# Patient Record
Sex: MALE | Race: WHITE | NOT HISPANIC OR LATINO | Employment: OTHER
[De-identification: names, ages, dates, MRNs, and addresses within clinical notes are randomized per-mention and may not be internally consistent; named-entity substitution may affect disease eponyms.]

---

## 2017-01-18 ENCOUNTER — MYAURORA ACCOUNT LINK (OUTPATIENT)
Dept: OTHER | Age: 67
End: 2017-01-18

## 2017-01-18 ENCOUNTER — PRIOR ORIGINAL RECORDS (OUTPATIENT)
Dept: OTHER | Age: 67
End: 2017-01-18

## 2017-03-05 DIAGNOSIS — K21.9 GASTROESOPHAGEAL REFLUX DISEASE, ESOPHAGITIS PRESENCE NOT SPECIFIED: Primary | ICD-10-CM

## 2017-03-07 RX ORDER — PANTOPRAZOLE SODIUM 40 MG/1
TABLET, DELAYED RELEASE ORAL
Qty: 90 TABLET | Refills: 1 | Status: SHIPPED | OUTPATIENT
Start: 2017-03-07 | End: 2017-06-29

## 2017-03-07 NOTE — TELEPHONE ENCOUNTER
Medication refilled per protocol.     LOV 12/9/2016    Future Appointments  Date Time Provider Cindi Calhoun   6/9/2017 8:30 AM Chris Duvall MD [facility] 3 [facility]         Signed Prescriptions Disp Refills    PANTOPRAZOLE SODIUM 40 MG Oral Tab EC 90

## 2017-04-03 ENCOUNTER — MYAURORA ACCOUNT LINK (OUTPATIENT)
Dept: OTHER | Age: 67
End: 2017-04-03

## 2017-04-03 ENCOUNTER — PRIOR ORIGINAL RECORDS (OUTPATIENT)
Dept: OTHER | Age: 67
End: 2017-04-03

## 2017-04-04 ENCOUNTER — MED REC SCAN ONLY (OUTPATIENT)
Dept: FAMILY MEDICINE CLINIC | Facility: CLINIC | Age: 67
End: 2017-04-04

## 2017-04-04 ENCOUNTER — PRIOR ORIGINAL RECORDS (OUTPATIENT)
Dept: OTHER | Age: 67
End: 2017-04-04

## 2017-04-05 ENCOUNTER — PRIOR ORIGINAL RECORDS (OUTPATIENT)
Dept: OTHER | Age: 67
End: 2017-04-05

## 2017-04-05 ENCOUNTER — HOSPITAL (OUTPATIENT)
Dept: OTHER | Age: 67
End: 2017-04-05
Attending: INTERNAL MEDICINE

## 2017-04-05 ENCOUNTER — DIAGNOSTIC TRANS (OUTPATIENT)
Dept: OTHER | Age: 67
End: 2017-04-05

## 2017-04-05 LAB
ANALYZER ANC (IANC): NORMAL
ANION GAP SERPL CALC-SCNC: 13 MMOL/L (ref 10–20)
BUN SERPL-MCNC: 17 MG/DL (ref 6–20)
BUN/CREAT SERPL: 19 (ref 7–25)
CALCIUM SERPL-MCNC: 9.1 MG/DL (ref 8.4–10.2)
CHLORIDE: 102 MMOL/L (ref 98–107)
CO2 SERPL-SCNC: 29 MMOL/L (ref 21–32)
CREAT SERPL-MCNC: 0.9 MG/DL (ref 0.67–1.17)
ERYTHROCYTE [DISTWIDTH] IN BLOOD: 12.9 % (ref 11–15)
GLUCOSE SERPL-MCNC: 139 MG/DL (ref 65–99)
HEMATOCRIT: 46.3 % (ref 39–51)
HGB BLD-MCNC: 16.4 GM/DL (ref 13–17)
MCH RBC QN AUTO: 31.8 PG (ref 26–34)
MCHC RBC AUTO-ENTMCNC: 35.4 GM/DL (ref 32–36.5)
MCV RBC AUTO: 89.7 FL (ref 78–100)
PLATELET # BLD: 160 THOUSAND/MCL (ref 140–450)
POTASSIUM SERPL-SCNC: 3.6 MMOL/L (ref 3.4–5.1)
RBC # BLD: 5.16 MILLION/MCL (ref 4.5–5.9)
SODIUM SERPL-SCNC: 140 MMOL/L (ref 135–145)
WBC # BLD: 8.6 THOUSAND/MCL (ref 4.2–11)

## 2017-04-06 LAB
BUN: 17 MG/DL
CALCIUM: 9.1 MG/DL
CHLORIDE: 102 MEQ/L
CREATININE, SERUM: 0.9 MG/DL
GLUCOSE: 139 MG/DL
HEMATOCRIT: 46.3 %
HEMOGLOBIN: 16.4 G/DL
PLATELETS: 160 K/UL
POTASSIUM, SERUM: 3.6 MEQ/L
RED BLOOD COUNT: 5.16 X 10-6/U
SODIUM: 140 MEQ/L
WHITE BLOOD COUNT: 8.6 X 10-3/U

## 2017-04-07 ENCOUNTER — PRIOR ORIGINAL RECORDS (OUTPATIENT)
Dept: OTHER | Age: 67
End: 2017-04-07

## 2017-04-10 ENCOUNTER — PRIOR ORIGINAL RECORDS (OUTPATIENT)
Dept: OTHER | Age: 67
End: 2017-04-10

## 2017-04-19 ENCOUNTER — PRIOR ORIGINAL RECORDS (OUTPATIENT)
Dept: OTHER | Age: 67
End: 2017-04-19

## 2017-04-19 ENCOUNTER — OFFICE VISIT (OUTPATIENT)
Dept: FAMILY MEDICINE CLINIC | Facility: CLINIC | Age: 67
End: 2017-04-19

## 2017-04-19 ENCOUNTER — LAB ENCOUNTER (OUTPATIENT)
Dept: LAB | Age: 67
End: 2017-04-19
Payer: COMMERCIAL

## 2017-04-19 VITALS
BODY MASS INDEX: 33.22 KG/M2 | DIASTOLIC BLOOD PRESSURE: 64 MMHG | RESPIRATION RATE: 12 BRPM | HEIGHT: 75.4 IN | HEART RATE: 68 BPM | WEIGHT: 270 LBS | SYSTOLIC BLOOD PRESSURE: 132 MMHG

## 2017-04-19 DIAGNOSIS — R91.1 LUNG NODULE SEEN ON IMAGING STUDY: ICD-10-CM

## 2017-04-19 DIAGNOSIS — J01.00 ACUTE NON-RECURRENT MAXILLARY SINUSITIS: ICD-10-CM

## 2017-04-19 DIAGNOSIS — I10 ESSENTIAL HYPERTENSION, MALIGNANT: ICD-10-CM

## 2017-04-19 DIAGNOSIS — R94.39 ABNORMAL STRESS TEST: Primary | ICD-10-CM

## 2017-04-19 DIAGNOSIS — E55.9 AVITAMINOSIS D: ICD-10-CM

## 2017-04-19 DIAGNOSIS — I25.10 CORONARY ATHEROSCLEROSIS OF NATIVE CORONARY ARTERY: ICD-10-CM

## 2017-04-19 DIAGNOSIS — E78.2 MIXED HYPERLIPIDEMIA: Primary | ICD-10-CM

## 2017-04-19 DIAGNOSIS — N40.0 BENIGN NON-NODULAR PROSTATIC HYPERPLASIA WITHOUT LOWER URINARY TRACT SYMPTOMS: ICD-10-CM

## 2017-04-19 PROCEDURE — 80053 COMPREHEN METABOLIC PANEL: CPT

## 2017-04-19 PROCEDURE — 82306 VITAMIN D 25 HYDROXY: CPT

## 2017-04-19 PROCEDURE — 36415 COLL VENOUS BLD VENIPUNCTURE: CPT

## 2017-04-19 PROCEDURE — 84153 ASSAY OF PSA TOTAL: CPT

## 2017-04-19 PROCEDURE — 80061 LIPID PANEL: CPT

## 2017-04-19 PROCEDURE — 99214 OFFICE O/P EST MOD 30 MIN: CPT | Performed by: FAMILY MEDICINE

## 2017-04-19 RX ORDER — AZITHROMYCIN 250 MG/1
TABLET, FILM COATED ORAL
Qty: 6 TABLET | Refills: 0 | Status: SHIPPED | OUTPATIENT
Start: 2017-04-19 | End: 2017-10-20 | Stop reason: ALTCHOICE

## 2017-04-19 RX ORDER — AZELASTINE 1 MG/ML
2 SPRAY, METERED NASAL 2 TIMES DAILY
Qty: 1 BOTTLE | Refills: 3 | Status: SHIPPED | OUTPATIENT
Start: 2017-04-19 | End: 2018-05-09

## 2017-04-19 NOTE — PROGRESS NOTES
Patient presents with:  Test Results     HPI:   Genia Up is a 77year old male who presents to the office to discuss recent testing results . Heart testing, and an abnl CXR. Cardiologist is Doreen. Heart history: 2010 he has quad bypass. including Test Results. 1. Abnormal stress test  And cath  Need to follow up with cards to determine next step  Avoid heavy exertion in mean time.      2. Lung nodule seen on imaging study  Subtle nodule in LLL  Will order CT for better definition.  - CT

## 2017-04-24 LAB
ALBUMIN: 3.9 G/DL
ALT (SGPT): 71 U/L
AST (SGOT): 59 U/L
BILIRUBIN TOTAL: 0.7 MG/DL
BUN: 14 MG/DL
CALCIUM: 9.8 MG/DL
CHLORIDE: 102 MEQ/L
CHOLESTEROL, TOTAL: 186 MG/DL
CREATININE, SERUM: 1.09 MG/DL
GLUCOSE: 115 MG/DL
GLUCOSE: 115 MG/DL
HDL CHOLESTEROL: 48 MG/DL
LDL CHOLESTEROL: 89 MG/DL
POTASSIUM, SERUM: 3.7 MEQ/L
PROTEIN, TOTAL: 7.7 G/DL
SGOT (AST): 59 IU/L
SGPT (ALT): 71 IU/L
SODIUM: 140 MEQ/L
TRIGLYCERIDES: 247 MG/DL
VITAMIN D 25-OH: 37.2 NG/ML

## 2017-04-26 ENCOUNTER — PRIOR ORIGINAL RECORDS (OUTPATIENT)
Dept: OTHER | Age: 67
End: 2017-04-26

## 2017-05-01 ENCOUNTER — HOSPITAL ENCOUNTER (OUTPATIENT)
Dept: CT IMAGING | Facility: HOSPITAL | Age: 67
Discharge: HOME | End: 2017-05-01
Attending: FAMILY MEDICINE
Payer: COMMERCIAL

## 2017-05-01 DIAGNOSIS — R91.1 LUNG NODULE SEEN ON IMAGING STUDY: ICD-10-CM

## 2017-05-01 PROCEDURE — 71260 CT THORAX DX C+: CPT

## 2017-06-29 DIAGNOSIS — K21.9 GASTROESOPHAGEAL REFLUX DISEASE, ESOPHAGITIS PRESENCE NOT SPECIFIED: ICD-10-CM

## 2017-06-29 NOTE — TELEPHONE ENCOUNTER
New script for Pantoprazole Sodium 40 mg # 80 from [facility] (new pharmacy -updated in patients chart). Rx pended. Routed to Clinical Staff.

## 2017-06-30 RX ORDER — PANTOPRAZOLE SODIUM 40 MG/1
40 TABLET, DELAYED RELEASE ORAL
Qty: 90 TABLET | Refills: 1 | Status: SHIPPED | OUTPATIENT
Start: 2017-06-30 | End: 2017-12-31

## 2017-10-09 ENCOUNTER — TELEPHONE (OUTPATIENT)
Dept: FAMILY MEDICINE CLINIC | Facility: CLINIC | Age: 67
End: 2017-10-09

## 2017-10-09 DIAGNOSIS — R73.9 ELEVATED BLOOD SUGAR: ICD-10-CM

## 2017-10-09 DIAGNOSIS — E78.00 PURE HYPERCHOLESTEROLEMIA: Primary | ICD-10-CM

## 2017-10-09 DIAGNOSIS — M10.9 GOUTY ARTHROPATHY: ICD-10-CM

## 2017-10-17 ENCOUNTER — LAB ENCOUNTER (OUTPATIENT)
Dept: LAB | Age: 67
End: 2017-10-17
Attending: FAMILY MEDICINE
Payer: COMMERCIAL

## 2017-10-17 ENCOUNTER — PRIOR ORIGINAL RECORDS (OUTPATIENT)
Dept: OTHER | Age: 67
End: 2017-10-17

## 2017-10-17 DIAGNOSIS — R73.9 ELEVATED BLOOD SUGAR: ICD-10-CM

## 2017-10-17 DIAGNOSIS — E55.9 AVITAMINOSIS D: Primary | ICD-10-CM

## 2017-10-17 DIAGNOSIS — E78.00 PURE HYPERCHOLESTEROLEMIA: ICD-10-CM

## 2017-10-17 DIAGNOSIS — M10.9 GOUTY ARTHROPATHY: ICD-10-CM

## 2017-10-17 PROCEDURE — 83036 HEMOGLOBIN GLYCOSYLATED A1C: CPT

## 2017-10-17 PROCEDURE — 85025 COMPLETE CBC W/AUTO DIFF WBC: CPT

## 2017-10-17 PROCEDURE — 82306 VITAMIN D 25 HYDROXY: CPT

## 2017-10-17 PROCEDURE — 80061 LIPID PANEL: CPT

## 2017-10-17 PROCEDURE — 84550 ASSAY OF BLOOD/URIC ACID: CPT

## 2017-10-17 PROCEDURE — 36415 COLL VENOUS BLD VENIPUNCTURE: CPT

## 2017-10-17 PROCEDURE — 80053 COMPREHEN METABOLIC PANEL: CPT

## 2017-10-18 LAB
ALBUMIN: 3.9 G/DL
ALKALINE PHOSPHATATE(ALK PHOS): 62 IU/L
ALT (SGPT): 74 U/L
AST (SGOT): 58 U/L
BILIRUBIN TOTAL: 0.7 MG/DL
BUN: 13 MG/DL
CALCIUM: 9.6 MG/DL
CHLORIDE: 102 MEQ/L
CHOLESTEROL, TOTAL: 183 MG/DL
CREATININE, SERUM: 1.11 MG/DL
GLUCOSE: 136 MG/DL
GLUCOSE: 136 MG/DL
HDL CHOLESTEROL: 47 MG/DL
HEMOGLOBIN A1C: 6.6 %
LDL CHOLESTEROL: 76 MG/DL
POTASSIUM, SERUM: 4 MEQ/L
PROTEIN, TOTAL: 7.8 G/DL
SGOT (AST): 58 IU/L
SGPT (ALT): 74 IU/L
SODIUM: 141 MEQ/L
TRIGLYCERIDES: 298 MG/DL
VITAMIN D 25-OH: 35.8 NG/ML

## 2017-10-19 ENCOUNTER — PRIOR ORIGINAL RECORDS (OUTPATIENT)
Dept: OTHER | Age: 67
End: 2017-10-19

## 2017-10-20 ENCOUNTER — OFFICE VISIT (OUTPATIENT)
Dept: FAMILY MEDICINE CLINIC | Facility: CLINIC | Age: 67
End: 2017-10-20

## 2017-10-20 VITALS
TEMPERATURE: 99 F | HEART RATE: 72 BPM | WEIGHT: 268 LBS | SYSTOLIC BLOOD PRESSURE: 136 MMHG | DIASTOLIC BLOOD PRESSURE: 84 MMHG | RESPIRATION RATE: 12 BRPM | BODY MASS INDEX: 31.64 KG/M2 | HEIGHT: 77 IN

## 2017-10-20 DIAGNOSIS — R73.9 ELEVATED BLOOD SUGAR: ICD-10-CM

## 2017-10-20 DIAGNOSIS — E78.00 PURE HYPERCHOLESTEROLEMIA: ICD-10-CM

## 2017-10-20 DIAGNOSIS — Z00.00 ANNUAL PHYSICAL EXAM: Primary | ICD-10-CM

## 2017-10-20 DIAGNOSIS — I25.812 CORONARY ARTERY DISEASE INVOLVING BYPASS GRAFT OF TRANSPLANTED HEART WITHOUT ANGINA PECTORIS: ICD-10-CM

## 2017-10-20 DIAGNOSIS — I10 ESSENTIAL HYPERTENSION: ICD-10-CM

## 2017-10-20 PROCEDURE — 90653 IIV ADJUVANT VACCINE IM: CPT | Performed by: FAMILY MEDICINE

## 2017-10-20 PROCEDURE — 90471 IMMUNIZATION ADMIN: CPT | Performed by: FAMILY MEDICINE

## 2017-10-20 PROCEDURE — 99397 PER PM REEVAL EST PAT 65+ YR: CPT | Performed by: FAMILY MEDICINE

## 2017-10-20 RX ORDER — TELMISARTAN 80 MG/1
TABLET ORAL
Refills: 1 | COMMUNITY
Start: 2017-09-13 | End: 2019-09-09

## 2017-10-20 RX ORDER — HYDROCHLOROTHIAZIDE 25 MG/1
25 TABLET ORAL DAILY
COMMUNITY
End: 2017-10-20

## 2017-10-20 RX ORDER — CHLORTHALIDONE 25 MG/1
25 TABLET ORAL DAILY
Qty: 90 TABLET | Refills: 3 | Status: SHIPPED | OUTPATIENT
Start: 2017-10-20 | End: 2018-10-18

## 2017-10-20 NOTE — PROGRESS NOTES
Patient presents with:  Physical     HPI:   Eboni Rand is a 79year old male who presents for a complete physical exam.    Last colonoscopy:  2013 - repeat 10 yrs  Last PSA:  Urology,  Normal PSA  Immunizations: PNA 23 in 2015. Zoster 2012.       CA needed for Erectile Dysfunction. Disp:  Rfl:    aspirin 325 MG Oral Tab Take 325 mg by mouth daily. Disp:  Rfl:    Rosuvastatin Calcium (CRESTOR) 10 MG Oral Tab Take 10 mg by mouth nightly.  Disp:  Rfl:    Triamcinolone Acetonide (NASACORT) 55 MCG/ACT Nasal reviewed, negative other than noted above. EXAM:   /84   Pulse 72   Temp 99 °F (37.2 °C) (Oral)   Resp 12   Ht 77\"   Wt 268 lb   BMI 31.78 kg/m²   Body mass index is 31.78 kg/m². General appearance: alert, appears stated age and cooperative. habits are not healthy, placing him at higher risk for cardiac events. Need to eat healthy, exercise, regularly.

## 2017-11-29 ENCOUNTER — PRIOR ORIGINAL RECORDS (OUTPATIENT)
Dept: OTHER | Age: 67
End: 2017-11-29

## 2017-12-18 PROBLEM — M47.812 SPONDYLOSIS OF CERVICAL REGION WITHOUT MYELOPATHY OR RADICULOPATHY: Status: ACTIVE | Noted: 2017-12-18

## 2017-12-22 PROBLEM — M54.2 NECK PAIN: Status: ACTIVE | Noted: 2017-12-22

## 2017-12-31 DIAGNOSIS — K21.9 GASTROESOPHAGEAL REFLUX DISEASE, ESOPHAGITIS PRESENCE NOT SPECIFIED: ICD-10-CM

## 2018-01-02 RX ORDER — PANTOPRAZOLE SODIUM 40 MG/1
TABLET, DELAYED RELEASE ORAL
Qty: 90 TABLET | Refills: 1 | Status: SHIPPED | OUTPATIENT
Start: 2018-01-02 | End: 2018-07-10

## 2018-01-12 DIAGNOSIS — Z87.442 HISTORY OF RENAL CALCULI: ICD-10-CM

## 2018-01-12 RX ORDER — ALLOPURINOL 300 MG/1
TABLET ORAL
Qty: 90 TABLET | Refills: 2 | Status: SHIPPED | OUTPATIENT
Start: 2018-01-12 | End: 2018-05-09

## 2018-01-12 NOTE — TELEPHONE ENCOUNTER
Patient is calling for     Allopurinol (Tab) ZYLOPRIM 300 MG     Refilled to [facility], [place] -

## 2018-01-19 ENCOUNTER — OFFICE VISIT (OUTPATIENT)
Dept: FAMILY MEDICINE CLINIC | Facility: CLINIC | Age: 68
End: 2018-01-19

## 2018-01-19 VITALS
BODY MASS INDEX: 32.26 KG/M2 | SYSTOLIC BLOOD PRESSURE: 122 MMHG | TEMPERATURE: 98 F | WEIGHT: 262.19 LBS | RESPIRATION RATE: 16 BRPM | HEIGHT: 75.5 IN | DIASTOLIC BLOOD PRESSURE: 70 MMHG | HEART RATE: 60 BPM

## 2018-01-19 DIAGNOSIS — R73.9 ELEVATED BLOOD SUGAR: Primary | ICD-10-CM

## 2018-01-19 LAB
CARTRIDGE LOT#: ABNORMAL NUMERIC
HEMOGLOBIN A1C: 6.1 % (ref 4.3–5.6)

## 2018-01-19 PROCEDURE — 83036 HEMOGLOBIN GLYCOSYLATED A1C: CPT | Performed by: FAMILY MEDICINE

## 2018-01-19 PROCEDURE — 99214 OFFICE O/P EST MOD 30 MIN: CPT | Performed by: FAMILY MEDICINE

## 2018-01-19 RX ORDER — AMOXICILLIN 500 MG
1 CAPSULE ORAL DAILY
COMMUNITY
End: 2020-12-16

## 2018-01-19 RX ORDER — METOPROLOL SUCCINATE 25 MG/1
1 TABLET, EXTENDED RELEASE ORAL DAILY
Refills: 2 | COMMUNITY
Start: 2017-11-07 | End: 2020-06-24

## 2018-01-19 NOTE — PROGRESS NOTES
Patient presents with:  Blood Sugar: 3 month f/u     HPI:   Abdulaziz Arriola is a 79year old male who presents to the office for follow up of elevated blood sugars. A1C testing eclipsed the 6.5 threshold in October to 6.6.   Previously he was at a [facility]

## 2018-02-15 DIAGNOSIS — Z87.442 HISTORY OF RENAL CALCULI: ICD-10-CM

## 2018-02-15 RX ORDER — POTASSIUM CITRATE 10 MEQ/1
TABLET, EXTENDED RELEASE ORAL
Qty: 300 TABLET | Refills: 0 | Status: SHIPPED | OUTPATIENT
Start: 2018-02-15 | End: 2018-05-21

## 2018-02-15 NOTE — TELEPHONE ENCOUNTER
LOV 1/19/2018. Last potassium was 4.0 on 10/17/17.     Future Appointments  Date Time Provider Cindi Unique   7/20/2018 9:00 AM Violetta Hemphill MD [facility] 3 [facility]        Refill request for:      Pending Prescriptions Disp Refills    POTASSIUM CITRA

## 2018-03-03 ENCOUNTER — CHARTING TRANS (OUTPATIENT)
Dept: OTHER | Age: 68
End: 2018-03-03

## 2018-05-09 ENCOUNTER — OFFICE VISIT (OUTPATIENT)
Dept: FAMILY MEDICINE CLINIC | Facility: CLINIC | Age: 68
End: 2018-05-09

## 2018-05-09 VITALS
RESPIRATION RATE: 20 BRPM | BODY MASS INDEX: 34 KG/M2 | WEIGHT: 272.63 LBS | DIASTOLIC BLOOD PRESSURE: 64 MMHG | TEMPERATURE: 98 F | HEART RATE: 56 BPM | SYSTOLIC BLOOD PRESSURE: 116 MMHG

## 2018-05-09 DIAGNOSIS — M10.9 GOUTY ARTHROPATHY: Primary | ICD-10-CM

## 2018-05-09 DIAGNOSIS — R10.32 LLQ ABDOMINAL PAIN: ICD-10-CM

## 2018-05-09 DIAGNOSIS — J30.2 SEASONAL ALLERGIC RHINITIS, UNSPECIFIED TRIGGER: ICD-10-CM

## 2018-05-09 PROBLEM — N52.9 ERECTILE DYSFUNCTION: Status: ACTIVE | Noted: 2018-05-09

## 2018-05-09 PROCEDURE — 99214 OFFICE O/P EST MOD 30 MIN: CPT | Performed by: PHYSICIAN ASSISTANT

## 2018-05-09 RX ORDER — AZELASTINE 1 MG/ML
2 SPRAY, METERED NASAL 2 TIMES DAILY
Qty: 3 BOTTLE | Refills: 3 | Status: SHIPPED | OUTPATIENT
Start: 2018-05-09 | End: 2020-01-14

## 2018-05-09 RX ORDER — SILDENAFIL 100 MG/1
100 TABLET, FILM COATED ORAL
Refills: 0 | Status: CANCELLED | OUTPATIENT
Start: 2018-05-09

## 2018-05-09 RX ORDER — ALLOPURINOL 300 MG/1
TABLET ORAL
Qty: 90 TABLET | Refills: 3 | Status: SHIPPED | OUTPATIENT
Start: 2018-05-09 | End: 2019-04-29

## 2018-05-09 NOTE — PROGRESS NOTES
CC:  Patient presents with:  Stomach Pain: for last 2 weeks has had abdominal ache lower epigastric area starts after eating and lasts all day ate lunch today and had gas and upset stomach, has started a probiotic       HPI: Jose Bustamante presents with complaints PANTOPRAZOLE SODIUM 40 MG Oral Tab EC TAKE 1 TABLET BY MOUTH DAILY Disp: 90 tablet Rfl: 1   hydrochlorothiazide 25 MG Oral Tab Take 25 mg by mouth daily.  Disp:  Rfl:    Telmisartan 80 MG Oral Tab TAKE 1 T BY MOUTH DAILY Disp:  Rfl: 1   Sildenafil Citrate normal; no edema   Cardiovascular: RRR; no murmurs, rubs, or extra sounds  Pulmonary/Chest: Clear to auscultation; no wheezing or labored breathing  Abdomen: No distention.  Normal BS; no HSM; McBurney's negative; no rebound tenderness or guarding; no herni defined types were placed in this encounter.       Signed Prescriptions Disp Refills    allopurinol 300 MG Oral Tab 90 tablet 3      Sig: TAKE 1 TABLET BY MOUTH ONCE A DAY      Azelastine HCl 0.1 % Nasal Solution 3 Bottle 3      Si sprays by Nasal route

## 2018-05-21 DIAGNOSIS — Z87.442 HISTORY OF RENAL CALCULI: ICD-10-CM

## 2018-05-22 ENCOUNTER — HOSPITAL ENCOUNTER (OUTPATIENT)
Dept: CT IMAGING | Facility: HOSPITAL | Age: 68
Discharge: HOME | End: 2018-05-22
Attending: PHYSICIAN ASSISTANT
Payer: COMMERCIAL

## 2018-05-22 DIAGNOSIS — R10.32 LLQ ABDOMINAL PAIN: ICD-10-CM

## 2018-05-22 PROCEDURE — 74176 CT ABD & PELVIS W/O CONTRAST: CPT | Performed by: PHYSICIAN ASSISTANT

## 2018-05-22 RX ORDER — POTASSIUM CITRATE 10 MEQ/1
TABLET, EXTENDED RELEASE ORAL
Qty: 300 TABLET | Refills: 0 | Status: SHIPPED | OUTPATIENT
Start: 2018-05-22 | End: 2018-09-05

## 2018-05-22 NOTE — TELEPHONE ENCOUNTER
Refill  request sent from pharmacy for Potassium. LOV 05/09/18 and CMP 10/17/17. Will you approve ? Routed to Dr Low Limon.

## 2018-05-24 ENCOUNTER — TELEPHONE (OUTPATIENT)
Dept: FAMILY MEDICINE CLINIC | Facility: CLINIC | Age: 68
End: 2018-05-24

## 2018-05-24 NOTE — TELEPHONE ENCOUNTER
Patient reports no change in abdominal discomfort. He will continue to monitor and will call back if anything changes.     FYI Routed to Omar Bustamante PA-C

## 2018-06-01 ENCOUNTER — PRIOR ORIGINAL RECORDS (OUTPATIENT)
Dept: OTHER | Age: 68
End: 2018-06-01

## 2018-06-05 ENCOUNTER — TELEPHONE (OUTPATIENT)
Dept: FAMILY MEDICINE CLINIC | Facility: CLINIC | Age: 68
End: 2018-06-05

## 2018-06-05 NOTE — TELEPHONE ENCOUNTER
Received medical records request from [facility] requesting patient's medical records from the past 5 years, 05/31/13 to present. All records located in [address] [place] [address] in which request was sent to [employer]. [address] [place] [address], [facility], [phone]

## 2018-06-06 ENCOUNTER — PRIOR ORIGINAL RECORDS (OUTPATIENT)
Dept: OTHER | Age: 68
End: 2018-06-06

## 2018-06-06 LAB
ALBUMIN: 4.3 G/DL
ALKALINE PHOSPHATATE(ALK PHOS): 50 IU/L
ALT (SGPT): 63 U/L
AST (SGOT): 61 U/L
BILIRUBIN TOTAL: 0.8 MG/DL
BUN: 15 MG/DL
CALCIUM: 9.9 MG/DL
CHLORIDE: 98 MEQ/L
CHOLESTEROL, TOTAL: 193 MG/DL
CREATININE, SERUM: 1.05 MG/DL
GLOBULIN: 2.8 G/DL
GLUCOSE: 131 MG/DL
GLUCOSE: 131 MG/DL
HDL CHOLESTEROL: 40 MG/DL
HEMATOCRIT: 42.8 %
HEMOGLOBIN A1C: 6.5 %
HEMOGLOBIN: 15 G/DL
LDL CHOLESTEROL: 116 MG/DL
PLATELETS: 155 K/UL
POTASSIUM, SERUM: 3.8 MEQ/L
PROTEIN, TOTAL: 7.1 G/DL
RED BLOOD COUNT: 4.73 X 10-6/U
SGOT (AST): 61 IU/L
SGPT (ALT): 63 IU/L
SODIUM: 140 MEQ/L
TRIGLYCERIDES: 259 MG/DL
VITAMIN D 25-OH: 49 NG/ML
WHITE BLOOD COUNT: 7 X 10-3/U

## 2018-07-03 PROBLEM — G57.03 PIRIFORMIS SYNDROME OF BOTH SIDES: Status: ACTIVE | Noted: 2018-07-03

## 2018-07-03 PROBLEM — M16.0 OSTEOARTHRITIS OF BOTH HIPS, UNSPECIFIED OSTEOARTHRITIS TYPE: Status: ACTIVE | Noted: 2018-07-03

## 2018-07-10 DIAGNOSIS — K21.9 GASTROESOPHAGEAL REFLUX DISEASE, ESOPHAGITIS PRESENCE NOT SPECIFIED: ICD-10-CM

## 2018-07-10 RX ORDER — PANTOPRAZOLE SODIUM 40 MG/1
TABLET, DELAYED RELEASE ORAL
Qty: 90 TABLET | Refills: 0 | Status: SHIPPED | OUTPATIENT
Start: 2018-07-10 | End: 2018-10-09

## 2018-07-20 ENCOUNTER — OFFICE VISIT (OUTPATIENT)
Dept: FAMILY MEDICINE CLINIC | Facility: CLINIC | Age: 68
End: 2018-07-20
Payer: COMMERCIAL

## 2018-07-20 VITALS
DIASTOLIC BLOOD PRESSURE: 66 MMHG | BODY MASS INDEX: 33.25 KG/M2 | HEIGHT: 75.6 IN | HEART RATE: 72 BPM | WEIGHT: 270.19 LBS | RESPIRATION RATE: 16 BRPM | TEMPERATURE: 98 F | SYSTOLIC BLOOD PRESSURE: 122 MMHG

## 2018-07-20 DIAGNOSIS — R10.32 LLQ ABDOMINAL PAIN: ICD-10-CM

## 2018-07-20 DIAGNOSIS — E78.00 PURE HYPERCHOLESTEROLEMIA: ICD-10-CM

## 2018-07-20 DIAGNOSIS — R73.9 ELEVATED BLOOD SUGAR: Primary | ICD-10-CM

## 2018-07-20 DIAGNOSIS — I10 ESSENTIAL HYPERTENSION: ICD-10-CM

## 2018-07-20 PROCEDURE — 99215 OFFICE O/P EST HI 40 MIN: CPT | Performed by: FAMILY MEDICINE

## 2018-07-20 NOTE — PROGRESS NOTES
Patient presents with:  HTN  Cholesterol     HPI:   Kosta Mercado is a 76year old male who presents to the office for lab check up. On testing with summer with another provider (cardiology), A1C was 6.5.   In Winter, the A1C was 6.2 and previously oz  07/03/18 : 260 lb  05/09/18 : 272 lb 9.6 oz  01/19/18 : 262 lb 3.2 oz  12/18/17 : 268 lb  10/26/17 : 265 lb      General appearance - alert, well appearing, and in no distress  Neck - supple, no significant adenopathy  Chest - clear to auscultation, no

## 2018-09-05 DIAGNOSIS — Z87.442 HISTORY OF RENAL CALCULI: ICD-10-CM

## 2018-09-10 RX ORDER — POTASSIUM CITRATE 10 MEQ/1
TABLET, EXTENDED RELEASE ORAL
Qty: 300 TABLET | Refills: 0 | Status: SHIPPED | OUTPATIENT
Start: 2018-09-10 | End: 2019-04-08

## 2018-09-12 ENCOUNTER — LAB ENCOUNTER (OUTPATIENT)
Dept: LAB | Age: 68
End: 2018-09-12
Attending: INTERNAL MEDICINE
Payer: COMMERCIAL

## 2018-09-12 ENCOUNTER — PRIOR ORIGINAL RECORDS (OUTPATIENT)
Dept: OTHER | Age: 68
End: 2018-09-12

## 2018-09-12 DIAGNOSIS — E87.6 HYPOPOTASSEMIA: Primary | ICD-10-CM

## 2018-09-12 DIAGNOSIS — I10 HTN (HYPERTENSION): ICD-10-CM

## 2018-09-12 DIAGNOSIS — I25.10 CAD (CORONARY ARTERY DISEASE): ICD-10-CM

## 2018-09-12 LAB
ALT SERPL-CCNC: 58 U/L (ref 17–63)
AST SERPL-CCNC: 46 U/L (ref 15–41)
CHOLEST SMN-MCNC: 172 MG/DL (ref ?–200)
HDLC SERPL-MCNC: 43 MG/DL (ref 40–59)
LDLC SERPL CALC-MCNC: 83 MG/DL (ref ?–100)
NONHDLC SERPL-MCNC: 129 MG/DL (ref ?–130)
TRIGL SERPL-MCNC: 230 MG/DL (ref 30–149)
VLDLC SERPL CALC-MCNC: 46 MG/DL (ref 0–30)

## 2018-09-12 PROCEDURE — 84450 TRANSFERASE (AST) (SGOT): CPT

## 2018-09-12 PROCEDURE — 36415 COLL VENOUS BLD VENIPUNCTURE: CPT

## 2018-09-12 PROCEDURE — 84460 ALANINE AMINO (ALT) (SGPT): CPT

## 2018-09-12 PROCEDURE — 80061 LIPID PANEL: CPT

## 2018-09-13 LAB
ALT (SGPT): 58 U/L
AST (SGOT): 46 U/L
CHOLESTEROL, TOTAL: 172 MG/DL
HDL CHOLESTEROL: 43 MG/DL
LDL CHOLESTEROL: 83 MG/DL
NON-HDL CHOLESTEROL: 129 MG/DL
TRIGLYCERIDES: 230 MG/DL

## 2018-09-14 ENCOUNTER — PRIOR ORIGINAL RECORDS (OUTPATIENT)
Dept: OTHER | Age: 68
End: 2018-09-14

## 2018-10-09 DIAGNOSIS — K21.9 GASTROESOPHAGEAL REFLUX DISEASE, ESOPHAGITIS PRESENCE NOT SPECIFIED: ICD-10-CM

## 2018-10-10 RX ORDER — PANTOPRAZOLE SODIUM 40 MG/1
TABLET, DELAYED RELEASE ORAL
Qty: 90 TABLET | Refills: 3 | Status: SHIPPED | OUTPATIENT
Start: 2018-10-10 | End: 2019-10-04

## 2018-10-18 RX ORDER — CHLORTHALIDONE 25 MG/1
TABLET ORAL
Qty: 90 TABLET | Refills: 0 | Status: SHIPPED | OUTPATIENT
Start: 2018-10-18 | End: 2019-01-09

## 2018-10-23 ENCOUNTER — HOSPITAL ENCOUNTER (OUTPATIENT)
Dept: CT IMAGING | Facility: HOSPITAL | Age: 68
Discharge: HOME | End: 2018-10-23
Attending: PHYSICIAN ASSISTANT
Payer: COMMERCIAL

## 2018-10-23 DIAGNOSIS — R10.9 RIGHT FLANK PAIN: ICD-10-CM

## 2018-10-23 PROCEDURE — 74176 CT ABD & PELVIS W/O CONTRAST: CPT | Performed by: PHYSICIAN ASSISTANT

## 2018-10-23 NOTE — PROGRESS NOTES
Patient notified via [address] Result has been released, patient account is currently active. No evidence of stones. No additional narcotic refills.

## 2018-11-01 VITALS
RESPIRATION RATE: 16 BRPM | SYSTOLIC BLOOD PRESSURE: 122 MMHG | BODY MASS INDEX: 31.66 KG/M2 | OXYGEN SATURATION: 97 % | DIASTOLIC BLOOD PRESSURE: 84 MMHG | HEIGHT: 76 IN | HEART RATE: 51 BPM | WEIGHT: 260 LBS | TEMPERATURE: 96.3 F

## 2018-11-06 ENCOUNTER — OFFICE VISIT (OUTPATIENT)
Dept: FAMILY MEDICINE CLINIC | Facility: CLINIC | Age: 68
End: 2018-11-06
Payer: COMMERCIAL

## 2018-11-06 VITALS
WEIGHT: 263 LBS | DIASTOLIC BLOOD PRESSURE: 60 MMHG | SYSTOLIC BLOOD PRESSURE: 120 MMHG | RESPIRATION RATE: 14 BRPM | TEMPERATURE: 98 F | HEIGHT: 75.6 IN | HEART RATE: 60 BPM | BODY MASS INDEX: 32.36 KG/M2

## 2018-11-06 DIAGNOSIS — I10 ESSENTIAL HYPERTENSION: ICD-10-CM

## 2018-11-06 DIAGNOSIS — Z00.00 ANNUAL PHYSICAL EXAM: Primary | ICD-10-CM

## 2018-11-06 DIAGNOSIS — Z23 NEED FOR VACCINATION FOR PNEUMOCOCCUS: ICD-10-CM

## 2018-11-06 DIAGNOSIS — Z23 NEEDS FLU SHOT: ICD-10-CM

## 2018-11-06 DIAGNOSIS — I25.812 CORONARY ARTERY DISEASE INVOLVING BYPASS GRAFT OF TRANSPLANTED HEART WITHOUT ANGINA PECTORIS: ICD-10-CM

## 2018-11-06 DIAGNOSIS — I77.9 CAROTID ARTERY DISEASE, UNSPECIFIED LATERALITY (HCC): ICD-10-CM

## 2018-11-06 DIAGNOSIS — E78.00 PURE HYPERCHOLESTEROLEMIA: ICD-10-CM

## 2018-11-06 DIAGNOSIS — R73.9 ELEVATED BLOOD SUGAR: ICD-10-CM

## 2018-11-06 PROCEDURE — 99397 PER PM REEVAL EST PAT 65+ YR: CPT | Performed by: FAMILY MEDICINE

## 2018-11-06 PROCEDURE — 90472 IMMUNIZATION ADMIN EACH ADD: CPT | Performed by: FAMILY MEDICINE

## 2018-11-06 PROCEDURE — 83036 HEMOGLOBIN GLYCOSYLATED A1C: CPT | Performed by: FAMILY MEDICINE

## 2018-11-06 PROCEDURE — 90653 IIV ADJUVANT VACCINE IM: CPT | Performed by: FAMILY MEDICINE

## 2018-11-06 PROCEDURE — 90670 PCV13 VACCINE IM: CPT | Performed by: FAMILY MEDICINE

## 2018-11-06 PROCEDURE — 90471 IMMUNIZATION ADMIN: CPT | Performed by: FAMILY MEDICINE

## 2018-11-06 NOTE — PROGRESS NOTES
Patient presents with:  Physical  Imm/Inj: flu shot     HPI:   Martínez Corrigan is a 76year old male with PMH CAD, HTN who presents for a complete physical exam.     Last colonoscopy:  2017 - repeat 2 yrs - lots of polyps  Last PSA:  managed by urology, Date Value   04/19/2017 1.160           Current Outpatient Medications:  CHLORTHALIDONE 25 MG Oral Tab TAKE 1 TABLET BY MOUTH DAILY Disp: 90 tablet Rfl: 0   PANTOPRAZOLE SODIUM 40 MG Oral Tab EC TAKE 1 TABLET BY MOUTH DAILY Disp: 90 tablet Rfl: 3   JAQUELINE 1968    \"Closed treatment of orbital fracture (non-'blowout')\"   • OTHER SURGICAL HISTORY N/A 12/21/2015    Procedure: EXCISION OF LESION/LIPOMA;  Surgeon: Meaghan Ko MD;  Location: [facility] MAIN OR   • SINUS SURGERY    2003   • SPINE SURGERY PROCEDURE U midline and thyroid not enlarged, symmetric, no tenderness/mass/nodules  Lungs: clear to auscultation bilaterally  Heart: S1, S2 normal, no murmur, click, rub or gallop, regular rate and rhythm  Abdomen: soft, non-tender; bowel sounds normal; no masses,  n

## 2018-11-07 PROBLEM — R10.32 LEFT LOWER QUADRANT PAIN: Status: ACTIVE | Noted: 2018-11-07

## 2018-11-07 PROBLEM — R74.01 NONSPECIFIC ELEVATION OF LEVELS OF TRANSAMINASE OR LACTIC ACID DEHYDROGENASE (LDH): Status: ACTIVE | Noted: 2018-11-07

## 2018-11-07 PROBLEM — R74.02 NONSPECIFIC ELEVATION OF LEVELS OF TRANSAMINASE OR LACTIC ACID DEHYDROGENASE (LDH): Status: ACTIVE | Noted: 2018-11-07

## 2018-11-14 ENCOUNTER — LAB ENCOUNTER (OUTPATIENT)
Dept: LAB | Facility: HOSPITAL | Age: 68
End: 2018-11-14
Attending: PHYSICIAN ASSISTANT
Payer: COMMERCIAL

## 2018-11-14 DIAGNOSIS — Z12.5 SCREENING FOR PROSTATE CANCER: ICD-10-CM

## 2018-11-14 DIAGNOSIS — R82.90 ABNORMAL URINALYSIS: ICD-10-CM

## 2018-11-14 DIAGNOSIS — R74.02 NONSPECIFIC ELEVATION OF LEVELS OF TRANSAMINASE OR LACTIC ACID DEHYDROGENASE (LDH): ICD-10-CM

## 2018-11-14 DIAGNOSIS — R74.01 NONSPECIFIC ELEVATION OF LEVELS OF TRANSAMINASE OR LACTIC ACID DEHYDROGENASE (LDH): ICD-10-CM

## 2018-11-14 PROCEDURE — 83540 ASSAY OF IRON: CPT

## 2018-11-14 PROCEDURE — 86706 HEP B SURFACE ANTIBODY: CPT

## 2018-11-14 PROCEDURE — 36415 COLL VENOUS BLD VENIPUNCTURE: CPT

## 2018-11-14 PROCEDURE — 87340 HEPATITIS B SURFACE AG IA: CPT

## 2018-11-14 PROCEDURE — 83550 IRON BINDING TEST: CPT

## 2018-11-14 PROCEDURE — 84153 ASSAY OF PSA TOTAL: CPT

## 2018-11-14 PROCEDURE — 86803 HEPATITIS C AB TEST: CPT

## 2018-11-14 PROCEDURE — 81015 MICROSCOPIC EXAM OF URINE: CPT

## 2018-11-18 NOTE — PROGRESS NOTES
Date: 2018    To: Hortencia Galindo  : 1950    I hope this letter finds you doing well. I am writing to inform you of the following:          The results of your recent lab tests were normal.       Please call the office at [phone] if

## 2018-12-13 ENCOUNTER — PRIOR ORIGINAL RECORDS (OUTPATIENT)
Dept: OTHER | Age: 68
End: 2018-12-13

## 2018-12-17 ENCOUNTER — MYAURORA ACCOUNT LINK (OUTPATIENT)
Dept: OTHER | Age: 68
End: 2018-12-17

## 2018-12-18 ENCOUNTER — PRIOR ORIGINAL RECORDS (OUTPATIENT)
Dept: OTHER | Age: 68
End: 2018-12-18

## 2019-01-02 ENCOUNTER — WALK IN (OUTPATIENT)
Dept: URGENT CARE | Age: 69
End: 2019-01-02

## 2019-01-02 VITALS
WEIGHT: 265 LBS | TEMPERATURE: 97.9 F | SYSTOLIC BLOOD PRESSURE: 114 MMHG | BODY MASS INDEX: 32.27 KG/M2 | RESPIRATION RATE: 18 BRPM | HEART RATE: 56 BPM | DIASTOLIC BLOOD PRESSURE: 78 MMHG | HEIGHT: 76 IN

## 2019-01-02 DIAGNOSIS — J01.90 ACUTE BACTERIAL SINUSITIS: Primary | ICD-10-CM

## 2019-01-02 DIAGNOSIS — B96.89 ACUTE BACTERIAL SINUSITIS: Primary | ICD-10-CM

## 2019-01-02 PROCEDURE — 99214 OFFICE O/P EST MOD 30 MIN: CPT | Performed by: NURSE PRACTITIONER

## 2019-01-02 RX ORDER — AZELASTINE 1 MG/ML
1 SPRAY, METERED NASAL 2 TIMES DAILY
COMMUNITY
End: 2022-10-18

## 2019-01-02 RX ORDER — TRIAMCINOLONE ACETONIDE 55 UG/1
1 SPRAY, METERED NASAL PRN
COMMUNITY

## 2019-01-02 RX ORDER — PANTOPRAZOLE SODIUM 40 MG/10ML
INJECTION, POWDER, LYOPHILIZED, FOR SOLUTION INTRAVENOUS
COMMUNITY
End: 2020-06-26

## 2019-01-02 RX ORDER — TELMISARTAN 40 MG/1
TABLET ORAL
COMMUNITY
End: 2019-06-19

## 2019-01-02 RX ORDER — ROSUVASTATIN CALCIUM 40 MG/1
40 TABLET, COATED ORAL
COMMUNITY
End: 2019-06-19

## 2019-01-02 RX ORDER — ALLOPURINOL 300 MG/1
TABLET ORAL DAILY
COMMUNITY

## 2019-01-02 RX ORDER — CHLORTHALIDONE 25 MG/1
TABLET ORAL
COMMUNITY
End: 2020-06-26

## 2019-01-02 RX ORDER — AMOXICILLIN AND CLAVULANATE POTASSIUM 875; 125 MG/1; MG/1
1 TABLET, FILM COATED ORAL EVERY 12 HOURS
Qty: 20 TABLET | Refills: 0 | Status: SHIPPED | OUTPATIENT
Start: 2019-01-02 | End: 2019-01-12

## 2019-01-02 ASSESSMENT — ENCOUNTER SYMPTOMS
SINUS PRESSURE: 1
EYES NEGATIVE: 1
SORE THROAT: 0
GASTROINTESTINAL NEGATIVE: 1
FEVER: 0
FATIGUE: 0
RESPIRATORY NEGATIVE: 1
SINUS PAIN: 1
VOICE CHANGE: 0

## 2019-01-09 RX ORDER — CHLORTHALIDONE 25 MG/1
TABLET ORAL
Qty: 90 TABLET | Refills: 0 | Status: SHIPPED | OUTPATIENT
Start: 2019-01-09 | End: 2019-04-29

## 2019-01-09 NOTE — TELEPHONE ENCOUNTER
Requested Prescriptions     Pending Prescriptions Disp Refills   • CHLORTHALIDONE 25 MG Oral Tab [Pharmacy Med Name: CHLORTHALIDONE 25MG TABLETS] 90 tablet 0     Sig: TAKE 1 TABLET BY MOUTH DAILY       LOV 11/6/2018     Appointment scheduled: 2/6/2019

## 2019-02-26 RX ORDER — AMOXICILLIN AND CLAVULANATE POTASSIUM 875; 125 MG/1; MG/1
TABLET, FILM COATED ORAL
COMMUNITY
End: 2019-12-12

## 2019-02-28 VITALS
BODY MASS INDEX: 33.5 KG/M2 | RESPIRATION RATE: 16 BRPM | DIASTOLIC BLOOD PRESSURE: 60 MMHG | WEIGHT: 261 LBS | HEART RATE: 72 BPM | SYSTOLIC BLOOD PRESSURE: 122 MMHG | HEIGHT: 74 IN

## 2019-02-28 VITALS
SYSTOLIC BLOOD PRESSURE: 122 MMHG | RESPIRATION RATE: 16 BRPM | DIASTOLIC BLOOD PRESSURE: 60 MMHG | HEART RATE: 64 BPM | BODY MASS INDEX: 34.65 KG/M2 | WEIGHT: 270 LBS | HEIGHT: 74 IN

## 2019-02-28 VITALS
SYSTOLIC BLOOD PRESSURE: 112 MMHG | BODY MASS INDEX: 34.27 KG/M2 | RESPIRATION RATE: 16 BRPM | WEIGHT: 267 LBS | HEIGHT: 74 IN | HEART RATE: 62 BPM | DIASTOLIC BLOOD PRESSURE: 60 MMHG

## 2019-03-01 VITALS
DIASTOLIC BLOOD PRESSURE: 85 MMHG | HEART RATE: 60 BPM | SYSTOLIC BLOOD PRESSURE: 135 MMHG | BODY MASS INDEX: 34.39 KG/M2 | RESPIRATION RATE: 16 BRPM | HEIGHT: 74 IN | WEIGHT: 268 LBS

## 2019-03-01 VITALS — DIASTOLIC BLOOD PRESSURE: 60 MMHG | HEART RATE: 60 BPM | WEIGHT: 271 LBS | SYSTOLIC BLOOD PRESSURE: 108 MMHG

## 2019-04-03 RX ORDER — POTASSIUM CHLORIDE 20 MEQ/1
TABLET, EXTENDED RELEASE ORAL
COMMUNITY
Start: 2014-05-20 | End: 2020-06-26

## 2019-04-03 RX ORDER — METOPROLOL SUCCINATE 25 MG/1
TABLET, EXTENDED RELEASE ORAL
COMMUNITY
End: 2019-08-27 | Stop reason: SDUPTHER

## 2019-04-03 RX ORDER — ASPIRIN 325 MG
1 TABLET ORAL DAILY
COMMUNITY
Start: 2010-07-29

## 2019-04-03 RX ORDER — AMOXICILLIN 500 MG
CAPSULE ORAL
COMMUNITY
Start: 2017-04-26 | End: 2020-06-26

## 2019-04-08 DIAGNOSIS — Z87.442 HISTORY OF RENAL CALCULI: ICD-10-CM

## 2019-04-08 RX ORDER — POTASSIUM CITRATE 10 MEQ/1
TABLET, EXTENDED RELEASE ORAL
Qty: 300 TABLET | Refills: 0 | Status: SHIPPED | OUTPATIENT
Start: 2019-04-08 | End: 2019-07-06

## 2019-04-08 NOTE — TELEPHONE ENCOUNTER
Refill request Potassium    LOV 11/6/18    Potassium 10 MeQ , 3 tabs once a day, 9/10/18 #300    Refill processed

## 2019-04-23 ENCOUNTER — TELEPHONE (OUTPATIENT)
Dept: FAMILY MEDICINE CLINIC | Facility: CLINIC | Age: 69
End: 2019-04-23

## 2019-04-23 DIAGNOSIS — M10.9 GOUTY ARTHROPATHY: ICD-10-CM

## 2019-04-24 RX ORDER — CHLORTHALIDONE 25 MG/1
TABLET ORAL
Qty: 90 TABLET | Refills: 0 | OUTPATIENT
Start: 2019-04-24

## 2019-04-24 NOTE — TELEPHONE ENCOUNTER
Requested Prescriptions     Pending Prescriptions Disp Refills   • CHLORTHALIDONE 25 MG Oral Tab [Pharmacy Med Name: CHLORTHALIDONE 25MG TABLETS] 90 tablet 0     Sig: TAKE 1 TABLET BY MOUTH DAILY       LOV 11/6/2018 .      Cancelled BP f/u appointment 2/6/1

## 2019-04-24 NOTE — TELEPHONE ENCOUNTER
Due for an appt to discuss sugars - very concerned about diabetes and he cancelled out scheduled Feb appt.     If he can schedule, ok to refill the chlorthalidone

## 2019-04-29 RX ORDER — CHLORTHALIDONE 25 MG/1
25 TABLET ORAL
Qty: 90 TABLET | Refills: 0 | Status: SHIPPED | OUTPATIENT
Start: 2019-04-29 | End: 2019-07-06

## 2019-04-29 RX ORDER — ALLOPURINOL 300 MG/1
TABLET ORAL
Qty: 90 TABLET | Refills: 1 | Status: SHIPPED | OUTPATIENT
Start: 2019-04-29 | End: 2019-10-04

## 2019-04-29 NOTE — TELEPHONE ENCOUNTER
Pt has scheduled his med visit w/Dr Gene Blackwood on 5/8/19. He is requesting meds to be sent to his [employer] as he will be out before appt.  Also requesting Alloprurinol

## 2019-05-08 ENCOUNTER — OFFICE VISIT (OUTPATIENT)
Dept: FAMILY MEDICINE CLINIC | Facility: CLINIC | Age: 69
End: 2019-05-08
Payer: COMMERCIAL

## 2019-05-08 VITALS
DIASTOLIC BLOOD PRESSURE: 60 MMHG | SYSTOLIC BLOOD PRESSURE: 112 MMHG | BODY MASS INDEX: 33.12 KG/M2 | HEIGHT: 76 IN | OXYGEN SATURATION: 98 % | TEMPERATURE: 98 F | WEIGHT: 272 LBS | HEART RATE: 76 BPM | RESPIRATION RATE: 16 BRPM

## 2019-05-08 DIAGNOSIS — R73.9 ELEVATED BLOOD SUGAR: ICD-10-CM

## 2019-05-08 DIAGNOSIS — K57.90 DIVERTICULAR DISEASE: ICD-10-CM

## 2019-05-08 DIAGNOSIS — E11.9 TYPE 2 DIABETES MELLITUS WITHOUT COMPLICATION, WITHOUT LONG-TERM CURRENT USE OF INSULIN (HCC): Primary | ICD-10-CM

## 2019-05-08 DIAGNOSIS — R10.32 LEFT LOWER QUADRANT PAIN: ICD-10-CM

## 2019-05-08 DIAGNOSIS — I10 ESSENTIAL HYPERTENSION: ICD-10-CM

## 2019-05-08 DIAGNOSIS — E78.00 PURE HYPERCHOLESTEROLEMIA: ICD-10-CM

## 2019-05-08 PROCEDURE — 99214 OFFICE O/P EST MOD 30 MIN: CPT | Performed by: FAMILY MEDICINE

## 2019-05-08 PROCEDURE — 83036 HEMOGLOBIN GLYCOSYLATED A1C: CPT | Performed by: FAMILY MEDICINE

## 2019-05-08 RX ORDER — METFORMIN HYDROCHLORIDE 500 MG/1
500 TABLET, EXTENDED RELEASE ORAL 2 TIMES DAILY WITH MEALS
Qty: 60 TABLET | Refills: 5 | Status: SHIPPED | OUTPATIENT
Start: 2019-05-08 | End: 2020-02-11

## 2019-05-08 RX ORDER — CIPROFLOXACIN 500 MG/1
500 TABLET, FILM COATED ORAL 2 TIMES DAILY
Qty: 14 TABLET | Refills: 0 | Status: SHIPPED | OUTPATIENT
Start: 2019-05-08 | End: 2019-05-15

## 2019-05-08 RX ORDER — METRONIDAZOLE 500 MG/1
500 TABLET ORAL 3 TIMES DAILY
Qty: 21 TABLET | Refills: 0 | Status: SHIPPED | OUTPATIENT
Start: 2019-05-08 | End: 2019-05-15

## 2019-05-08 NOTE — PROGRESS NOTES
Patient presents with:  Blood Pressure: Medication Refills  Sinus Problem: x 2-3 weeks  Ear Problem: Popping on Right Ear   Abdominal Pain: LLQ pain      HPI:   Liss Prado is a 76year old male who presents to the office for 6 mo med check up.     Pr visualized feet and the appearance is normal.  Bilateral monofilament/sensation of both feet is normal.  Pulsation pedal pulse exam of both lower legs/feet is normal as well.   Pulses: 2+ and symmetric  Neurologic: Alert and oriented X 3, normal strength an

## 2019-06-11 LAB
25(OH)D3 SERPL-MCNC: 50 NG/ML (ref 30–100)
ALBUMIN SERPL-MCNC: 4.2 G/DL (ref 3.6–5.1)
ALBUMIN/GLOB SERPL: 1.7 (CALC) (ref 1–2.5)
ALP SERPL-CCNC: 45 U/L (ref 40–115)
ALT SERPL-CCNC: 40 U/L (ref 9–46)
AST SERPL-CCNC: 29 U/L (ref 10–35)
BASOPHILS # BLD AUTO: 80 CELLS/UL (ref 0–200)
BASOPHILS NFR BLD AUTO: 1.2 %
BILIRUB SERPL-MCNC: 0.8 MG/DL (ref 0.2–1.2)
BUN SERPL-MCNC: 17 MG/DL (ref 7–25)
BUN/CREAT SERPL: ABNORMAL (CALC) (ref 6–22)
CALCIUM SERPL-MCNC: 9.8 MG/DL (ref 8.6–10.3)
CHLORIDE SERPL-SCNC: 100 MMOL/L (ref 98–110)
CHOLEST SERPL-MCNC: 175 MG/DL
CHOLEST/HDLC SERPL: 4.3 (CALC)
CO2 SERPL-SCNC: 32 MMOL/L (ref 20–32)
CREAT SERPL-MCNC: 0.95 MG/DL (ref 0.7–1.25)
EOSINOPHIL # BLD AUTO: 228 CELLS/UL (ref 15–500)
EOSINOPHIL NFR BLD AUTO: 3.4 %
ERYTHROCYTE [DISTWIDTH] IN BLOOD BY AUTOMATED COUNT: 13 % (ref 11–15)
GFRSERPLBLD MDRD-ARVRAT: 81 ML/MIN/1.73M2
GLOBULIN SER CALC-MCNC: 2.5 G/DL (CALC) (ref 1.9–3.7)
GLUCOSE SERPL-MCNC: 148 MG/DL (ref 65–99)
HBA1C MFR BLD: 6.9 % OF TOTAL HGB
HCT VFR BLD AUTO: 46.7 % (ref 38.5–50)
HDLC SERPL-MCNC: 41 MG/DL
HGB BLD-MCNC: 16.4 G/DL (ref 13.2–17.1)
LDLC SERPL CALC-MCNC: 97 MG/DL (CALC)
LYMPHOCYTES # BLD AUTO: 2017 CELLS/UL (ref 850–3900)
LYMPHOCYTES NFR BLD AUTO: 30.1 %
MCH RBC QN AUTO: 32 PG (ref 27–33)
MCHC RBC AUTO-ENTMCNC: 35.1 G/DL (ref 32–36)
MCV RBC AUTO: 91.2 FL (ref 80–100)
MONOCYTES # BLD AUTO: 509 CELLS/UL (ref 200–950)
MONOCYTES NFR BLD AUTO: 7.6 %
NEUTROPHILS # BLD AUTO: 3866 CELLS/UL (ref 1500–7800)
NEUTROPHILS NFR BLD AUTO: 57.7 %
NONHDLC SERPL-MCNC: 134 MG/DL (CALC)
PLATELET # BLD AUTO: 152 THOUSAND/UL (ref 140–400)
PMV BLD REES-ECKER: 13.4 FL (ref 7.5–12.5)
POTASSIUM SERPL-SCNC: 3.8 MMOL/L (ref 3.5–5.3)
PROT SERPL-MCNC: 6.7 G/DL (ref 6.1–8.1)
RBC # BLD AUTO: 5.12 MILLION/UL (ref 4.2–5.8)
SODIUM SERPL-SCNC: 142 MMOL/L (ref 135–146)
TRIGL SERPL-MCNC: 256 MG/DL
WBC # BLD AUTO: 6.7 THOUSAND/UL (ref 3.8–10.8)

## 2019-06-19 ENCOUNTER — OFFICE VISIT (OUTPATIENT)
Dept: CARDIOLOGY | Age: 69
End: 2019-06-19

## 2019-06-19 ENCOUNTER — TELEPHONE (OUTPATIENT)
Dept: CARDIOLOGY | Age: 69
End: 2019-06-19

## 2019-06-19 VITALS
DIASTOLIC BLOOD PRESSURE: 50 MMHG | SYSTOLIC BLOOD PRESSURE: 110 MMHG | RESPIRATION RATE: 16 BRPM | HEART RATE: 64 BPM | WEIGHT: 268 LBS | BODY MASS INDEX: 32.63 KG/M2 | HEIGHT: 76 IN

## 2019-06-19 DIAGNOSIS — E55.9 VITAMIN D DEFICIENCY: ICD-10-CM

## 2019-06-19 DIAGNOSIS — I10 HYPERTENSION, BENIGN: ICD-10-CM

## 2019-06-19 DIAGNOSIS — E11.9 TYPE 2 DIABETES MELLITUS WITHOUT COMPLICATION, WITHOUT LONG-TERM CURRENT USE OF INSULIN (CMD): ICD-10-CM

## 2019-06-19 DIAGNOSIS — I65.23 BILATERAL CAROTID ARTERY STENOSIS: ICD-10-CM

## 2019-06-19 DIAGNOSIS — E78.2 HYPERLIPIDEMIA, MIXED: ICD-10-CM

## 2019-06-19 DIAGNOSIS — I12.9 BENIGN HYPERTENSION WITH CKD (CHRONIC KIDNEY DISEASE) STAGE III (CMD): Primary | ICD-10-CM

## 2019-06-19 DIAGNOSIS — I25.10 CORONARY ARTERY DISEASE INVOLVING NATIVE CORONARY ARTERY OF NATIVE HEART WITHOUT ANGINA PECTORIS: ICD-10-CM

## 2019-06-19 DIAGNOSIS — Z95.1 HX OF CABG: ICD-10-CM

## 2019-06-19 DIAGNOSIS — N18.30 BENIGN HYPERTENSION WITH CKD (CHRONIC KIDNEY DISEASE) STAGE III (CMD): Primary | ICD-10-CM

## 2019-06-19 PROCEDURE — 3074F SYST BP LT 130 MM HG: CPT | Performed by: INTERNAL MEDICINE

## 2019-06-19 PROCEDURE — 99215 OFFICE O/P EST HI 40 MIN: CPT | Performed by: INTERNAL MEDICINE

## 2019-06-19 PROCEDURE — 3078F DIAST BP <80 MM HG: CPT | Performed by: INTERNAL MEDICINE

## 2019-06-19 RX ORDER — ROSUVASTATIN CALCIUM 10 MG/1
10 TABLET, COATED ORAL DAILY
COMMUNITY
End: 2019-07-08 | Stop reason: SDUPTHER

## 2019-06-19 RX ORDER — TELMISARTAN 80 MG/1
80 TABLET ORAL DAILY
COMMUNITY
End: 2019-11-05 | Stop reason: SDUPTHER

## 2019-06-19 ASSESSMENT — ENCOUNTER SYMPTOMS
CHILLS: 0
DIZZINESS: 0
SUSPICIOUS LESIONS: 0
COUGH: 0
NIGHT SWEATS: 1
PSYCHIATRIC NEGATIVE: 1
FEVER: 0
ABDOMINAL PAIN: 0
BRUISES/BLEEDS EASILY: 0
HEMATOCHEZIA: 0
ENDOCRINE NEGATIVE: 1
WEIGHT LOSS: 0
SYNCOPE: 0
SHORTNESS OF BREATH: 1
WEIGHT GAIN: 0
HEMOPTYSIS: 0
LIGHT-HEADEDNESS: 0

## 2019-07-06 DIAGNOSIS — Z87.442 HISTORY OF RENAL CALCULI: ICD-10-CM

## 2019-07-09 RX ORDER — POTASSIUM CITRATE 10 MEQ/1
TABLET, EXTENDED RELEASE ORAL
Qty: 300 TABLET | Refills: 0 | Status: SHIPPED | OUTPATIENT
Start: 2019-07-09 | End: 2019-10-04

## 2019-07-09 RX ORDER — CHLORTHALIDONE 25 MG/1
TABLET ORAL
Qty: 90 TABLET | Refills: 1 | Status: SHIPPED | OUTPATIENT
Start: 2019-07-09 | End: 2020-03-11

## 2019-07-09 NOTE — TELEPHONE ENCOUNTER
Deyanira Montes De Oca from [employer] called stated once approved please call the pharmacy at [phone] to speed up the process

## 2019-07-10 RX ORDER — ROSUVASTATIN CALCIUM 40 MG/1
40 TABLET, COATED ORAL DAILY
Qty: 90 TABLET | Refills: 3 | Status: SHIPPED | OUTPATIENT
Start: 2019-07-10 | End: 2019-07-15 | Stop reason: SDUPTHER

## 2019-07-15 ENCOUNTER — TELEPHONE (OUTPATIENT)
Dept: CARDIOLOGY | Age: 69
End: 2019-07-15

## 2019-07-15 RX ORDER — ROSUVASTATIN CALCIUM 40 MG/1
40 TABLET, COATED ORAL DAILY
Qty: 90 TABLET | Refills: 1 | Status: SHIPPED | OUTPATIENT
Start: 2019-07-15 | End: 2019-07-18 | Stop reason: DRUGHIGH

## 2019-07-17 ENCOUNTER — ANCILLARY PROCEDURE (OUTPATIENT)
Dept: CARDIOLOGY | Age: 69
End: 2019-07-17
Attending: INTERNAL MEDICINE

## 2019-07-17 VITALS — WEIGHT: 270 LBS | BODY MASS INDEX: 32.88 KG/M2 | HEIGHT: 76 IN

## 2019-07-17 DIAGNOSIS — Z95.1 HX OF CABG: ICD-10-CM

## 2019-07-17 DIAGNOSIS — I65.23 BILATERAL CAROTID ARTERY STENOSIS: ICD-10-CM

## 2019-07-17 DIAGNOSIS — I25.10 CORONARY ARTERY DISEASE INVOLVING NATIVE CORONARY ARTERY OF NATIVE HEART WITHOUT ANGINA PECTORIS: ICD-10-CM

## 2019-07-17 DIAGNOSIS — I10 HYPERTENSION, BENIGN: ICD-10-CM

## 2019-07-17 DIAGNOSIS — I12.9 BENIGN HYPERTENSION WITH CKD (CHRONIC KIDNEY DISEASE) STAGE III (CMD): ICD-10-CM

## 2019-07-17 DIAGNOSIS — E11.9 TYPE 2 DIABETES MELLITUS WITHOUT COMPLICATION, WITHOUT LONG-TERM CURRENT USE OF INSULIN (CMD): ICD-10-CM

## 2019-07-17 DIAGNOSIS — E78.2 HYPERLIPIDEMIA, MIXED: ICD-10-CM

## 2019-07-17 DIAGNOSIS — E55.9 VITAMIN D DEFICIENCY: ICD-10-CM

## 2019-07-17 DIAGNOSIS — N18.30 BENIGN HYPERTENSION WITH CKD (CHRONIC KIDNEY DISEASE) STAGE III (CMD): ICD-10-CM

## 2019-07-17 LAB
HEART RATE RESERVE PREDICTED: 23.18 BPM
LV EF: 48 %
PEAK HR ACHIEVED: 116 BPM
RESTING HR ACHIEVED: 57 BPM
STRESS BASELINE BP: NORMAL MMHG
STRESS O2 SAT REST: 96 %
STRESS PERCENT HR: 77 %
STRESS POST ESTIMATED WORKLOAD: 5.5 METS
STRESS POST EXERCISE DUR MIN: 6 MIN
STRESS POST O2 SAT PEAK: 94 %
STRESS POST PEAK BP: NORMAL MMHG
STRESS TARGET HR: 151 BPM

## 2019-07-17 PROCEDURE — 94621 CARDIOPULM EXERCISE TESTING: CPT | Performed by: INTERNAL MEDICINE

## 2019-07-17 PROCEDURE — A9502 TC99M TETROFOSMIN: HCPCS | Performed by: INTERNAL MEDICINE

## 2019-07-17 PROCEDURE — X1094 CARDIOPULMONARY STRESS TEST: HCPCS | Performed by: INTERNAL MEDICINE

## 2019-07-17 PROCEDURE — 78452 HT MUSCLE IMAGE SPECT MULT: CPT | Performed by: INTERNAL MEDICINE

## 2019-07-17 RX ORDER — REGADENOSON 0.08 MG/ML
0.4 INJECTION, SOLUTION INTRAVENOUS ONCE
Status: COMPLETED | OUTPATIENT
Start: 2019-07-17 | End: 2019-07-17

## 2019-07-17 RX ADMIN — REGADENOSON 0.4 MG: 0.08 INJECTION, SOLUTION INTRAVENOUS at 10:00

## 2019-07-17 ASSESSMENT — EXERCISE STRESS TEST
MPH: 2.5
PEAK_HR: 88
STAGE_CATEGORIES: RECOVERY 3
PEAK_BP: 120/80
PEAK_BP: 140/80
PEAK_BP: 130/80
PEAK_RPP: 12032
PEAK_O2_SAT: 94
STAGE_CATEGORIES: 1
PEAK_O2_SAT: 97
PEAK_RPP: 14400
STAGE_CATEGORIES: RECOVERY 0
PEAK_HR: 96
PEAK_O2_SAT: 96
PEAK_HR: 94
GRADE: 8
PEAK_BP: 130/70
STAGE_CATEGORIES: RECOVERY 2
PEAK_RPP: 18560
PEAK_HR: 77
COMMENTS: REGADENOSON CONVERTED
PEAK_HR: 116
STAGE_CATEGORIES: RECOVERY 4
PEAK_HR: 100
PEAK_HR: 90
PEAK_O2_SAT: 96
COMMENTS: RPE:11
PEAK_HR: 116
PEAK_HR: 57
PEAK_RPP: 6840
PEAK_RPP: 10010
PEAK_BP: 144/86
STAGE_CATEGORIES: RECOVERY 1
PEAK_RPP: 12600
STAGE_CATEGORIES: 3
STAGE_CATEGORIES: RESTING
STAGE_CATEGORIES: 2
PEAK_RPP: 11440
PEAK_BP: 160/80
MPH: 3.3
PEAK_RPP: 13824
PEAK_BP: 144/86
PEAK_BP: 160/80
GRADE: 9
COMMENTS: RPE:15
PEAK_BP: 128/76
MPH: 3.9
PEAK_O2_SAT: 98
GRADE: 7
PEAK_RPP: 18560

## 2019-07-18 ENCOUNTER — TELEPHONE (OUTPATIENT)
Dept: CARDIOLOGY | Age: 69
End: 2019-07-18

## 2019-07-18 RX ORDER — ROSUVASTATIN CALCIUM 20 MG/1
20 TABLET, COATED ORAL DAILY
COMMUNITY
End: 2019-07-18 | Stop reason: SDUPTHER

## 2019-07-18 RX ORDER — ROSUVASTATIN CALCIUM 20 MG/1
20 TABLET, COATED ORAL DAILY
Qty: 90 TABLET | Refills: 1 | Status: SHIPPED | OUTPATIENT
Start: 2019-07-18 | End: 2020-02-17 | Stop reason: SDUPTHER

## 2019-07-29 LAB
BODY MASS INDEX: 33
BREATHING RESERVE (CALCULATED) PREDICTED: 35.69 %
BREATHING RESERVE (MEASURED) ACHIEVED: 41 %
CHANGE VO2/ CHANGE WR ACHIEVED: 9.5 ML/MIN/WATT
CHRONOTROPIC INDEX PREDICTED: 0.75
HEART RATE RESERVE PREDICTED: 23.18 BPM
HEIGHT: 193 CM
IDEAL BODY WEIGHT: 92 KG
METS ACHIEVED: 5.5
O2 SATURATION ACHIEVED: 94 %
OUES ACHIEVED: 2499.6
PEAK HR ACHIEVED: 116 BPM
PEAK HR PREDICTED: 151 BPM
PEAK O2 PULSE (%) PREDICTED: 111.85 %
PEAK O2 PULSE (ML/BEAT) ACHIEVED: 20.42 ML/BEAT
PEAK O2 PULSE (ML/BEAT) PREDICTED: 18.26 ML/BEAT
PEAK RESPIRATORY RATE ACHIEVED: 30 BRS/MIN
PEAK VE ACHIEVED: 83.6 L/MIN
PECO2 ACHIEVED: 29.3 MMHG
PECO2/PETCO2 AT PREDICTED: 79.19 %
PETCO2 ACHIEVED: 37 MMHG
PREDICTED VO2 % AT AT: 60.89 %
PREDICTED VO2 %: 85.78 %
RER MAX ACHIEVED: 1.06
RESTING HR ACHIEVED: 57 BPM
RESTING MVV: 130 L/MIN
STRESS PERCENT HR: 77 %
STRESS TARGET HR: 151 BPM
VD/VT ACHIEVED: 0.24
VE/VCO2 AT ACHIEVED: 29
VE/VO2 AT ACHIEVED: 23
VO2 AT ACHIEVED: 13.7 ML/KG/MIN
VO2 AT AT (ML/MIN) ACHIEVED: 1672 ML/MIN
VO2 AT, IBW ACHIEVED: 18.17 ML/KG/MIN
VO2 PEAK (ML/KG/MIN) ACHIEVED: 19.3 ML/KG/MIN
VO2 PEAK (ML/KG/MIN) PREDICTED: 22.5 ML/KG/MIN
VO2 PEAK (ML/MIN) ACHIEVED: 2369 ML/MIN
VO2 PEAK (ML/MIN) PREDICTED: 2757 ML/MIN
VO2 PEAK IBW ACHIEVED: 25.75 ML/KG/MIN
VT/IC PREDICTED: 61 %
WEIGHT MEASUREMENT: 123 KG

## 2019-07-30 ENCOUNTER — TELEPHONE (OUTPATIENT)
Dept: CARDIOLOGY | Age: 69
End: 2019-07-30

## 2019-08-27 RX ORDER — METOPROLOL SUCCINATE 25 MG/1
TABLET, EXTENDED RELEASE ORAL
Qty: 90 TABLET | Refills: 2 | Status: SHIPPED | OUTPATIENT
Start: 2019-08-27 | End: 2020-06-26

## 2019-09-12 ENCOUNTER — TELEPHONE (OUTPATIENT)
Dept: CARDIOLOGY | Age: 69
End: 2019-09-12

## 2019-09-25 PROBLEM — Z86.0100 PERSONAL HISTORY OF COLONIC POLYPS: Status: ACTIVE | Noted: 2019-09-25

## 2019-09-25 PROBLEM — R13.10 DYSPHAGIA: Status: ACTIVE | Noted: 2019-09-25

## 2019-09-25 PROBLEM — Z86.010 PERSONAL HISTORY OF COLONIC POLYPS: Status: ACTIVE | Noted: 2019-09-25

## 2019-09-25 PROBLEM — D12.3 BENIGN NEOPLASM OF TRANSVERSE COLON: Status: ACTIVE | Noted: 2019-09-25

## 2019-09-25 PROBLEM — K63.5 POLYP, SIGMOID COLON: Status: ACTIVE | Noted: 2019-09-25

## 2019-10-04 DIAGNOSIS — Z87.442 HISTORY OF RENAL CALCULI: ICD-10-CM

## 2019-10-04 DIAGNOSIS — M10.9 GOUTY ARTHROPATHY: ICD-10-CM

## 2019-10-04 DIAGNOSIS — K21.9 GASTROESOPHAGEAL REFLUX DISEASE, ESOPHAGITIS PRESENCE NOT SPECIFIED: ICD-10-CM

## 2019-10-07 RX ORDER — POTASSIUM CITRATE 10 MEQ/1
30 TABLET, EXTENDED RELEASE ORAL DAILY
Qty: 270 TABLET | Refills: 3 | Status: SHIPPED | OUTPATIENT
Start: 2019-10-07 | End: 2020-06-24

## 2019-10-07 RX ORDER — ALLOPURINOL 300 MG/1
TABLET ORAL
Qty: 90 TABLET | Refills: 3 | Status: SHIPPED | OUTPATIENT
Start: 2019-10-07 | End: 2020-11-02

## 2019-10-07 RX ORDER — PANTOPRAZOLE SODIUM 40 MG/1
TABLET, DELAYED RELEASE ORAL
Qty: 90 TABLET | Refills: 3 | Status: SHIPPED | OUTPATIENT
Start: 2019-10-07 | End: 2020-11-02

## 2019-10-12 DIAGNOSIS — Z87.442 HISTORY OF RENAL CALCULI: ICD-10-CM

## 2019-10-14 RX ORDER — POTASSIUM CITRATE 10 MEQ/1
TABLET, EXTENDED RELEASE ORAL
Qty: 270 TABLET | Refills: 3 | OUTPATIENT
Start: 2019-10-14

## 2019-11-06 RX ORDER — TELMISARTAN 80 MG/1
80 TABLET ORAL DAILY
Qty: 90 TABLET | Refills: 2 | Status: SHIPPED | OUTPATIENT
Start: 2019-11-06 | End: 2020-06-26

## 2019-12-17 ENCOUNTER — WALK IN (OUTPATIENT)
Dept: URGENT CARE | Age: 69
End: 2019-12-17

## 2019-12-17 DIAGNOSIS — J20.9 ACUTE BRONCHITIS, UNSPECIFIED ORGANISM: Primary | ICD-10-CM

## 2019-12-17 PROCEDURE — 99214 OFFICE O/P EST MOD 30 MIN: CPT | Performed by: NURSE PRACTITIONER

## 2019-12-17 RX ORDER — DOXYCYCLINE HYCLATE 100 MG/1
100 CAPSULE ORAL 2 TIMES DAILY
Qty: 20 CAPSULE | Refills: 0 | Status: SHIPPED | OUTPATIENT
Start: 2019-12-17 | End: 2019-12-27

## 2019-12-17 ASSESSMENT — ENCOUNTER SYMPTOMS
WHEEZING: 0
ALLERGIC/IMMUNOLOGIC NEGATIVE: 1
HEMATOLOGIC/LYMPHATIC NEGATIVE: 1
EYES NEGATIVE: 1
PSYCHIATRIC NEGATIVE: 1
SHORTNESS OF BREATH: 0
NEUROLOGICAL NEGATIVE: 1
GASTROINTESTINAL NEGATIVE: 1
COUGH: 1
CONSTITUTIONAL NEGATIVE: 1

## 2019-12-23 ENCOUNTER — TELEPHONE (OUTPATIENT)
Dept: FAMILY MEDICINE CLINIC | Facility: CLINIC | Age: 69
End: 2019-12-23

## 2019-12-31 ENCOUNTER — OFFICE VISIT (OUTPATIENT)
Dept: FAMILY MEDICINE CLINIC | Facility: CLINIC | Age: 69
End: 2019-12-31
Payer: MEDICARE

## 2019-12-31 VITALS
SYSTOLIC BLOOD PRESSURE: 126 MMHG | WEIGHT: 267.38 LBS | TEMPERATURE: 97 F | HEIGHT: 76 IN | RESPIRATION RATE: 18 BRPM | BODY MASS INDEX: 32.56 KG/M2 | HEART RATE: 56 BPM | DIASTOLIC BLOOD PRESSURE: 60 MMHG

## 2019-12-31 DIAGNOSIS — I25.812 CORONARY ARTERY DISEASE INVOLVING BYPASS GRAFT OF TRANSPLANTED HEART WITHOUT ANGINA PECTORIS: ICD-10-CM

## 2019-12-31 DIAGNOSIS — E78.00 PURE HYPERCHOLESTEROLEMIA: ICD-10-CM

## 2019-12-31 DIAGNOSIS — E11.9 TYPE 2 DIABETES MELLITUS WITHOUT COMPLICATION, WITHOUT LONG-TERM CURRENT USE OF INSULIN (HCC): ICD-10-CM

## 2019-12-31 DIAGNOSIS — I10 ESSENTIAL HYPERTENSION: ICD-10-CM

## 2019-12-31 DIAGNOSIS — Z00.00 ENCOUNTER FOR ANNUAL HEALTH EXAMINATION: Primary | ICD-10-CM

## 2019-12-31 PROBLEM — K63.5 POLYP, SIGMOID COLON: Status: RESOLVED | Noted: 2019-09-25 | Resolved: 2019-12-31

## 2019-12-31 PROBLEM — R74.02 NONSPECIFIC ELEVATION OF LEVELS OF TRANSAMINASE OR LACTIC ACID DEHYDROGENASE (LDH): Status: RESOLVED | Noted: 2018-11-07 | Resolved: 2019-12-31

## 2019-12-31 PROBLEM — M54.2 NECK PAIN: Status: RESOLVED | Noted: 2017-12-22 | Resolved: 2019-12-31

## 2019-12-31 PROBLEM — R10.32 LEFT LOWER QUADRANT PAIN: Status: RESOLVED | Noted: 2018-11-07 | Resolved: 2019-12-31

## 2019-12-31 PROBLEM — D12.3 BENIGN NEOPLASM OF TRANSVERSE COLON: Status: RESOLVED | Noted: 2019-09-25 | Resolved: 2019-12-31

## 2019-12-31 PROBLEM — Z86.0100 PERSONAL HISTORY OF COLONIC POLYPS: Status: RESOLVED | Noted: 2019-09-25 | Resolved: 2019-12-31

## 2019-12-31 PROBLEM — Z86.010 PERSONAL HISTORY OF COLONIC POLYPS: Status: RESOLVED | Noted: 2019-09-25 | Resolved: 2019-12-31

## 2019-12-31 PROBLEM — R74.01 NONSPECIFIC ELEVATION OF LEVELS OF TRANSAMINASE OR LACTIC ACID DEHYDROGENASE (LDH): Status: RESOLVED | Noted: 2018-11-07 | Resolved: 2019-12-31

## 2019-12-31 PROCEDURE — G0402 INITIAL PREVENTIVE EXAM: HCPCS | Performed by: FAMILY MEDICINE

## 2019-12-31 NOTE — PROGRESS NOTES
HPI:   Vernell Marks is a 71year old male who presents for a Medicare Initial Preventative Physical Exam (Welcome to Medicare- < 12 months on Medicare). Preventative Screening  Colonoscopy - 9/25/2019. Repeat 3 yrs. 2022.    Prostate - 1.24 a year the last two weeks)?: Several days  PHQ-2 SCORE: 2     Advanced Directive:   He does have a Living Will but we do NOT have it on file in [address] [place] [address] Rd.    The patient has this document but we do not have it in [facility], and patient is instructed to get our office a copy (121.3 kg)  09/09/19 : 268 lb (121.6 kg)  05/08/19 : 272 lb (123.4 kg)     Last Cholesterol Labs:   Lab Results   Component Value Date    CHOLEST 172 09/12/2018    HDL 43 09/12/2018    LDL 83 09/12/2018    TRIG 230 (H) 09/12/2018          Last Chemistry La reflux, Fatigue, Gout, Hearing loss, High blood pressure, High cholesterol, Night sweats, Stress, Visual impairment, and Wears glasses.     He  has a past surgical history that includes orbit surgery proc unlisted (1968); colonoscopy (2/2012); femur/knee hernandez 20/30   Left Eye Visual Acuity: Corrected Left Eye Chart Acuity: 20/30   Both Eyes Visual Acuity: Corrected Both Eyes Chart Acuity: 20/30   Able To Tolerate Visual Acuity: Yes      General Appearance:  Alert, cooperative, no distress, appears stated age with diet, weight loss. Will help heart, DM.    c-scope UTD  PSA wnl a year ago  Immun UTD    2.  Coronary artery disease involving bypass graft of transplanted heart without angina pectoris  Follows with cardio  Meds traightened out - on Crestor 20 and te 11/30/2015 122        EKG - w/ Initial Preventative Physical Exam only, or if medically necessary Electrocardiogram date    Colorectal Cancer Screening      Colonoscopy Screen every 10 years Colonoscopy due on 09/25/2022 Update Health Maintenance if appl anticonvulsants.)    Potassium  Annually Potassium (mmol/L)   Date Value   10/17/2017 4.0     POTASSIUM (mmol/L)   Date Value   11/30/2015 3.5    No flowsheet data found.     Creatinine  Annually CREATININE (mg/dL)   Date Value   06/02/2018 1.05   11/30/201

## 2019-12-31 NOTE — PATIENT INSTRUCTIONS
[place] SCREENING SCHEDULE   Tests on this list are recommended by your physician but may not be covered, or covered at this frequency, by your insurer. Please check with your insurance carrier before scheduling to verify coverage.     PREVENT visit.  Limited to patients who meet one of the following criteria:   • Men who are 73-68 years old and have smoked more than 100 cigarettes in their lifetime   • Anyone with a family history    Colorectal Cancer Screening Covered up to Age 76     [place] Clients of institutions for the mentally retarded   Persons who live in the same house as a HepB virus carrier   Homosexual men   Illicit injectable drug abusers     Tetanus Toxoid- Only covered with a cut with metal- TD and TDaP Not covered by [facility]

## 2020-01-09 LAB
ABSOLUTE IMMATURE GRANULOCYTES (OFFPRE24): NORMAL
ALBUMIN SERPL-MCNC: 4.6 G/DL
ALBUMIN/GLOB SERPL: NORMAL {RATIO}
ALP SERPL-CCNC: 44 U/L
ALT SERPL-CCNC: 51 U/L
ANION GAP SERPL CALC-SCNC: NORMAL MMOL/L
AST SERPL-CCNC: 57 U/L
BASO+EOS+MONOS # BLD: NORMAL 10*3/UL
BASO+EOS+MONOS NFR BLD: NORMAL %
BASOPHILS # BLD: NORMAL 10*3/UL
BASOPHILS NFR BLD: NORMAL %
BILIRUB SERPL-MCNC: 0.7 MG/DL
BUN SERPL-MCNC: 17 MG/DL
BUN/CREAT SERPL: NORMAL
CALCIUM SERPL-MCNC: 9.9 MG/DL
CHLORIDE SERPL-SCNC: 98 MMOL/L
CHOLEST SERPL-MCNC: 183 MG/DL
CHOLEST/HDLC SERPL: NORMAL {RATIO}
CO2 SERPL-SCNC: NORMAL MMOL/L
CREAT SERPL-MCNC: 1.17 MG/DL
DIFFERENTIAL METHOD BLD: NORMAL
EOSINOPHIL # BLD: NORMAL 10*3/UL
EOSINOPHIL NFR BLD: NORMAL %
ERYTHROCYTE [DISTWIDTH] IN BLOOD: NORMAL %
EST. AVERAGE GLUCOSE BLD GHB EST-MCNC: NORMAL MG/DL
GLOBULIN SER-MCNC: 2.6 G/DL
GLUCOSE SERPL-MCNC: 164 MG/DL
HBA1C MFR BLD: 7.5 %
HBA1C MFR BLD: NORMAL % (ref 4.5–5.6)
HCT VFR BLD CALC: 47.4 %
HDLC SERPL-MCNC: 41 MG/DL
HGB BLD-MCNC: 16.2 G/DL
IMMATURE GRANULOCYTES (OFFPRE25): NORMAL
LDLC SERPL CALC-MCNC: NORMAL MG/DL
LENGTH OF FAST TIME PATIENT: NORMAL H
LENGTH OF FAST TIME PATIENT: NORMAL H
LYMPHOCYTES # BLD: NORMAL 10*3/UL
LYMPHOCYTES NFR BLD: NORMAL %
MCH RBC QN AUTO: NORMAL PG
MCHC RBC AUTO-ENTMCNC: NORMAL G/DL
MCV RBC AUTO: NORMAL FL
MONOCYTES # BLD: NORMAL 10*3/UL
MONOCYTES NFR BLD: NORMAL %
MPV (OFFPRE2): NORMAL
NEUTROPHILS # BLD: NORMAL 10*3/UL
NEUTROPHILS NFR BLD: NORMAL %
NONHDLC SERPL-MCNC: NORMAL MG/DL
NRBC BLD MANUAL-RTO: NORMAL %
PLAT MORPH BLD: NORMAL
PLATELET # BLD: 170 10*3/UL
POTASSIUM SERPL-SCNC: 3.7 MMOL/L
PROT SERPL-MCNC: 7.2 G/DL
RBC # BLD: 5.23 10*6/UL
RBC MORPH BLD: NORMAL
SODIUM SERPL-SCNC: 141 MMOL/L
TRIGL SERPL-MCNC: 405 MG/DL
VLDLC SERPL CALC-MCNC: NORMAL MG/DL
WBC # BLD: 6.4 10*3/UL
WBC MORPH BLD: NORMAL

## 2020-01-13 ENCOUNTER — TELEPHONE (OUTPATIENT)
Dept: CARDIOLOGY | Age: 70
End: 2020-01-13

## 2020-01-13 ENCOUNTER — CLINICAL ABSTRACT (OUTPATIENT)
Dept: CARDIOLOGY | Age: 70
End: 2020-01-13

## 2020-01-14 RX ORDER — AZELASTINE 1 MG/ML
SPRAY, METERED NASAL
Qty: 90 ML | Refills: 0 | Status: SHIPPED
Start: 2020-01-14 | End: 2020-01-18

## 2020-01-14 RX ORDER — AZELASTINE 1 MG/ML
SPRAY, METERED NASAL
Qty: 90 ML | Refills: 0 | Status: CANCELLED | OUTPATIENT
Start: 2020-01-14

## 2020-01-14 NOTE — TELEPHONE ENCOUNTER
The prescribing provider is no longer in office. Is this a medication that should be refilled?     Routed to Macy Dunne MD

## 2020-01-16 ENCOUNTER — OFFICE VISIT (OUTPATIENT)
Dept: CARDIOLOGY | Age: 70
End: 2020-01-16

## 2020-01-16 VITALS
DIASTOLIC BLOOD PRESSURE: 70 MMHG | BODY MASS INDEX: 32.21 KG/M2 | WEIGHT: 264.5 LBS | HEIGHT: 76 IN | SYSTOLIC BLOOD PRESSURE: 108 MMHG | RESPIRATION RATE: 16 BRPM | HEART RATE: 60 BPM

## 2020-01-16 DIAGNOSIS — E78.2 HYPERLIPIDEMIA, MIXED: ICD-10-CM

## 2020-01-16 DIAGNOSIS — I12.9 BENIGN HYPERTENSION WITH CKD (CHRONIC KIDNEY DISEASE) STAGE III (CMD): Primary | ICD-10-CM

## 2020-01-16 DIAGNOSIS — I10 HYPERTENSION, BENIGN: ICD-10-CM

## 2020-01-16 DIAGNOSIS — Z95.1 HX OF CABG: ICD-10-CM

## 2020-01-16 DIAGNOSIS — E55.9 VITAMIN D DEFICIENCY: ICD-10-CM

## 2020-01-16 DIAGNOSIS — E11.9 TYPE 2 DIABETES MELLITUS WITHOUT COMPLICATION, WITHOUT LONG-TERM CURRENT USE OF INSULIN (CMD): ICD-10-CM

## 2020-01-16 DIAGNOSIS — I25.10 CORONARY ARTERY DISEASE INVOLVING NATIVE CORONARY ARTERY OF NATIVE HEART WITHOUT ANGINA PECTORIS: ICD-10-CM

## 2020-01-16 DIAGNOSIS — I65.23 BILATERAL CAROTID ARTERY STENOSIS: ICD-10-CM

## 2020-01-16 DIAGNOSIS — N18.30 BENIGN HYPERTENSION WITH CKD (CHRONIC KIDNEY DISEASE) STAGE III (CMD): Primary | ICD-10-CM

## 2020-01-16 PROCEDURE — 99214 OFFICE O/P EST MOD 30 MIN: CPT | Performed by: INTERNAL MEDICINE

## 2020-01-16 RX ORDER — TELMISARTAN 40 MG/1
40 TABLET ORAL
COMMUNITY
End: 2020-06-26

## 2020-01-16 ASSESSMENT — ENCOUNTER SYMPTOMS
COUGH: 0
WEIGHT LOSS: 0
HEMATOCHEZIA: 0
HEMOPTYSIS: 0
SYNCOPE: 0
LIGHT-HEADEDNESS: 0
ENDOCRINE NEGATIVE: 1
ABDOMINAL PAIN: 0
PSYCHIATRIC NEGATIVE: 1
BRUISES/BLEEDS EASILY: 0
SUSPICIOUS LESIONS: 0
WEIGHT GAIN: 0
CHILLS: 0
DIZZINESS: 0
SHORTNESS OF BREATH: 0
FEVER: 0

## 2020-01-18 RX ORDER — AZELASTINE 1 MG/ML
SPRAY, METERED NASAL
Qty: 3 BOTTLE | Refills: 1 | Status: SHIPPED
Start: 2020-01-18 | End: 2020-01-18

## 2020-01-18 RX ORDER — AZELASTINE 1 MG/ML
SPRAY, METERED NASAL
Qty: 3 BOTTLE | Refills: 1 | Status: SHIPPED | OUTPATIENT
Start: 2020-01-18 | End: 2021-06-01

## 2020-02-09 DIAGNOSIS — E11.9 TYPE 2 DIABETES MELLITUS WITHOUT COMPLICATION, WITHOUT LONG-TERM CURRENT USE OF INSULIN (HCC): ICD-10-CM

## 2020-02-11 RX ORDER — METFORMIN HYDROCHLORIDE 500 MG/1
TABLET, EXTENDED RELEASE ORAL
Qty: 180 TABLET | Refills: 1 | Status: SHIPPED | OUTPATIENT
Start: 2020-02-11 | End: 2020-06-24

## 2020-02-17 RX ORDER — ROSUVASTATIN CALCIUM 20 MG/1
20 TABLET, COATED ORAL DAILY
Qty: 90 TABLET | Refills: 0 | Status: SHIPPED | OUTPATIENT
Start: 2020-02-17 | End: 2020-06-26

## 2020-03-11 ENCOUNTER — WALK IN (OUTPATIENT)
Dept: URGENT CARE | Age: 70
End: 2020-03-11

## 2020-03-11 VITALS
RESPIRATION RATE: 16 BRPM | SYSTOLIC BLOOD PRESSURE: 134 MMHG | DIASTOLIC BLOOD PRESSURE: 90 MMHG | HEIGHT: 76 IN | BODY MASS INDEX: 32.2 KG/M2 | TEMPERATURE: 98.1 F | OXYGEN SATURATION: 97 % | HEART RATE: 60 BPM

## 2020-03-11 DIAGNOSIS — J01.00 ACUTE MAXILLARY SINUSITIS, RECURRENCE NOT SPECIFIED: Primary | ICD-10-CM

## 2020-03-11 DIAGNOSIS — H65.93 FLUID LEVEL BEHIND TYMPANIC MEMBRANE OF BOTH EARS: ICD-10-CM

## 2020-03-11 DIAGNOSIS — J30.2 SEASONAL ALLERGIC RHINITIS, UNSPECIFIED TRIGGER: ICD-10-CM

## 2020-03-11 PROCEDURE — 99214 OFFICE O/P EST MOD 30 MIN: CPT | Performed by: NURSE PRACTITIONER

## 2020-03-11 RX ORDER — DOXYCYCLINE HYCLATE 100 MG/1
100 CAPSULE ORAL 2 TIMES DAILY
Qty: 14 CAPSULE | Refills: 0 | Status: SHIPPED | OUTPATIENT
Start: 2020-03-11 | End: 2020-03-18

## 2020-03-11 RX ORDER — CHLORTHALIDONE 25 MG/1
TABLET ORAL
Qty: 60 TABLET | Refills: 0 | Status: SHIPPED | OUTPATIENT
Start: 2020-03-11 | End: 2020-06-05

## 2020-03-11 ASSESSMENT — ENCOUNTER SYMPTOMS
TROUBLE SWALLOWING: 0
ABDOMINAL PAIN: 0
LIGHT-HEADEDNESS: 0
CHEST TIGHTNESS: 0
NAUSEA: 0
HEADACHES: 0
SINUS PRESSURE: 1
COUGH: 1
APPETITE CHANGE: 0
VOMITING: 0
SLEEP DISTURBANCE: 0
CHILLS: 0
FATIGUE: 0
RHINORRHEA: 1
SHORTNESS OF BREATH: 0
EYE REDNESS: 0
FEVER: 0
SORE THROAT: 0
DIZZINESS: 0
ACTIVITY CHANGE: 0
DIAPHORESIS: 0
SINUS PAIN: 0
WHEEZING: 0

## 2020-03-11 NOTE — TELEPHONE ENCOUNTER
Requested Prescriptions     Pending Prescriptions Disp Refills   • CHLORTHALIDONE 25 MG Oral Tab [Pharmacy Med Name: CHLORTHALIDONE 25MG TABLETS] 90 tablet 1     Sig: TAKE 1 TABLET BY MOUTH DAILY     LOV 12/31/2019 (physical)  Last CMP 10/17/17  Creatinine

## 2020-04-14 ENCOUNTER — TELEPHONE (OUTPATIENT)
Dept: FAMILY MEDICINE CLINIC | Facility: CLINIC | Age: 70
End: 2020-04-14

## 2020-04-14 NOTE — TELEPHONE ENCOUNTER
Appointment for 4 mo fup Diabetes on 4/30/20 has been cancelled.  Option of Virtual or video visit mentioned but not confirmed) as pt will call back to reschedule for sometime in July

## 2020-05-01 ENCOUNTER — HOSPITAL ENCOUNTER (INPATIENT)
Facility: HOSPITAL | Age: 70
LOS: 1 days | Discharge: HOME | DRG: 054 | End: 2020-05-02
Attending: EMERGENCY MEDICINE | Admitting: HOSPITALIST
Payer: MEDICARE

## 2020-05-01 ENCOUNTER — APPOINTMENT (OUTPATIENT)
Dept: MRI IMAGING | Facility: HOSPITAL | Age: 70
DRG: 054 | End: 2020-05-01
Attending: EMERGENCY MEDICINE
Payer: MEDICARE

## 2020-05-01 DIAGNOSIS — D49.6 NEOPLASM OF BRAIN CAUSING MASS EFFECT ON ADJACENT STRUCTURES (HCC): Primary | ICD-10-CM

## 2020-05-01 PROBLEM — G93.5 NEOPLASM OF BRAIN CAUSING MASS EFFECT ON ADJACENT STRUCTURES (HCC): Status: ACTIVE | Noted: 2020-05-01

## 2020-05-01 PROBLEM — R73.9 HYPERGLYCEMIA: Status: ACTIVE | Noted: 2020-05-01

## 2020-05-01 PROBLEM — E87.6 HYPOKALEMIA: Status: ACTIVE | Noted: 2020-05-01

## 2020-05-01 PROCEDURE — 70553 MRI BRAIN STEM W/O & W/DYE: CPT | Performed by: EMERGENCY MEDICINE

## 2020-05-01 PROCEDURE — 99291 CRITICAL CARE FIRST HOUR: CPT | Performed by: HOSPITALIST

## 2020-05-01 RX ORDER — POTASSIUM CHLORIDE 20 MEQ/1
40 TABLET, EXTENDED RELEASE ORAL ONCE
Status: COMPLETED | OUTPATIENT
Start: 2020-05-01 | End: 2020-05-01

## 2020-05-01 RX ORDER — FAMOTIDINE 10 MG/ML
20 INJECTION, SOLUTION INTRAVENOUS ONCE
Status: COMPLETED | OUTPATIENT
Start: 2020-05-01 | End: 2020-05-01

## 2020-05-01 RX ORDER — DEXAMETHASONE SODIUM PHOSPHATE 4 MG/ML
10 VIAL (ML) INJECTION ONCE
Status: COMPLETED | OUTPATIENT
Start: 2020-05-01 | End: 2020-05-01

## 2020-05-02 ENCOUNTER — TELEPHONE (OUTPATIENT)
Dept: SURGERY | Facility: CLINIC | Age: 70
End: 2020-05-02

## 2020-05-02 VITALS
WEIGHT: 270.94 LBS | RESPIRATION RATE: 19 BRPM | TEMPERATURE: 98 F | BODY MASS INDEX: 32.99 KG/M2 | HEART RATE: 82 BPM | HEIGHT: 76 IN | OXYGEN SATURATION: 92 % | DIASTOLIC BLOOD PRESSURE: 52 MMHG | SYSTOLIC BLOOD PRESSURE: 115 MMHG

## 2020-05-02 PROCEDURE — 99222 1ST HOSP IP/OBS MODERATE 55: CPT | Performed by: PHYSICIAN ASSISTANT

## 2020-05-02 PROCEDURE — 99239 HOSP IP/OBS DSCHRG MGMT >30: CPT | Performed by: INTERNAL MEDICINE

## 2020-05-02 PROCEDURE — 99221 1ST HOSP IP/OBS SF/LOW 40: CPT | Performed by: NURSE PRACTITIONER

## 2020-05-02 RX ORDER — FAMOTIDINE 10 MG/ML
20 INJECTION, SOLUTION INTRAVENOUS 2 TIMES DAILY
Status: DISCONTINUED | OUTPATIENT
Start: 2020-05-02 | End: 2020-05-02

## 2020-05-02 RX ORDER — METOCLOPRAMIDE 10 MG/1
10 TABLET ORAL EVERY 6 HOURS PRN
Status: DISCONTINUED | OUTPATIENT
Start: 2020-05-02 | End: 2020-05-02

## 2020-05-02 RX ORDER — ONDANSETRON 2 MG/ML
4 INJECTION INTRAMUSCULAR; INTRAVENOUS EVERY 6 HOURS PRN
Status: DISCONTINUED | OUTPATIENT
Start: 2020-05-02 | End: 2020-05-02

## 2020-05-02 RX ORDER — CHLORTHALIDONE 25 MG/1
25 TABLET ORAL DAILY
Status: DISCONTINUED | OUTPATIENT
Start: 2020-05-02 | End: 2020-05-02

## 2020-05-02 RX ORDER — LOSARTAN POTASSIUM 100 MG/1
100 TABLET ORAL DAILY
Status: DISCONTINUED | OUTPATIENT
Start: 2020-05-02 | End: 2020-05-02

## 2020-05-02 RX ORDER — ASPIRIN 325 MG
325 TABLET ORAL DAILY
Status: DISCONTINUED | OUTPATIENT
Start: 2020-05-02 | End: 2020-05-02

## 2020-05-02 RX ORDER — POTASSIUM CITRATE 5 MEQ/1
30 TABLET, EXTENDED RELEASE ORAL DAILY
Status: DISCONTINUED | OUTPATIENT
Start: 2020-05-02 | End: 2020-05-02

## 2020-05-02 RX ORDER — PANTOPRAZOLE SODIUM 40 MG/1
40 TABLET, DELAYED RELEASE ORAL DAILY
Status: DISCONTINUED | OUTPATIENT
Start: 2020-05-02 | End: 2020-05-02

## 2020-05-02 RX ORDER — HYDRALAZINE HYDROCHLORIDE 20 MG/ML
10 INJECTION INTRAMUSCULAR; INTRAVENOUS EVERY 4 HOURS PRN
Status: DISCONTINUED | OUTPATIENT
Start: 2020-05-02 | End: 2020-05-02

## 2020-05-02 RX ORDER — METOCLOPRAMIDE HYDROCHLORIDE 5 MG/ML
10 INJECTION INTRAMUSCULAR; INTRAVENOUS EVERY 6 HOURS PRN
Status: DISCONTINUED | OUTPATIENT
Start: 2020-05-02 | End: 2020-05-02

## 2020-05-02 RX ORDER — DEXAMETHASONE SODIUM PHOSPHATE 4 MG/ML
4 VIAL (ML) INJECTION EVERY 6 HOURS
Status: DISCONTINUED | OUTPATIENT
Start: 2020-05-02 | End: 2020-05-02

## 2020-05-02 RX ORDER — MORPHINE SULFATE 4 MG/ML
1 INJECTION, SOLUTION INTRAMUSCULAR; INTRAVENOUS
Status: DISCONTINUED | OUTPATIENT
Start: 2020-05-02 | End: 2020-05-02

## 2020-05-02 RX ORDER — ACETAMINOPHEN 325 MG/1
650 TABLET ORAL EVERY 6 HOURS PRN
Status: DISCONTINUED | OUTPATIENT
Start: 2020-05-02 | End: 2020-05-02

## 2020-05-02 RX ORDER — ROSUVASTATIN CALCIUM 20 MG/1
20 TABLET, COATED ORAL NIGHTLY
Status: DISCONTINUED | OUTPATIENT
Start: 2020-05-02 | End: 2020-05-02

## 2020-05-02 RX ORDER — ALLOPURINOL 300 MG/1
300 TABLET ORAL DAILY
Status: DISCONTINUED | OUTPATIENT
Start: 2020-05-02 | End: 2020-05-02

## 2020-05-02 RX ORDER — METOCLOPRAMIDE HYDROCHLORIDE 5 MG/ML
5 INJECTION INTRAMUSCULAR; INTRAVENOUS EVERY 8 HOURS PRN
Status: DISCONTINUED | OUTPATIENT
Start: 2020-05-02 | End: 2020-05-02

## 2020-05-02 RX ORDER — HYDROCODONE BITARTRATE AND ACETAMINOPHEN 5; 325 MG/1; MG/1
1 TABLET ORAL EVERY 6 HOURS PRN
Status: DISCONTINUED | OUTPATIENT
Start: 2020-05-02 | End: 2020-05-02

## 2020-05-02 RX ORDER — METOPROLOL SUCCINATE 25 MG/1
25 TABLET, EXTENDED RELEASE ORAL DAILY
Status: DISCONTINUED | OUTPATIENT
Start: 2020-05-02 | End: 2020-05-02

## 2020-05-02 RX ORDER — DEXTROSE MONOHYDRATE 25 G/50ML
50 INJECTION, SOLUTION INTRAVENOUS
Status: DISCONTINUED | OUTPATIENT
Start: 2020-05-02 | End: 2020-05-02

## 2020-05-02 RX ORDER — SODIUM CHLORIDE 9 MG/ML
INJECTION, SOLUTION INTRAVENOUS CONTINUOUS
Status: DISCONTINUED | OUTPATIENT
Start: 2020-05-02 | End: 2020-05-02

## 2020-05-02 RX ORDER — DEXAMETHASONE 4 MG/1
4 TABLET ORAL 3 TIMES DAILY
Qty: 90 TABLET | Refills: 0 | Status: SHIPPED | OUTPATIENT
Start: 2020-05-02 | End: 2020-06-24

## 2020-05-02 NOTE — PROGRESS NOTES
Full consult to follow  Patient with MRI showing meningioma (or some variant) on th right side. Decadron for the edema. H2 blocker as well. Surgery can be done in an elective manner. I will ask IR about potentially doing preoperative embolization.

## 2020-05-02 NOTE — PROGRESS NOTES
[facility]PITAL[facility]  Progress Note     Curtis Walhs Patient Status:  Inpatient    1950 MRN [id number]   [facility] 6NE-A Attending Quinten Oliveros MD   Hosp Day # 0 PCP Pipo Dang MD     Chief Complaint: brain mass    S: Pa No results for input(s): TROP, CK in the last 168 hours. Imaging: Imaging data reviewed in Epic.     Medications:   • dexamethasone Sodium Phosphate  4 mg Intravenous Q6H   • aspirin  325 mg Oral Daily   • Metoprolol Succinate ER  25 mg Oral D

## 2020-05-02 NOTE — PLAN OF CARE
Assumed care of pt this am, he is AOx3, DEAN, no neuro deficits noted. Pt discharged home with all discharge information/instructions, all questions answered. IV x2 d/c'd pressure held, no bleeding from sites. All personal belongings taken with pt.  He was e

## 2020-05-02 NOTE — CONSULTS
[id number] [facility] Neurosurgery Consultation Note    Yusuf Troncoso Patient Status:  Inpatient    1950 MRN [id number]   [facility] 6NE-A Attending Jeanna Amaya MD   Hosp Day # 0 PCP Anel Payne MD     CC:Patient presents wi 07/2010    QUADRUPLE   • COLONOSCOPY  2/2012   • COLONOSCOPY     • EXCISION OF LESION/LIPOMA N/A 12/21/2015    Performed by Gilberto Wilkerson MD at [facility] MAIN OR   • [address] [facility] [address]    \"Closed treatmen insight into the problem appear intact and appropriate. Cranial nerve Exam:  Visual fields are full. The pupils are equal, round, and reactive to light. The extraocular movements are intact. Facial sensation in intact.   Facial symmetry and movement o effect as described above with mild right to left shift with surrounding edema without herniation. Assessment:  1. Right frontal and temporal lobe meningioma. Plan:  1. Discussed treatment options. Pt prefers to have tumor resected.   This is n

## 2020-05-02 NOTE — ED NOTES
Report given to Black Pyle RN. Dr. Nisreen Arceo at patient bedside. Pt remains AAOX4 with no new complaints.

## 2020-05-02 NOTE — DISCHARGE SUMMARY
[facility] [place] HOSPITALIST  DISCHARGE SUMMARY     Andre Heck Patient Status:  Inpatient    1950 MRN [id number]   [facility] 6NE-A Attending Carlos Wilson MD   Hosp Day # 0 PCP Jeff Trevino MD     Date of Admission: 2020  Jadiel dexamethasone 4 MG tablet  Commonly known as:  DECADRON  Notes to patient:  Your last dose was this afternoon at 12:15pm      Take 1 tablet (4 mg total) by mouth 3 (three) times daily.    Quantity:  90 tablet  Refills:  0        CONTINUE taking these medi Prep Kit  Notes to patient:  You did not get this medication in the hospital       Take as directed by physician.    Quantity:  1 kit  Refills:  0     Pantoprazole Sodium 40 MG Tbec  Commonly known as:  PROTONIX  Notes to patient:  Your last dose was this m 30 Days 5/2/2020 - 6/1/2020    None          Vital signs:  Temp:  [97.7 °F (36.5 °C)-98 °F (36.7 °C)] 97.7 °F (36.5 °C)  Pulse:  [59-89] 82  Resp:  [13-23] 19  BP: (115-160)/(52-87) 115/52  -----  PATIENT DISCHARGE INSTRUCTIONS: See electronic chart    Aru

## 2020-05-02 NOTE — ED INITIAL ASSESSMENT (HPI)
Pt has been having sinus problems and has taken antibiotics without relief. Pt also c/o unsteady gait over the past year.

## 2020-05-02 NOTE — ED NOTES
Pt's monitor has alarmed asystole multiple times. Each time RN to the bedside, pt sitting up and talking to his wife. Radial pulse palpable and pt has no symptoms, will continue to monitor.

## 2020-05-02 NOTE — H&P
[facility]ITAL[facility]  History and Physical     Gregory Ruelas Patient Status:  Emergency    1950 MRN [id number]   Location [facility] Attending No att. providers found   Hosp Day # 0 PCP Kinza Quiros MD     Chief Com Father    • Cancer Father    • Lung Disorder Maternal Grandmother    • Cancer Paternal Grandfather    • Heart Disease Paternal Grandmother         CAD   • Breast Cancer Other    • Alcohol and Other Disorders Associated Other    • Heart Attack Other Rfl: 2  aspirin 325 MG Oral Tab, Take 325 mg by mouth daily. , Disp: , Rfl:   Triamcinolone Acetonide (NASACORT) 55 MCG/ACT Nasal Aerosol, 1 spray by Nasal route daily. , Disp: , Rfl:   Multiple Vitamins-Minerals (CENTRUM SILVER) Oral Tab, Take 1 Tab by mout 1. Cerebral Edema  1. decadron  2. Brain mass, likely meningioma  1. NS to eval  3. CAD s/p CABG  4. HTN  5. DM2  6. GERD  7. Hx of renal stones    Total Critical care time:  Greater than 40 minutes.       Quality:  · DVT Prophylaxis: SCD pending possib

## 2020-05-02 NOTE — TELEPHONE ENCOUNTER
FYI    Patient was seen over the weekend by neurosurgery for a large meningioma. He will need embolization and surgery. Once surgical date has been coordinated with Dr. Jennifer Gomez please have patient schedule for embolization 2 days prior.

## 2020-05-02 NOTE — CONSULTS
[id number] [facility] Interventional Neuro Radiology Consult    Silvia Cheadle Patient Status:  Inpatient    1950 MRN [id number]   [facility] 6NE-A Attending Drew Martínez MD   Hosp Day # 0 PCP Magali William MD     REASON FOR Date   • Abdominal pain    • Back problem    • Calculus of kidney    • Coronary atherosclerosis     S/P QUADRUPLE CABG 2010   • Esophageal reflux    • Fatigue    • Gout    • Hearing loss    • High blood pressure    • High cholesterol    • Night sweats    • TWICE DAILY WITH MEALS, Disp: 180 tablet, Rfl: 1  Azelastine HCl 0.1 % Nasal Solution, USE 2 SPRAYS IN EACH NOSTRIL TWICE DAILY, Disp: 3 Bottle, Rfl: 1  Tadalafil 20 MG Oral Tab, Take 1 tablet (20 mg total) by mouth daily as needed for Erectile Dysfunction Or  Metoclopramide HCl (REGLAN) injection 10 mg, 10 mg, Intravenous, Q6H PRN  aspirin tab 325 mg, 325 mg, Oral, Daily  Metoprolol Succinate ER (Toprol XL) 24 hr tab 25 mg, 25 mg, Oral, Daily  Rosuvastatin Calcium (CRESTOR) tab 20 mg, 20 mg, Oral, Nightly light touch is intact bilaterally. Motor: No Drift. No arm or leg weakness noted. 5/5 bilaterally. Finger-to-nose coordination is intact. Gait deferred.         DIAGNOSTIC DATA:   Lab Results   Component Value Date    WBC 8.4 05/02/2020    HGB 15.5 05/02/

## 2020-05-02 NOTE — PLAN OF CARE
Received pt. From ER at approx. 0100. Vitals stable. Hourly neuro exams, pt. Intact. Denies pain. IVF at 75cc/hr. Pt. NPO for now.

## 2020-05-02 NOTE — ED PROVIDER NOTES
Patient Seen in: [facility] Emergency Department      History   Patient presents with:  Abnormal Result    Stated Complaint: brain tumor found on imaging    HPI    Patient presents for evaluation of brain tumor.   The patient has been having sinus sy History    Tobacco Use      Smoking status: Light Tobacco Smoker        Packs/day: 2.00        Years: 17.00        Pack years: 29        Types: Cigars        Quit date: 1988        Years since quittin.3      Smokeless tobacco: Never Used      [facility] other components within normal limits   BASIC METABOLIC PANEL (8) - Abnormal; Notable for the following components:    Glucose 204 (*)     Sodium 135 (*)     BUN 20 (*)     All other components within normal limits   MAGNESIUM - Abnormal; Notable for the f these tests on the individual orders. HEMOGLOBIN A1C    Narrative:     Hemoglobin A1C to be confirmed at [address]    Narrative: The following orders were created for panel order TYPE AND SCREEN.   Procedure unremarkable. The bony nasal septum is bidirectional with very mild leftward deviation of the posterior half of the bony nasal septum and mild rightward deviation of the anterior half of the nasal septum based on axial image 65.  Olfactory groove Keros Clas images with FLAIR sequences and diffusion weighted images without and with infusion. PATIENT STATED HISTORY:(As transcribed by Technologist)  Abnormal findings of CT sinus. Evaluate brain tumor.    CONTRAST USED:  20 mL of Dotarem  FINDINGS:  INTRACRANIAL: 4.8 x 6.4 cm homogeneous lead enhancing extra-axial dural-based mass involving the right anterior temporal lobe and anterior inferior lateral right frontal lobe most consistent with a meningioma.   Prominent enhancement and adjacent feeding vessels are note effect on adjacent structures ([facility] 75.) D49.6 5/1/2020 Unknown    Hyperglycemia R73.9 5/1/2020 Yes    Hypokalemia E87.6 5/1/2020 Yes

## 2020-05-04 ENCOUNTER — PATIENT OUTREACH (OUTPATIENT)
Dept: CASE MANAGEMENT | Age: 70
End: 2020-05-04

## 2020-05-04 DIAGNOSIS — D49.6 NEOPLASM OF BRAIN CAUSING MASS EFFECT ON ADJACENT STRUCTURES (HCC): ICD-10-CM

## 2020-05-04 DIAGNOSIS — E11.9 TYPE 2 DIABETES MELLITUS WITHOUT COMPLICATION, WITHOUT LONG-TERM CURRENT USE OF INSULIN (HCC): ICD-10-CM

## 2020-05-04 DIAGNOSIS — Z02.9 ENCOUNTERS FOR UNSPECIFIED ADMINISTRATIVE PURPOSE: ICD-10-CM

## 2020-05-04 PROCEDURE — 1111F DSCHRG MED/CURRENT MED MERGE: CPT

## 2020-05-04 NOTE — TELEPHONE ENCOUNTER
Per Dr. Charly Cohn THEMA) on 5/1/20:    \"History of Present Illness: Fontenot Glass a 71year old male with a past medical history of CAD s/p CABG, HTN and GERD.  He now comes to the ED, sent by his ENT. [facility] has had recurrently sinus is

## 2020-05-04 NOTE — TELEPHONE ENCOUNTER
Dr. Christel Valdivia' office contacted [facility], requesting medical clearance request to hold ASA be faxed to office in writing at [phone].

## 2020-05-04 NOTE — PROGRESS NOTES
Initial Post Discharge Follow Up   Discharge Date: 5/2/20  Contact Date: 5/4/2020    Consent Verification:  Assessment Completed With: Patient  HIPAA Verified?   Yes    Discharge Dx:     Brain mass  Cerebral Edema  CAD  Ess HTN  DM2  GERD    Was TCC orde discharge instructions? No  • Before leaving the hospital was your diagnoses explained to you? Yes  • Do you have any questions about your diagnoses?  No  • Are you able to perform normal daily activities of living as you have prior to your hospital stay ( daily.     • Multiple Vitamins-Minerals (CENTRUM SILVER) Oral Tab Take 1 Tab by mouth daily.        • (NCM)  Were there any medication changes noted on AVS?  no  • (NCM) If a new medication was prescribed:    o Was the new medication’s purpose & side effect understanding of these. NCM instructed patient to call PCP with any questions or needs, he states he will.     CCM referral placed:  Yes    BOOK BY DATE: 5/16/2020    [x]  Discharge Summary, Discharge medications reviewed/discussed/and reconciled against ou

## 2020-05-05 ENCOUNTER — TELEPHONE (OUTPATIENT)
Dept: CARDIOLOGY | Age: 70
End: 2020-05-05

## 2020-05-06 ENCOUNTER — DOCUMENTATION (OUTPATIENT)
Dept: CARDIOLOGY | Age: 70
End: 2020-05-06

## 2020-05-07 NOTE — TELEPHONE ENCOUNTER
Letter received from Cardiologist giving clearance for procedure and clearance to hold ASA 10 days prior to procedure. Note sent to scan. Will verify follow up appointment plan with providers.

## 2020-05-08 ENCOUNTER — TELEMEDICINE (OUTPATIENT)
Dept: FAMILY MEDICINE CLINIC | Facility: CLINIC | Age: 70
End: 2020-05-08
Payer: MEDICARE

## 2020-05-08 DIAGNOSIS — E11.9 TYPE 2 DIABETES MELLITUS WITHOUT COMPLICATION, WITHOUT LONG-TERM CURRENT USE OF INSULIN (HCC): ICD-10-CM

## 2020-05-08 DIAGNOSIS — D32.9 MENINGIOMA (HCC): Primary | ICD-10-CM

## 2020-05-08 PROCEDURE — 99442 PHONE E/M BY PHYS 11-20 MIN: CPT | Performed by: FAMILY MEDICINE

## 2020-05-08 NOTE — PROGRESS NOTES
Subjective     HPI:   Jeannine Padilla is a 71year old male who presents for for hospital f/u - Transitional care medicine visit. This visit is conducted using Telemedicine with live, interactive video and audio.   The video aspect was not sufficien hours prior to surgery with neuro IRStuart PLATT from hospital on steroids. Planning to see Dr. Jayla Jones with [facility] to get the operation.     Estimating it will be about 30-40 days right now, then surgery, then recovery    No side effects at this point MG) Oral Tab CR, Take 3 tablets (30 mEq total) by mouth daily.  TAKE 3 TABLETS BY MOUTH ONCE A DAY, Disp: 270 tablet, Rfl: 3  ALLOPURINOL 300 MG Oral Tab, TAKE 1 TABLET BY MOUTH ONCE A DAY, Disp: 90 tablet, Rfl: 3  PANTOPRAZOLE SODIUM 40 MG Oral Tab EC, [place] Problem Relation Age of Onset   • Heart Disease Father    • Cancer Father    • Lung Disorder Maternal Grandmother    • Cancer Paternal Grandfather    • Heart Disease Paternal Grandmother         CAD   • Breast Cancer Other    • Alcohol and Other Disorder fat into the posterior ethmoid air cells without evidence for  herniation or entrapment of the medial rectus muscle. Please correlate clinically.   3. Incidentally, there appears to be a large right frontal lobe mass with associated vasogenic edema  and pos

## 2020-05-22 ENCOUNTER — TELEPHONE (OUTPATIENT)
Dept: CARDIOLOGY | Age: 70
End: 2020-05-22

## 2020-05-22 RX ORDER — ROSUVASTATIN CALCIUM 20 MG/1
20 TABLET, COATED ORAL DAILY
Qty: 90 TABLET | Refills: 2 | Status: SHIPPED | OUTPATIENT
Start: 2020-05-22 | End: 2021-07-09 | Stop reason: DRUGHIGH

## 2020-06-05 RX ORDER — CHLORTHALIDONE 25 MG/1
TABLET ORAL
Qty: 90 TABLET | Refills: 1 | Status: SHIPPED | OUTPATIENT
Start: 2020-06-05 | End: 2020-10-06

## 2020-06-05 NOTE — TELEPHONE ENCOUNTER
Patient had a video visit appointment with Dr Nelson Found on 05/08/20 for a post hospital follow up. Does the patient need another appointment scheduled?

## 2020-06-05 NOTE — TELEPHONE ENCOUNTER
LOV 12/31/2019 and at that time, pt was advised to follow up in 4-6 months. No future appointments. Please assist pt in scheduling follow up appt for DM,HTN. Chlorthalidone was last prescribed 3/11/2020 and at that time, pt was advised to follow up in 4-6 months. Please ask pt if he has enough medication to last until appt    Routed to front staff.

## 2020-06-17 ENCOUNTER — TELEPHONE (OUTPATIENT)
Dept: CARDIOLOGY | Age: 70
End: 2020-06-17

## 2020-06-24 ENCOUNTER — OFFICE VISIT (OUTPATIENT)
Dept: FAMILY MEDICINE CLINIC | Facility: CLINIC | Age: 70
End: 2020-06-24
Payer: MEDICARE

## 2020-06-24 VITALS
SYSTOLIC BLOOD PRESSURE: 128 MMHG | BODY MASS INDEX: 31.84 KG/M2 | WEIGHT: 258.81 LBS | TEMPERATURE: 99 F | RESPIRATION RATE: 16 BRPM | HEIGHT: 75.75 IN | HEART RATE: 60 BPM | DIASTOLIC BLOOD PRESSURE: 74 MMHG

## 2020-06-24 DIAGNOSIS — D42.0 ATYPICAL MENINGIOMA OF BRAIN (HCC): Primary | ICD-10-CM

## 2020-06-24 DIAGNOSIS — E78.00 PURE HYPERCHOLESTEROLEMIA: ICD-10-CM

## 2020-06-24 DIAGNOSIS — I25.812 CORONARY ARTERY DISEASE INVOLVING BYPASS GRAFT OF TRANSPLANTED HEART WITHOUT ANGINA PECTORIS: ICD-10-CM

## 2020-06-24 DIAGNOSIS — I10 ESSENTIAL HYPERTENSION: ICD-10-CM

## 2020-06-24 DIAGNOSIS — E11.9 TYPE 2 DIABETES MELLITUS WITHOUT COMPLICATION, WITHOUT LONG-TERM CURRENT USE OF INSULIN (HCC): ICD-10-CM

## 2020-06-24 DIAGNOSIS — D49.6 NEOPLASM OF BRAIN CAUSING MASS EFFECT ON ADJACENT STRUCTURES (HCC): ICD-10-CM

## 2020-06-24 PROCEDURE — 1111F DSCHRG MED/CURRENT MED MERGE: CPT | Performed by: FAMILY MEDICINE

## 2020-06-24 PROCEDURE — 99214 OFFICE O/P EST MOD 30 MIN: CPT | Performed by: FAMILY MEDICINE

## 2020-06-24 RX ORDER — GAUZE BANDAGE 2" X 2"
1 BANDAGE TOPICAL DAILY
COMMUNITY
Start: 2020-06-16 | End: 2020-07-13

## 2020-06-24 RX ORDER — ACETAMINOPHEN 325 MG/1
650 TABLET ORAL EVERY 6 HOURS PRN
COMMUNITY
Start: 2020-06-16

## 2020-06-24 RX ORDER — BUTALBITAL, ACETAMINOPHEN AND CAFFEINE 50; 325; 40 MG/1; MG/1; MG/1
1 TABLET ORAL AS NEEDED
COMMUNITY
Start: 2020-06-16 | End: 2020-12-16

## 2020-06-24 RX ORDER — GLIPIZIDE 10 MG/1
10 TABLET ORAL DAILY
COMMUNITY
Start: 2020-06-16 | End: 2020-07-13

## 2020-06-24 RX ORDER — TRAZODONE HYDROCHLORIDE 100 MG/1
100 TABLET ORAL NIGHTLY
COMMUNITY
Start: 2020-06-16 | End: 2020-12-16

## 2020-06-24 RX ORDER — PHENYTOIN 50 MG/1
150 TABLET, CHEWABLE ORAL 3 TIMES DAILY
COMMUNITY
Start: 2020-06-16 | End: 2020-12-16

## 2020-06-24 RX ORDER — LEVETIRACETAM 500 MG/1
1 TABLET ORAL 2 TIMES DAILY
COMMUNITY
Start: 2020-05-08 | End: 2020-12-16

## 2020-06-24 NOTE — PROGRESS NOTES
Patient presents with:  Hospital F/U: Discharged from [place] 6/17/2020.  Brain sugery 5/27/2020     HPI:   Solomon Arthur is a 79year old male with PMH CAD s/p CABG, HTN, HLD, DM2 who presents to the office for follow up after meningioma and surgery Neoplasm of brain causing mass effect on adjacent structures [facility])    Social History    Tobacco Use      Smoking status: Light Tobacco Smoker        Packs/day: 2.00        Years: 17.00        Pack years: 29        Types: Cigars        Quit date: 1/1/1988 MRI Brain: 1. There is a 5.1 x 4.8 x 6.4 cm homogeneous lead enhancing extra-axial dural-based mass involving the right anterior temporal lobe and anterior inferior lateral right frontal lobe most consistent with a meningioma.   Prominent enhancement an

## 2020-06-26 ENCOUNTER — OFFICE VISIT (OUTPATIENT)
Dept: CARDIOLOGY | Age: 70
End: 2020-06-26

## 2020-06-26 VITALS
HEART RATE: 68 BPM | HEIGHT: 76 IN | BODY MASS INDEX: 31.66 KG/M2 | WEIGHT: 260 LBS | SYSTOLIC BLOOD PRESSURE: 144 MMHG | DIASTOLIC BLOOD PRESSURE: 70 MMHG

## 2020-06-26 DIAGNOSIS — I10 HYPERTENSION, BENIGN: ICD-10-CM

## 2020-06-26 DIAGNOSIS — I95.1 ORTHOSTASIS: ICD-10-CM

## 2020-06-26 DIAGNOSIS — I65.23 BILATERAL CAROTID ARTERY STENOSIS: ICD-10-CM

## 2020-06-26 DIAGNOSIS — E11.9 TYPE 2 DIABETES MELLITUS WITHOUT COMPLICATION, WITHOUT LONG-TERM CURRENT USE OF INSULIN (CMD): ICD-10-CM

## 2020-06-26 DIAGNOSIS — I12.9 BENIGN HYPERTENSION WITH CKD (CHRONIC KIDNEY DISEASE) STAGE III (CMD): Primary | ICD-10-CM

## 2020-06-26 DIAGNOSIS — E78.2 HYPERLIPIDEMIA, MIXED: ICD-10-CM

## 2020-06-26 DIAGNOSIS — N18.30 BENIGN HYPERTENSION WITH CKD (CHRONIC KIDNEY DISEASE) STAGE III (CMD): Primary | ICD-10-CM

## 2020-06-26 DIAGNOSIS — I25.10 CORONARY ARTERY DISEASE INVOLVING NATIVE CORONARY ARTERY OF NATIVE HEART WITHOUT ANGINA PECTORIS: ICD-10-CM

## 2020-06-26 DIAGNOSIS — Z95.1 HX OF CABG: ICD-10-CM

## 2020-06-26 DIAGNOSIS — E55.9 VITAMIN D DEFICIENCY: ICD-10-CM

## 2020-06-26 PROCEDURE — 99215 OFFICE O/P EST HI 40 MIN: CPT | Performed by: INTERNAL MEDICINE

## 2020-06-26 SDOH — HEALTH STABILITY: PHYSICAL HEALTH: ON AVERAGE, HOW MANY MINUTES DO YOU ENGAGE IN EXERCISE AT THIS LEVEL?: 10 MIN

## 2020-06-26 SDOH — HEALTH STABILITY: PHYSICAL HEALTH: ON AVERAGE, HOW MANY DAYS PER WEEK DO YOU ENGAGE IN MODERATE TO STRENUOUS EXERCISE (LIKE A BRISK WALK)?: 7 DAYS

## 2020-06-26 ASSESSMENT — ENCOUNTER SYMPTOMS
DIFFICULTY WITH CONCENTRATION: 1
FEVER: 0
DIZZINESS: 1
HEMATOCHEZIA: 0
WEIGHT LOSS: 0
HEMOPTYSIS: 0
WEIGHT GAIN: 0
COUGH: 0
SUSPICIOUS LESIONS: 0
ALTERED MENTAL STATUS: 1
CHILLS: 0
BRUISES/BLEEDS EASILY: 0
ALLERGIC/IMMUNOLOGIC COMMENTS: NO NEW FOOD ALLERGIES

## 2020-06-26 ASSESSMENT — PATIENT HEALTH QUESTIONNAIRE - PHQ9
SUM OF ALL RESPONSES TO PHQ9 QUESTIONS 1 AND 2: 1
2. FEELING DOWN, DEPRESSED OR HOPELESS: SEVERAL DAYS
CLINICAL INTERPRETATION OF PHQ2 SCORE: NO FURTHER SCREENING NEEDED
1. LITTLE INTEREST OR PLEASURE IN DOING THINGS: NOT AT ALL
CLINICAL INTERPRETATION OF PHQ9 SCORE: NO FURTHER SCREENING NEEDED
SUM OF ALL RESPONSES TO PHQ9 QUESTIONS 1 AND 2: 1

## 2020-07-07 ENCOUNTER — TELEPHONE (OUTPATIENT)
Dept: CARDIOLOGY | Age: 70
End: 2020-07-07

## 2020-07-07 DIAGNOSIS — Z95.1 HX OF CABG: ICD-10-CM

## 2020-07-07 DIAGNOSIS — N18.30 BENIGN HYPERTENSION WITH CKD (CHRONIC KIDNEY DISEASE) STAGE III (CMD): Primary | ICD-10-CM

## 2020-07-07 DIAGNOSIS — I12.9 BENIGN HYPERTENSION WITH CKD (CHRONIC KIDNEY DISEASE) STAGE III (CMD): Primary | ICD-10-CM

## 2020-07-07 DIAGNOSIS — I10 HYPERTENSION, BENIGN: ICD-10-CM

## 2020-07-07 DIAGNOSIS — I25.10 CORONARY ARTERY DISEASE INVOLVING NATIVE CORONARY ARTERY OF NATIVE HEART WITHOUT ANGINA PECTORIS: ICD-10-CM

## 2020-07-07 DIAGNOSIS — E11.9 TYPE 2 DIABETES MELLITUS WITHOUT COMPLICATION, WITHOUT LONG-TERM CURRENT USE OF INSULIN (CMD): ICD-10-CM

## 2020-07-07 RX ORDER — POTASSIUM CHLORIDE 20 MEQ/1
20 TABLET, EXTENDED RELEASE ORAL DAILY
Qty: 30 TABLET | Refills: 0 | Status: SHIPPED | OUTPATIENT
Start: 2020-07-07 | End: 2021-04-01

## 2020-07-07 RX ORDER — FUROSEMIDE 20 MG/1
20 TABLET ORAL DAILY
Qty: 30 TABLET | Refills: 0 | Status: SHIPPED | OUTPATIENT
Start: 2020-07-07 | End: 2021-04-01

## 2020-07-09 RX ORDER — FUROSEMIDE 20 MG/1
20 TABLET ORAL DAILY
Qty: 90 TABLET | OUTPATIENT
Start: 2020-07-09

## 2020-07-09 RX ORDER — POTASSIUM CHLORIDE 20 MEQ/1
20 TABLET, EXTENDED RELEASE ORAL DAILY
Qty: 90 TABLET | OUTPATIENT
Start: 2020-07-09

## 2020-07-12 ENCOUNTER — PATIENT MESSAGE (OUTPATIENT)
Dept: FAMILY MEDICINE CLINIC | Facility: CLINIC | Age: 70
End: 2020-07-12

## 2020-07-12 DIAGNOSIS — E11.9 TYPE 2 DIABETES MELLITUS WITHOUT COMPLICATION, WITHOUT LONG-TERM CURRENT USE OF INSULIN (HCC): Primary | ICD-10-CM

## 2020-07-13 ENCOUNTER — PATIENT OUTREACH (OUTPATIENT)
Dept: CASE MANAGEMENT | Age: 70
End: 2020-07-13

## 2020-07-13 RX ORDER — GAUZE BANDAGE 2" X 2"
1 BANDAGE TOPICAL DAILY
Qty: 90 TABLET | Refills: 1 | Status: SHIPPED | OUTPATIENT
Start: 2020-07-13 | End: 2020-12-16

## 2020-07-13 RX ORDER — GLIPIZIDE 10 MG/1
10 TABLET ORAL DAILY
Qty: 90 TABLET | Refills: 1 | Status: SHIPPED | OUTPATIENT
Start: 2020-07-13 | End: 2021-01-04

## 2020-07-13 NOTE — TELEPHONE ENCOUNTER
From: Abdulaziz Prior  To: Christopher Billingsley MD  Sent: 7/12/2020 5:47 PM CDT  Subject: Prescription Question    I am running low on the following prescriptions:    Glipizide 10 mg tablets  Vitamin B1 thiamine 100 mg tablets  Trazodone 100 mg tablets - Is

## 2020-07-13 NOTE — TELEPHONE ENCOUNTER
Ok to stop the trazodone. I might recommend cutting to 50mg for a few days before going from 100mg to 0.

## 2020-07-14 ENCOUNTER — TELEPHONE (OUTPATIENT)
Dept: FAMILY MEDICINE CLINIC | Facility: CLINIC | Age: 70
End: 2020-07-14

## 2020-07-14 ENCOUNTER — APPOINTMENT (OUTPATIENT)
Dept: CARDIOLOGY | Age: 70
End: 2020-07-14

## 2020-07-14 NOTE — TELEPHONE ENCOUNTER
Received call from home health nurse to inform [facility] and speech health on 07/09.  Is requesting a new referral for outpatient speech therapy through [facility].

## 2020-07-15 NOTE — TELEPHONE ENCOUNTER
Spoke with patient's wife they have decided to hold off on speech therapy for now and see how he does.   They will let us know if he decides to move forward with additional speech therapy

## 2020-07-18 ENCOUNTER — LAB SERVICES (OUTPATIENT)
Dept: LAB | Age: 70
End: 2020-07-18

## 2020-07-18 DIAGNOSIS — I65.23 BILATERAL CAROTID ARTERY STENOSIS: ICD-10-CM

## 2020-07-18 DIAGNOSIS — E55.9 VITAMIN D DEFICIENCY: ICD-10-CM

## 2020-07-18 DIAGNOSIS — I10 HYPERTENSION, BENIGN: ICD-10-CM

## 2020-07-18 DIAGNOSIS — Z95.1 HX OF CABG: ICD-10-CM

## 2020-07-18 DIAGNOSIS — E78.2 HYPERLIPIDEMIA, MIXED: ICD-10-CM

## 2020-07-18 DIAGNOSIS — I12.9 BENIGN HYPERTENSION WITH CKD (CHRONIC KIDNEY DISEASE) STAGE III (CMD): ICD-10-CM

## 2020-07-18 DIAGNOSIS — N18.30 BENIGN HYPERTENSION WITH CKD (CHRONIC KIDNEY DISEASE) STAGE III (CMD): ICD-10-CM

## 2020-07-18 DIAGNOSIS — E11.9 TYPE 2 DIABETES MELLITUS WITHOUT COMPLICATION, WITHOUT LONG-TERM CURRENT USE OF INSULIN (CMD): ICD-10-CM

## 2020-07-18 DIAGNOSIS — I25.10 CORONARY ARTERY DISEASE INVOLVING NATIVE CORONARY ARTERY OF NATIVE HEART WITHOUT ANGINA PECTORIS: ICD-10-CM

## 2020-07-18 LAB
ALBUMIN SERPL-MCNC: 3.6 G/DL (ref 3.6–5.1)
ALBUMIN/GLOB SERPL: 1.2 {RATIO} (ref 1–2.4)
ALP SERPL-CCNC: 62 UNITS/L (ref 45–117)
ALT SERPL-CCNC: 29 UNITS/L
ANION GAP SERPL CALC-SCNC: 10 MMOL/L (ref 10–20)
AST SERPL-CCNC: 23 UNITS/L
BASOPHILS # BLD: 0.1 K/MCL (ref 0–0.3)
BASOPHILS NFR BLD: 1 %
BILIRUB SERPL-MCNC: 0.3 MG/DL (ref 0.2–1)
BUN SERPL-MCNC: 12 MG/DL (ref 6–20)
BUN/CREAT SERPL: 16 (ref 7–25)
CALCIUM SERPL-MCNC: 8.9 MG/DL (ref 8.4–10.2)
CHLORIDE SERPL-SCNC: 107 MMOL/L (ref 98–107)
CHOLEST SERPL-MCNC: 178 MG/DL
CHOLEST/HDLC SERPL: 4.3 {RATIO}
CO2 SERPL-SCNC: 27 MMOL/L (ref 21–32)
CREAT SERPL-MCNC: 0.73 MG/DL (ref 0.67–1.17)
DIFFERENTIAL METHOD BLD: ABNORMAL
EOSINOPHIL # BLD: 0.1 K/MCL (ref 0.1–0.5)
EOSINOPHIL NFR BLD: 2 %
ERYTHROCYTE [DISTWIDTH] IN BLOOD: 13.2 % (ref 11–15)
GLOBULIN SER-MCNC: 3.1 G/DL (ref 2–4)
GLUCOSE SERPL-MCNC: 135 MG/DL (ref 65–99)
HBA1C MFR BLD: 5.3 % (ref 4.5–5.6)
HCT VFR BLD CALC: 40.1 % (ref 39–51)
HDLC SERPL-MCNC: 41 MG/DL
HGB BLD-MCNC: 12.4 G/DL (ref 13–17)
IMM GRANULOCYTES # BLD AUTO: 0 K/MCL (ref 0–0.2)
IMM GRANULOCYTES NFR BLD: 0 %
LDLC SERPL CALC-MCNC: 94 MG/DL
LENGTH OF FAST TIME PATIENT: 12 HRS
LENGTH OF FAST TIME PATIENT: 12 HRS
LYMPHOCYTES # BLD: 1.5 K/MCL (ref 1–4)
LYMPHOCYTES NFR BLD: 22 %
MCH RBC QN AUTO: 26.7 PG (ref 26–34)
MCHC RBC AUTO-ENTMCNC: 30.9 G/DL (ref 32–36.5)
MCV RBC AUTO: 86.2 FL (ref 78–100)
MONOCYTES # BLD: 0.5 K/MCL (ref 0.3–0.9)
MONOCYTES NFR BLD: 8 %
NEUTROPHILS # BLD: 4.6 K/MCL (ref 1.8–7.7)
NEUTROPHILS NFR BLD: 67 %
NONHDLC SERPL-MCNC: 137 MG/DL
NRBC BLD MANUAL-RTO: 0 /100 WBC
PLATELET # BLD: 211 K/MCL (ref 140–450)
POTASSIUM SERPL-SCNC: 3.7 MMOL/L (ref 3.4–5.1)
PROT SERPL-MCNC: 6.7 G/DL (ref 6.4–8.2)
RBC # BLD: 4.65 MIL/MCL (ref 4.5–5.9)
SODIUM SERPL-SCNC: 140 MMOL/L (ref 135–145)
TRIGL SERPL-MCNC: 213 MG/DL
WBC # BLD: 6.9 K/MCL (ref 4.2–11)

## 2020-07-20 ENCOUNTER — TELEPHONE (OUTPATIENT)
Dept: CARDIOLOGY | Age: 70
End: 2020-07-20

## 2020-07-23 ENCOUNTER — APPOINTMENT (OUTPATIENT)
Dept: CARDIOLOGY | Age: 70
End: 2020-07-23

## 2020-08-17 ENCOUNTER — PATIENT OUTREACH (OUTPATIENT)
Dept: CASE MANAGEMENT | Age: 70
End: 2020-08-17

## 2020-08-17 NOTE — PROGRESS NOTES
Called and spoke to patient. Patient didn't remember speaking to [address] [place] [address] the program to patient. Patient states he will give the program a try. Patient just recently had brain surgery. 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  [facility] [employer]

## 2020-08-28 ENCOUNTER — HOSPITAL ENCOUNTER (OUTPATIENT)
Dept: MRI IMAGING | Facility: HOSPITAL | Age: 70
Discharge: HOME | End: 2020-08-28
Attending: CLINICAL NURSE SPECIALIST
Payer: MEDICARE

## 2020-08-28 DIAGNOSIS — D42.0 ATYPICAL MENINGIOMA OF BRAIN (HCC): ICD-10-CM

## 2020-08-28 PROCEDURE — A9575 INJ GADOTERATE MEGLUMI 0.1ML: HCPCS

## 2020-08-28 PROCEDURE — 70553 MRI BRAIN STEM W/O & W/DYE: CPT | Performed by: CLINICAL NURSE SPECIALIST

## 2020-10-01 ENCOUNTER — OFFICE VISIT (OUTPATIENT)
Dept: CARDIOLOGY | Age: 70
End: 2020-10-01

## 2020-10-01 VITALS
HEIGHT: 76 IN | BODY MASS INDEX: 31.45 KG/M2 | SYSTOLIC BLOOD PRESSURE: 116 MMHG | WEIGHT: 258.3 LBS | RESPIRATION RATE: 16 BRPM | DIASTOLIC BLOOD PRESSURE: 62 MMHG | HEART RATE: 60 BPM

## 2020-10-01 DIAGNOSIS — N18.30 BENIGN HYPERTENSION WITH CKD (CHRONIC KIDNEY DISEASE) STAGE III (CMD): ICD-10-CM

## 2020-10-01 DIAGNOSIS — I65.23 BILATERAL CAROTID ARTERY STENOSIS: ICD-10-CM

## 2020-10-01 DIAGNOSIS — E78.2 HYPERLIPIDEMIA, MIXED: ICD-10-CM

## 2020-10-01 DIAGNOSIS — Z95.1 HX OF CABG: ICD-10-CM

## 2020-10-01 DIAGNOSIS — I12.9 BENIGN HYPERTENSION WITH CKD (CHRONIC KIDNEY DISEASE) STAGE III (CMD): ICD-10-CM

## 2020-10-01 DIAGNOSIS — E55.9 VITAMIN D DEFICIENCY: ICD-10-CM

## 2020-10-01 DIAGNOSIS — I10 HYPERTENSION, BENIGN: ICD-10-CM

## 2020-10-01 DIAGNOSIS — I47.29 NSVT (NONSUSTAINED VENTRICULAR TACHYCARDIA) (CMD): Primary | ICD-10-CM

## 2020-10-01 DIAGNOSIS — E11.9 TYPE 2 DIABETES MELLITUS WITHOUT COMPLICATION, WITHOUT LONG-TERM CURRENT USE OF INSULIN (CMD): ICD-10-CM

## 2020-10-01 DIAGNOSIS — I25.10 CORONARY ARTERY DISEASE INVOLVING NATIVE CORONARY ARTERY OF NATIVE HEART WITHOUT ANGINA PECTORIS: ICD-10-CM

## 2020-10-01 DIAGNOSIS — I95.1 ORTHOSTASIS: ICD-10-CM

## 2020-10-01 PROBLEM — D32.9 MENINGIOMA (CMD): Status: ACTIVE | Noted: 2020-05-12

## 2020-10-01 PROCEDURE — 99214 OFFICE O/P EST MOD 30 MIN: CPT | Performed by: INTERNAL MEDICINE

## 2020-10-01 RX ORDER — THIAMINE MONONITRATE (VIT B1) 100 MG
TABLET ORAL
COMMUNITY
Start: 2020-07-13 | End: 2021-11-16

## 2020-10-01 RX ORDER — POTASSIUM CHLORIDE 750 MG/1
10 TABLET, EXTENDED RELEASE ORAL DAILY
COMMUNITY
End: 2021-04-01

## 2020-10-01 RX ORDER — CHLORTHALIDONE 25 MG/1
25 TABLET ORAL DAILY
COMMUNITY
End: 2022-04-04

## 2020-10-01 ASSESSMENT — ENCOUNTER SYMPTOMS
FEVER: 0
CHILLS: 0
ENDOCRINE NEGATIVE: 1
ABDOMINAL PAIN: 0
SYNCOPE: 0
BRUISES/BLEEDS EASILY: 0
SUSPICIOUS LESIONS: 0
COUGH: 0
PSYCHIATRIC NEGATIVE: 1
HEMATOCHEZIA: 0
SHORTNESS OF BREATH: 0
HEMOPTYSIS: 0
WEIGHT LOSS: 0
WEIGHT GAIN: 0
LIGHT-HEADEDNESS: 0
DIZZINESS: 0

## 2020-10-01 ASSESSMENT — PATIENT HEALTH QUESTIONNAIRE - PHQ9
2. FEELING DOWN, DEPRESSED OR HOPELESS: NOT AT ALL
CLINICAL INTERPRETATION OF PHQ9 SCORE: NO FURTHER SCREENING NEEDED
1. LITTLE INTEREST OR PLEASURE IN DOING THINGS: NOT AT ALL
CLINICAL INTERPRETATION OF PHQ2 SCORE: NO FURTHER SCREENING NEEDED
SUM OF ALL RESPONSES TO PHQ9 QUESTIONS 1 AND 2: 0
SUM OF ALL RESPONSES TO PHQ9 QUESTIONS 1 AND 2: 0

## 2020-10-06 RX ORDER — CHLORTHALIDONE 25 MG/1
TABLET ORAL
Qty: 60 TABLET | Refills: 0 | Status: SHIPPED | OUTPATIENT
Start: 2020-10-06 | End: 2020-12-08

## 2020-10-06 NOTE — TELEPHONE ENCOUNTER
Requested Prescriptions     Signed Prescriptions Disp Refills   • CHLORTHALIDONE 25 MG Oral Tab 60 tablet 0     Sig: TAKE 1 TABLET BY MOUTH EVERY DAY     Authorizing Provider: Dori Chavez     Ordering User: Varsha Cooley 6/24/2020

## 2020-10-31 DIAGNOSIS — K21.9 GASTROESOPHAGEAL REFLUX DISEASE: ICD-10-CM

## 2020-10-31 DIAGNOSIS — M10.9 GOUTY ARTHROPATHY: ICD-10-CM

## 2020-11-02 ENCOUNTER — TELEPHONE (OUTPATIENT)
Dept: FAMILY MEDICINE CLINIC | Facility: CLINIC | Age: 70
End: 2020-11-02

## 2020-11-02 RX ORDER — ALLOPURINOL 300 MG/1
TABLET ORAL
Qty: 90 TABLET | Refills: 3 | Status: SHIPPED | OUTPATIENT
Start: 2020-11-02 | End: 2021-01-04

## 2020-11-02 RX ORDER — PANTOPRAZOLE SODIUM 40 MG/1
TABLET, DELAYED RELEASE ORAL
Qty: 90 TABLET | Refills: 3 | Status: SHIPPED | OUTPATIENT
Start: 2020-11-02 | End: 2021-01-04

## 2020-11-02 NOTE — TELEPHONE ENCOUNTER
Lets hold off on labs in advance, and discuss at the visit if any are needed.   He has had a fair amount of blood work done this year already

## 2020-11-02 NOTE — TELEPHONE ENCOUNTER
Pt is completely out of medication, and is going to [facility] and needs it refilled asap.

## 2020-11-02 NOTE — TELEPHONE ENCOUNTER
Pt wants to know if he needs any labs prior to appt since he had them with Cardiologist.    Please enter lab orders for the patient's upcoming physical appointment. Physical scheduled:    Your appointments     Date & Time Appointment Department [facility])

## 2020-11-05 NOTE — TELEPHONE ENCOUNTER
Called patient and left message on machine letting him know no blood work was needed prior to appointment

## 2020-11-10 ENCOUNTER — PATIENT OUTREACH (OUTPATIENT)
Dept: CASE MANAGEMENT | Age: 70
End: 2020-11-10

## 2020-11-10 NOTE — PROGRESS NOTES
Contacting patient to follow up on CCM for the month.  LMCB       Time with pt -  1min  Chart review -    Total time -  1min  Total Monthly time-  1min

## 2020-11-10 NOTE — PROGRESS NOTES
Reached out to patient. Patient was on his way to work.  Scheduled call back for 11/10/2020 at 3pm.           Time with pt - 2 min  Chart review -  5 min  Total time -  7 min  Total Monthly time-  7 min

## 2020-11-11 LAB
CALCIUM SERPL-MCNC: 9.6 MG/DL
CHLORIDE SERPL-SCNC: 99 MMOL/L
CO2 SERPL-SCNC: 32 MMOL/L
GLUCOSE SERPL-MCNC: 135 MG/DL
HCT VFR BLD CALC: 46.3 %
HGB BLD-MCNC: 14.7 G/DL
PLATELET # BLD: 176 K/MCL
POTASSIUM SERPL-SCNC: 3.6 MMOL/L
RBC # BLD: 5.69 10*6/UL
SODIUM SERPL-SCNC: 141 MMOL/L
WBC # BLD: 6.2 K/MCL

## 2020-11-12 ENCOUNTER — TELEPHONE (OUTPATIENT)
Dept: FAMILY MEDICINE CLINIC | Facility: CLINIC | Age: 70
End: 2020-11-12

## 2020-11-12 ENCOUNTER — CLINICAL ABSTRACT (OUTPATIENT)
Dept: CARDIOLOGY | Age: 70
End: 2020-11-12

## 2020-11-12 NOTE — TELEPHONE ENCOUNTER
Received lab results from [facility] for pt.  Placed in Dr Aylin Ordoñez

## 2020-11-13 ENCOUNTER — IMMUNIZATION (OUTPATIENT)
Dept: FAMILY MEDICINE CLINIC | Facility: CLINIC | Age: 70
End: 2020-11-13
Payer: MEDICARE

## 2020-11-13 DIAGNOSIS — Z23 NEED FOR VACCINATION: ICD-10-CM

## 2020-11-13 PROCEDURE — 90662 IIV NO PRSV INCREASED AG IM: CPT | Performed by: FAMILY MEDICINE

## 2020-11-13 PROCEDURE — G0008 ADMIN INFLUENZA VIRUS VAC: HCPCS | Performed by: FAMILY MEDICINE

## 2020-12-08 RX ORDER — CHLORTHALIDONE 25 MG/1
25 TABLET ORAL DAILY
Qty: 60 TABLET | Refills: 0 | Status: SHIPPED | OUTPATIENT
Start: 2020-12-08 | End: 2021-01-04

## 2020-12-08 NOTE — TELEPHONE ENCOUNTER
Requested Prescriptions     Pending Prescriptions Disp Refills   • chlorthalidone 25 MG Oral Tab 60 tablet 0     Sig: Take 1 tablet (25 mg total) by mouth daily.      LOV 6/24/2020     Patient was asked to follow-up in: 6 months    Appointment scheduled: 12

## 2020-12-10 ENCOUNTER — TELEPHONE (OUTPATIENT)
Dept: FAMILY MEDICINE CLINIC | Facility: CLINIC | Age: 70
End: 2020-12-10

## 2020-12-16 ENCOUNTER — OFFICE VISIT (OUTPATIENT)
Dept: FAMILY MEDICINE CLINIC | Facility: CLINIC | Age: 70
End: 2020-12-16
Payer: MEDICARE

## 2020-12-16 VITALS
RESPIRATION RATE: 16 BRPM | DIASTOLIC BLOOD PRESSURE: 74 MMHG | HEIGHT: 75.25 IN | HEART RATE: 60 BPM | BODY MASS INDEX: 32.53 KG/M2 | WEIGHT: 261.63 LBS | SYSTOLIC BLOOD PRESSURE: 136 MMHG | TEMPERATURE: 98 F

## 2020-12-16 DIAGNOSIS — D42.0 ATYPICAL MENINGIOMA OF BRAIN (HCC): ICD-10-CM

## 2020-12-16 DIAGNOSIS — E11.9 TYPE 2 DIABETES MELLITUS WITHOUT COMPLICATION, WITHOUT LONG-TERM CURRENT USE OF INSULIN (HCC): ICD-10-CM

## 2020-12-16 DIAGNOSIS — Z00.00 ENCOUNTER FOR ANNUAL HEALTH EXAMINATION: Primary | ICD-10-CM

## 2020-12-16 PROCEDURE — G0438 PPPS, INITIAL VISIT: HCPCS | Performed by: FAMILY MEDICINE

## 2020-12-16 PROCEDURE — 83036 HEMOGLOBIN GLYCOSYLATED A1C: CPT | Performed by: FAMILY MEDICINE

## 2020-12-16 RX ORDER — POTASSIUM CITRATE 10 MEQ/1
3 TABLET, EXTENDED RELEASE ORAL DAILY
COMMUNITY
Start: 2020-10-07 | End: 2021-01-04

## 2020-12-16 RX ORDER — FUROSEMIDE 20 MG/1
20 TABLET ORAL DAILY
COMMUNITY
Start: 2020-07-07 | End: 2020-12-16

## 2020-12-16 NOTE — PROGRESS NOTES
HPI:   Aime Escalante is a 79year old male who presents for a Medicare Subsequent Annual Wellness visit (Pt already had Initial Annual Wellness). Preventative Screening  Colonoscopy - 2019. Repeat 3 yrs - 2022. Prostate - PSA ordered.   Last 2018 cigar/month     This is a tobacco user, and I will give tobacco cessation counseling today.  (update Vitals or Tob Hx section to heike Tobacco Cessation counseling given as YES and refresh this link)      Mr. Miriam Benito already takes aspirin and has it on his labs)   Lab Results   Component Value Date    WBC 6.2 11/11/2020    HGB 14.7 11/11/2020     11/11/2020        ALLERGIES:   He is allergic to atorvastatin; carvedilol; pravastatin; and seasonal.    CURRENT MEDICATIONS:     •  Multiple Vitamins-[place] grandmother; Lung Disorder in his maternal grandmother. SOCIAL HISTORY:   He  reports that he has been smoking cigars. He has a 34.00 pack-year smoking history.  He has never used smokeless tobacco. He reports current alcohol use of about 12.0 standard dr Skin: Skin color, texture, turgor normal, no rashes or lesions   Neurologic: Normal         Vaccination History     Immunization History   Administered Date(s) Administered   • FLU VAC High Dose 65 YRS & Older PRSV Free (80211) 09/24/2019, 11/13/2020   • Hemoglobin A1C to be confirmed at [facility]   05/01/2020 7.5 % (H)       No flowsheet data found.     Fasting Blood Sugar (FSB)Annually Glucose (mg/dL)   Date Value   11/11/2020 135     GLUCOSE (mg/dL)   Date Value   11/30/2015 130 (H)       Cardiovascular D covered with your prescription benefits, but Medicare does not cover unless Medically needed    Zoster   Not covered by Medicare Part B No vaccine history found This may be covered with your pharmacy  prescription benefits      SPECIFIC DISEASE MONITORING

## 2020-12-16 NOTE — PATIENT INSTRUCTIONS
[place] [place] SCREENING SCHEDULE   Tests on this list are recommended by your physician but may not be covered, or covered at this frequency, by your insurer. Please check with your insurance carrier before scheduling to verify coverage.     PREVENT visit.  Limited to patients who meet one of the following criteria:   • Men who are 73-68 years old and have smoked more than 100 cigarettes in their lifetime   • Anyone with a family history    Colorectal Cancer Screening Covered up to Age 76     Colonosco Factor VIII or IX concentrates   Clients of institutions for the mentally retarded   Persons who live in the same house as a HepB virus carrier   Homosexual men   Illicit injectable drug abusers     Tetanus Toxoid- Only covered with a cut with metal- TD an

## 2020-12-21 ENCOUNTER — HOSPITAL ENCOUNTER (OUTPATIENT)
Dept: MRI IMAGING | Facility: HOSPITAL | Age: 70
Discharge: HOME | End: 2020-12-21
Attending: CLINICAL NURSE SPECIALIST
Payer: MEDICARE

## 2020-12-21 DIAGNOSIS — D42.0 ATYPICAL MENINGIOMA OF BRAIN (HCC): ICD-10-CM

## 2020-12-21 PROCEDURE — A9575 INJ GADOTERATE MEGLUMI 0.1ML: HCPCS | Performed by: RADIOLOGY

## 2020-12-21 PROCEDURE — 70553 MRI BRAIN STEM W/O & W/DYE: CPT | Performed by: CLINICAL NURSE SPECIALIST

## 2020-12-22 ENCOUNTER — PATIENT OUTREACH (OUTPATIENT)
Dept: CASE MANAGEMENT | Age: 70
End: 2020-12-22

## 2020-12-22 DIAGNOSIS — M16.0 OSTEOARTHRITIS OF BOTH HIPS, UNSPECIFIED OSTEOARTHRITIS TYPE: ICD-10-CM

## 2020-12-22 DIAGNOSIS — E55.9 VITAMIN D DEFICIENCY: ICD-10-CM

## 2020-12-22 DIAGNOSIS — I10 HYPERTENSION, BENIGN: ICD-10-CM

## 2020-12-22 DIAGNOSIS — E78.00 PURE HYPERCHOLESTEROLEMIA: ICD-10-CM

## 2020-12-22 DIAGNOSIS — R73.9 HYPERGLYCEMIA: ICD-10-CM

## 2020-12-22 DIAGNOSIS — M10.9 GOUTY ARTHROPATHY: ICD-10-CM

## 2020-12-22 DIAGNOSIS — K21.9 GASTROESOPHAGEAL REFLUX DISEASE, UNSPECIFIED WHETHER ESOPHAGITIS PRESENT: ICD-10-CM

## 2020-12-22 DIAGNOSIS — E11.9 TYPE 2 DIABETES MELLITUS WITHOUT COMPLICATION, WITHOUT LONG-TERM CURRENT USE OF INSULIN (HCC): ICD-10-CM

## 2020-12-22 NOTE — PROGRESS NOTES
Pt was in car.  Asked for CB between 1-3 pm today 12/22/2020       Time with pt - 2 min  Chart review -3  min  Total time - 5 min  Total Monthly time-5  min

## 2020-12-22 NOTE — PROGRESS NOTES
12/22/2020  Spoke to Padmini Welch for CCM. Updates to patient care team/ comments: UTD  Patient reported changes in medications: UTD  Med Adherence  Comment: Pt taking medications as prescribed     Health Maintenance:      Tobacco Cessation Counseling 2 Year 1= least confident in achieving goal, 5= very confident               - confidence: : 5             Care Manager Follow Up: 1 month  Reason For Follow Up: review progress and or barriers towards patients goals.      Care managers interventions:

## 2020-12-31 PROCEDURE — 99490 CHRNC CARE MGMT STAFF 1ST 20: CPT

## 2021-01-01 ENCOUNTER — EXTERNAL RECORD (OUTPATIENT)
Dept: OTHER | Age: 71
End: 2021-01-01

## 2021-01-03 DIAGNOSIS — E11.9 TYPE 2 DIABETES MELLITUS WITHOUT COMPLICATION, WITHOUT LONG-TERM CURRENT USE OF INSULIN (HCC): ICD-10-CM

## 2021-01-04 DIAGNOSIS — K21.9 GASTROESOPHAGEAL REFLUX DISEASE: ICD-10-CM

## 2021-01-04 DIAGNOSIS — E11.9 TYPE 2 DIABETES MELLITUS WITHOUT COMPLICATION, WITHOUT LONG-TERM CURRENT USE OF INSULIN (HCC): ICD-10-CM

## 2021-01-04 DIAGNOSIS — M10.9 GOUTY ARTHROPATHY: ICD-10-CM

## 2021-01-04 RX ORDER — PANTOPRAZOLE SODIUM 40 MG/1
40 TABLET, DELAYED RELEASE ORAL DAILY
Qty: 90 TABLET | Refills: 2 | Status: SHIPPED | OUTPATIENT
Start: 2021-01-04 | End: 2021-07-16

## 2021-01-04 RX ORDER — GLIPIZIDE 10 MG/1
10 TABLET ORAL DAILY
Qty: 90 TABLET | Refills: 1 | Status: SHIPPED | OUTPATIENT
Start: 2021-01-04 | End: 2021-06-16

## 2021-01-04 RX ORDER — CHLORTHALIDONE 25 MG/1
25 TABLET ORAL DAILY
Qty: 90 TABLET | Refills: 0 | Status: SHIPPED | OUTPATIENT
Start: 2021-01-04 | End: 2021-03-29

## 2021-01-04 RX ORDER — ALLOPURINOL 300 MG/1
300 TABLET ORAL DAILY
Qty: 90 TABLET | Refills: 2 | Status: SHIPPED | OUTPATIENT
Start: 2021-01-04 | End: 2021-07-16

## 2021-01-04 NOTE — TELEPHONE ENCOUNTER
Spoke with Sukhi Cullen. His new medication plan is now [facility]. He would like his medications to be refilled by [facility]. Allopurinol,Chlorthalidon,Glipizide,and Pantoprazol redirected to [facility].     Jaylen said Potassium Citrate ER 10meq was original

## 2021-01-05 RX ORDER — POTASSIUM CITRATE 10 MEQ/1
30 TABLET, EXTENDED RELEASE ORAL DAILY
Qty: 270 TABLET | Refills: 1 | Status: SHIPPED | OUTPATIENT
Start: 2021-01-05 | End: 2021-06-16

## 2021-01-05 RX ORDER — GLIPIZIDE 10 MG/1
TABLET ORAL
Qty: 90 TABLET | Refills: 1 | OUTPATIENT
Start: 2021-01-05

## 2021-01-21 ENCOUNTER — PATIENT OUTREACH (OUTPATIENT)
Dept: CASE MANAGEMENT | Age: 71
End: 2021-01-21

## 2021-01-21 DIAGNOSIS — K21.9 GASTROESOPHAGEAL REFLUX DISEASE, UNSPECIFIED WHETHER ESOPHAGITIS PRESENT: ICD-10-CM

## 2021-01-21 DIAGNOSIS — E11.9 TYPE 2 DIABETES MELLITUS WITHOUT COMPLICATION, WITHOUT LONG-TERM CURRENT USE OF INSULIN (HCC): ICD-10-CM

## 2021-01-21 DIAGNOSIS — R73.9 HYPERGLYCEMIA: ICD-10-CM

## 2021-01-21 DIAGNOSIS — M16.0 OSTEOARTHRITIS OF BOTH HIPS, UNSPECIFIED OSTEOARTHRITIS TYPE: ICD-10-CM

## 2021-01-21 DIAGNOSIS — E78.00 PURE HYPERCHOLESTEROLEMIA: ICD-10-CM

## 2021-01-21 DIAGNOSIS — I10 HYPERTENSION, BENIGN: ICD-10-CM

## 2021-01-21 NOTE — PROGRESS NOTES
1/21/2021  Spoke to Maikel Cheung for CCM. 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       • Update to previous barriers: n/a    • Patient Reported New Barriers And Concerns: abel Palomino

## 2021-01-26 DIAGNOSIS — Z23 NEED FOR VACCINATION: ICD-10-CM

## 2021-01-31 PROCEDURE — 99490 CHRNC CARE MGMT STAFF 1ST 20: CPT

## 2021-02-03 ENCOUNTER — IMMUNIZATION (OUTPATIENT)
Dept: LAB | Age: 71
End: 2021-02-03
Attending: HOSPITALIST
Payer: MEDICARE

## 2021-02-03 DIAGNOSIS — Z23 NEED FOR VACCINATION: Primary | ICD-10-CM

## 2021-02-03 PROCEDURE — 0001A SARSCOV2 VAC 30MCG/0.3ML IM: CPT

## 2021-02-22 ENCOUNTER — PATIENT OUTREACH (OUTPATIENT)
Dept: CASE MANAGEMENT | Age: 71
End: 2021-02-22

## 2021-02-22 DIAGNOSIS — R73.9 HYPERGLYCEMIA: ICD-10-CM

## 2021-02-22 DIAGNOSIS — E11.9 TYPE 2 DIABETES MELLITUS WITHOUT COMPLICATION, WITHOUT LONG-TERM CURRENT USE OF INSULIN (HCC): ICD-10-CM

## 2021-02-22 DIAGNOSIS — E78.00 PURE HYPERCHOLESTEROLEMIA: ICD-10-CM

## 2021-02-22 DIAGNOSIS — M16.0 OSTEOARTHRITIS OF BOTH HIPS, UNSPECIFIED OSTEOARTHRITIS TYPE: ICD-10-CM

## 2021-02-22 DIAGNOSIS — I25.812 CORONARY ARTERY DISEASE INVOLVING BYPASS GRAFT OF TRANSPLANTED HEART WITHOUT ANGINA PECTORIS: ICD-10-CM

## 2021-02-22 DIAGNOSIS — I10 HYPERTENSION, BENIGN: ICD-10-CM

## 2021-02-22 NOTE — PROGRESS NOTES
2/22/2021  Spoke to Usman Watosn for CCM. Updates to patient care team/ comments: UTD  Patient reported changes in medications: UTD  Med Adherence  Comment: Pt taking medications as prescribed     Health Maintenance:      Tobacco Cessation Counseling 2 Years Reported New Barriers And Concerns: none                              Patient agrees to goal action plan.   Self-Management Abilities (patient reported)             1= least confident in achieving goal, 5= very confident               - confidence: : 4

## 2021-02-24 ENCOUNTER — IMMUNIZATION (OUTPATIENT)
Dept: LAB | Age: 71
End: 2021-02-24
Attending: HOSPITALIST
Payer: MEDICARE

## 2021-02-24 DIAGNOSIS — Z23 NEED FOR VACCINATION: Primary | ICD-10-CM

## 2021-02-24 PROCEDURE — 0002A SARSCOV2 VAC 30MCG/0.3ML IM: CPT

## 2021-02-28 PROCEDURE — 99490 CHRNC CARE MGMT STAFF 1ST 20: CPT

## 2021-03-05 LAB
ALBUMIN SERPL-MCNC: 4.2 G/DL
ALP SERPL-CCNC: 59 U/L
ALT SERPL-CCNC: 31 UNITS/L
AMB EXT CREATININE: 0.96 MG/DL
AMB EXT GFR: 80
AMB EXT GLUCOSE: 149 MG/DL
AMB EXT HEMATOCRIT: 47
AMB EXT HEMOGLOBIN: 15.2
AMB EXT HGBA1C: 6.4 %
AMB EXT MCV: 81.3
AMB EXT PLATELETS: 180
AMB EXT WBC: 7.5 X10(3)UL
AST SERPL-CCNC: 32 UNITS/L
BILIRUB SERPL-MCNC: 0.7 MG/DL
BUN SERPL-MCNC: 17 MG/DL
CALCIUM SERPL-MCNC: 9.7 MG/DL
CHLORIDE SERPL-SCNC: 97 MMOL/L
CO2 SERPL-SCNC: 31 MMOL/L
CREAT SERPL-MCNC: 0.96 MG/DL
GLOBULIN SER-MCNC: 2.8 G/DL
GLUCOSE SERPL-MCNC: 149 MG/DL
HBA1C MFR BLD: 6.4 %
HCT VFR BLD CALC: 47 %
HGB BLD-MCNC: 15.2 G/DL
PLATELET # BLD: 180 K/MCL
POTASSIUM SERPL-SCNC: 3.6 MMOL/L
PROT SERPL-MCNC: 7 G/DL
RBC # BLD: 5.78 10*6/UL
SODIUM SERPL-SCNC: 141 MMOL/L
WBC # BLD: 7.5 K/MCL

## 2021-03-08 ENCOUNTER — TELEPHONE (OUTPATIENT)
Dept: FAMILY MEDICINE CLINIC | Facility: CLINIC | Age: 71
End: 2021-03-08

## 2021-03-08 ENCOUNTER — CLINICAL ABSTRACT (OUTPATIENT)
Dept: CARDIOLOGY | Age: 71
End: 2021-03-08

## 2021-03-08 ENCOUNTER — TELEPHONE (OUTPATIENT)
Dept: CARDIOLOGY | Age: 71
End: 2021-03-08

## 2021-03-08 NOTE — TELEPHONE ENCOUNTER
Received fax from [facility] with patient's test results, placed in Dr. Bryan Alvarez in box

## 2021-03-08 NOTE — TELEPHONE ENCOUNTER
Labs from [facility]  Abstracted, then sent to scan    Glucose 149  A1C 6.4

## 2021-03-17 LAB
AMB EXT CHOL/HDL RATIO: 3.4
AMB EXT CHOLESTEROL, TOTAL: 170 MG/DL
AMB EXT HDL CHOLESTEROL: 50 MG/DL
AMB EXT LDL CHOLESTEROL, DIRECT: 89 MG/DL
AMB EXT PSA SCREEN: 1.4 NG/ML
AMB EXT TRIGLYCERIDES: 221 MG/DL
CHOLEST SERPL-MCNC: 170 MG/DL
HDLC SERPL-MCNC: 50 MG/DL
LDLC SERPL CALC-MCNC: 89 MG/DL
TRIGL SERPL-MCNC: 221 MG/DL

## 2021-03-18 ENCOUNTER — TELEPHONE (OUTPATIENT)
Dept: FAMILY MEDICINE CLINIC | Facility: CLINIC | Age: 71
End: 2021-03-18

## 2021-03-18 ENCOUNTER — TELEPHONE (OUTPATIENT)
Dept: CARDIOLOGY | Age: 71
End: 2021-03-18

## 2021-03-18 ENCOUNTER — CLINICAL ABSTRACT (OUTPATIENT)
Dept: CARDIOLOGY | Age: 71
End: 2021-03-18

## 2021-03-18 NOTE — TELEPHONE ENCOUNTER
Received lab results from [facility] for patient. Placed in Dr. Daphne Lentz in box for review.

## 2021-03-18 NOTE — TELEPHONE ENCOUNTER
Received fax from [address] [place] [address] with PSA test results, placed in Dr. Te Jhaveri in box

## 2021-03-22 ENCOUNTER — PATIENT OUTREACH (OUTPATIENT)
Dept: CASE MANAGEMENT | Age: 71
End: 2021-03-22

## 2021-03-22 NOTE — PROGRESS NOTES
Called pt. Pt requested call back 3/23/21 in the afternoon.        Time with pt -  min  Chart review -  min  Total time - 3 min  Total Monthly time- 3 min

## 2021-03-23 ENCOUNTER — PATIENT OUTREACH (OUTPATIENT)
Dept: CASE MANAGEMENT | Age: 71
End: 2021-03-23

## 2021-03-23 ENCOUNTER — ANCILLARY PROCEDURE (OUTPATIENT)
Dept: CARDIOLOGY | Age: 71
End: 2021-03-23
Attending: INTERNAL MEDICINE

## 2021-03-23 DIAGNOSIS — I10 HYPERTENSION, BENIGN: ICD-10-CM

## 2021-03-23 DIAGNOSIS — N18.30 BENIGN HYPERTENSION WITH CKD (CHRONIC KIDNEY DISEASE) STAGE III (CMD): ICD-10-CM

## 2021-03-23 DIAGNOSIS — I47.29 NSVT (NONSUSTAINED VENTRICULAR TACHYCARDIA) (CMD): ICD-10-CM

## 2021-03-23 DIAGNOSIS — E55.9 VITAMIN D DEFICIENCY: ICD-10-CM

## 2021-03-23 DIAGNOSIS — Z95.1 HX OF CABG: ICD-10-CM

## 2021-03-23 DIAGNOSIS — E11.9 TYPE 2 DIABETES MELLITUS WITHOUT COMPLICATION, WITHOUT LONG-TERM CURRENT USE OF INSULIN (CMD): ICD-10-CM

## 2021-03-23 DIAGNOSIS — I12.9 BENIGN HYPERTENSION WITH CKD (CHRONIC KIDNEY DISEASE) STAGE III (CMD): ICD-10-CM

## 2021-03-23 DIAGNOSIS — E78.2 HYPERLIPIDEMIA, MIXED: ICD-10-CM

## 2021-03-23 DIAGNOSIS — I25.10 CORONARY ARTERY DISEASE INVOLVING NATIVE CORONARY ARTERY OF NATIVE HEART WITHOUT ANGINA PECTORIS: ICD-10-CM

## 2021-03-23 DIAGNOSIS — I65.23 BILATERAL CAROTID ARTERY STENOSIS: ICD-10-CM

## 2021-03-23 DIAGNOSIS — I95.1 ORTHOSTASIS: ICD-10-CM

## 2021-03-23 PROCEDURE — 93880 EXTRACRANIAL BILAT STUDY: CPT | Performed by: INTERNAL MEDICINE

## 2021-03-23 NOTE — PROGRESS NOTES
Contacting patient to follow up on CCM for the month.  LMCB       Time with pt -  min  Chart review -  min  Total time - 1 mmin  Total Monthly time-4  min

## 2021-03-24 ENCOUNTER — TELEPHONE (OUTPATIENT)
Dept: FAMILY MEDICINE CLINIC | Facility: CLINIC | Age: 71
End: 2021-03-24

## 2021-03-24 NOTE — TELEPHONE ENCOUNTER
Vascular US study:  50-69% stenosis R internal carotid - worse  16-49% stenosis L internal carotid  Vertebral arteries patent bilateral.       Sent to scan.

## 2021-03-24 NOTE — TELEPHONE ENCOUNTER
Received ultra sound results from [facility]. Placed in Dr. Chris Bowens.

## 2021-03-29 RX ORDER — CHLORTHALIDONE 25 MG/1
TABLET ORAL
Qty: 90 TABLET | Refills: 0 | Status: SHIPPED | OUTPATIENT
Start: 2021-03-29 | End: 2021-06-16

## 2021-03-29 NOTE — TELEPHONE ENCOUNTER
Requested Prescriptions     Pending Prescriptions Disp Refills   • CHLORTHALIDONE 25 MG Oral Tab [Pharmacy Med Name: CHLORTHALID  TAB 25MG] 90 tablet 0     Sig: TAKE 1 TABLET DAILY     LOV 12/16/2020     Patient was asked to follow-up in: 6 months    Appoi

## 2021-03-30 PROBLEM — R73.9 HYPERGLYCEMIA: Status: ACTIVE | Noted: 2020-05-01

## 2021-04-01 ENCOUNTER — OFFICE VISIT (OUTPATIENT)
Dept: CARDIOLOGY | Age: 71
End: 2021-04-01

## 2021-04-01 VITALS
BODY MASS INDEX: 32.17 KG/M2 | HEART RATE: 54 BPM | SYSTOLIC BLOOD PRESSURE: 126 MMHG | WEIGHT: 264.2 LBS | DIASTOLIC BLOOD PRESSURE: 70 MMHG | HEIGHT: 76 IN

## 2021-04-01 DIAGNOSIS — I47.29 NSVT (NONSUSTAINED VENTRICULAR TACHYCARDIA) (CMD): Primary | ICD-10-CM

## 2021-04-01 DIAGNOSIS — I25.10 CORONARY ARTERY DISEASE INVOLVING NATIVE CORONARY ARTERY OF NATIVE HEART WITHOUT ANGINA PECTORIS: ICD-10-CM

## 2021-04-01 DIAGNOSIS — N18.30 BENIGN HYPERTENSION WITH CKD (CHRONIC KIDNEY DISEASE) STAGE III (CMD): ICD-10-CM

## 2021-04-01 DIAGNOSIS — I10 HYPERTENSION, BENIGN: ICD-10-CM

## 2021-04-01 DIAGNOSIS — E78.00 PURE HYPERCHOLESTEROLEMIA: ICD-10-CM

## 2021-04-01 DIAGNOSIS — I12.9 BENIGN HYPERTENSION WITH CKD (CHRONIC KIDNEY DISEASE) STAGE III (CMD): ICD-10-CM

## 2021-04-01 DIAGNOSIS — R73.9 HYPERGLYCEMIA: ICD-10-CM

## 2021-04-01 DIAGNOSIS — E78.2 HYPERLIPIDEMIA, MIXED: ICD-10-CM

## 2021-04-01 DIAGNOSIS — Z95.1 HX OF CABG: ICD-10-CM

## 2021-04-01 DIAGNOSIS — E55.9 VITAMIN D DEFICIENCY: ICD-10-CM

## 2021-04-01 PROCEDURE — 99214 OFFICE O/P EST MOD 30 MIN: CPT | Performed by: INTERNAL MEDICINE

## 2021-04-01 RX ORDER — POTASSIUM CITRATE 10 MEQ/1
30 TABLET, EXTENDED RELEASE ORAL
COMMUNITY
Start: 2021-01-05 | End: 2022-04-14 | Stop reason: DRUGHIGH

## 2021-04-01 RX ORDER — TADALAFIL 5 MG/1
5 TABLET ORAL
COMMUNITY
Start: 2021-02-03 | End: 2021-11-16

## 2021-04-01 SDOH — HEALTH STABILITY: PHYSICAL HEALTH: ON AVERAGE, HOW MANY MINUTES DO YOU ENGAGE IN EXERCISE AT THIS LEVEL?: 40 MIN

## 2021-04-01 SDOH — HEALTH STABILITY: PHYSICAL HEALTH: ON AVERAGE, HOW MANY DAYS PER WEEK DO YOU ENGAGE IN MODERATE TO STRENUOUS EXERCISE (LIKE A BRISK WALK)?: 3 DAYS

## 2021-04-01 ASSESSMENT — PATIENT HEALTH QUESTIONNAIRE - PHQ9
SUM OF ALL RESPONSES TO PHQ9 QUESTIONS 1 AND 2: 0
2. FEELING DOWN, DEPRESSED OR HOPELESS: NOT AT ALL
CLINICAL INTERPRETATION OF PHQ9 SCORE: NO FURTHER SCREENING NEEDED
SUM OF ALL RESPONSES TO PHQ9 QUESTIONS 1 AND 2: 0
CLINICAL INTERPRETATION OF PHQ2 SCORE: NO FURTHER SCREENING NEEDED
1. LITTLE INTEREST OR PLEASURE IN DOING THINGS: NOT AT ALL

## 2021-04-28 ENCOUNTER — PATIENT OUTREACH (OUTPATIENT)
Dept: CASE MANAGEMENT | Age: 71
End: 2021-04-28

## 2021-04-28 NOTE — PROGRESS NOTES
Contacting patient to follow up on CCM for the month.  LMCB       Time with pt -  min  Chart review -  min  Total time -3  min  Total Monthly time3-  min

## 2021-06-01 ENCOUNTER — TELEPHONE (OUTPATIENT)
Dept: FAMILY MEDICINE CLINIC | Facility: CLINIC | Age: 71
End: 2021-06-01

## 2021-06-01 DIAGNOSIS — J30.2 SEASONAL ALLERGIC RHINITIS, UNSPECIFIED TRIGGER: Primary | ICD-10-CM

## 2021-06-01 RX ORDER — AZELASTINE 1 MG/ML
SPRAY, METERED NASAL
Qty: 3 EACH | Refills: 1 | Status: SHIPPED | OUTPATIENT
Start: 2021-06-01 | End: 2021-10-29

## 2021-06-01 NOTE — TELEPHONE ENCOUNTER
Pt states that he has been out of his Azelastine HCl 0.1 % Nasal Solution for some time now. Did request but med had not been called in to his [facility] Mail service.  Can we send please  Also when we send his test results he cannot see anything

## 2021-06-04 ENCOUNTER — PATIENT OUTREACH (OUTPATIENT)
Dept: CASE MANAGEMENT | Age: 71
End: 2021-06-04

## 2021-06-09 DIAGNOSIS — E11.9 TYPE 2 DIABETES MELLITUS WITHOUT COMPLICATION, WITHOUT LONG-TERM CURRENT USE OF INSULIN (HCC): ICD-10-CM

## 2021-06-15 ENCOUNTER — HOSPITAL ENCOUNTER (OUTPATIENT)
Dept: MRI IMAGING | Facility: HOSPITAL | Age: 71
Discharge: HOME | End: 2021-06-15
Attending: CLINICAL NURSE SPECIALIST
Payer: MEDICARE

## 2021-06-15 DIAGNOSIS — D42.0 ATYPICAL MENINGIOMA OF BRAIN (HCC): ICD-10-CM

## 2021-06-15 PROCEDURE — A9575 INJ GADOTERATE MEGLUMI 0.1ML: HCPCS

## 2021-06-15 PROCEDURE — 70553 MRI BRAIN STEM W/O & W/DYE: CPT | Performed by: CLINICAL NURSE SPECIALIST

## 2021-06-15 NOTE — TELEPHONE ENCOUNTER
Requested Prescriptions     Pending Prescriptions Disp Refills   • POTASSIUM CITRATE ER 10 MEQ (1080 MG) Oral Tab CR [Pharmacy Med Name: POT CITRATE  TAB 10MEQ] 270 tablet 1     Sig: TAKE 3 TABLETS DAILY   • CHLORTHALIDONE 25 MG Oral Tab [Pharmacy Med Name

## 2021-06-16 ENCOUNTER — OFFICE VISIT (OUTPATIENT)
Dept: FAMILY MEDICINE CLINIC | Facility: CLINIC | Age: 71
End: 2021-06-16
Payer: MEDICARE

## 2021-06-16 VITALS
SYSTOLIC BLOOD PRESSURE: 122 MMHG | OXYGEN SATURATION: 98 % | BODY MASS INDEX: 32.53 KG/M2 | TEMPERATURE: 97 F | HEIGHT: 75.25 IN | HEART RATE: 60 BPM | WEIGHT: 261.63 LBS | DIASTOLIC BLOOD PRESSURE: 66 MMHG | RESPIRATION RATE: 16 BRPM

## 2021-06-16 DIAGNOSIS — E78.00 PURE HYPERCHOLESTEROLEMIA: ICD-10-CM

## 2021-06-16 DIAGNOSIS — I25.812 CORONARY ARTERY DISEASE INVOLVING BYPASS GRAFT OF TRANSPLANTED HEART WITHOUT ANGINA PECTORIS: ICD-10-CM

## 2021-06-16 DIAGNOSIS — E11.42 TYPE 2 DIABETES MELLITUS WITH DIABETIC POLYNEUROPATHY, WITHOUT LONG-TERM CURRENT USE OF INSULIN (HCC): Primary | ICD-10-CM

## 2021-06-16 DIAGNOSIS — I65.21 STENOSIS OF RIGHT CAROTID ARTERY: ICD-10-CM

## 2021-06-16 DIAGNOSIS — I10 ESSENTIAL HYPERTENSION: ICD-10-CM

## 2021-06-16 PROCEDURE — 82043 UR ALBUMIN QUANTITATIVE: CPT | Performed by: FAMILY MEDICINE

## 2021-06-16 PROCEDURE — 82570 ASSAY OF URINE CREATININE: CPT | Performed by: FAMILY MEDICINE

## 2021-06-16 PROCEDURE — 99214 OFFICE O/P EST MOD 30 MIN: CPT | Performed by: FAMILY MEDICINE

## 2021-06-16 RX ORDER — GLIPIZIDE 10 MG/1
10 TABLET ORAL
Qty: 90 TABLET | Refills: 3 | Status: SHIPPED | OUTPATIENT
Start: 2021-06-16

## 2021-06-16 RX ORDER — CHLORTHALIDONE 25 MG/1
25 TABLET ORAL DAILY
Qty: 90 TABLET | Refills: 3 | Status: SHIPPED | OUTPATIENT
Start: 2021-06-16

## 2021-06-16 RX ORDER — POTASSIUM CITRATE 10 MEQ/1
30 TABLET, EXTENDED RELEASE ORAL DAILY
Qty: 270 TABLET | Refills: 3 | Status: SHIPPED | OUTPATIENT
Start: 2021-06-16

## 2021-06-16 NOTE — PROGRESS NOTES
Patient presents with: Follow - Up: 6 Month Disbetic Check      HPI:   Owen Lesch is a 70year old male who presents for a diabetic visit. A little higher a year ago, but has since returned to his baseline, 6.4 in early March.         Current diabe negative  Ears, nose, mouth, throat, and face: negative  Respiratory: negative  Cardiovascular: negative  Gastrointestinal: negative  Genitourinary: negative  Neurological: increase in neuropathy fee. t     EXAM:     /66   Pulse 60   Temp 97.1 °F (36. artery disease involving bypass graft of transplanted heart without angina pectoris  3. Essential hypertension  4. Pure hypercholesterolemia  Ongoing problems. Managed by cardio. No active issues. Blood pressure controlled.   Cholesterol well controlled

## 2021-07-09 ENCOUNTER — TELEPHONE (OUTPATIENT)
Dept: CARDIOLOGY | Age: 71
End: 2021-07-09

## 2021-07-09 DIAGNOSIS — M10.9 GOUTY ARTHROPATHY: ICD-10-CM

## 2021-07-09 DIAGNOSIS — K21.9 GASTROESOPHAGEAL REFLUX DISEASE: ICD-10-CM

## 2021-07-09 RX ORDER — ROSUVASTATIN CALCIUM 40 MG/1
40 TABLET, COATED ORAL DAILY
Qty: 90 TABLET | Refills: 1 | Status: SHIPPED | OUTPATIENT
Start: 2021-07-09 | End: 2021-10-27

## 2021-07-09 RX ORDER — ROSUVASTATIN CALCIUM 40 MG/1
40 TABLET, COATED ORAL DAILY
Qty: 7 TABLET | Refills: 1 | Status: SHIPPED | OUTPATIENT
Start: 2021-07-09 | End: 2021-07-09 | Stop reason: SDUPTHER

## 2021-07-09 RX ORDER — ROSUVASTATIN CALCIUM 40 MG/1
40 TABLET, COATED ORAL DAILY
COMMUNITY
End: 2021-07-09 | Stop reason: SDUPTHER

## 2021-07-15 ENCOUNTER — TELEPHONE (OUTPATIENT)
Dept: CARDIOLOGY | Age: 71
End: 2021-07-15

## 2021-07-16 RX ORDER — ALLOPURINOL 300 MG/1
TABLET ORAL
Qty: 90 TABLET | Refills: 2 | Status: SHIPPED | OUTPATIENT
Start: 2021-07-16

## 2021-07-16 RX ORDER — PANTOPRAZOLE SODIUM 40 MG/1
TABLET, DELAYED RELEASE ORAL
Qty: 90 TABLET | Refills: 2 | Status: SHIPPED | OUTPATIENT
Start: 2021-07-16

## 2021-07-16 NOTE — TELEPHONE ENCOUNTER
Refill request for:    Requested Prescriptions     Pending Prescriptions Disp Refills   • PANTOPRAZOLE SODIUM 40 MG Oral Tab EC [Pharmacy Med Name: PANTOPRAZOLE TAB 40MG DR] 90 tablet 2     Sig: TAKE 1 TABLET DAILY   • ALLOPURINOL 300 MG Oral Tab [Pharmacy

## 2021-07-29 ENCOUNTER — PATIENT OUTREACH (OUTPATIENT)
Dept: CASE MANAGEMENT | Age: 71
End: 2021-07-29

## 2021-07-29 NOTE — PROGRESS NOTES
Contacting patient to follow up on CCM for the month.  LMCB       Time with pt -  min  Chart review -  min  Total time -  3 min  Total Monthly time- 3 min

## 2021-09-09 ENCOUNTER — PATIENT OUTREACH (OUTPATIENT)
Dept: CASE MANAGEMENT | Age: 71
End: 2021-09-09

## 2021-09-09 DIAGNOSIS — E11.9 TYPE 2 DIABETES MELLITUS WITHOUT COMPLICATION, UNSPECIFIED WHETHER LONG TERM INSULIN USE (HCC): ICD-10-CM

## 2021-09-09 DIAGNOSIS — E55.9 VITAMIN D DEFICIENCY: ICD-10-CM

## 2021-09-09 DIAGNOSIS — K21.9 GASTROESOPHAGEAL REFLUX DISEASE, UNSPECIFIED WHETHER ESOPHAGITIS PRESENT: ICD-10-CM

## 2021-09-09 DIAGNOSIS — M16.0 OSTEOARTHRITIS OF BOTH HIPS, UNSPECIFIED OSTEOARTHRITIS TYPE: ICD-10-CM

## 2021-09-09 DIAGNOSIS — I10 HYPERTENSION, BENIGN: ICD-10-CM

## 2021-09-09 DIAGNOSIS — R73.9 HYPERGLYCEMIA: ICD-10-CM

## 2021-09-09 DIAGNOSIS — I25.812 CORONARY ARTERY DISEASE INVOLVING BYPASS GRAFT OF TRANSPLANTED HEART WITHOUT ANGINA PECTORIS: ICD-10-CM

## 2021-09-09 NOTE — PROGRESS NOTES
Contacting patient to follow up on CCM for the month.  Pt was leaving for work asked for call back mid afternoon       Time with pt -  min  Chart review -  min  Total time - 3 min  Total Monthly time-3  min

## 2021-09-10 ENCOUNTER — TELEPHONE (OUTPATIENT)
Dept: CASE MANAGEMENT | Age: 71
End: 2021-09-10

## 2021-09-10 DIAGNOSIS — N13.8 BPH WITH OBSTRUCTION/LOWER URINARY TRACT SYMPTOMS: Primary | ICD-10-CM

## 2021-09-10 DIAGNOSIS — G47.30 SLEEP APNEA, UNSPECIFIED TYPE: ICD-10-CM

## 2021-09-10 DIAGNOSIS — N40.1 BPH WITH OBSTRUCTION/LOWER URINARY TRACT SYMPTOMS: Primary | ICD-10-CM

## 2021-09-10 NOTE — TELEPHONE ENCOUNTER
Usually insurance will decide which sleep study he needs.   I can try to place orders for this  We will refer to new urology team.

## 2021-09-10 NOTE — TELEPHONE ENCOUNTER
Pt requested two orders    Pt concerned about sleep apnea, does not wake well rested often times so drowsy in the afternoon he has to pull over for a quick nap on the way home from work.      Can order be placed for both at home sleep study and sleep study

## 2021-09-10 NOTE — TELEPHONE ENCOUNTER
TC to patient asked him to return my call to discuss referral to sleep center and also Dr. Leatha Ceballos M.D.

## 2021-09-10 NOTE — PROGRESS NOTES
9/10/2021  Spoke to Susanna Gottron for CCM. Updates to patient care team/ comments: UTD  Patient reported changes in medications: UTD  Med Adherence  Comment: PT taking medications as prescribed     Health Maintenance:      Tobacco Cessation Counseling 2 Years cat nap. Pt feels this has become an issue since brain surgery. Pt inquired about what sleep study involves.  [facility] informed pt that sleep study can determine if lack of restful sleep is cased by sleep apnea and if so he can be seen by pulmonologist to dete

## 2021-09-13 NOTE — TELEPHONE ENCOUNTER
Patient given contact number for sleep center to make appt for sleep study and also given contact information for Dr. Tamra Rausch M.D.

## 2021-09-28 ENCOUNTER — OFFICE VISIT (OUTPATIENT)
Dept: SLEEP CENTER | Age: 71
End: 2021-09-28
Attending: Other
Payer: MEDICARE

## 2021-09-28 DIAGNOSIS — G47.30 SLEEP APNEA, UNSPECIFIED TYPE: ICD-10-CM

## 2021-09-28 PROCEDURE — 95810 POLYSOM 6/> YRS 4/> PARAM: CPT

## 2021-09-30 PROCEDURE — 99490 CHRNC CARE MGMT STAFF 1ST 20: CPT

## 2021-10-06 ENCOUNTER — TELEPHONE (OUTPATIENT)
Dept: FAMILY MEDICINE CLINIC | Facility: CLINIC | Age: 71
End: 2021-10-06

## 2021-10-06 ENCOUNTER — TELEPHONE (OUTPATIENT)
Dept: CARDIOLOGY | Age: 71
End: 2021-10-06

## 2021-10-06 ENCOUNTER — OFFICE VISIT (OUTPATIENT)
Dept: CARDIOLOGY | Age: 71
End: 2021-10-06

## 2021-10-06 VITALS
WEIGHT: 256 LBS | DIASTOLIC BLOOD PRESSURE: 64 MMHG | SYSTOLIC BLOOD PRESSURE: 124 MMHG | HEART RATE: 58 BPM | BODY MASS INDEX: 31.17 KG/M2 | HEIGHT: 76 IN

## 2021-10-06 DIAGNOSIS — Z95.1 HX OF CABG: Primary | ICD-10-CM

## 2021-10-06 DIAGNOSIS — I47.29 NSVT (NONSUSTAINED VENTRICULAR TACHYCARDIA) (CMD): ICD-10-CM

## 2021-10-06 DIAGNOSIS — I65.23 BILATERAL CAROTID ARTERY STENOSIS: ICD-10-CM

## 2021-10-06 DIAGNOSIS — E78.00 PURE HYPERCHOLESTEROLEMIA: ICD-10-CM

## 2021-10-06 DIAGNOSIS — I12.9 BENIGN HYPERTENSION WITH CKD (CHRONIC KIDNEY DISEASE) STAGE III (CMD): ICD-10-CM

## 2021-10-06 DIAGNOSIS — E78.2 HYPERLIPIDEMIA, MIXED: ICD-10-CM

## 2021-10-06 DIAGNOSIS — E55.9 VITAMIN D DEFICIENCY: ICD-10-CM

## 2021-10-06 DIAGNOSIS — N18.30 BENIGN HYPERTENSION WITH CKD (CHRONIC KIDNEY DISEASE) STAGE III (CMD): ICD-10-CM

## 2021-10-06 DIAGNOSIS — I10 HYPERTENSION, BENIGN: ICD-10-CM

## 2021-10-06 LAB
25(OH)D3+25(OH)D2 SERPL-MCNC: 43 NG/ML (ref 30–100)
ALBUMIN SERPL-MCNC: 4.3 G/DL (ref 3.5–5.1)
ALBUMIN/GLOB SERPL: 1.6 (CALC) (ref 1–2.5)
ALP SERPL-CCNC: 53 U/L (ref 35–144)
ALT SERPL-CCNC: 32 U/L (ref 9–46)
AST SERPL-CCNC: 36 U/L (ref 10–35)
BASOPHILS # BLD: 78 CELLS/UL (ref 0–200)
BASOPHILS NFR BLD: 1.1 %
BILIRUB SERPL-MCNC: 0.8 MG/DL (ref 0.2–1.2)
BUN SERPL-MCNC: 19 MG/DL (ref 7–25)
BUN/CREAT SERPL: ABNORMAL (CALC) (ref 6–22)
CALCIUM SERPL-MCNC: 9.9 MG/DL (ref 8.5–10.3)
CHLORIDE SERPL-SCNC: 92 MMOL/L (ref 98–110)
CHOLEST SERPL-MCNC: 166 MG/DL
CHOLEST/HDLC SERPL: 3.7 (CALC)
CO2 SERPL-SCNC: 36 MMOL/L (ref 20–32)
CREAT SERPL-MCNC: 1.11 MG/DL (ref 0.7–1.18)
EOSINOPHIL # BLD: 170 CELLS/UL (ref 15–500)
EOSINOPHIL NFR BLD: 2.4 %
ERYTHROCYTE [DISTWIDTH] IN BLOOD: 13.9 % (ref 11–15)
GLOBULIN SER-MCNC: 2.7 G/DL (CALC) (ref 1.9–3.7)
GLUCOSE SERPL-MCNC: 141 MG/DL (ref 65–99)
HBA1C MFR BLD: 6.6 % OF TOTAL HGB
HCT VFR BLD CALC: 47 % (ref 38.5–50)
HDLC SERPL-MCNC: 45 MG/DL
HGB BLD-MCNC: 15.6 G/DL (ref 13.2–17.1)
LDLC SERPL CALC-MCNC: 86 MG/DL (CALC)
LENGTH OF FAST TIME PATIENT: YES H
LENGTH OF FAST TIME PATIENT: YES H
LYMPHOCYTES # BLD: 1867 CELLS/UL (ref 850–3900)
LYMPHOCYTES NFR BLD: 26.3 %
MCH RBC QN AUTO: 27.9 PG (ref 27–33)
MCHC RBC AUTO-ENTMCNC: 33.2 G/DL (ref 32–36)
MCV RBC AUTO: 83.9 FL (ref 80–100)
MONOCYTES # BLD: 561 CELLS/UL (ref 200–950)
MONOCYTES NFR BLD: 7.9 %
MPV (OFFPRE2): 13.3 FL (ref 7.5–12.5)
NEUTROPHILS # BLD: 4423 CELLS/UL (ref 1500–7800)
NEUTROPHILS NFR BLD: 62.3 %
NONHDLC SERPL-MCNC: 121 MG/DL (CALC)
PLATELET # BLD: 176 THOUSAND/UL (ref 140–400)
POTASSIUM SERPL-SCNC: 3 MMOL/L (ref 3.5–5.3)
PROT SERPL-MCNC: 7 G/DL (ref 6.1–8.1)
RBC # BLD: 5.6 MILLION/UL (ref 4.2–5.8)
SODIUM SERPL-SCNC: 139 MMOL/L (ref 135–146)
TRIGL SERPL-MCNC: 265 MG/DL
WBC # BLD: 7.1 THOUSAND/UL (ref 3.8–10.8)

## 2021-10-06 PROCEDURE — 99214 OFFICE O/P EST MOD 30 MIN: CPT | Performed by: INTERNAL MEDICINE

## 2021-10-06 SDOH — HEALTH STABILITY: PHYSICAL HEALTH: ON AVERAGE, HOW MANY MINUTES DO YOU ENGAGE IN EXERCISE AT THIS LEVEL?: 20 MIN

## 2021-10-06 SDOH — HEALTH STABILITY: PHYSICAL HEALTH: ON AVERAGE, HOW MANY DAYS PER WEEK DO YOU ENGAGE IN MODERATE TO STRENUOUS EXERCISE (LIKE A BRISK WALK)?: 2 DAYS

## 2021-10-06 ASSESSMENT — PATIENT HEALTH QUESTIONNAIRE - PHQ9
SUM OF ALL RESPONSES TO PHQ9 QUESTIONS 1 AND 2: 1
1. LITTLE INTEREST OR PLEASURE IN DOING THINGS: NOT AT ALL
SUM OF ALL RESPONSES TO PHQ9 QUESTIONS 1 AND 2: 1
2. FEELING DOWN, DEPRESSED OR HOPELESS: SEVERAL DAYS
CLINICAL INTERPRETATION OF PHQ2 SCORE: NO FURTHER SCREENING NEEDED
CLINICAL INTERPRETATION OF PHQ9 SCORE: NO FURTHER SCREENING NEEDED

## 2021-10-06 NOTE — TELEPHONE ENCOUNTER
Received lab results from [address] [place] [address]. Paperwork in Dr. Hakan Platt in box for review.

## 2021-10-13 ENCOUNTER — TELEPHONE (OUTPATIENT)
Dept: FAMILY MEDICINE CLINIC | Facility: CLINIC | Age: 71
End: 2021-10-13

## 2021-10-13 DIAGNOSIS — G47.33 OBSTRUCTIVE SLEEP APNEA SYNDROME: Primary | ICD-10-CM

## 2021-10-21 ENCOUNTER — ANCILLARY PROCEDURE (OUTPATIENT)
Dept: CARDIOLOGY | Age: 71
End: 2021-10-21
Attending: INTERNAL MEDICINE

## 2021-10-21 DIAGNOSIS — N18.30 BENIGN HYPERTENSION WITH CKD (CHRONIC KIDNEY DISEASE) STAGE III (CMD): ICD-10-CM

## 2021-10-21 DIAGNOSIS — I12.9 BENIGN HYPERTENSION WITH CKD (CHRONIC KIDNEY DISEASE) STAGE III (CMD): ICD-10-CM

## 2021-10-21 DIAGNOSIS — E78.2 HYPERLIPIDEMIA, MIXED: ICD-10-CM

## 2021-10-21 DIAGNOSIS — I65.23 BILATERAL CAROTID ARTERY STENOSIS: ICD-10-CM

## 2021-10-21 DIAGNOSIS — E78.00 PURE HYPERCHOLESTEROLEMIA: ICD-10-CM

## 2021-10-21 DIAGNOSIS — E55.9 VITAMIN D DEFICIENCY: ICD-10-CM

## 2021-10-21 DIAGNOSIS — I47.29 NSVT (NONSUSTAINED VENTRICULAR TACHYCARDIA) (CMD): ICD-10-CM

## 2021-10-21 DIAGNOSIS — Z95.1 HX OF CABG: ICD-10-CM

## 2021-10-21 DIAGNOSIS — I10 HYPERTENSION, BENIGN: ICD-10-CM

## 2021-10-21 PROCEDURE — 93224 XTRNL ECG REC UP TO 48 HRS: CPT | Performed by: INTERNAL MEDICINE

## 2021-10-25 ENCOUNTER — TELEPHONE (OUTPATIENT)
Dept: CARDIOLOGY | Age: 71
End: 2021-10-25

## 2021-10-27 DIAGNOSIS — J30.2 SEASONAL ALLERGIC RHINITIS, UNSPECIFIED TRIGGER: ICD-10-CM

## 2021-10-27 RX ORDER — ROSUVASTATIN CALCIUM 40 MG/1
TABLET, COATED ORAL
Qty: 90 TABLET | Refills: 3 | Status: SHIPPED | OUTPATIENT
Start: 2021-10-27 | End: 2022-11-28

## 2021-10-29 RX ORDER — AZELASTINE 1 MG/ML
SPRAY, METERED NASAL
Qty: 90 ML | Refills: 1 | Status: SHIPPED | OUTPATIENT
Start: 2021-10-29

## 2021-10-29 NOTE — TELEPHONE ENCOUNTER
Requested Prescriptions     Pending Prescriptions Disp Refills   • AZELASTINE HCL 0.1 % Nasal Solution [Pharmacy Med Name: AZELASTINE SPR NSL 0.1%] 90 mL 1     Sig: USE 2 SPRAYS IN EACH       NOSTRIL TWICE DAILY     LOV 6/16/2021     Patient was asked to f

## 2021-11-02 ENCOUNTER — OFFICE VISIT (OUTPATIENT)
Dept: CARDIOLOGY | Age: 71
End: 2021-11-02

## 2021-11-02 VITALS
DIASTOLIC BLOOD PRESSURE: 70 MMHG | HEIGHT: 76 IN | HEART RATE: 76 BPM | BODY MASS INDEX: 31.16 KG/M2 | RESPIRATION RATE: 20 BRPM | SYSTOLIC BLOOD PRESSURE: 124 MMHG

## 2021-11-02 DIAGNOSIS — I49.3 PVC (PREMATURE VENTRICULAR CONTRACTION): ICD-10-CM

## 2021-11-02 DIAGNOSIS — I47.29 NSVT (NONSUSTAINED VENTRICULAR TACHYCARDIA) (CMD): Primary | ICD-10-CM

## 2021-11-02 PROCEDURE — 93000 ELECTROCARDIOGRAM COMPLETE: CPT | Performed by: INTERNAL MEDICINE

## 2021-11-02 PROCEDURE — 99205 OFFICE O/P NEW HI 60 MIN: CPT | Performed by: INTERNAL MEDICINE

## 2021-11-02 RX ORDER — METOPROLOL SUCCINATE 50 MG/1
50 TABLET, EXTENDED RELEASE ORAL DAILY
Qty: 30 TABLET | Refills: 5 | Status: SHIPPED | OUTPATIENT
Start: 2021-11-02 | End: 2022-02-09 | Stop reason: DRUGHIGH

## 2021-11-02 ASSESSMENT — PATIENT HEALTH QUESTIONNAIRE - PHQ9
SUM OF ALL RESPONSES TO PHQ9 QUESTIONS 1 AND 2: 0
2. FEELING DOWN, DEPRESSED OR HOPELESS: NOT AT ALL
1. LITTLE INTEREST OR PLEASURE IN DOING THINGS: NOT AT ALL
SUM OF ALL RESPONSES TO PHQ9 QUESTIONS 1 AND 2: 0
CLINICAL INTERPRETATION OF PHQ2 SCORE: NO FURTHER SCREENING NEEDED
SUM OF ALL RESPONSES TO PHQ9 QUESTIONS 1 AND 2: 0
CLINICAL INTERPRETATION OF PHQ9 SCORE: NO FURTHER SCREENING NEEDED

## 2021-11-09 ENCOUNTER — ANCILLARY PROCEDURE (OUTPATIENT)
Dept: CARDIOLOGY | Age: 71
End: 2021-11-09
Attending: INTERNAL MEDICINE

## 2021-11-09 DIAGNOSIS — I47.29 NSVT (NONSUSTAINED VENTRICULAR TACHYCARDIA) (CMD): ICD-10-CM

## 2021-11-09 DIAGNOSIS — I49.3 PVC (PREMATURE VENTRICULAR CONTRACTION): ICD-10-CM

## 2021-11-09 PROCEDURE — 93306 TTE W/DOPPLER COMPLETE: CPT | Performed by: INTERNAL MEDICINE

## 2021-11-15 ENCOUNTER — TELEPHONE (OUTPATIENT)
Dept: CARDIOLOGY | Age: 71
End: 2021-11-15

## 2021-11-16 ENCOUNTER — OFFICE VISIT (OUTPATIENT)
Dept: CARDIOLOGY | Age: 71
End: 2021-11-16

## 2021-11-16 VITALS
SYSTOLIC BLOOD PRESSURE: 118 MMHG | DIASTOLIC BLOOD PRESSURE: 70 MMHG | HEART RATE: 56 BPM | BODY MASS INDEX: 31.45 KG/M2 | WEIGHT: 258.3 LBS | HEIGHT: 76 IN

## 2021-11-16 DIAGNOSIS — E78.00 PURE HYPERCHOLESTEROLEMIA: ICD-10-CM

## 2021-11-16 DIAGNOSIS — I25.10 CORONARY ARTERY DISEASE INVOLVING NATIVE CORONARY ARTERY OF NATIVE HEART WITHOUT ANGINA PECTORIS: Primary | ICD-10-CM

## 2021-11-16 DIAGNOSIS — I12.9 BENIGN HYPERTENSION WITH CKD (CHRONIC KIDNEY DISEASE) STAGE III (CMD): ICD-10-CM

## 2021-11-16 DIAGNOSIS — E78.2 HYPERLIPIDEMIA, MIXED: ICD-10-CM

## 2021-11-16 DIAGNOSIS — I47.29 NSVT (NONSUSTAINED VENTRICULAR TACHYCARDIA) (CMD): ICD-10-CM

## 2021-11-16 DIAGNOSIS — Z95.1 HX OF CABG: ICD-10-CM

## 2021-11-16 DIAGNOSIS — E11.9 TYPE 2 DIABETES MELLITUS WITHOUT COMPLICATION, WITHOUT LONG-TERM CURRENT USE OF INSULIN (CMD): ICD-10-CM

## 2021-11-16 DIAGNOSIS — I65.23 BILATERAL CAROTID ARTERY STENOSIS: ICD-10-CM

## 2021-11-16 DIAGNOSIS — I10 HYPERTENSION, BENIGN: ICD-10-CM

## 2021-11-16 DIAGNOSIS — N18.30 BENIGN HYPERTENSION WITH CKD (CHRONIC KIDNEY DISEASE) STAGE III (CMD): ICD-10-CM

## 2021-11-16 PROCEDURE — 93000 ELECTROCARDIOGRAM COMPLETE: CPT | Performed by: NURSE PRACTITIONER

## 2021-11-16 PROCEDURE — 99214 OFFICE O/P EST MOD 30 MIN: CPT | Performed by: NURSE PRACTITIONER

## 2021-11-16 RX ORDER — IBUPROFEN 200 MG
200 TABLET ORAL EVERY 6 HOURS PRN
COMMUNITY

## 2021-11-16 SDOH — HEALTH STABILITY: PHYSICAL HEALTH: ON AVERAGE, HOW MANY MINUTES DO YOU ENGAGE IN EXERCISE AT THIS LEVEL?: 0 MIN

## 2021-11-16 SDOH — HEALTH STABILITY: PHYSICAL HEALTH: ON AVERAGE, HOW MANY DAYS PER WEEK DO YOU ENGAGE IN MODERATE TO STRENUOUS EXERCISE (LIKE A BRISK WALK)?: 0 DAYS

## 2021-11-16 ASSESSMENT — PATIENT HEALTH QUESTIONNAIRE - PHQ9
2. FEELING DOWN, DEPRESSED OR HOPELESS: SEVERAL DAYS
SUM OF ALL RESPONSES TO PHQ9 QUESTIONS 1 AND 2: 2
1. LITTLE INTEREST OR PLEASURE IN DOING THINGS: SEVERAL DAYS
SUM OF ALL RESPONSES TO PHQ9 QUESTIONS 1 AND 2: 2
SUM OF ALL RESPONSES TO PHQ9 QUESTIONS 1 AND 2: 2
CLINICAL INTERPRETATION OF PHQ9 SCORE: NO FURTHER SCREENING NEEDED
CLINICAL INTERPRETATION OF PHQ2 SCORE: NO FURTHER SCREENING NEEDED

## 2021-11-24 ENCOUNTER — TELEPHONE (OUTPATIENT)
Dept: FAMILY MEDICINE CLINIC | Facility: CLINIC | Age: 71
End: 2021-11-24

## 2021-11-24 DIAGNOSIS — H53.9 VISION CHANGES: ICD-10-CM

## 2021-11-24 DIAGNOSIS — D49.6 BRAIN NEOPLASM (HCC): Primary | ICD-10-CM

## 2021-11-24 NOTE — TELEPHONE ENCOUNTER
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 He has been doing well up until this week when he noticed having some \"foggy vision\" in the right eye.    Patient is calling for a referral to an ophthalmologist.   He called his s

## 2021-11-24 NOTE — TELEPHONE ENCOUNTER
Referral request Dr. Justine Cueva M.D. Patient had brain surgery about 15 months ago for a tumor behind the right eye. Patient relates new finding of fogginess of vision in the right eye.

## 2021-11-24 NOTE — TELEPHONE ENCOUNTER
Patient was unable to get in with Dr. Shirley Weems M.D. Referral placed for Dr. Delaney Malik and patient given all their information.    I made a call to Dr. Delaney Malik M.D. and they will see patient on Monday 11/29 at 11:30 am.

## 2021-11-30 ENCOUNTER — ORDER TRANSCRIPTION (OUTPATIENT)
Dept: SLEEP CENTER | Age: 71
End: 2021-11-30

## 2021-11-30 DIAGNOSIS — G47.33 OSA (OBSTRUCTIVE SLEEP APNEA): Primary | ICD-10-CM

## 2021-12-01 ENCOUNTER — TELEPHONE (OUTPATIENT)
Dept: FAMILY MEDICINE CLINIC | Facility: CLINIC | Age: 71
End: 2021-12-01

## 2021-12-02 ENCOUNTER — OFFICE VISIT (OUTPATIENT)
Dept: FAMILY MEDICINE CLINIC | Facility: CLINIC | Age: 71
End: 2021-12-02
Payer: MEDICARE

## 2021-12-02 VITALS
WEIGHT: 255 LBS | SYSTOLIC BLOOD PRESSURE: 120 MMHG | HEIGHT: 76 IN | BODY MASS INDEX: 31.05 KG/M2 | TEMPERATURE: 98 F | HEART RATE: 60 BPM | DIASTOLIC BLOOD PRESSURE: 70 MMHG

## 2021-12-02 DIAGNOSIS — I25.10 CORONARY ARTERY DISEASE INVOLVING NATIVE CORONARY ARTERY OF NATIVE HEART WITHOUT ANGINA PECTORIS: ICD-10-CM

## 2021-12-02 DIAGNOSIS — I47.2 NSVT (NONSUSTAINED VENTRICULAR TACHYCARDIA) (HCC): ICD-10-CM

## 2021-12-02 DIAGNOSIS — Z95.1 HX OF CABG: ICD-10-CM

## 2021-12-02 DIAGNOSIS — E78.00 PURE HYPERCHOLESTEROLEMIA: ICD-10-CM

## 2021-12-02 DIAGNOSIS — I65.21 STENOSIS OF RIGHT CAROTID ARTERY: ICD-10-CM

## 2021-12-02 DIAGNOSIS — E11.42 TYPE 2 DIABETES MELLITUS WITH DIABETIC POLYNEUROPATHY, WITHOUT LONG-TERM CURRENT USE OF INSULIN (HCC): ICD-10-CM

## 2021-12-02 DIAGNOSIS — Z13.31 DEPRESSION SCREENING: ICD-10-CM

## 2021-12-02 DIAGNOSIS — Z00.00 ENCOUNTER FOR ANNUAL HEALTH EXAMINATION: Primary | ICD-10-CM

## 2021-12-02 DIAGNOSIS — E11.29 MICROALBUMINURIA DUE TO TYPE 2 DIABETES MELLITUS (HCC): ICD-10-CM

## 2021-12-02 DIAGNOSIS — I10 HYPERTENSION, BENIGN: ICD-10-CM

## 2021-12-02 DIAGNOSIS — E27.8 ADRENAL NODULE (HCC): ICD-10-CM

## 2021-12-02 DIAGNOSIS — R80.9 MICROALBUMINURIA DUE TO TYPE 2 DIABETES MELLITUS (HCC): ICD-10-CM

## 2021-12-02 DIAGNOSIS — I70.0 ABDOMINAL AORTIC ATHEROSCLEROSIS (HCC): ICD-10-CM

## 2021-12-02 PROBLEM — I47.29 NSVT (NONSUSTAINED VENTRICULAR TACHYCARDIA): Status: ACTIVE | Noted: 2021-12-02

## 2021-12-02 PROBLEM — I47.29 NSVT (NONSUSTAINED VENTRICULAR TACHYCARDIA) (HCC): Status: ACTIVE | Noted: 2021-12-02

## 2021-12-02 PROCEDURE — G0444 DEPRESSION SCREEN ANNUAL: HCPCS | Performed by: FAMILY MEDICINE

## 2021-12-02 PROCEDURE — G0439 PPPS, SUBSEQ VISIT: HCPCS | Performed by: FAMILY MEDICINE

## 2021-12-02 PROCEDURE — 99214 OFFICE O/P EST MOD 30 MIN: CPT | Performed by: FAMILY MEDICINE

## 2021-12-02 RX ORDER — METOPROLOL SUCCINATE 50 MG/1
TABLET, EXTENDED RELEASE ORAL
COMMUNITY
Start: 2021-11-02

## 2021-12-02 NOTE — PROGRESS NOTES
HPI:   Giorgi Marx is a 70year old male who presents for a Medicare Subsequent Annual Wellness visit (Pt already had Initial Annual Wellness). Preventative Screening  Colonoscopy - 9/2019.   Repeat 3 yrs - due again 2022  Prostate - 1.4  Immunizat present), and forms available to patient in AVS       He currently smokes tobacco.  Social History    Tobacco Use      Smoking status: Light Tobacco Smoker        Packs/day: 2.00        Years: 17.00        Pack years: 29        Types: Caño 24 Encounters:  12/02/21 : 255 lb (115.7 kg)  06/16/21 : 261 lb 9.6 oz (118.7 kg)  12/16/20 : 261 lb 9.6 oz (118.7 kg)     Last Cholesterol Labs:   Lab Results   Component Value Date    CHOLEST 166 10/05/2021    HDL 45 10/05/2021    LDL 83 09/12/2018    TRIG tobacco. He reports current alcohol use of about 12.0 standard drinks of alcohol per week. He reports that he does not use drugs.      REVIEW OF SYSTEMS:   Constitutional: negative  Eyes: cataracts  Ears, nose, mouth, throat, and face: negative  Respiratory Normal         Vaccination History     Immunization History   Administered Date(s) Administered   • Covid-19 Vaccine Pfizer 30 mcg/0.3 ml 02/03/2021, 02/24/2021, 11/22/2021   • FLU VAC High Dose 65 YRS & Older PRSV Free (55892) 09/24/2019, 11/13/2020, 11/2 Coronary artery disease involving native coronary artery of native heart without angina pectoris  5. Hx of CABG  Managed by the cardiology team.  Denies any chest pain at this time.   Continue on Crestor and blood pressure control and aspirin.  - OFFICE/OUT you describe your current health state?: Fair  How do you maintain positive mental well-being?: Social Interaction     Supplementary Documentation:   [facility] [place] SCREENING SCHEDULE   Tests on this list are recommended by your physician but may not 11/14/2018     PSA due on 03/17/2023   Immunizations    Influenza Covered once per flu season  Please get every year -  No recommendations at this time    Pneumococcal Each vaccine (Efhillx03 & Iqrfhfkhr04) covered once after 65 Prevnar 13: 11/06/2018    P

## 2021-12-02 NOTE — PATIENT INSTRUCTIONS
[place] SCREENING SCHEDULE   Tests on this list are recommended by your physician but may not be covered, or covered at this frequency, by your insurer. Please check with your insurance carrier before scheduling to verify coverage.    PREVEN 10/01/2021    Pneumococcal Each vaccine (Ogfbmdc61 & Bzvojghiu16) covered once after 72 Prevnar 13: 11/06/2018    [id number]: 06/10/2015     No recommendations at this time    Hepatitis B One screening covered for patients with certain risk factors   -  N http://www. [place]ph.state. [place]/public/books/advin.htm  A link to the [employer]. This site has a lot of good information including definitions of the different types of Advance Directives.  It also has the State forms available on it's webs

## 2021-12-13 PROBLEM — H40.023 OPEN ANGLE WITH BORDERLINE FINDINGS AND HIGH GLAUCOMA RISK IN BOTH EYES: Status: ACTIVE | Noted: 2021-12-13

## 2021-12-13 PROBLEM — H25.12 NUCLEAR SCLEROTIC CATARACT OF LEFT EYE: Status: ACTIVE | Noted: 2021-12-13

## 2021-12-13 PROBLEM — H25.11 AGE-RELATED NUCLEAR CATARACT OF RIGHT EYE: Status: ACTIVE | Noted: 2021-12-13

## 2021-12-15 ENCOUNTER — HOSPITAL ENCOUNTER (OUTPATIENT)
Dept: MRI IMAGING | Facility: HOSPITAL | Age: 71
Discharge: HOME | End: 2021-12-15
Attending: CLINICAL NURSE SPECIALIST
Payer: MEDICARE

## 2021-12-15 DIAGNOSIS — D42.0 ATYPICAL MENINGIOMA OF BRAIN (HCC): ICD-10-CM

## 2021-12-15 PROCEDURE — 82565 ASSAY OF CREATININE: CPT

## 2021-12-15 PROCEDURE — A9575 INJ GADOTERATE MEGLUMI 0.1ML: HCPCS | Performed by: RADIOLOGY

## 2021-12-15 PROCEDURE — 70553 MRI BRAIN STEM W/O & W/DYE: CPT | Performed by: CLINICAL NURSE SPECIALIST

## 2021-12-30 ENCOUNTER — LAB ENCOUNTER (OUTPATIENT)
Dept: LAB | Facility: HOSPITAL | Age: 71
End: 2021-12-30
Attending: Other
Payer: MEDICARE

## 2021-12-30 DIAGNOSIS — Z01.818 PREOP EXAMINATION: ICD-10-CM

## 2021-12-30 DIAGNOSIS — Z11.59 SCREENING FOR VIRAL DISEASE: ICD-10-CM

## 2021-12-31 LAB — SARS-COV-2 RNA RESP QL NAA+PROBE: NOT DETECTED

## 2022-01-02 ENCOUNTER — OFFICE VISIT (OUTPATIENT)
Dept: SLEEP CENTER | Age: 72
End: 2022-01-02
Attending: Other
Payer: MEDICARE

## 2022-01-02 DIAGNOSIS — G47.33 OSA (OBSTRUCTIVE SLEEP APNEA): ICD-10-CM

## 2022-01-02 PROCEDURE — 95811 POLYSOM 6/>YRS CPAP 4/> PARM: CPT

## 2022-01-26 ENCOUNTER — TELEPHONE (OUTPATIENT)
Dept: FAMILY MEDICINE CLINIC | Facility: CLINIC | Age: 72
End: 2022-01-26

## 2022-01-26 NOTE — TELEPHONE ENCOUNTER
Patient is working with Dr. Dorita Orellana DO from [facility] to obtain CPAP machine. [employer] the company they are working with is looking for some notes from the PCP where this was discussed.    Ramya had discussed with patient in Sept 2021 about obtain

## 2022-01-27 NOTE — TELEPHONE ENCOUNTER
Patient left message that we have faxed the office visits needed to [employer] as requested by patient so he can obtain his CPAP machine.

## 2022-02-03 ENCOUNTER — PATIENT OUTREACH (OUTPATIENT)
Dept: CASE MANAGEMENT | Age: 72
End: 2022-02-03

## 2022-02-03 ENCOUNTER — ANCILLARY PROCEDURE (OUTPATIENT)
Dept: CARDIOLOGY | Age: 72
End: 2022-02-03
Attending: NURSE PRACTITIONER

## 2022-02-03 DIAGNOSIS — I47.29 NSVT (NONSUSTAINED VENTRICULAR TACHYCARDIA) (CMD): ICD-10-CM

## 2022-02-03 RX ORDER — ROSUVASTATIN CALCIUM 40 MG/1
TABLET, COATED ORAL
COMMUNITY
Start: 2022-01-10

## 2022-02-08 ENCOUNTER — DOCUMENTATION (OUTPATIENT)
Dept: CARDIOLOGY | Age: 72
End: 2022-02-08

## 2022-02-08 ENCOUNTER — TELEPHONE (OUTPATIENT)
Dept: CARDIOLOGY | Age: 72
End: 2022-02-08

## 2022-02-09 ENCOUNTER — OFFICE VISIT (OUTPATIENT)
Dept: CARDIOLOGY | Age: 72
End: 2022-02-09

## 2022-02-09 VITALS
HEART RATE: 73 BPM | BODY MASS INDEX: 31.28 KG/M2 | DIASTOLIC BLOOD PRESSURE: 78 MMHG | SYSTOLIC BLOOD PRESSURE: 124 MMHG | WEIGHT: 257 LBS

## 2022-02-09 DIAGNOSIS — I49.3 PVC (PREMATURE VENTRICULAR CONTRACTION): Primary | ICD-10-CM

## 2022-02-09 DIAGNOSIS — I51.9 LV DYSFUNCTION: ICD-10-CM

## 2022-02-09 PROCEDURE — 93227 XTRNL ECG REC<48 HR R&I: CPT | Performed by: INTERNAL MEDICINE

## 2022-02-09 PROCEDURE — 99215 OFFICE O/P EST HI 40 MIN: CPT | Performed by: INTERNAL MEDICINE

## 2022-02-09 RX ORDER — METOPROLOL SUCCINATE 100 MG/1
100 TABLET, EXTENDED RELEASE ORAL DAILY
Qty: 30 TABLET | Refills: 5 | Status: SHIPPED | OUTPATIENT
Start: 2022-02-09 | End: 2022-08-15

## 2022-02-10 DIAGNOSIS — I51.9 LV DYSFUNCTION: ICD-10-CM

## 2022-02-10 DIAGNOSIS — I49.3 PVC (PREMATURE VENTRICULAR CONTRACTION): ICD-10-CM

## 2022-02-10 PROCEDURE — 93000 ELECTROCARDIOGRAM COMPLETE: CPT | Performed by: INTERNAL MEDICINE

## 2022-02-28 PROCEDURE — 99490 CHRNC CARE MGMT STAFF 1ST 20: CPT

## 2022-03-23 ENCOUNTER — PATIENT OUTREACH (OUTPATIENT)
Dept: CASE MANAGEMENT | Age: 72
End: 2022-03-23

## 2022-03-30 LAB
AMB EXT BILIRUBIN, TOTAL: 0.9 MG/DL (ref 0.2–1.2)
AMB EXT BUN: 17 MG/DL (ref 7–25)
AMB EXT CALCIUM: 10.5 (ref 8.6–10.3)
AMB EXT CHOL/HDL RATIO: 4.1 (ref ?–5)
AMB EXT CHOLESTEROL, TOTAL: 156 MG/DL (ref ?–200)
AMB EXT CMP ALT: 30 U/L (ref 9–46)
AMB EXT CMP AST: 33 U/L (ref 10–35)
AMB EXT CREATININE: 1.16 MG/DL (ref 0.7–1.18)
AMB EXT EGFR NON-AA: 63 (ref 60–?)
AMB EXT GLUCOSE: 166 MG/DL (ref 65–99)
AMB EXT HDL CHOLESTEROL: 38 MG/DL (ref 40–?)
AMB EXT HEMATOCRIT: 51 (ref 38.5–50)
AMB EXT HEMOGLOBIN: 16.6 (ref 13.2–17.1)
AMB EXT LDL CHOLESTEROL, DIRECT: 0 MG/DL
AMB EXT MCV: 86.6 (ref 80–100)
AMB EXT NON HDL CHOL: 118 MG/DL (ref ?–130)
AMB EXT PLATELETS: 185 (ref 140–400)
AMB EXT TOTAL PROTEIN: 7.3 (ref 6.1–8.1)
AMB EXT TRIGLYCERIDES: 412 MG/DL (ref ?–150)
AMB EXT WBC: 8.2 X10(3)UL (ref 3.8–10.8)

## 2022-03-31 PROCEDURE — 99490 CHRNC CARE MGMT STAFF 1ST 20: CPT

## 2022-04-02 LAB
25(OH)D3 SERPL-MCNC: 46 NG/ML (ref 30–100)
ALBUMIN SERPL-MCNC: 4.4 G/DL (ref 3.6–5.1)
ALBUMIN/GLOB SERPL: 1.5 (CALC) (ref 1–2.5)
ALP SERPL-CCNC: 57 U/L (ref 35–144)
ALT SERPL-CCNC: 30 U/L (ref 9–46)
AST SERPL-CCNC: 33 U/L (ref 10–35)
BASOPHILS # BLD AUTO: 107 CELLS/UL (ref 0–200)
BASOPHILS NFR BLD AUTO: 1.3 %
BILIRUB SERPL-MCNC: 0.9 MG/DL (ref 0.2–1.2)
BUN SERPL-MCNC: 17 MG/DL (ref 7–25)
BUN/CREAT SERPL: ABNORMAL (CALC) (ref 6–22)
CALCIUM SERPL-MCNC: 10.5 MG/DL (ref 8.6–10.3)
CHLORIDE SERPL-SCNC: 89 MMOL/L (ref 98–110)
CHOLEST SERPL-MCNC: 156 MG/DL
CHOLEST/HDLC SERPL: 4.1 (CALC)
CO2 SERPL-SCNC: 38 MMOL/L (ref 20–32)
CREAT SERPL-MCNC: 1.16 MG/DL (ref 0.7–1.18)
EOSINOPHIL # BLD AUTO: 213 CELLS/UL (ref 15–500)
EOSINOPHIL NFR BLD AUTO: 2.6 %
ERYTHROCYTE [DISTWIDTH] IN BLOOD BY AUTOMATED COUNT: 13.7 % (ref 11–15)
GLOBULIN SER CALC-MCNC: 2.9 G/DL (CALC) (ref 1.9–3.7)
GLUCOSE SERPL-MCNC: 166 MG/DL (ref 65–99)
HCT VFR BLD AUTO: 51 % (ref 38.5–50)
HDLC SERPL-MCNC: 38 MG/DL
HGB BLD-MCNC: 16.6 G/DL (ref 13.2–17.1)
LDLC SERPL CALC-MCNC: ABNORMAL MG/DL (CALC)
LDLC SERPL DIRECT ASSAY-MCNC: 62 MG/DL
LYMPHOCYTES # BLD AUTO: 2066 CELLS/UL (ref 850–3900)
LYMPHOCYTES NFR BLD AUTO: 25.2 %
MCH RBC QN AUTO: 28.2 PG (ref 27–33)
MCHC RBC AUTO-ENTMCNC: 32.5 G/DL (ref 32–36)
MCV RBC AUTO: 86.6 FL (ref 80–100)
MONOCYTES # BLD AUTO: 672 CELLS/UL (ref 200–950)
MONOCYTES NFR BLD AUTO: 8.2 %
NEUTROPHILS # BLD AUTO: 5141 CELLS/UL (ref 1500–7800)
NEUTROPHILS NFR BLD AUTO: 62.7 %
NONHDLC SERPL-MCNC: 118 MG/DL (CALC)
PLATELET # BLD AUTO: 185 THOUSAND/UL (ref 140–400)
PMV BLD REES-ECKER: 13.2 FL (ref 7.5–12.5)
POTASSIUM SERPL-SCNC: 2.9 MMOL/L (ref 3.5–5.3)
PROT SERPL-MCNC: 7.3 G/DL (ref 6.1–8.1)
RBC # BLD AUTO: 5.89 MILLION/UL (ref 4.2–5.8)
SODIUM SERPL-SCNC: 140 MMOL/L (ref 135–146)
TRIGL SERPL-MCNC: 412 MG/DL
WBC # BLD AUTO: 8.2 THOUSAND/UL (ref 3.8–10.8)

## 2022-04-04 ENCOUNTER — TELEPHONE (OUTPATIENT)
Dept: CARDIOLOGY | Age: 72
End: 2022-04-04

## 2022-04-04 DIAGNOSIS — E78.2 HYPERLIPIDEMIA, MIXED: Primary | ICD-10-CM

## 2022-04-04 DIAGNOSIS — E87.6 HYPOKALEMIA: Primary | ICD-10-CM

## 2022-04-04 RX ORDER — FENOFIBRATE 48 MG/1
48 TABLET, COATED ORAL DAILY
COMMUNITY
End: 2022-04-04 | Stop reason: SDUPTHER

## 2022-04-04 RX ORDER — FENOFIBRATE 48 MG/1
48 TABLET, COATED ORAL DAILY
Qty: 90 TABLET | Refills: 1 | Status: SHIPPED | OUTPATIENT
Start: 2022-04-04 | End: 2022-10-06

## 2022-04-13 ENCOUNTER — OFFICE VISIT (OUTPATIENT)
Dept: CARDIOLOGY | Age: 72
End: 2022-04-13

## 2022-04-13 VITALS
RESPIRATION RATE: 16 BRPM | HEIGHT: 76 IN | DIASTOLIC BLOOD PRESSURE: 72 MMHG | BODY MASS INDEX: 31.78 KG/M2 | SYSTOLIC BLOOD PRESSURE: 120 MMHG | HEART RATE: 64 BPM | WEIGHT: 261 LBS

## 2022-04-13 DIAGNOSIS — I25.10 CORONARY ARTERY DISEASE INVOLVING NATIVE CORONARY ARTERY OF NATIVE HEART WITHOUT ANGINA PECTORIS: Primary | ICD-10-CM

## 2022-04-13 DIAGNOSIS — N18.30 BENIGN HYPERTENSION WITH CKD (CHRONIC KIDNEY DISEASE) STAGE III (CMD): ICD-10-CM

## 2022-04-13 DIAGNOSIS — E11.9 TYPE 2 DIABETES MELLITUS WITHOUT COMPLICATION, WITHOUT LONG-TERM CURRENT USE OF INSULIN (CMD): ICD-10-CM

## 2022-04-13 DIAGNOSIS — I10 HYPERTENSION, BENIGN: ICD-10-CM

## 2022-04-13 DIAGNOSIS — I65.23 BILATERAL CAROTID ARTERY STENOSIS: ICD-10-CM

## 2022-04-13 DIAGNOSIS — E78.00 PURE HYPERCHOLESTEROLEMIA: ICD-10-CM

## 2022-04-13 DIAGNOSIS — I12.9 BENIGN HYPERTENSION WITH CKD (CHRONIC KIDNEY DISEASE) STAGE III (CMD): ICD-10-CM

## 2022-04-13 DIAGNOSIS — E78.2 HYPERLIPIDEMIA, MIXED: ICD-10-CM

## 2022-04-13 DIAGNOSIS — Z95.1 HX OF CABG: ICD-10-CM

## 2022-04-13 DIAGNOSIS — E55.9 VITAMIN D DEFICIENCY: ICD-10-CM

## 2022-04-13 DIAGNOSIS — I47.29 NSVT (NONSUSTAINED VENTRICULAR TACHYCARDIA) (CMD): ICD-10-CM

## 2022-04-13 PROCEDURE — 99215 OFFICE O/P EST HI 40 MIN: CPT | Performed by: INTERNAL MEDICINE

## 2022-04-13 ASSESSMENT — PATIENT HEALTH QUESTIONNAIRE - PHQ9
2. FEELING DOWN, DEPRESSED OR HOPELESS: NOT AT ALL
CLINICAL INTERPRETATION OF PHQ2 SCORE: NO FURTHER SCREENING NEEDED
SUM OF ALL RESPONSES TO PHQ9 QUESTIONS 1 AND 2: 0
1. LITTLE INTEREST OR PLEASURE IN DOING THINGS: NOT AT ALL
SUM OF ALL RESPONSES TO PHQ9 QUESTIONS 1 AND 2: 0

## 2022-04-14 ENCOUNTER — TELEPHONE (OUTPATIENT)
Dept: CARDIOLOGY | Age: 72
End: 2022-04-14

## 2022-04-14 DIAGNOSIS — E87.6 HYPOKALEMIA: Primary | ICD-10-CM

## 2022-04-14 LAB
CHOLEST SERPL-MCNC: 144 MG/DL
CHOLEST/HDLC SERPL: 3.5 (CALC)
HDLC SERPL-MCNC: 41 MG/DL
LDLC SERPL CALC-MCNC: 69 MG/DL (CALC)
NONHDLC SERPL-MCNC: 103 MG/DL (CALC)
POTASSIUM SERPL-SCNC: 3 MMOL/L (ref 3.5–5.3)
TRIGL SERPL-MCNC: 247 MG/DL

## 2022-04-14 RX ORDER — POTASSIUM CITRATE 10 MEQ/1
40 TABLET, EXTENDED RELEASE ORAL DAILY
COMMUNITY
End: 2022-04-14 | Stop reason: SDUPTHER

## 2022-04-14 RX ORDER — POTASSIUM CITRATE 10 MEQ/1
40 TABLET, EXTENDED RELEASE ORAL DAILY
Qty: 360 TABLET | Refills: 0 | Status: SHIPPED | OUTPATIENT
Start: 2022-04-14 | End: 2023-06-12 | Stop reason: DRUGHIGH

## 2022-04-21 ENCOUNTER — TELEPHONE (OUTPATIENT)
Dept: FAMILY MEDICINE CLINIC | Facility: CLINIC | Age: 72
End: 2022-04-21

## 2022-04-21 NOTE — TELEPHONE ENCOUNTER
Received fax from [facility]. Patient having Cataract surgery 06/01/22. Patient is scheduled 05/16/22 with Dr. Pino Obrien. Form in tickler as no labs are required.  Fax H&P to [facility] at [phone]

## 2022-04-25 ENCOUNTER — PATIENT OUTREACH (OUTPATIENT)
Dept: CASE MANAGEMENT | Age: 72
End: 2022-04-25

## 2022-04-25 RX ORDER — FENOFIBRATE 48 MG/1
TABLET, COATED ORAL
COMMUNITY
Start: 2022-04-04

## 2022-04-25 RX ORDER — METOPROLOL SUCCINATE 100 MG/1
100 TABLET, EXTENDED RELEASE ORAL DAILY
COMMUNITY
Start: 2022-04-06

## 2022-04-25 RX ORDER — LATANOPROST 50 UG/ML
SOLUTION/ DROPS OPHTHALMIC
COMMUNITY
Start: 2022-04-18

## 2022-04-28 RX ORDER — METOPROLOL SUCCINATE 100 MG/1
100 TABLET, EXTENDED RELEASE ORAL DAILY
Qty: 30 TABLET | Refills: 5 | OUTPATIENT
Start: 2022-04-28

## 2022-05-05 ENCOUNTER — ANCILLARY PROCEDURE (OUTPATIENT)
Dept: CARDIOLOGY | Age: 72
End: 2022-05-05
Attending: INTERNAL MEDICINE

## 2022-05-10 ENCOUNTER — TELEPHONE (OUTPATIENT)
Dept: FAMILY MEDICINE CLINIC | Facility: CLINIC | Age: 72
End: 2022-05-10

## 2022-05-10 ENCOUNTER — TELEPHONE (OUTPATIENT)
Dept: CARDIOLOGY | Age: 72
End: 2022-05-10

## 2022-05-10 PROCEDURE — 93227 XTRNL ECG REC<48 HR R&I: CPT | Performed by: INTERNAL MEDICINE

## 2022-05-16 ENCOUNTER — OFFICE VISIT (OUTPATIENT)
Dept: FAMILY MEDICINE CLINIC | Facility: CLINIC | Age: 72
End: 2022-05-16
Payer: MEDICARE

## 2022-05-16 VITALS
RESPIRATION RATE: 16 BRPM | HEART RATE: 54 BPM | SYSTOLIC BLOOD PRESSURE: 138 MMHG | TEMPERATURE: 97 F | OXYGEN SATURATION: 98 % | DIASTOLIC BLOOD PRESSURE: 72 MMHG | BODY MASS INDEX: 32.12 KG/M2 | HEIGHT: 76 IN | WEIGHT: 263.81 LBS

## 2022-05-16 DIAGNOSIS — E78.00 PURE HYPERCHOLESTEROLEMIA: ICD-10-CM

## 2022-05-16 DIAGNOSIS — E11.29 MICROALBUMINURIA DUE TO TYPE 2 DIABETES MELLITUS (HCC): ICD-10-CM

## 2022-05-16 DIAGNOSIS — Z95.1 HX OF CABG: ICD-10-CM

## 2022-05-16 DIAGNOSIS — I10 HYPERTENSION, BENIGN: ICD-10-CM

## 2022-05-16 DIAGNOSIS — E11.42 TYPE 2 DIABETES MELLITUS WITH DIABETIC POLYNEUROPATHY, WITHOUT LONG-TERM CURRENT USE OF INSULIN (HCC): ICD-10-CM

## 2022-05-16 DIAGNOSIS — H25.11 NUCLEAR SCLEROTIC CATARACT OF RIGHT EYE: ICD-10-CM

## 2022-05-16 DIAGNOSIS — Z99.89 OSA ON CPAP: ICD-10-CM

## 2022-05-16 DIAGNOSIS — I25.10 CORONARY ARTERY DISEASE INVOLVING NATIVE CORONARY ARTERY OF NATIVE HEART WITHOUT ANGINA PECTORIS: ICD-10-CM

## 2022-05-16 DIAGNOSIS — Z01.818 PREOP EXAMINATION: Primary | ICD-10-CM

## 2022-05-16 DIAGNOSIS — R80.9 MICROALBUMINURIA DUE TO TYPE 2 DIABETES MELLITUS (HCC): ICD-10-CM

## 2022-05-16 DIAGNOSIS — G47.33 OSA ON CPAP: ICD-10-CM

## 2022-05-16 PROCEDURE — 99214 OFFICE O/P EST MOD 30 MIN: CPT | Performed by: FAMILY MEDICINE

## 2022-05-16 RX ORDER — PREDNISOLONE ACETATE 10 MG/ML
SUSPENSION/ DROPS OPHTHALMIC
COMMUNITY
Start: 2022-05-09

## 2022-05-16 RX ORDER — OFLOXACIN 3 MG/ML
SOLUTION/ DROPS OPHTHALMIC
COMMUNITY
Start: 2022-05-09

## 2022-05-16 RX ORDER — KETOROLAC TROMETHAMINE 5 MG/ML
SOLUTION OPHTHALMIC
COMMUNITY
Start: 2022-05-09

## 2022-05-18 ENCOUNTER — OFFICE VISIT (OUTPATIENT)
Dept: CARDIOLOGY | Age: 72
End: 2022-05-18

## 2022-05-18 VITALS
BODY MASS INDEX: 32.01 KG/M2 | HEART RATE: 55 BPM | DIASTOLIC BLOOD PRESSURE: 78 MMHG | SYSTOLIC BLOOD PRESSURE: 126 MMHG | WEIGHT: 263 LBS

## 2022-05-18 DIAGNOSIS — R00.1 BRADYCARDIA, UNSPECIFIED: ICD-10-CM

## 2022-05-18 DIAGNOSIS — I49.3 PVC (PREMATURE VENTRICULAR CONTRACTION): Primary | ICD-10-CM

## 2022-05-18 PROCEDURE — 99215 OFFICE O/P EST HI 40 MIN: CPT | Performed by: INTERNAL MEDICINE

## 2022-05-19 ENCOUNTER — PATIENT OUTREACH (OUTPATIENT)
Dept: FAMILY MEDICINE CLINIC | Facility: CLINIC | Age: 72
End: 2022-05-19

## 2022-05-20 LAB — VITAMIN D, 25-HYDROXY: 46 NG/ML (ref 30–100)

## 2022-05-27 ENCOUNTER — TELEPHONE (OUTPATIENT)
Dept: FAMILY MEDICINE CLINIC | Facility: CLINIC | Age: 72
End: 2022-05-27

## 2022-05-27 ENCOUNTER — PATIENT OUTREACH (OUTPATIENT)
Dept: CASE MANAGEMENT | Age: 72
End: 2022-05-27

## 2022-05-27 DIAGNOSIS — I47.2 NSVT (NONSUSTAINED VENTRICULAR TACHYCARDIA) (HCC): ICD-10-CM

## 2022-05-27 DIAGNOSIS — I10 HYPERTENSION, BENIGN: ICD-10-CM

## 2022-05-27 DIAGNOSIS — M10.9 GOUTY ARTHROPATHY: ICD-10-CM

## 2022-05-27 DIAGNOSIS — H25.11 AGE-RELATED NUCLEAR CATARACT OF RIGHT EYE: ICD-10-CM

## 2022-05-27 DIAGNOSIS — R80.9 TYPE 2 DIABETES MELLITUS WITH MICROALBUMINURIA, UNSPECIFIED WHETHER LONG TERM INSULIN USE (HCC): ICD-10-CM

## 2022-05-27 DIAGNOSIS — Z99.89 OSA ON CPAP: ICD-10-CM

## 2022-05-27 DIAGNOSIS — R73.9 HYPERGLYCEMIA: ICD-10-CM

## 2022-05-27 DIAGNOSIS — R00.1 BRADYCARDIA, UNSPECIFIED: ICD-10-CM

## 2022-05-27 DIAGNOSIS — E11.29 TYPE 2 DIABETES MELLITUS WITH MICROALBUMINURIA, UNSPECIFIED WHETHER LONG TERM INSULIN USE (HCC): ICD-10-CM

## 2022-05-27 DIAGNOSIS — E55.9 VITAMIN D DEFICIENCY: ICD-10-CM

## 2022-05-27 DIAGNOSIS — E78.00 PURE HYPERCHOLESTEROLEMIA: ICD-10-CM

## 2022-05-27 DIAGNOSIS — G47.33 OSA ON CPAP: ICD-10-CM

## 2022-05-27 DIAGNOSIS — M16.0 OSTEOARTHRITIS OF BOTH HIPS, UNSPECIFIED OSTEOARTHRITIS TYPE: ICD-10-CM

## 2022-05-27 DIAGNOSIS — I49.3 PVC (PREMATURE VENTRICULAR CONTRACTION): ICD-10-CM

## 2022-05-27 DIAGNOSIS — K21.9 GASTROESOPHAGEAL REFLUX DISEASE, UNSPECIFIED WHETHER ESOPHAGITIS PRESENT: ICD-10-CM

## 2022-05-27 PROCEDURE — 93000 ELECTROCARDIOGRAM COMPLETE: CPT | Performed by: INTERNAL MEDICINE

## 2022-05-27 NOTE — TELEPHONE ENCOUNTER
Received pre-op clearance form from [facility]. DOS: 7/12/22 with Dr. James Wu. Cataract extraction w/intraocular lens implants.     Fax H&P to [phone]    Form placed in triage

## 2022-06-07 DIAGNOSIS — E11.9 TYPE 2 DIABETES MELLITUS WITHOUT COMPLICATION, WITHOUT LONG-TERM CURRENT USE OF INSULIN (HCC): ICD-10-CM

## 2022-06-07 DIAGNOSIS — K21.9 GASTROESOPHAGEAL REFLUX DISEASE: ICD-10-CM

## 2022-06-07 DIAGNOSIS — M10.9 GOUTY ARTHROPATHY: ICD-10-CM

## 2022-06-08 RX ORDER — CHLORTHALIDONE 25 MG/1
TABLET ORAL
Qty: 90 TABLET | Refills: 3 | Status: SHIPPED | OUTPATIENT
Start: 2022-06-08 | End: 2022-06-22

## 2022-06-08 RX ORDER — PANTOPRAZOLE SODIUM 40 MG/1
40 TABLET, DELAYED RELEASE ORAL DAILY
Qty: 90 TABLET | Refills: 3 | Status: SHIPPED | OUTPATIENT
Start: 2022-06-08 | End: 2023-07-31

## 2022-06-08 RX ORDER — GLIPIZIDE 10 MG/1
10 TABLET ORAL
Qty: 90 TABLET | Refills: 3 | Status: SHIPPED | OUTPATIENT
Start: 2022-06-08 | End: 2023-05-30

## 2022-06-08 RX ORDER — ALLOPURINOL 300 MG/1
300 TABLET ORAL DAILY
Qty: 90 TABLET | Refills: 3 | Status: SHIPPED | OUTPATIENT
Start: 2022-06-08 | End: 2023-07-31

## 2022-06-08 NOTE — TELEPHONE ENCOUNTER
PANTOPRAZOLE TAB 40MG DR  Start date :7/16/21, 90 TABS 2 REFILLS    GLIPIZIDE TAB 10MG  Start date :6/16/21, 90 TABS 3 REFILLS  FAILED PROTOCOLS    ALLOPURINOL  TAB 300MG  Start date :7/16/21, 90 TABS 2 REFILLS    CHLORTHALID  TAB 25MG  PER LOV, PT DISCONTINUED  On 5/16/22    Routed to [address] [place] [address] for review  LOV 5/16/22, last physical was 12/2/21

## 2022-06-09 ENCOUNTER — ORDER TRANSCRIPTION (OUTPATIENT)
Dept: SLEEP CENTER | Age: 72
End: 2022-06-09

## 2022-06-09 DIAGNOSIS — I49.3 PVC (PREMATURE VENTRICULAR CONTRACTION): ICD-10-CM

## 2022-06-09 DIAGNOSIS — G47.33 OSA (OBSTRUCTIVE SLEEP APNEA): Primary | ICD-10-CM

## 2022-06-22 ENCOUNTER — OFFICE VISIT (OUTPATIENT)
Dept: FAMILY MEDICINE CLINIC | Facility: CLINIC | Age: 72
End: 2022-06-22
Payer: MEDICARE

## 2022-06-22 VITALS
TEMPERATURE: 97 F | BODY MASS INDEX: 32.43 KG/M2 | SYSTOLIC BLOOD PRESSURE: 136 MMHG | RESPIRATION RATE: 16 BRPM | HEIGHT: 75 IN | HEART RATE: 72 BPM | DIASTOLIC BLOOD PRESSURE: 68 MMHG | WEIGHT: 260.81 LBS

## 2022-06-22 DIAGNOSIS — H25.11 NUCLEAR SCLEROTIC CATARACT OF RIGHT EYE: ICD-10-CM

## 2022-06-22 DIAGNOSIS — G47.33 OSA ON CPAP: ICD-10-CM

## 2022-06-22 DIAGNOSIS — E78.00 PURE HYPERCHOLESTEROLEMIA: ICD-10-CM

## 2022-06-22 DIAGNOSIS — Z99.89 OSA ON CPAP: ICD-10-CM

## 2022-06-22 DIAGNOSIS — I25.10 CORONARY ARTERY DISEASE INVOLVING NATIVE CORONARY ARTERY OF NATIVE HEART WITHOUT ANGINA PECTORIS: ICD-10-CM

## 2022-06-22 DIAGNOSIS — E11.42 TYPE 2 DIABETES MELLITUS WITH DIABETIC POLYNEUROPATHY, WITHOUT LONG-TERM CURRENT USE OF INSULIN (HCC): ICD-10-CM

## 2022-06-22 DIAGNOSIS — R80.9 MICROALBUMINURIA DUE TO TYPE 2 DIABETES MELLITUS (HCC): ICD-10-CM

## 2022-06-22 DIAGNOSIS — E11.29 MICROALBUMINURIA DUE TO TYPE 2 DIABETES MELLITUS (HCC): ICD-10-CM

## 2022-06-22 DIAGNOSIS — Z95.1 HX OF CABG: ICD-10-CM

## 2022-06-22 DIAGNOSIS — Z01.818 PREOP EXAMINATION: Primary | ICD-10-CM

## 2022-06-22 DIAGNOSIS — I10 HYPERTENSION, BENIGN: ICD-10-CM

## 2022-06-22 LAB
CREAT UR-SCNC: 281 MG/DL
EST. AVERAGE GLUCOSE BLD GHB EST-MCNC: 157 MG/DL (ref 68–126)
HBA1C MFR BLD: 7.1 % (ref ?–5.7)
MICROALBUMIN UR-MCNC: 24.1 MG/DL
MICROALBUMIN/CREAT 24H UR-RTO: 85.8 UG/MG (ref ?–30)

## 2022-06-22 PROCEDURE — 83036 HEMOGLOBIN GLYCOSYLATED A1C: CPT | Performed by: FAMILY MEDICINE

## 2022-06-22 PROCEDURE — 82043 UR ALBUMIN QUANTITATIVE: CPT | Performed by: FAMILY MEDICINE

## 2022-06-22 PROCEDURE — 99214 OFFICE O/P EST MOD 30 MIN: CPT | Performed by: FAMILY MEDICINE

## 2022-06-22 PROCEDURE — 82570 ASSAY OF URINE CREATININE: CPT | Performed by: FAMILY MEDICINE

## 2022-07-12 ENCOUNTER — PATIENT OUTREACH (OUTPATIENT)
Dept: CASE MANAGEMENT | Age: 72
End: 2022-07-12

## 2022-07-18 ENCOUNTER — OFFICE VISIT (OUTPATIENT)
Dept: SURGERY | Facility: CLINIC | Age: 72
End: 2022-07-18
Payer: MEDICARE

## 2022-07-18 DIAGNOSIS — N40.1 BPH WITH OBSTRUCTION/LOWER URINARY TRACT SYMPTOMS: Primary | ICD-10-CM

## 2022-07-18 DIAGNOSIS — R82.991 HYPOCITRATURIA: ICD-10-CM

## 2022-07-18 DIAGNOSIS — N20.0 RECURRENT KIDNEY STONES: ICD-10-CM

## 2022-07-18 DIAGNOSIS — N13.8 BPH WITH OBSTRUCTION/LOWER URINARY TRACT SYMPTOMS: Primary | ICD-10-CM

## 2022-07-18 DIAGNOSIS — N39.0 RECURRENT UTI: ICD-10-CM

## 2022-07-18 DIAGNOSIS — N52.9 ERECTILE DYSFUNCTION, UNSPECIFIED ERECTILE DYSFUNCTION TYPE: ICD-10-CM

## 2022-07-18 DIAGNOSIS — Z12.5 SCREENING PSA (PROSTATE SPECIFIC ANTIGEN): ICD-10-CM

## 2022-07-18 LAB
APPEARANCE: CLEAR
GLUCOSE (URINE DIPSTICK): NEGATIVE MG/DL
KETONES (URINE DIPSTICK): NEGATIVE MG/DL
LEUKOCYTES: NEGATIVE
MULTISTIX LOT#: ABNORMAL NUMERIC
NITRITE, URINE: NEGATIVE
OCCULT BLOOD: NEGATIVE
PH, URINE: 5.5 (ref 4.5–8)
PROTEIN (URINE DIPSTICK): 30 MG/DL
SPECIFIC GRAVITY: >=1.03 (ref 1–1.03)
UROBILINOGEN,SEMI-QN: 0.2 MG/DL (ref 0–1.9)

## 2022-07-18 PROCEDURE — 51798 US URINE CAPACITY MEASURE: CPT | Performed by: UROLOGY

## 2022-07-18 PROCEDURE — 81003 URINALYSIS AUTO W/O SCOPE: CPT | Performed by: UROLOGY

## 2022-07-18 PROCEDURE — 99204 OFFICE O/P NEW MOD 45 MIN: CPT | Performed by: UROLOGY

## 2022-07-18 RX ORDER — SILDENAFIL 100 MG/1
100 TABLET, FILM COATED ORAL
Qty: 30 TABLET | Refills: 5 | Status: SHIPPED | OUTPATIENT
Start: 2022-07-18

## 2022-07-18 RX ORDER — POTASSIUM CITRATE 15 MEQ/1
1 TABLET, EXTENDED RELEASE ORAL DAILY
Qty: 90 TABLET | Refills: 5 | Status: SHIPPED | OUTPATIENT
Start: 2022-07-18

## 2022-07-19 ENCOUNTER — HOSPITAL ENCOUNTER (OUTPATIENT)
Dept: MRI IMAGING | Facility: HOSPITAL | Age: 72
Discharge: HOME | End: 2022-07-19
Attending: CLINICAL NURSE SPECIALIST
Payer: MEDICARE

## 2022-07-19 ENCOUNTER — LAB ENCOUNTER (OUTPATIENT)
Dept: LAB | Facility: HOSPITAL | Age: 72
End: 2022-07-19
Attending: CLINICAL NURSE SPECIALIST
Payer: MEDICARE

## 2022-07-19 DIAGNOSIS — D42.0 ATYPICAL INTRACRANIAL MENINGIOMA (HCC): ICD-10-CM

## 2022-07-19 DIAGNOSIS — Z12.5 SCREENING PSA (PROSTATE SPECIFIC ANTIGEN): ICD-10-CM

## 2022-07-19 LAB — COMPLEXED PSA SERPL-MCNC: 1.85 NG/ML (ref ?–4)

## 2022-07-19 PROCEDURE — 36415 COLL VENOUS BLD VENIPUNCTURE: CPT

## 2022-07-19 PROCEDURE — 70553 MRI BRAIN STEM W/O & W/DYE: CPT | Performed by: CLINICAL NURSE SPECIALIST

## 2022-07-19 PROCEDURE — A9575 INJ GADOTERATE MEGLUMI 0.1ML: HCPCS

## 2022-07-20 NOTE — IMAGING NOTE
Right AC infiltrated with about 5 ml of Dotarem. Patient had pain to site and swelling. Warm compress placed. Pain better by end of exam.  Swelling starting to go down.

## 2022-07-22 ENCOUNTER — EXTERNAL RECORD (OUTPATIENT)
Dept: CARDIOLOGY | Age: 72
End: 2022-07-22

## 2022-07-25 ENCOUNTER — EXTERNAL RECORD (OUTPATIENT)
Dept: CARDIOLOGY | Age: 72
End: 2022-07-25

## 2022-07-27 ENCOUNTER — PATIENT OUTREACH (OUTPATIENT)
Dept: CASE MANAGEMENT | Age: 72
End: 2022-07-27

## 2022-07-27 NOTE — PROGRESS NOTES
Contacting patient to follow up on CCM for the month.  LMCB    Total time -  min  Total Monthly time-  min

## 2022-08-15 RX ORDER — METOPROLOL SUCCINATE 100 MG/1
100 TABLET, EXTENDED RELEASE ORAL DAILY
Qty: 30 TABLET | Refills: 3 | Status: SHIPPED | OUTPATIENT
Start: 2022-08-15 | End: 2022-12-19

## 2022-08-18 ENCOUNTER — PATIENT OUTREACH (OUTPATIENT)
Dept: CASE MANAGEMENT | Age: 72
End: 2022-08-18

## 2022-08-18 ENCOUNTER — TELEPHONE (OUTPATIENT)
Dept: CARDIOLOGY | Age: 72
End: 2022-08-18

## 2022-09-23 ENCOUNTER — PATIENT OUTREACH (OUTPATIENT)
Dept: CASE MANAGEMENT | Age: 72
End: 2022-09-23

## 2022-09-23 DIAGNOSIS — I10 HYPERTENSION, BENIGN: ICD-10-CM

## 2022-09-23 DIAGNOSIS — I25.10 CORONARY ARTERY DISEASE INVOLVING NATIVE CORONARY ARTERY OF NATIVE HEART WITHOUT ANGINA PECTORIS: ICD-10-CM

## 2022-09-23 DIAGNOSIS — R80.9 TYPE 2 DIABETES MELLITUS WITH MICROALBUMINURIA, UNSPECIFIED WHETHER LONG TERM INSULIN USE (HCC): ICD-10-CM

## 2022-09-23 DIAGNOSIS — Z99.89 OSA ON CPAP: ICD-10-CM

## 2022-09-23 DIAGNOSIS — G47.33 OSA ON CPAP: ICD-10-CM

## 2022-09-23 DIAGNOSIS — R73.9 HYPERGLYCEMIA: ICD-10-CM

## 2022-09-23 DIAGNOSIS — M16.0 OSTEOARTHRITIS OF BOTH HIPS, UNSPECIFIED OSTEOARTHRITIS TYPE: ICD-10-CM

## 2022-09-23 DIAGNOSIS — E55.9 VITAMIN D DEFICIENCY: ICD-10-CM

## 2022-09-23 DIAGNOSIS — E11.29 TYPE 2 DIABETES MELLITUS WITH MICROALBUMINURIA, UNSPECIFIED WHETHER LONG TERM INSULIN USE (HCC): ICD-10-CM

## 2022-09-23 DIAGNOSIS — E78.00 PURE HYPERCHOLESTEROLEMIA: ICD-10-CM

## 2022-09-23 DIAGNOSIS — M10.9 GOUTY ARTHROPATHY: ICD-10-CM

## 2022-09-23 DIAGNOSIS — K21.9 GASTROESOPHAGEAL REFLUX DISEASE, UNSPECIFIED WHETHER ESOPHAGITIS PRESENT: ICD-10-CM

## 2022-09-23 DIAGNOSIS — I65.21 STENOSIS OF RIGHT CAROTID ARTERY: ICD-10-CM

## 2022-10-06 RX ORDER — FENOFIBRATE 48 MG/1
TABLET, COATED ORAL
Qty: 90 TABLET | Refills: 0 | Status: SHIPPED | OUTPATIENT
Start: 2022-10-06 | End: 2022-10-18 | Stop reason: SDUPTHER

## 2022-10-18 ENCOUNTER — OFFICE VISIT (OUTPATIENT)
Dept: CARDIOLOGY | Age: 72
End: 2022-10-18

## 2022-10-18 VITALS
WEIGHT: 260.91 LBS | HEIGHT: 76 IN | HEART RATE: 72 BPM | SYSTOLIC BLOOD PRESSURE: 161 MMHG | OXYGEN SATURATION: 97 % | DIASTOLIC BLOOD PRESSURE: 63 MMHG | BODY MASS INDEX: 31.77 KG/M2

## 2022-10-18 DIAGNOSIS — E78.2 HYPERLIPIDEMIA, MIXED: ICD-10-CM

## 2022-10-18 DIAGNOSIS — E78.00 PURE HYPERCHOLESTEROLEMIA: Primary | ICD-10-CM

## 2022-10-18 DIAGNOSIS — I10 HYPERTENSION, BENIGN: ICD-10-CM

## 2022-10-18 DIAGNOSIS — I47.29 NSVT (NONSUSTAINED VENTRICULAR TACHYCARDIA) (CMD): ICD-10-CM

## 2022-10-18 DIAGNOSIS — E55.9 VITAMIN D DEFICIENCY: ICD-10-CM

## 2022-10-18 DIAGNOSIS — Z95.1 HX OF CABG: ICD-10-CM

## 2022-10-18 DIAGNOSIS — I65.23 BILATERAL CAROTID ARTERY STENOSIS: ICD-10-CM

## 2022-10-18 PROCEDURE — 99214 OFFICE O/P EST MOD 30 MIN: CPT | Performed by: INTERNAL MEDICINE

## 2022-10-18 RX ORDER — FENOFIBRATE 48 MG/1
54 TABLET, COATED ORAL DAILY
Qty: 90 TABLET | Refills: 3 | Status: SHIPPED | OUTPATIENT
Start: 2022-10-18 | End: 2023-01-05

## 2022-10-18 SDOH — HEALTH STABILITY: PHYSICAL HEALTH: ON AVERAGE, HOW MANY DAYS PER WEEK DO YOU ENGAGE IN MODERATE TO STRENUOUS EXERCISE (LIKE A BRISK WALK)?: 0 DAYS

## 2022-10-18 SDOH — HEALTH STABILITY: PHYSICAL HEALTH: ON AVERAGE, HOW MANY MINUTES DO YOU ENGAGE IN EXERCISE AT THIS LEVEL?: 0 MIN

## 2022-10-18 ASSESSMENT — PATIENT HEALTH QUESTIONNAIRE - PHQ9
SUM OF ALL RESPONSES TO PHQ9 QUESTIONS 1 AND 2: 0
SUM OF ALL RESPONSES TO PHQ9 QUESTIONS 1 AND 2: 0
CLINICAL INTERPRETATION OF PHQ2 SCORE: NO FURTHER SCREENING NEEDED
1. LITTLE INTEREST OR PLEASURE IN DOING THINGS: NOT AT ALL
2. FEELING DOWN, DEPRESSED OR HOPELESS: NOT AT ALL

## 2022-10-28 ENCOUNTER — TELEPHONE (OUTPATIENT)
Dept: CARDIOLOGY | Age: 72
End: 2022-10-28

## 2022-10-28 RX ORDER — LOSARTAN POTASSIUM 50 MG/1
50 TABLET ORAL AT BEDTIME
COMMUNITY
End: 2022-10-28 | Stop reason: SDUPTHER

## 2022-10-28 RX ORDER — LOSARTAN POTASSIUM 50 MG/1
50 TABLET ORAL AT BEDTIME
Qty: 90 TABLET | Refills: 1 | Status: SHIPPED | OUTPATIENT
Start: 2022-10-28 | End: 2022-11-09 | Stop reason: DRUGHIGH

## 2022-11-08 ENCOUNTER — TELEPHONE (OUTPATIENT)
Dept: CARDIOLOGY | Age: 72
End: 2022-11-08

## 2022-11-09 ENCOUNTER — TELEPHONE (OUTPATIENT)
Dept: CARDIOLOGY | Age: 72
End: 2022-11-09

## 2022-11-09 RX ORDER — LOSARTAN POTASSIUM 50 MG/1
50 TABLET ORAL 2 TIMES DAILY
Qty: 180 TABLET | Refills: 1 | Status: SHIPPED | OUTPATIENT
Start: 2022-11-09 | End: 2023-03-01 | Stop reason: SDUPTHER

## 2022-11-09 RX ORDER — LOSARTAN POTASSIUM 50 MG/1
50 TABLET ORAL 2 TIMES DAILY
COMMUNITY
End: 2022-11-09 | Stop reason: SDUPTHER

## 2022-11-10 ENCOUNTER — TELEPHONE (OUTPATIENT)
Dept: CARDIOLOGY | Age: 72
End: 2022-11-10

## 2022-11-11 ENCOUNTER — PATIENT OUTREACH (OUTPATIENT)
Dept: CASE MANAGEMENT | Age: 72
End: 2022-11-11

## 2022-11-26 ENCOUNTER — EXTERNAL LAB (OUTPATIENT)
Dept: CARDIOLOGY | Age: 72
End: 2022-11-26

## 2022-11-26 LAB
25(OH)D3+25(OH)D2 SERPL-MCNC: 42 NG/ML (ref 30–100)
ALBUMIN SERPL-MCNC: 4.1 G/DL (ref 3.6–5.1)
ALBUMIN/GLOB SERPL: 1.6 (CALC) (ref 1–2.5)
ALP SERPL-CCNC: 62 U/L (ref 35–144)
ALT SERPL-CCNC: 21 U/L (ref 9–46)
AST SERPL-CCNC: 26 U/L (ref 10–35)
BASOPHILS # BLD: 32 CELLS/UL (ref 0–200)
BASOPHILS NFR BLD: 0.6 %
BILIRUB SERPL-MCNC: 0.6 MG/DL (ref 0.2–1.2)
BUN SERPL-MCNC: 13 MG/DL (ref 7–25)
BUN/CREAT SERPL: ABNORMAL (CALC) (ref 6–22)
CALCIUM SERPL-MCNC: 9.3 MG/DL (ref 8.6–10.3)
CHLORIDE SERPL-SCNC: 103 MMOL/L (ref 98–110)
CHOLEST SERPL-MCNC: 132 MG/DL
CHOLEST/HDLC SERPL: 3.3 (CALC)
CO2 SERPL-SCNC: 30 MMOL/L (ref 20–32)
CREAT SERPL-MCNC: 1.05 MG/DL (ref 0.7–1.28)
EOSINOPHIL # BLD: 80 CELLS/UL (ref 15–500)
EOSINOPHIL NFR BLD: 1.5 %
ERYTHROCYTE [DISTWIDTH] IN BLOOD: 13.9 % (ref 11–15)
GFR SERPLBLD SCHWARTZ-ARVRAT: 75 ML/MIN/1.73M2
GLOBULIN SER-MCNC: 2.5 G/DL (CALC) (ref 1.9–3.7)
GLUCOSE SERPL-MCNC: 127 MG/DL (ref 65–99)
HBA1C MFR BLD: 6.5 % OF TOTAL HGB
HCT VFR BLD CALC: 43.4 % (ref 38.5–50)
HDLC SERPL-MCNC: 40 MG/DL
HGB BLD-MCNC: 14.3 G/DL (ref 13.2–17.1)
LDLC SERPL CALC-MCNC: 72 MG/DL (CALC)
LENGTH OF FAST TIME PATIENT: ABNORMAL H
LENGTH OF FAST TIME PATIENT: NORMAL H
LYMPHOCYTES # BLD: 906 CELLS/UL (ref 850–3900)
LYMPHOCYTES NFR BLD: 17.1 %
MCH RBC QN AUTO: 28 PG (ref 27–33)
MCHC RBC AUTO-ENTMCNC: 32.9 G/DL (ref 32–36)
MCV RBC AUTO: 84.9 FL (ref 80–100)
MONOCYTES # BLD: 726 CELLS/UL (ref 200–950)
MONOCYTES NFR BLD: 13.7 %
MPV (OFFPRE2): 12.9 FL (ref 7.5–12.5)
NEUTROPHILS # BLD: 3556 CELLS/UL (ref 1500–7800)
NEUTROPHILS NFR BLD: 67.1 %
NONHDLC SERPL-MCNC: 92 MG/DL (CALC)
PLATELET # BLD: 132 THOUSAND/UL (ref 140–400)
POTASSIUM SERPL-SCNC: 3.6 MMOL/L (ref 3.5–5.3)
PROT SERPL-MCNC: 6.6 G/DL (ref 6.1–8.1)
RBC # BLD: 5.11 MILLION/UL (ref 4.2–5.8)
SODIUM SERPL-SCNC: 140 MMOL/L (ref 135–146)
TRIGL SERPL-MCNC: 122 MG/DL
WBC # BLD: 5.3 THOUSAND/UL (ref 3.8–10.8)

## 2022-11-27 LAB
25(OH)D3 SERPL-MCNC: 42 NG/ML (ref 30–100)
ALBUMIN SERPL-MCNC: 4.1 G/DL (ref 3.6–5.1)
ALBUMIN/GLOB SERPL: 1.6 (CALC) (ref 1–2.5)
ALP SERPL-CCNC: 62 U/L (ref 35–144)
ALT SERPL-CCNC: 21 U/L (ref 9–46)
AST SERPL-CCNC: 26 U/L (ref 10–35)
BASOPHILS # BLD AUTO: 32 CELLS/UL (ref 0–200)
BASOPHILS NFR BLD AUTO: 0.6 %
BILIRUB SERPL-MCNC: 0.6 MG/DL (ref 0.2–1.2)
BUN SERPL-MCNC: 13 MG/DL (ref 7–25)
BUN/CREAT SERPL: ABNORMAL (CALC) (ref 6–22)
CALCIUM SERPL-MCNC: 9.3 MG/DL (ref 8.6–10.3)
CHLORIDE SERPL-SCNC: 103 MMOL/L (ref 98–110)
CHOLEST SERPL-MCNC: 132 MG/DL
CHOLEST/HDLC SERPL: 3.3 (CALC)
CO2 SERPL-SCNC: 30 MMOL/L (ref 20–32)
CREAT SERPL-MCNC: 1.05 MG/DL (ref 0.7–1.28)
EGFRCR SERPLBLD CKD-EPI 2021: 75 ML/MIN/1.73M2
EOSINOPHIL # BLD AUTO: 80 CELLS/UL (ref 15–500)
EOSINOPHIL NFR BLD AUTO: 1.5 %
ERYTHROCYTE [DISTWIDTH] IN BLOOD BY AUTOMATED COUNT: 13.9 % (ref 11–15)
GLOBULIN SER CALC-MCNC: 2.5 G/DL (CALC) (ref 1.9–3.7)
GLUCOSE SERPL-MCNC: 127 MG/DL (ref 65–99)
HBA1C MFR BLD: 6.5 % OF TOTAL HGB
HCT VFR BLD AUTO: 43.4 % (ref 38.5–50)
HDLC SERPL-MCNC: 40 MG/DL
HGB BLD-MCNC: 14.3 G/DL (ref 13.2–17.1)
LDLC SERPL CALC-MCNC: 72 MG/DL (CALC)
LYMPHOCYTES # BLD AUTO: 906 CELLS/UL (ref 850–3900)
LYMPHOCYTES NFR BLD AUTO: 17.1 %
MCH RBC QN AUTO: 28 PG (ref 27–33)
MCHC RBC AUTO-ENTMCNC: 32.9 G/DL (ref 32–36)
MCV RBC AUTO: 84.9 FL (ref 80–100)
MONOCYTES # BLD AUTO: 726 CELLS/UL (ref 200–950)
MONOCYTES NFR BLD AUTO: 13.7 %
NEUTROPHILS # BLD AUTO: 3556 CELLS/UL (ref 1500–7800)
NEUTROPHILS NFR BLD AUTO: 67.1 %
NONHDLC SERPL-MCNC: 92 MG/DL (CALC)
PLATELET # BLD AUTO: 132 THOUSAND/UL (ref 140–400)
PMV BLD REES-ECKER: 12.9 FL (ref 7.5–12.5)
POTASSIUM SERPL-SCNC: 3.6 MMOL/L (ref 3.5–5.3)
PROT SERPL-MCNC: 6.6 G/DL (ref 6.1–8.1)
RBC # BLD AUTO: 5.11 MILLION/UL (ref 4.2–5.8)
SODIUM SERPL-SCNC: 140 MMOL/L (ref 135–146)
TRIGL SERPL-MCNC: 122 MG/DL
WBC # BLD AUTO: 5.3 THOUSAND/UL (ref 3.8–10.8)

## 2022-11-28 RX ORDER — ROSUVASTATIN CALCIUM 40 MG/1
TABLET, COATED ORAL
Qty: 90 TABLET | Refills: 1 | Status: SHIPPED | OUTPATIENT
Start: 2022-11-28 | End: 2023-06-14

## 2022-12-08 PROBLEM — Z86.010 HISTORY OF ADENOMATOUS POLYP OF COLON: Status: ACTIVE | Noted: 2022-12-08

## 2022-12-08 PROBLEM — Z86.0101 HISTORY OF ADENOMATOUS POLYP OF COLON: Status: ACTIVE | Noted: 2022-12-08

## 2022-12-19 RX ORDER — METOPROLOL SUCCINATE 100 MG/1
100 TABLET, EXTENDED RELEASE ORAL DAILY
Qty: 90 TABLET | Refills: 0 | Status: SHIPPED | OUTPATIENT
Start: 2022-12-19 | End: 2023-04-07

## 2022-12-22 RX ORDER — METOPROLOL SUCCINATE 100 MG/1
100 TABLET, EXTENDED RELEASE ORAL DAILY
Qty: 30 TABLET | Refills: 0 | OUTPATIENT
Start: 2022-12-22

## 2023-01-09 ENCOUNTER — PATIENT OUTREACH (OUTPATIENT)
Dept: CASE MANAGEMENT | Age: 73
End: 2023-01-09

## 2023-01-09 ENCOUNTER — TELEPHONE (OUTPATIENT)
Dept: FAMILY MEDICINE CLINIC | Facility: CLINIC | Age: 73
End: 2023-01-09

## 2023-01-09 DIAGNOSIS — E78.00 PURE HYPERCHOLESTEROLEMIA: ICD-10-CM

## 2023-01-09 DIAGNOSIS — E55.9 VITAMIN D DEFICIENCY: ICD-10-CM

## 2023-01-09 DIAGNOSIS — I10 HYPERTENSION, BENIGN: ICD-10-CM

## 2023-01-09 DIAGNOSIS — R73.9 HYPERGLYCEMIA: ICD-10-CM

## 2023-01-09 DIAGNOSIS — R13.10 DYSPHAGIA, UNSPECIFIED TYPE: ICD-10-CM

## 2023-01-09 DIAGNOSIS — J30.2 SEASONAL ALLERGIC RHINITIS, UNSPECIFIED TRIGGER: ICD-10-CM

## 2023-01-09 DIAGNOSIS — I25.10 CORONARY ARTERY DISEASE INVOLVING NATIVE CORONARY ARTERY OF NATIVE HEART WITHOUT ANGINA PECTORIS: ICD-10-CM

## 2023-01-09 DIAGNOSIS — M16.0 OSTEOARTHRITIS OF BOTH HIPS, UNSPECIFIED OSTEOARTHRITIS TYPE: ICD-10-CM

## 2023-01-09 DIAGNOSIS — I70.0 ABDOMINAL AORTIC ATHEROSCLEROSIS (HCC): ICD-10-CM

## 2023-01-09 DIAGNOSIS — K21.9 GASTROESOPHAGEAL REFLUX DISEASE, UNSPECIFIED WHETHER ESOPHAGITIS PRESENT: ICD-10-CM

## 2023-01-09 DIAGNOSIS — E11.29 TYPE 2 DIABETES MELLITUS WITH MICROALBUMINURIA, UNSPECIFIED WHETHER LONG TERM INSULIN USE (HCC): ICD-10-CM

## 2023-01-09 DIAGNOSIS — R80.9 TYPE 2 DIABETES MELLITUS WITH MICROALBUMINURIA, UNSPECIFIED WHETHER LONG TERM INSULIN USE (HCC): ICD-10-CM

## 2023-01-09 DIAGNOSIS — Z99.89 OSA ON CPAP: ICD-10-CM

## 2023-01-09 DIAGNOSIS — M10.9 GOUTY ARTHROPATHY: ICD-10-CM

## 2023-01-09 DIAGNOSIS — G47.33 OSA ON CPAP: ICD-10-CM

## 2023-01-09 RX ORDER — LOSARTAN POTASSIUM 50 MG/1
TABLET ORAL 2 TIMES DAILY
COMMUNITY

## 2023-01-09 NOTE — TELEPHONE ENCOUNTER
Future Appointments   Date Time Provider Cindi Calhoun   1/17/2023  9:00 AM Shirley Rivera MD [facility] EC Nap 4   1/18/2023  9:30 AM WDR US RM1 WDR US EDW [place]   1/18/2023 12:45 PM [facility] MR RM3 (3T WIDE) [facility] MRI [place]   1/30/2023  9:30 AM Chris Ferguson MD EMG 3 EMG [place]     Pt has [facility] scheduled and most recent lab work was ordered on 11/23 by Dr Carmen Mclaughlin. If additional lab work is necessary please review chart and place appropriate order.     Thank You    Routed to Dr Michael Simms

## 2023-01-09 NOTE — TELEPHONE ENCOUNTER
Theo Bui    No additional labs.   He's had enough with the cardio team, and we can see them all    Thanks!  -Earl Macias

## 2023-01-10 ENCOUNTER — OFFICE VISIT (OUTPATIENT)
Dept: CARDIOLOGY | Age: 73
End: 2023-01-10

## 2023-01-10 VITALS
WEIGHT: 260.14 LBS | SYSTOLIC BLOOD PRESSURE: 132 MMHG | HEIGHT: 76 IN | DIASTOLIC BLOOD PRESSURE: 64 MMHG | HEART RATE: 78 BPM | OXYGEN SATURATION: 96 % | BODY MASS INDEX: 31.68 KG/M2

## 2023-01-10 DIAGNOSIS — I49.3 PVC (PREMATURE VENTRICULAR CONTRACTION): Primary | ICD-10-CM

## 2023-01-10 PROCEDURE — 93000 ELECTROCARDIOGRAM COMPLETE: CPT | Performed by: INTERNAL MEDICINE

## 2023-01-10 PROCEDURE — 99215 OFFICE O/P EST HI 40 MIN: CPT | Performed by: INTERNAL MEDICINE

## 2023-01-10 ASSESSMENT — PATIENT HEALTH QUESTIONNAIRE - PHQ9
SUM OF ALL RESPONSES TO PHQ9 QUESTIONS 1 AND 2: 0
SUM OF ALL RESPONSES TO PHQ9 QUESTIONS 1 AND 2: 0
2. FEELING DOWN, DEPRESSED OR HOPELESS: NOT AT ALL
1. LITTLE INTEREST OR PLEASURE IN DOING THINGS: NOT AT ALL
CLINICAL INTERPRETATION OF PHQ2 SCORE: NO FURTHER SCREENING NEEDED

## 2023-01-11 ENCOUNTER — TELEPHONE (OUTPATIENT)
Dept: CARDIOLOGY | Age: 73
End: 2023-01-11

## 2023-01-11 RX ORDER — AMLODIPINE BESYLATE 5 MG/1
5 TABLET ORAL AT BEDTIME
Qty: 90 TABLET | Refills: 1 | Status: SHIPPED | OUTPATIENT
Start: 2023-01-11 | End: 2023-01-25 | Stop reason: DRUGHIGH

## 2023-01-11 RX ORDER — AMLODIPINE BESYLATE 5 MG/1
5 TABLET ORAL AT BEDTIME
COMMUNITY
End: 2023-01-11 | Stop reason: SDUPTHER

## 2023-01-17 ENCOUNTER — OFFICE VISIT (OUTPATIENT)
Dept: SURGERY | Facility: CLINIC | Age: 73
End: 2023-01-17

## 2023-01-17 DIAGNOSIS — N13.8 BPH WITH OBSTRUCTION/LOWER URINARY TRACT SYMPTOMS: Primary | ICD-10-CM

## 2023-01-17 DIAGNOSIS — N40.1 BPH WITH OBSTRUCTION/LOWER URINARY TRACT SYMPTOMS: Primary | ICD-10-CM

## 2023-01-17 DIAGNOSIS — N20.0 RECURRENT KIDNEY STONES: ICD-10-CM

## 2023-01-17 DIAGNOSIS — N52.9 ERECTILE DYSFUNCTION, UNSPECIFIED ERECTILE DYSFUNCTION TYPE: ICD-10-CM

## 2023-01-17 DIAGNOSIS — R82.991 HYPOCITRATURIA: ICD-10-CM

## 2023-01-17 DIAGNOSIS — N39.0 RECURRENT UTI: ICD-10-CM

## 2023-01-17 LAB
APPEARANCE: CLEAR
GLUCOSE (URINE DIPSTICK): NEGATIVE MG/DL
KETONES (URINE DIPSTICK): NEGATIVE MG/DL
LEUKOCYTES: NEGATIVE
MULTISTIX LOT#: ABNORMAL NUMERIC
NITRITE, URINE: NEGATIVE
PH, URINE: 5.5 (ref 4.5–8)
PROTEIN (URINE DIPSTICK): 100 MG/DL
SPECIFIC GRAVITY: 1.03 (ref 1–1.03)
URINE-COLOR: YELLOW
UROBILINOGEN,SEMI-QN: 1 MG/DL (ref 0–1.9)

## 2023-01-17 PROCEDURE — 81003 URINALYSIS AUTO W/O SCOPE: CPT | Performed by: UROLOGY

## 2023-01-17 PROCEDURE — 99214 OFFICE O/P EST MOD 30 MIN: CPT | Performed by: UROLOGY

## 2023-01-17 RX ORDER — POTASSIUM CITRATE 15 MEQ/1
1 TABLET, EXTENDED RELEASE ORAL DAILY
Qty: 90 TABLET | Refills: 5 | Status: SHIPPED | OUTPATIENT
Start: 2023-01-17

## 2023-01-17 RX ORDER — LOSARTAN POTASSIUM 50 MG/1
50 TABLET ORAL 2 TIMES DAILY
COMMUNITY
Start: 2022-11-09

## 2023-01-17 RX ORDER — AMLODIPINE BESYLATE 5 MG/1
5 TABLET ORAL NIGHTLY
COMMUNITY
Start: 2023-01-11

## 2023-01-18 ENCOUNTER — HOSPITAL ENCOUNTER (OUTPATIENT)
Dept: MRI IMAGING | Facility: HOSPITAL | Age: 73
Discharge: HOME | End: 2023-01-18
Attending: CLINICAL NURSE SPECIALIST
Payer: MEDICARE

## 2023-01-18 ENCOUNTER — HOSPITAL ENCOUNTER (OUTPATIENT)
Dept: ULTRASOUND IMAGING | Age: 73
Discharge: HOME | End: 2023-01-18
Attending: UROLOGY
Payer: MEDICARE

## 2023-01-18 DIAGNOSIS — D42.0 ATYPICAL MENINGIOMA OF BRAIN (HCC): ICD-10-CM

## 2023-01-18 DIAGNOSIS — N20.0 RECURRENT KIDNEY STONES: ICD-10-CM

## 2023-01-18 PROCEDURE — A9575 INJ GADOTERATE MEGLUMI 0.1ML: HCPCS | Performed by: RADIOLOGY

## 2023-01-18 PROCEDURE — 70553 MRI BRAIN STEM W/O & W/DYE: CPT | Performed by: CLINICAL NURSE SPECIALIST

## 2023-01-18 PROCEDURE — 76775 US EXAM ABDO BACK WALL LIM: CPT | Performed by: UROLOGY

## 2023-01-18 RX ORDER — GADOTERATE MEGLUMINE 376.9 MG/ML
30 INJECTION INTRAVENOUS
Status: COMPLETED | OUTPATIENT
Start: 2023-01-18 | End: 2023-01-18

## 2023-01-18 RX ADMIN — GADOTERATE MEGLUMINE 30 ML: 376.9 INJECTION INTRAVENOUS at 14:14:00

## 2023-01-25 ENCOUNTER — TELEPHONE (OUTPATIENT)
Dept: CARDIOLOGY | Age: 73
End: 2023-01-25

## 2023-01-25 RX ORDER — AMLODIPINE BESYLATE 5 MG/1
5 TABLET ORAL 2 TIMES DAILY
COMMUNITY
End: 2023-03-01 | Stop reason: SDUPTHER

## 2023-01-27 ENCOUNTER — TELEPHONE (OUTPATIENT)
Dept: FAMILY MEDICINE CLINIC | Facility: CLINIC | Age: 73
End: 2023-01-27

## 2023-01-30 ENCOUNTER — OFFICE VISIT (OUTPATIENT)
Dept: FAMILY MEDICINE CLINIC | Facility: CLINIC | Age: 73
End: 2023-01-30
Payer: MEDICARE

## 2023-01-30 VITALS
BODY MASS INDEX: 32.45 KG/M2 | SYSTOLIC BLOOD PRESSURE: 124 MMHG | WEIGHT: 261 LBS | RESPIRATION RATE: 16 BRPM | HEART RATE: 68 BPM | DIASTOLIC BLOOD PRESSURE: 55 MMHG | HEIGHT: 75 IN

## 2023-01-30 DIAGNOSIS — D49.6 NEOPLASM OF BRAIN CAUSING MASS EFFECT ON ADJACENT STRUCTURES (HCC): ICD-10-CM

## 2023-01-30 DIAGNOSIS — Z99.89 OSA ON CPAP: ICD-10-CM

## 2023-01-30 DIAGNOSIS — I70.0 ABDOMINAL AORTIC ATHEROSCLEROSIS (HCC): ICD-10-CM

## 2023-01-30 DIAGNOSIS — E78.00 PURE HYPERCHOLESTEROLEMIA: ICD-10-CM

## 2023-01-30 DIAGNOSIS — G47.33 OSA ON CPAP: ICD-10-CM

## 2023-01-30 DIAGNOSIS — R80.9 TYPE 2 DIABETES MELLITUS WITH MICROALBUMINURIA, WITHOUT LONG-TERM CURRENT USE OF INSULIN (HCC): ICD-10-CM

## 2023-01-30 DIAGNOSIS — E11.29 TYPE 2 DIABETES MELLITUS WITH MICROALBUMINURIA, WITHOUT LONG-TERM CURRENT USE OF INSULIN (HCC): ICD-10-CM

## 2023-01-30 DIAGNOSIS — Z00.00 ENCOUNTER FOR ANNUAL HEALTH EXAMINATION: Primary | ICD-10-CM

## 2023-01-30 DIAGNOSIS — I25.10 CORONARY ARTERY DISEASE INVOLVING NATIVE CORONARY ARTERY OF NATIVE HEART WITHOUT ANGINA PECTORIS: ICD-10-CM

## 2023-01-30 DIAGNOSIS — I65.21 STENOSIS OF RIGHT CAROTID ARTERY: ICD-10-CM

## 2023-01-30 DIAGNOSIS — Z95.1 HX OF CABG: ICD-10-CM

## 2023-01-30 DIAGNOSIS — I10 HYPERTENSION, BENIGN: ICD-10-CM

## 2023-01-30 PROBLEM — Z86.010 HISTORY OF ADENOMATOUS POLYP OF COLON: Status: RESOLVED | Noted: 2022-12-08 | Resolved: 2023-01-30

## 2023-01-30 PROBLEM — Z86.0101 HISTORY OF ADENOMATOUS POLYP OF COLON: Status: RESOLVED | Noted: 2022-12-08 | Resolved: 2023-01-30

## 2023-01-30 PROCEDURE — 1126F AMNT PAIN NOTED NONE PRSNT: CPT | Performed by: FAMILY MEDICINE

## 2023-01-30 PROCEDURE — G0439 PPPS, SUBSEQ VISIT: HCPCS | Performed by: FAMILY MEDICINE

## 2023-01-31 LAB
CREAT UR-SCNC: 295 MG/DL
MICROALBUMIN UR-MCNC: 43.3 MG/DL
MICROALBUMIN/CREAT 24H UR-RTO: 146.8 UG/MG (ref ?–30)

## 2023-01-31 PROCEDURE — 82043 UR ALBUMIN QUANTITATIVE: CPT | Performed by: FAMILY MEDICINE

## 2023-01-31 PROCEDURE — 82570 ASSAY OF URINE CREATININE: CPT | Performed by: FAMILY MEDICINE

## 2023-02-06 ENCOUNTER — ANCILLARY PROCEDURE (OUTPATIENT)
Dept: CARDIOLOGY | Age: 73
End: 2023-02-06
Attending: INTERNAL MEDICINE

## 2023-02-06 DIAGNOSIS — I49.3 PVC (PREMATURE VENTRICULAR CONTRACTION): ICD-10-CM

## 2023-02-06 LAB
AORTIC VALVE AREA (AVA): 0.71
AV PEAK GRADIENT (AVPG): 14
AV PEAK VELOCITY (AVPV): 1.89
AV STENOSIS SEVERITY TEXT: NORMAL
AVI LVOT PEAK GRADIENT (LVOTMG): 1
LEFT INTERNAL DIMENSION IN SYSTOLE (LVSD): 1.4
LEFT VENTRICULAR INTERNAL DIMENSION IN DIASTOLE (LVDD): 4.6
LEFT VENTRICULAR POSTERIOR WALL IN END DIASTOLE (LVPW): 5.8
LV EF: NORMAL %
LVOT VTI (LVOTVTI): 0.94
MV PEAK A VELOCITY (MVPAV): 215
MV PEAK E VELOCITY (MVPEV): 1.12
RV END SYSTOLIC LONGITUDINAL STRAIN FREE WALL (RVGS): 2
TRICUSPID VALVE PEAK REGURGITATION VELOCITY (TRPV): 3.6

## 2023-02-06 PROCEDURE — 76376 3D RENDER W/INTRP POSTPROCES: CPT | Performed by: INTERNAL MEDICINE

## 2023-02-06 PROCEDURE — 93306 TTE W/DOPPLER COMPLETE: CPT | Performed by: INTERNAL MEDICINE

## 2023-02-08 ENCOUNTER — TELEPHONE (OUTPATIENT)
Dept: CARDIOLOGY | Age: 73
End: 2023-02-08

## 2023-02-13 PROCEDURE — 93227 XTRNL ECG REC<48 HR R&I: CPT | Performed by: INTERNAL MEDICINE

## 2023-02-24 ENCOUNTER — PATIENT OUTREACH (OUTPATIENT)
Dept: CASE MANAGEMENT | Age: 73
End: 2023-02-24

## 2023-02-24 DIAGNOSIS — G47.33 OSA ON CPAP: ICD-10-CM

## 2023-02-24 DIAGNOSIS — I25.10 CORONARY ARTERY DISEASE INVOLVING NATIVE CORONARY ARTERY OF NATIVE HEART WITHOUT ANGINA PECTORIS: ICD-10-CM

## 2023-02-24 DIAGNOSIS — E78.00 PURE HYPERCHOLESTEROLEMIA: ICD-10-CM

## 2023-02-24 DIAGNOSIS — E55.9 VITAMIN D DEFICIENCY: ICD-10-CM

## 2023-02-24 DIAGNOSIS — E11.29 TYPE 2 DIABETES MELLITUS WITH MICROALBUMINURIA, WITHOUT LONG-TERM CURRENT USE OF INSULIN (HCC): ICD-10-CM

## 2023-02-24 DIAGNOSIS — I10 HYPERTENSION, BENIGN: ICD-10-CM

## 2023-02-24 DIAGNOSIS — R73.9 HYPERGLYCEMIA: ICD-10-CM

## 2023-02-24 DIAGNOSIS — R80.9 TYPE 2 DIABETES MELLITUS WITH MICROALBUMINURIA, WITHOUT LONG-TERM CURRENT USE OF INSULIN (HCC): ICD-10-CM

## 2023-02-24 DIAGNOSIS — Z99.89 OSA ON CPAP: ICD-10-CM

## 2023-02-24 DIAGNOSIS — M16.0 OSTEOARTHRITIS OF BOTH HIPS, UNSPECIFIED OSTEOARTHRITIS TYPE: ICD-10-CM

## 2023-02-24 DIAGNOSIS — I70.0 ABDOMINAL AORTIC ATHEROSCLEROSIS (HCC): ICD-10-CM

## 2023-02-24 DIAGNOSIS — R13.10 DYSPHAGIA, UNSPECIFIED TYPE: ICD-10-CM

## 2023-02-24 DIAGNOSIS — K21.9 GASTROESOPHAGEAL REFLUX DISEASE, UNSPECIFIED WHETHER ESOPHAGITIS PRESENT: ICD-10-CM

## 2023-02-24 DIAGNOSIS — J30.2 SEASONAL ALLERGIC RHINITIS, UNSPECIFIED TRIGGER: ICD-10-CM

## 2023-02-27 ENCOUNTER — TELEPHONE (OUTPATIENT)
Dept: CARDIOLOGY | Age: 73
End: 2023-02-27

## 2023-02-27 ENCOUNTER — EXTERNAL RECORD (OUTPATIENT)
Dept: HEALTH INFORMATION MANAGEMENT | Facility: OTHER | Age: 73
End: 2023-02-27

## 2023-03-01 RX ORDER — AMLODIPINE BESYLATE 5 MG/1
5 TABLET ORAL 2 TIMES DAILY
Qty: 180 TABLET | Refills: 0 | Status: SHIPPED | OUTPATIENT
Start: 2023-03-01 | End: 2023-05-26

## 2023-03-01 RX ORDER — LOSARTAN POTASSIUM 50 MG/1
50 TABLET ORAL 2 TIMES DAILY
Qty: 180 TABLET | Refills: 0 | Status: SHIPPED | OUTPATIENT
Start: 2023-03-01 | End: 2023-05-26

## 2023-03-24 ENCOUNTER — TELEPHONE (OUTPATIENT)
Dept: CARDIOLOGY | Age: 73
End: 2023-03-24

## 2023-03-27 ENCOUNTER — OFFICE VISIT (OUTPATIENT)
Dept: CARDIOLOGY | Age: 73
End: 2023-03-27

## 2023-03-27 VITALS
HEIGHT: 76 IN | HEART RATE: 75 BPM | BODY MASS INDEX: 32.35 KG/M2 | WEIGHT: 265.65 LBS | SYSTOLIC BLOOD PRESSURE: 140 MMHG | DIASTOLIC BLOOD PRESSURE: 74 MMHG

## 2023-03-27 DIAGNOSIS — E78.00 PURE HYPERCHOLESTEROLEMIA: ICD-10-CM

## 2023-03-27 DIAGNOSIS — I10 HYPERTENSION, BENIGN: ICD-10-CM

## 2023-03-27 DIAGNOSIS — R60.0 BILATERAL LEG EDEMA: Primary | ICD-10-CM

## 2023-03-27 DIAGNOSIS — E78.2 HYPERLIPIDEMIA, MIXED: ICD-10-CM

## 2023-03-27 DIAGNOSIS — I47.29 NSVT (NONSUSTAINED VENTRICULAR TACHYCARDIA) (CMD): ICD-10-CM

## 2023-03-27 DIAGNOSIS — I25.10 CORONARY ARTERY DISEASE INVOLVING NATIVE CORONARY ARTERY OF NATIVE HEART WITHOUT ANGINA PECTORIS: ICD-10-CM

## 2023-03-27 DIAGNOSIS — E11.9 TYPE 2 DIABETES MELLITUS WITHOUT COMPLICATION, WITHOUT LONG-TERM CURRENT USE OF INSULIN (CMD): ICD-10-CM

## 2023-03-27 DIAGNOSIS — Z95.1 HX OF CABG: ICD-10-CM

## 2023-03-27 PROCEDURE — 99214 OFFICE O/P EST MOD 30 MIN: CPT | Performed by: NURSE PRACTITIONER

## 2023-03-27 RX ORDER — FUROSEMIDE 20 MG/1
20 TABLET ORAL DAILY
Qty: 30 TABLET | Refills: 3 | Status: SHIPPED | OUTPATIENT
Start: 2023-03-27 | End: 2023-10-19 | Stop reason: SDUPTHER

## 2023-03-27 SDOH — HEALTH STABILITY: PHYSICAL HEALTH: ON AVERAGE, HOW MANY DAYS PER WEEK DO YOU ENGAGE IN MODERATE TO STRENUOUS EXERCISE (LIKE A BRISK WALK)?: 2 DAYS

## 2023-03-27 SDOH — HEALTH STABILITY: PHYSICAL HEALTH: ON AVERAGE, HOW MANY MINUTES DO YOU ENGAGE IN EXERCISE AT THIS LEVEL?: 60 MIN

## 2023-03-27 ASSESSMENT — PATIENT HEALTH QUESTIONNAIRE - PHQ9
CLINICAL INTERPRETATION OF PHQ2 SCORE: NO FURTHER SCREENING NEEDED
SUM OF ALL RESPONSES TO PHQ9 QUESTIONS 1 AND 2: 0
2. FEELING DOWN, DEPRESSED OR HOPELESS: NOT AT ALL
1. LITTLE INTEREST OR PLEASURE IN DOING THINGS: NOT AT ALL
SUM OF ALL RESPONSES TO PHQ9 QUESTIONS 1 AND 2: 0

## 2023-03-29 ENCOUNTER — OFFICE VISIT (OUTPATIENT)
Dept: FAMILY MEDICINE CLINIC | Facility: CLINIC | Age: 73
End: 2023-03-29
Payer: MEDICARE

## 2023-03-29 VITALS
HEART RATE: 71 BPM | OXYGEN SATURATION: 96 % | SYSTOLIC BLOOD PRESSURE: 120 MMHG | RESPIRATION RATE: 16 BRPM | WEIGHT: 263.19 LBS | BODY MASS INDEX: 32.72 KG/M2 | DIASTOLIC BLOOD PRESSURE: 82 MMHG | HEIGHT: 75 IN

## 2023-03-29 DIAGNOSIS — J40 BRONCHITIS: Primary | ICD-10-CM

## 2023-03-29 PROCEDURE — 99213 OFFICE O/P EST LOW 20 MIN: CPT | Performed by: FAMILY MEDICINE

## 2023-03-29 RX ORDER — FUROSEMIDE 20 MG/1
20 TABLET ORAL 2 TIMES DAILY
COMMUNITY

## 2023-03-29 RX ORDER — ALBUTEROL SULFATE 90 UG/1
2 AEROSOL, METERED RESPIRATORY (INHALATION) EVERY 4 HOURS PRN
Qty: 18 G | Refills: 0 | Status: SHIPPED | OUTPATIENT
Start: 2023-03-29

## 2023-04-07 RX ORDER — FENOFIBRATE 48 MG/1
TABLET, COATED ORAL
Qty: 90 TABLET | Refills: 0 | Status: SHIPPED | OUTPATIENT
Start: 2023-04-07 | End: 2023-07-14

## 2023-04-07 RX ORDER — METOPROLOL SUCCINATE 100 MG/1
100 TABLET, EXTENDED RELEASE ORAL DAILY
Qty: 90 TABLET | Refills: 3 | Status: SHIPPED | OUTPATIENT
Start: 2023-04-07

## 2023-04-10 ENCOUNTER — PATIENT OUTREACH (OUTPATIENT)
Dept: CASE MANAGEMENT | Age: 73
End: 2023-04-10

## 2023-05-12 ENCOUNTER — TELEPHONE (OUTPATIENT)
Dept: FAMILY MEDICINE CLINIC | Facility: CLINIC | Age: 73
End: 2023-05-12

## 2023-05-12 NOTE — TELEPHONE ENCOUNTER
Spoke with patient discussed appt to come in and examine hips as patient having pain in both hips.   Appt made with Johnson Guillaume NP

## 2023-05-16 ENCOUNTER — TELEPHONE (OUTPATIENT)
Dept: PHYSICAL THERAPY | Facility: HOSPITAL | Age: 73
End: 2023-05-16

## 2023-05-16 ENCOUNTER — OFFICE VISIT (OUTPATIENT)
Dept: FAMILY MEDICINE CLINIC | Facility: CLINIC | Age: 73
End: 2023-05-16
Payer: MEDICARE

## 2023-05-16 VITALS
RESPIRATION RATE: 16 BRPM | DIASTOLIC BLOOD PRESSURE: 62 MMHG | HEIGHT: 75 IN | OXYGEN SATURATION: 97 % | TEMPERATURE: 97 F | BODY MASS INDEX: 33.24 KG/M2 | HEART RATE: 72 BPM | WEIGHT: 267.38 LBS | SYSTOLIC BLOOD PRESSURE: 120 MMHG

## 2023-05-16 DIAGNOSIS — R80.9 TYPE 2 DIABETES MELLITUS WITH MICROALBUMINURIA, WITHOUT LONG-TERM CURRENT USE OF INSULIN (HCC): ICD-10-CM

## 2023-05-16 DIAGNOSIS — E11.29 TYPE 2 DIABETES MELLITUS WITH MICROALBUMINURIA, WITHOUT LONG-TERM CURRENT USE OF INSULIN (HCC): ICD-10-CM

## 2023-05-16 DIAGNOSIS — M25.552 BILATERAL HIP PAIN: Primary | ICD-10-CM

## 2023-05-16 DIAGNOSIS — M25.562 ACUTE PAIN OF BOTH KNEES: ICD-10-CM

## 2023-05-16 DIAGNOSIS — M25.561 ACUTE PAIN OF BOTH KNEES: ICD-10-CM

## 2023-05-16 DIAGNOSIS — M25.551 BILATERAL HIP PAIN: Primary | ICD-10-CM

## 2023-05-16 PROCEDURE — 99213 OFFICE O/P EST LOW 20 MIN: CPT | Performed by: NURSE PRACTITIONER

## 2023-05-16 RX ORDER — MELOXICAM 10 MG/1
10 CAPSULE ORAL DAILY PRN
Qty: 30 CAPSULE | Refills: 0 | Status: SHIPPED | OUTPATIENT
Start: 2023-05-16 | End: 2023-05-17

## 2023-05-16 NOTE — PATIENT INSTRUCTIONS
Take Meloxicam 1 tab daily as needed. TAKE WITH FOOD. Do not take any Advil, Motrin, Aleve or ibuprofen products while taking this medication. It is ok to take acetaminophen (Tylenol) while taking this medication. If you have regular strength tylenol may take 3 tabs up to 3 times daily. If you have extra-strength tylenol may take 2 tabs up to 3 times daily.

## 2023-05-17 ENCOUNTER — TELEPHONE (OUTPATIENT)
Dept: FAMILY MEDICINE CLINIC | Facility: CLINIC | Age: 73
End: 2023-05-17

## 2023-05-17 ENCOUNTER — TELEPHONE (OUTPATIENT)
Dept: ORTHOPEDICS CLINIC | Facility: CLINIC | Age: 73
End: 2023-05-17

## 2023-05-17 ENCOUNTER — PATIENT OUTREACH (OUTPATIENT)
Dept: CASE MANAGEMENT | Age: 73
End: 2023-05-17

## 2023-05-17 DIAGNOSIS — I47.29 NSVT (NONSUSTAINED VENTRICULAR TACHYCARDIA) (HCC): ICD-10-CM

## 2023-05-17 DIAGNOSIS — M10.9 GOUTY ARTHROPATHY: ICD-10-CM

## 2023-05-17 DIAGNOSIS — R73.9 HYPERGLYCEMIA: ICD-10-CM

## 2023-05-17 DIAGNOSIS — G47.33 OSA ON CPAP: ICD-10-CM

## 2023-05-17 DIAGNOSIS — J30.2 SEASONAL ALLERGIC RHINITIS, UNSPECIFIED TRIGGER: ICD-10-CM

## 2023-05-17 DIAGNOSIS — E87.6 HYPOKALEMIA: ICD-10-CM

## 2023-05-17 DIAGNOSIS — E55.9 VITAMIN D DEFICIENCY: ICD-10-CM

## 2023-05-17 DIAGNOSIS — I10 HYPERTENSION, BENIGN: ICD-10-CM

## 2023-05-17 DIAGNOSIS — E78.00 PURE HYPERCHOLESTEROLEMIA: ICD-10-CM

## 2023-05-17 DIAGNOSIS — Z99.89 OSA ON CPAP: ICD-10-CM

## 2023-05-17 DIAGNOSIS — K21.9 GASTROESOPHAGEAL REFLUX DISEASE, UNSPECIFIED WHETHER ESOPHAGITIS PRESENT: ICD-10-CM

## 2023-05-17 DIAGNOSIS — M16.0 OSTEOARTHRITIS OF BOTH HIPS, UNSPECIFIED OSTEOARTHRITIS TYPE: ICD-10-CM

## 2023-05-17 DIAGNOSIS — E11.29 TYPE 2 DIABETES MELLITUS WITH MICROALBUMINURIA, WITHOUT LONG-TERM CURRENT USE OF INSULIN (HCC): ICD-10-CM

## 2023-05-17 DIAGNOSIS — R80.9 TYPE 2 DIABETES MELLITUS WITH MICROALBUMINURIA, WITHOUT LONG-TERM CURRENT USE OF INSULIN (HCC): ICD-10-CM

## 2023-05-17 DIAGNOSIS — M25.552 BILATERAL HIP PAIN: Primary | ICD-10-CM

## 2023-05-17 DIAGNOSIS — M25.551 BILATERAL HIP PAIN: Primary | ICD-10-CM

## 2023-05-17 DIAGNOSIS — I70.0 ABDOMINAL AORTIC ATHEROSCLEROSIS (HCC): ICD-10-CM

## 2023-05-17 DIAGNOSIS — R13.10 DYSPHAGIA, UNSPECIFIED TYPE: ICD-10-CM

## 2023-05-17 RX ORDER — MELOXICAM 7.5 MG/1
7.5 TABLET ORAL DAILY
Qty: 90 TABLET | Refills: 0 | Status: SHIPPED | OUTPATIENT
Start: 2023-05-17

## 2023-05-17 NOTE — TELEPHONE ENCOUNTER
Future Appointments   Date Time Provider Cindi Calhoun   6/2/2023 11:40 AM Neelima Torres MD [facility] [place] Gwenette Goldberg [id number]       [facility] for patient to schedule xray or come in prior to appt,

## 2023-05-17 NOTE — TELEPHONE ENCOUNTER
Patient has an upcoming appt for RABIA HIP PAIN . At this time patient has not had any imaging done, please place RX accordingly. I have notified the patient to come in 20-30 min prior to appointment time to have the imaging done . Please forward to the  pool to schedule imaging. Thank you.       Future Appointments   Date Time Provider Cindi Unique   6/2/2023 11:40 AM Jimi Edwards MD [facility] [id number]   7/28/2023 10:00 AM Carmina Velásquez MD EMG 3 EMG [place]   1/17/2024  1:15 PM Charley Carrasco MD [facility] EC Nap 4

## 2023-05-17 NOTE — TELEPHONE ENCOUNTER
Reviewed patients chart, xray orders are required. Order placed for giuliana hip xrays.    Please contact patient advise to arrive 15 mins prior to patients appt to complete x-ray order and schedule patients xray appt-Thank you

## 2023-05-17 NOTE — TELEPHONE ENCOUNTER
Pt insurance will not cover rx for Meloxicam 10 Capsules. Pharmacy requesting new Rx for Meloxicam Tablets. Meloxicam tabs available in 15mg and 7.5 mg strength. Please review pt chart and write new Rx for Meloxicam Tablets.     Thank you    Routed to Maximiliano Esquivel

## 2023-05-26 RX ORDER — AMLODIPINE BESYLATE 5 MG/1
TABLET ORAL
Qty: 180 TABLET | Refills: 0 | Status: SHIPPED | OUTPATIENT
Start: 2023-05-26 | End: 2023-05-26 | Stop reason: SDUPTHER

## 2023-05-26 RX ORDER — LOSARTAN POTASSIUM 50 MG/1
TABLET ORAL
Qty: 180 TABLET | Refills: 0 | Status: SHIPPED | OUTPATIENT
Start: 2023-05-26 | End: 2023-05-26 | Stop reason: SDUPTHER

## 2023-05-28 DIAGNOSIS — E11.9 TYPE 2 DIABETES MELLITUS WITHOUT COMPLICATION, WITHOUT LONG-TERM CURRENT USE OF INSULIN (HCC): ICD-10-CM

## 2023-05-30 RX ORDER — LOSARTAN POTASSIUM 50 MG/1
TABLET ORAL
Qty: 180 TABLET | Refills: 1 | Status: SHIPPED | OUTPATIENT
Start: 2023-05-30

## 2023-05-30 RX ORDER — GLIPIZIDE 10 MG/1
TABLET ORAL
Qty: 90 TABLET | Refills: 3 | Status: SHIPPED | OUTPATIENT
Start: 2023-05-30

## 2023-05-30 RX ORDER — AMLODIPINE BESYLATE 5 MG/1
TABLET ORAL
Qty: 180 TABLET | Refills: 1 | Status: SHIPPED | OUTPATIENT
Start: 2023-05-30

## 2023-06-02 ENCOUNTER — HOSPITAL ENCOUNTER (OUTPATIENT)
Dept: GENERAL RADIOLOGY | Age: 73
Discharge: HOME | End: 2023-06-02
Attending: ORTHOPAEDIC SURGERY
Payer: MEDICARE

## 2023-06-02 ENCOUNTER — OFFICE VISIT (OUTPATIENT)
Dept: ORTHOPEDICS CLINIC | Facility: CLINIC | Age: 73
End: 2023-06-02
Payer: MEDICARE

## 2023-06-02 VITALS — HEIGHT: 75 IN | BODY MASS INDEX: 32.08 KG/M2 | WEIGHT: 258 LBS

## 2023-06-02 DIAGNOSIS — M25.551 BILATERAL HIP PAIN: ICD-10-CM

## 2023-06-02 DIAGNOSIS — M25.552 BILATERAL HIP PAIN: ICD-10-CM

## 2023-06-02 DIAGNOSIS — M54.50 BILATERAL LOW BACK PAIN, UNSPECIFIED CHRONICITY, UNSPECIFIED WHETHER SCIATICA PRESENT: Primary | ICD-10-CM

## 2023-06-02 PROCEDURE — 73523 X-RAY EXAM HIPS BI 5/> VIEWS: CPT | Performed by: ORTHOPAEDIC SURGERY

## 2023-06-02 PROCEDURE — 99203 OFFICE O/P NEW LOW 30 MIN: CPT | Performed by: ORTHOPAEDIC SURGERY

## 2023-06-12 ENCOUNTER — OFFICE VISIT (OUTPATIENT)
Dept: CARDIOLOGY | Age: 73
End: 2023-06-12

## 2023-06-12 ENCOUNTER — TELEPHONE (OUTPATIENT)
Dept: CARDIOLOGY | Age: 73
End: 2023-06-12

## 2023-06-12 VITALS
DIASTOLIC BLOOD PRESSURE: 65 MMHG | HEART RATE: 48 BPM | OXYGEN SATURATION: 98 % | SYSTOLIC BLOOD PRESSURE: 125 MMHG | HEIGHT: 76 IN | BODY MASS INDEX: 32.48 KG/M2 | WEIGHT: 266.76 LBS

## 2023-06-12 DIAGNOSIS — I10 HYPERTENSION, BENIGN: ICD-10-CM

## 2023-06-12 DIAGNOSIS — I65.23 BILATERAL CAROTID ARTERY STENOSIS: ICD-10-CM

## 2023-06-12 DIAGNOSIS — Z95.1 HX OF CABG: ICD-10-CM

## 2023-06-12 DIAGNOSIS — I12.9 BENIGN HYPERTENSION WITH CKD (CHRONIC KIDNEY DISEASE) STAGE III (CMD): Primary | ICD-10-CM

## 2023-06-12 DIAGNOSIS — E78.2 HYPERLIPIDEMIA, MIXED: ICD-10-CM

## 2023-06-12 DIAGNOSIS — G47.33 OSA (OBSTRUCTIVE SLEEP APNEA): ICD-10-CM

## 2023-06-12 DIAGNOSIS — N18.30 BENIGN HYPERTENSION WITH CKD (CHRONIC KIDNEY DISEASE) STAGE III (CMD): Primary | ICD-10-CM

## 2023-06-12 PROCEDURE — 99215 OFFICE O/P EST HI 40 MIN: CPT | Performed by: INTERNAL MEDICINE

## 2023-06-12 RX ORDER — MELOXICAM 7.5 MG/1
TABLET ORAL
COMMUNITY
Start: 2023-05-18

## 2023-06-12 RX ORDER — POTASSIUM CITRATE 15 MEQ/1
15 TABLET, EXTENDED RELEASE ORAL DAILY
COMMUNITY
Start: 2023-03-30

## 2023-06-12 RX ORDER — LATANOPROST 50 UG/ML
SOLUTION/ DROPS OPHTHALMIC
COMMUNITY
Start: 2023-05-08

## 2023-06-12 SDOH — HEALTH STABILITY: PHYSICAL HEALTH: ON AVERAGE, HOW MANY DAYS PER WEEK DO YOU ENGAGE IN MODERATE TO STRENUOUS EXERCISE (LIKE A BRISK WALK)?: 3 DAYS

## 2023-06-12 SDOH — HEALTH STABILITY: PHYSICAL HEALTH: ON AVERAGE, HOW MANY MINUTES DO YOU ENGAGE IN EXERCISE AT THIS LEVEL?: 30 MIN

## 2023-06-12 ASSESSMENT — PATIENT HEALTH QUESTIONNAIRE - PHQ9
CLINICAL INTERPRETATION OF PHQ2 SCORE: NO FURTHER SCREENING NEEDED
SUM OF ALL RESPONSES TO PHQ9 QUESTIONS 1 AND 2: 0
2. FEELING DOWN, DEPRESSED OR HOPELESS: NOT AT ALL
SUM OF ALL RESPONSES TO PHQ9 QUESTIONS 1 AND 2: 0
1. LITTLE INTEREST OR PLEASURE IN DOING THINGS: NOT AT ALL

## 2023-06-14 LAB
AMB EXT GLUCOSE: 141 MG/DL
AMB EXT TRIGLYCERIDES: 286 MG/DL
TOTAL VITAMIN D: 35 NG/ML (ref 30–100)

## 2023-06-14 RX ORDER — ROSUVASTATIN CALCIUM 40 MG/1
TABLET, COATED ORAL
Qty: 90 TABLET | Refills: 0 | Status: SHIPPED | OUTPATIENT
Start: 2023-06-14 | End: 2023-09-12

## 2023-06-15 ENCOUNTER — TELEPHONE (OUTPATIENT)
Dept: FAMILY MEDICINE CLINIC | Facility: CLINIC | Age: 73
End: 2023-06-15

## 2023-06-15 LAB
25(OH)D3 SERPL-MCNC: 35 NG/ML (ref 30–100)
ALBUMIN SERPL-MCNC: 4.3 G/DL (ref 3.6–5.1)
ALBUMIN/GLOB SERPL: 1.5 (CALC) (ref 1–2.5)
ALP SERPL-CCNC: 63 U/L (ref 35–144)
ALT SERPL-CCNC: 20 U/L (ref 9–46)
AMB EXT HGBA1C: 6.2 %
AST SERPL-CCNC: 24 U/L (ref 10–35)
BASOPHILS # BLD AUTO: 78 CELLS/UL (ref 0–200)
BASOPHILS NFR BLD AUTO: 1.3 %
BILIRUB SERPL-MCNC: 0.7 MG/DL (ref 0.2–1.2)
BUN SERPL-MCNC: 18 MG/DL (ref 7–25)
BUN/CREAT SERPL: ABNORMAL (CALC) (ref 6–22)
CALCIUM SERPL-MCNC: 10 MG/DL (ref 8.6–10.3)
CHLORIDE SERPL-SCNC: 102 MMOL/L (ref 98–110)
CHOLEST SERPL-MCNC: 147 MG/DL
CHOLEST/HDLC SERPL: 3.3 (CALC)
CO2 SERPL-SCNC: 26 MMOL/L (ref 20–32)
CREAT SERPL-MCNC: 1.08 MG/DL (ref 0.7–1.28)
EGFRCR SERPLBLD CKD-EPI 2021: 72 ML/MIN/1.73M2
EOSINOPHIL # BLD AUTO: 228 CELLS/UL (ref 15–500)
EOSINOPHIL NFR BLD AUTO: 3.8 %
ERYTHROCYTE [DISTWIDTH] IN BLOOD BY AUTOMATED COUNT: 13.3 % (ref 11–15)
GLOBULIN SER CALC-MCNC: 2.9 G/DL (CALC) (ref 1.9–3.7)
GLUCOSE SERPL-MCNC: 141 MG/DL (ref 65–99)
HBA1C MFR BLD: 6.2 % OF TOTAL HGB
HCT VFR BLD AUTO: 44.6 % (ref 38.5–50)
HDLC SERPL-MCNC: 44 MG/DL
HGB BLD-MCNC: 14.7 G/DL (ref 13.2–17.1)
LDLC SERPL CALC-MCNC: 65 MG/DL (CALC)
LYMPHOCYTES # BLD AUTO: 1812 CELLS/UL (ref 850–3900)
LYMPHOCYTES NFR BLD AUTO: 30.2 %
MCH RBC QN AUTO: 28.8 PG (ref 27–33)
MCHC RBC AUTO-ENTMCNC: 33 G/DL (ref 32–36)
MCV RBC AUTO: 87.5 FL (ref 80–100)
MONOCYTES # BLD AUTO: 492 CELLS/UL (ref 200–950)
MONOCYTES NFR BLD AUTO: 8.2 %
NEUTROPHILS # BLD AUTO: 3390 CELLS/UL (ref 1500–7800)
NEUTROPHILS NFR BLD AUTO: 56.5 %
NONHDLC SERPL-MCNC: 103 MG/DL (CALC)
PLATELET # BLD AUTO: 166 THOUSAND/UL (ref 140–400)
PMV BLD REES-ECKER: 13.2 FL (ref 7.5–12.5)
POTASSIUM SERPL-SCNC: 4.2 MMOL/L (ref 3.5–5.3)
PROT SERPL-MCNC: 7.2 G/DL (ref 6.1–8.1)
RBC # BLD AUTO: 5.1 MILLION/UL (ref 4.2–5.8)
SODIUM SERPL-SCNC: 138 MMOL/L (ref 135–146)
TRIGL SERPL-MCNC: 286 MG/DL
WBC # BLD AUTO: 6 THOUSAND/UL (ref 3.8–10.8)

## 2023-06-15 NOTE — TELEPHONE ENCOUNTER
Received test results from [facility] for patient's labs. Paperwork in Geradine Sees, NP's in box for review.

## 2023-06-19 ENCOUNTER — OFFICE VISIT (OUTPATIENT)
Dept: PAIN CLINIC | Facility: CLINIC | Age: 73
End: 2023-06-19
Payer: MEDICARE

## 2023-06-19 ENCOUNTER — TELEPHONE (OUTPATIENT)
Dept: PAIN CLINIC | Facility: CLINIC | Age: 73
End: 2023-06-19

## 2023-06-19 VITALS
SYSTOLIC BLOOD PRESSURE: 126 MMHG | HEART RATE: 54 BPM | WEIGHT: 258 LBS | HEIGHT: 75 IN | BODY MASS INDEX: 32.08 KG/M2 | DIASTOLIC BLOOD PRESSURE: 78 MMHG

## 2023-06-19 DIAGNOSIS — M53.3 SI (SACROILIAC) PAIN: Primary | ICD-10-CM

## 2023-06-19 PROCEDURE — 99204 OFFICE O/P NEW MOD 45 MIN: CPT | Performed by: ANESTHESIOLOGY

## 2023-06-19 NOTE — TELEPHONE ENCOUNTER
Labs good, A1C 6.2. Trig elevated but other lipids to goal. CBC normal. CMP and lipids in Epic from another provider, please abstract A1C and CBC. Thanks.

## 2023-06-19 NOTE — TELEPHONE ENCOUNTER
Patient notified of results. Triage does not have results. New process is all abstracting goes to your roomer. They should have an abstracting folder at their station.

## 2023-06-19 NOTE — PROGRESS NOTES
Location of Pain: Bilateral Hip Pain    Date Pain Began: 12/2022          Work Related:   No        Receiving Work Comp/Disability:   No    Numeric Rating Scale:  Pain at Present:  6                                                                                                          (No Pain) 0  to  10 (Worst Pain)                 Minimum Pain:   6  Maximum Pain  8    Distribution of Pain:    bilateral    Quality of Pain:   aching, numbness and tingling    Origin of Pain:    Degenerative    Aggravating Factors:    Walking    Past Treatments for Current Pain Condition:   Other Chiropractic    Prior diagnostic testing for your pain:   6/2/23-- XR HIP

## 2023-06-19 NOTE — TELEPHONE ENCOUNTER
Question Answer   Anesthesia Type Local   Provider [facility] Procedure Lab   Procedure SI Joint   CPT (Hit enter after each entry) INJECTION PROC FOR SACROILIAC JOINT ARTHROGRAPHY &/OR ANESTHETIC/STEROID   Procedure Modifier ;BILATERAL PROCEDURE   Medical clearance requested (will send to Pain Navigator) No   Patient has Medicare coverage?  Yes   Comments (Please list entire procedure name here.) bilateral sacroiliac joint injection

## 2023-06-19 NOTE — TELEPHONE ENCOUNTER
I was not aware of this process. Labs have already been taken to scan, I cannot locate them. Can you please enter the A1C of 6.2 and in future I will follow new procedure. The rest can be seen in scanned results once done. Thanks.

## 2023-06-22 ENCOUNTER — PATIENT OUTREACH (OUTPATIENT)
Dept: CASE MANAGEMENT | Age: 73
End: 2023-06-22

## 2023-06-22 DIAGNOSIS — E55.9 VITAMIN D DEFICIENCY: ICD-10-CM

## 2023-06-22 DIAGNOSIS — Z95.1 HX OF CABG: ICD-10-CM

## 2023-06-22 DIAGNOSIS — K21.9 GASTROESOPHAGEAL REFLUX DISEASE, UNSPECIFIED WHETHER ESOPHAGITIS PRESENT: ICD-10-CM

## 2023-06-22 DIAGNOSIS — Z99.89 OSA ON CPAP: ICD-10-CM

## 2023-06-22 DIAGNOSIS — I25.10 CORONARY ARTERY DISEASE INVOLVING NATIVE CORONARY ARTERY OF NATIVE HEART WITHOUT ANGINA PECTORIS: ICD-10-CM

## 2023-06-22 DIAGNOSIS — R13.10 DYSPHAGIA, UNSPECIFIED TYPE: ICD-10-CM

## 2023-06-22 DIAGNOSIS — E11.29 TYPE 2 DIABETES MELLITUS WITH MICROALBUMINURIA, WITHOUT LONG-TERM CURRENT USE OF INSULIN (HCC): ICD-10-CM

## 2023-06-22 DIAGNOSIS — I10 HYPERTENSION, BENIGN: ICD-10-CM

## 2023-06-22 DIAGNOSIS — R80.9 TYPE 2 DIABETES MELLITUS WITH MICROALBUMINURIA, WITHOUT LONG-TERM CURRENT USE OF INSULIN (HCC): ICD-10-CM

## 2023-06-22 DIAGNOSIS — J30.2 SEASONAL ALLERGIC RHINITIS, UNSPECIFIED TRIGGER: ICD-10-CM

## 2023-06-22 DIAGNOSIS — G47.33 OSA ON CPAP: ICD-10-CM

## 2023-06-22 DIAGNOSIS — R73.9 HYPERGLYCEMIA: ICD-10-CM

## 2023-06-22 DIAGNOSIS — N52.9 ERECTILE DYSFUNCTION, UNSPECIFIED ERECTILE DYSFUNCTION TYPE: ICD-10-CM

## 2023-06-22 DIAGNOSIS — E78.00 PURE HYPERCHOLESTEROLEMIA: ICD-10-CM

## 2023-06-22 DIAGNOSIS — I70.0 ABDOMINAL AORTIC ATHEROSCLEROSIS (HCC): ICD-10-CM

## 2023-06-22 DIAGNOSIS — M16.0 OSTEOARTHRITIS OF BOTH HIPS, UNSPECIFIED OSTEOARTHRITIS TYPE: ICD-10-CM

## 2023-07-11 ENCOUNTER — OFFICE VISIT (OUTPATIENT)
Dept: CARDIOLOGY | Age: 73
End: 2023-07-11

## 2023-07-11 ENCOUNTER — APPOINTMENT (OUTPATIENT)
Dept: GENERAL RADIOLOGY | Facility: HOSPITAL | Age: 73
End: 2023-07-11
Attending: ANESTHESIOLOGY
Payer: MEDICARE

## 2023-07-11 ENCOUNTER — HOSPITAL ENCOUNTER (OUTPATIENT)
Facility: HOSPITAL | Age: 73
Setting detail: HOSPITAL OUTPATIENT SURGERY
Discharge: HOME | End: 2023-07-11
Attending: ANESTHESIOLOGY | Admitting: ANESTHESIOLOGY
Payer: MEDICARE

## 2023-07-11 VITALS
RESPIRATION RATE: 16 BRPM | TEMPERATURE: 97 F | WEIGHT: 258 LBS | DIASTOLIC BLOOD PRESSURE: 67 MMHG | SYSTOLIC BLOOD PRESSURE: 144 MMHG | HEIGHT: 75 IN | OXYGEN SATURATION: 99 % | BODY MASS INDEX: 32.08 KG/M2 | HEART RATE: 85 BPM

## 2023-07-11 VITALS
SYSTOLIC BLOOD PRESSURE: 130 MMHG | DIASTOLIC BLOOD PRESSURE: 56 MMHG | BODY MASS INDEX: 32.08 KG/M2 | HEART RATE: 72 BPM | WEIGHT: 263.56 LBS

## 2023-07-11 DIAGNOSIS — I49.3 PVC (PREMATURE VENTRICULAR CONTRACTION): Primary | ICD-10-CM

## 2023-07-11 LAB — GLUCOSE BLD-MCNC: 174 MG/DL (ref 70–99)

## 2023-07-11 PROCEDURE — 3E0U33Z INTRODUCTION OF ANTI-INFLAMMATORY INTO JOINTS, PERCUTANEOUS APPROACH: ICD-10-PCS | Performed by: ANESTHESIOLOGY

## 2023-07-11 PROCEDURE — 27096 INJECT SACROILIAC JOINT: CPT | Performed by: ANESTHESIOLOGY

## 2023-07-11 PROCEDURE — 93000 ELECTROCARDIOGRAM COMPLETE: CPT | Performed by: INTERNAL MEDICINE

## 2023-07-11 PROCEDURE — 3E0U3BZ INTRODUCTION OF ANESTHETIC AGENT INTO JOINTS, PERCUTANEOUS APPROACH: ICD-10-PCS | Performed by: ANESTHESIOLOGY

## 2023-07-11 PROCEDURE — 99215 OFFICE O/P EST HI 40 MIN: CPT | Performed by: INTERNAL MEDICINE

## 2023-07-11 RX ORDER — DEXTROSE MONOHYDRATE 25 G/50ML
50 INJECTION, SOLUTION INTRAVENOUS
Status: DISCONTINUED | OUTPATIENT
Start: 2023-07-11 | End: 2023-07-11

## 2023-07-11 RX ORDER — LIDOCAINE HYDROCHLORIDE 10 MG/ML
INJECTION, SOLUTION EPIDURAL; INFILTRATION; INTRACAUDAL; PERINEURAL
Status: DISCONTINUED | OUTPATIENT
Start: 2023-07-11 | End: 2023-07-11

## 2023-07-11 RX ORDER — NICOTINE POLACRILEX 4 MG
30 LOZENGE BUCCAL
Status: DISCONTINUED | OUTPATIENT
Start: 2023-07-11 | End: 2023-07-11

## 2023-07-11 RX ORDER — NICOTINE POLACRILEX 4 MG
15 LOZENGE BUCCAL
Status: DISCONTINUED | OUTPATIENT
Start: 2023-07-11 | End: 2023-07-11

## 2023-07-11 RX ORDER — INSULIN ASPART 100 [IU]/ML
3 INJECTION, SOLUTION INTRAVENOUS; SUBCUTANEOUS ONCE
Status: DISCONTINUED | OUTPATIENT
Start: 2023-07-11 | End: 2023-07-11

## 2023-07-11 RX ORDER — SODIUM CHLORIDE, SODIUM LACTATE, POTASSIUM CHLORIDE, CALCIUM CHLORIDE 600; 310; 30; 20 MG/100ML; MG/100ML; MG/100ML; MG/100ML
100 INJECTION, SOLUTION INTRAVENOUS CONTINUOUS
Status: DISCONTINUED | OUTPATIENT
Start: 2023-07-11 | End: 2023-07-11

## 2023-07-11 RX ORDER — ONDANSETRON 2 MG/ML
4 INJECTION INTRAMUSCULAR; INTRAVENOUS ONCE AS NEEDED
Status: DISCONTINUED | OUTPATIENT
Start: 2023-07-11 | End: 2023-07-11

## 2023-07-11 NOTE — H&P
History & Physical Examination    Patient Name: Madison Pinto  MRN: [id number]  CSN: [id number]  YOB: 1950    Pre-Operative Diagnosis:  SI (sacroiliac) pain [M53.3]    Present Illness: Patient with SI joint pain    GLIPIZIDE 10 MG Oral Tab, TAKE 1 TABLET BEFORE       BREAKFAST, Disp: 90 tablet, Rfl: 3, 7/10/2023  Meloxicam 7.5 MG Oral Tab, Take 1 tablet (7.5 mg total) by mouth daily. , Disp: 90 tablet, Rfl: 0, 7/10/2023  furosemide 20 MG Oral Tab, Take 1 tablet (20 mg total) by mouth 2 (two) times daily. , Disp: , Rfl: , 7/10/2023  amLODIPine 5 MG Oral Tab, Take 1 tablet (5 mg total) by mouth nightly. TAKE AT BEDTIME, Disp: , Rfl: , 7/10/2023  losartan 50 MG Oral Tab, Take 1 tablet (50 mg total) by mouth 2 (two) times daily. , Disp: , Rfl: , 7/10/2023  Potassium Citrate ER (UROCIT-K 15) 15 MEQ (1620 MG) Oral Tab CR, Take 1 tablet by mouth daily. , Disp: 90 tablet, Rfl: 5, 7/10/2023  pantoprazole 40 MG Oral Tab EC, Take 1 tablet (40 mg total) by mouth daily. , Disp: 90 tablet, Rfl: 3, 7/10/2023  allopurinol 300 MG Oral Tab, Take 1 tablet (300 mg total) by mouth daily. , Disp: 90 tablet, Rfl: 3, 7/10/2023  fenofibrate 48 MG Oral Tab, , Disp: , Rfl: , 7/10/2023  metoprolol succinate  MG Oral Tablet 24 Hr, Take 1 tablet (100 mg total) by mouth daily. , Disp: , Rfl: , 7/10/2023  rosuvastatin 40 MG Oral Tab, , Disp: , Rfl: , 7/10/2023  Multiple Vitamins-Minerals (MULTIVITAMIN ADULT OR), Take 1 tablet by mouth daily. , Disp: , Rfl: , 7/10/2023  acetaminophen 325 MG Oral Tab, Take 2 tablets (650 mg total) by mouth every 6 (six) hours as needed. , Disp: , Rfl: , 7/10/2023  aspirin 325 MG Oral Tab, Take 1 tablet (325 mg total) by mouth daily. , Disp: , Rfl: , 7/11/2023  CANNABIDIOL OR, Take by mouth., Disp: , Rfl: , Unknown  albuterol (PROAIR HFA) 108 (90 Base) MCG/ACT Inhalation Aero Soln, Inhale 2 puffs into the lungs every 4 (four) hours as needed for Wheezing., Disp: 18 g, Rfl: 0  PEG 3350-KCl-Na Bicarb-NaCl 420 g Oral Recon Soln, Take 4,000 mL by mouth As Directed., Disp: 4000 mL, Rfl: 0  Sildenafil Citrate 100 MG Oral Tab, Take 1 tablet (100 mg total) by mouth daily as needed for Erectile Dysfunction. Take ~ one hour prior to sexual activity on an empty stomach, Disp: 30 tablet, Rfl: 5  ketorolac 0.5 % Ophthalmic Solution, , Disp: , Rfl:   ofloxacin 0.3 % Ophthalmic Solution, , Disp: , Rfl:   prednisoLONE 1 % Ophthalmic Suspension, , Disp: , Rfl:   latanoprost 0.005 % Ophthalmic Solution, , Disp: , Rfl:   AZELASTINE HCL 0.1 % Nasal Solution, USE 2 SPRAYS IN EACH       NOSTRIL TWICE DAILY, Disp: 90 mL, Rfl: 1  Triamcinolone Acetonide (NASACORT) 55 MCG/ACT Nasal Aerosol, 1 spray by Nasal route daily. , Disp: , Rfl:       insulin aspart (NovoLOG) 100 Units/mL vial 3 Units, 3 Units, Subcutaneous, Once  glucose (Dex4) 15 GM/59ML oral liquid 15 g, 15 g, Oral, Q15 Min PRN   Or  glucose (Glutose) 40% oral gel 15 g, 15 g, Oral, Q15 Min PRN   Or  glucose-vitamin C (Dex-4) chewable tab 4 tablet, 4 tablet, Oral, Q15 Min PRN   Or  dextrose 50% injection 50 mL, 50 mL, Intravenous, Q15 Min PRN   Or  glucose (Dex4) 15 GM/59ML oral liquid 30 g, 30 g, Oral, Q15 Min PRN   Or  glucose (Glutose) 40% oral gel 30 g, 30 g, Oral, Q15 Min PRN   Or  glucose-vitamin C (Dex-4) chewable tab 8 tablet, 8 tablet, Oral, Q15 Min PRN  lactated ringers infusion, 100 mL/hr, Intravenous, Continuous  ondansetron (Zofran) 4 MG/2ML injection 4 mg, 4 mg, Intravenous, Once PRN        Allergies:   Atorvastatin            MYALGIA    Comment:Oral  Carvedilol              OTHER (SEE COMMENTS)    Comment:Oral, mental status changes  Pravastatin             OTHER (SEE COMMENTS)    Comment:Oral  Seasonal                OTHER (SEE COMMENTS)    Comment:rinoitis    Past Medical History:   Diagnosis Date    Abdominal pain     Back problem     Calculus of kidney     Coronary atherosclerosis     S/P QUADRUPLE CABG 2010    Esophageal reflux     Essential hypertension Fatigue     Gout     Hearing loss     Heart palpitations     High blood pressure     High cholesterol     Night sweats     Sleep apnea     Sleep disturbance     Stress     Visual impairment     GLASSES    Wears glasses      Past Surgical History:   Procedure Laterality Date    BACK SURGERY      CABG      CABG, ARTERY-VEIN, FOUR  2010    QUADRUPLE    COLONOSCOPY  2012    COLONOSCOPY      FEMUR/KNEE SURG [address]    \"Closed treatment of orbital fracture (non-'blowout')\"    OTHER SURGICAL HISTORY N/A 2015    Procedure: EXCISION OF LESION/LIPOMA;  Surgeon: Claudia Jeffers MD;  Location: [address] [place] [address] OR    [address] [place] [address]      SINUS SURGERY        SPINE SURGERY PROCEDURE UNLISTED      LOWER BACK SURGERY X2-MICRODISCECTOMY AND LAMINECTOMY     Family History   Problem Relation Age of Onset    Heart Disease Father     Cancer Father     Breast Cancer Mother     Lung Disorder Maternal Grandmother     Heart Disease Paternal Grandmother         CAD    Cancer Paternal Grandfather     Breast Cancer Other     Alcohol and Other Disorders Associated Other     Heart Attack Other      Social History    Tobacco Use      Smoking status: Light Smoker        Packs/day: 2.00        Years: 17.00        Pack years: 29        Types: Cigars, Cigarettes        Last attempt to quit: 1988        Years since quittin.5      Smokeless tobacco: Never      Tobacco comments: 2 cigar/month    Alcohol use:  Yes      Alcohol/week: 12.0 standard drinks of alcohol      Types: 2 Glasses of wine, 2 Cans of beer, 8 Shots of liquor per week      Comment: 2 drinks of scotch daily      SYSTEM Check if Review is Normal Check if Physical Exam is Normal If not normal, please explain:   HEENT [x ] [x ]    NECK & BACK [x ] [x ]    HEART [x ] [x ]    LUNGS [x ] [x ]    ABDOMEN [x ] [x ]    UROGENITAL [x ] [x ]    EXTREMITIES [x ] [x ]    OTHER        [ x ] I have discussed the risks and benefits and alternatives with the patient/family. They understand and agree to proceed with plan of care. [ x ] I have reviewed the History and Physical done within the last 30 days. Any changes noted above.     Capo Denton MD

## 2023-07-11 NOTE — DISCHARGE INSTRUCTIONS
Home Care Instructions Following Your Pain Procedure     Sariah Henderson,  It has been a pleasure to have you as our patient. To help you at home, you must follow these general discharge instructions. We will review these with you before you are discharged. It is our hope that you have a complete and uneventful recovery from our procedure. General Instructions:  What to Expect:  Bandages from your procedure today can be removed when you get home. Please avoid soaking and/or swimming for 24 hours. Showering is okay  It is normal to have increased pain symptoms and/or pain at injection site for up to 3-5 days after procedure, you can use heat or ice (20 minutes on 20 minutes off) for comfort. You may experience some temporary side effects which may include restlessness or insomnia, flushing of the face, or heart palpitations. Please contact the provider if these symptoms do not resolve within 3-4 days. Lightheadedness or nausea may occur and should resolve within 24 to 48 hours. If you develop a headache after treatment, rest, drink fluids (with caffeine, if possible) and take mild over-the-counter pain medication. If the headache does not improve with the above treatment, contact the physician. Home Medications:  Resume all previously prescribed medication. Please avoid taking NSAIDs (Non-Steriodal Anti-Inflammatory Drugs) such as:  Ibuprofen ( Advil, Motrin) Aleve (Naproxen), Diclofenac, Meloxicam for 6 hours after procedure. If you are on Coumadin (Warfarin) or any other anti-coagulant (or \"blood thinning\") medication such as Plavix (Clopidogrel), Xarelto (Rivaroxaban), Eliquis (Apixaban), Effient (Prasugrel) etc., restart on the following day from the procedure unless otherwise directed by your provider. If you are a diabetic, please increase the frequency of your glucose monitoring after the procedure as steroids may cause a temporary (2-3 day) increase in your blood sugar.   Contact your primary care physician if your blood sugar remains elevated as you may require some medication adjustment. Diet:  Resume your regular diet as tolerated. Activity: We recommend that you relax and rest during the rest of your procedure day. If you feel weakness in your arms or legs do not drive. Follow-up Appointment  Please schedule a follow-up visit within 3 to 4 weeks after your last procedure date. Question or Concerns:  Feel free to call our office with any questions or concerns at [phone] (option #2)    Rachna Hartmann  Thank you for coming to [facility] for your procedure. The nurses try very hard to make sure you receive the best care possible. Your trust in them as well as us is greatly appreciated.     Thanks so much,   Dr. Zonia Miller

## 2023-07-11 NOTE — OPERATIVE REPORT
[facility]  Operative Report  2023     Latanya Chi Patient Status:  Hospital Outpatient Surgery    1950 MRN [id number]   Location [address] [address] Attending Kayleen Wilkins MD   [facility] Day # 0 PCP Ping Alves MD     Indication: Kenan Perez is a 68year old male with SI joint pain    Imaging: MRI reviewed    Preoperative Diagnosis:  SI (sacroiliac) pain [M53.3]    Postoperative Diagnosis: Same as above. Procedure performed: SACROILIAC JOINT INJECTION BILATERAL with local     Anesthesia: Local .    EBL: Less than 1 ml. Procedure Description:   After reviewing the patient's history and performing a focused physical examination, the diagnosis was confirmed and contraindications such as infection and coagulopathy were ruled out. Following review of potential side effects and complications, including but not necessarily limited to infection, allergic reaction, local tissue breakdown, nerve injury, and paresis, the patient indicated they understood and agreed to proceed. After obtaining the informed consent, the patient was brought to the procedure room and monitored. In the prone position, the patient's lumbar and sacral areas were prepped and draped in sterile fashion. The subcutaneous lidocaine was instilled into the superficial soft tissues for local anesthesia. Under fluoroscopic guidance, the anterior and posterior aspects of the sacroiliac joint were overlapped. A 22-gauge, Quincke spinal needle was advanced into the middle portion of the first sacroiliac joint. After the needle  positions were confirmed by AP and lateral views of fluoroscopy, 1 cc Omnipaque-240 was injected into the sacroiliac joint. There was a nice sacroiliac joint arthrogram revealed. After that methylprednisolone 20 mg with 2 cc of 1% lidocaine was injected. The needle was flushed with 1 cc of 1% lidocaine. The needles were removed with stylet in situ.   The injection for the contralateral joint was performed in the similar fashion. The patient tolerated the procedure very well. There was no subjective or objective loss of motor strength. The patient was observed until discharge criteria met. Discharge instructions were given and patient was released to a responsible adult. Complications: None. Follow up: The patient was followed in the pain clinic as needed basis.       Suzette Carbajal MD

## 2023-07-12 ENCOUNTER — APPOINTMENT (OUTPATIENT)
Dept: CARDIOLOGY | Age: 73
End: 2023-07-12

## 2023-07-12 ENCOUNTER — TELEPHONE (OUTPATIENT)
Dept: PAIN CLINIC | Facility: CLINIC | Age: 73
End: 2023-07-12

## 2023-07-12 NOTE — TELEPHONE ENCOUNTER
Follow-up call post pain procedure. Left message informing patient to contact the pain clinic at [phone] option #2 regarding any questions or concerns about recent pain procedure.        Procedure: SACROILIAC JOINT INJECTION BILATERAL with local   Date: 07/11/23  Follow up Visit Scheduled: 07/26/23

## 2023-07-14 RX ORDER — FENOFIBRATE 48 MG/1
TABLET, COATED ORAL
Qty: 90 TABLET | Refills: 3 | Status: SHIPPED | OUTPATIENT
Start: 2023-07-14

## 2023-07-21 ENCOUNTER — OFFICE VISIT (OUTPATIENT)
Dept: PHYSICAL THERAPY | Age: 73
End: 2023-07-21
Attending: NURSE PRACTITIONER
Payer: MEDICARE

## 2023-07-21 DIAGNOSIS — M25.552 BILATERAL HIP PAIN: Primary | ICD-10-CM

## 2023-07-21 DIAGNOSIS — M25.562 ACUTE PAIN OF BOTH KNEES: ICD-10-CM

## 2023-07-21 DIAGNOSIS — M25.561 ACUTE PAIN OF BOTH KNEES: ICD-10-CM

## 2023-07-21 DIAGNOSIS — M25.551 BILATERAL HIP PAIN: Primary | ICD-10-CM

## 2023-07-21 PROCEDURE — 97162 PT EVAL MOD COMPLEX 30 MIN: CPT

## 2023-07-21 PROCEDURE — 97110 THERAPEUTIC EXERCISES: CPT

## 2023-07-24 NOTE — PROGRESS NOTES
Diagnosis:   - Bilateral hip pain (M25.551,M25.552)  - Acute pain of both knees (M25.561,M25.562)  - pt reports SI joint dysfunction  - Lumbar radiculopathy   Referring Provider: Kallie Fiore  Date of Evaluation:    7/21/23    Precautions:  None Next MD visit:   none scheduled  Date of Surgery: n/a   Insurance Primary/Secondary: [employer]arkin / [employer]     # Auth Visits: 10 ([facility])            Subjective: pt arrives to PT today and states he feels about the same, the more he moves, the better he feels.     Pain: 4/10      Objective:     PULLED FROM INITIAL EVAL  Observation: NT  Palpation: NT  Sensation: R foot neuropathy  Accessory motion: NT    AROM: (* denotes performed with pain)  Hip Knee   Flexion: R WNL w/ mild tightness in glute; L WNL w/ mild tightness in glute  Abduction: R WNL; L WNL  ER: R tighter than L; L WNL  IR: R limited; L limited  Flexion: R WNL; L WNL  Extension: R WNL; L WNL        Accessory motion: NT    Flexibility:  Hip Flexor: R NT, L NT  Hamstrings: R mild tightness; L mild tightness  Piriformis: R NT; L NT  Quads: R NT; L NT  Gastroc-soleus: R NT; L NT    Neuro Screen: R foot neuropathy, \"tightness of B glutes, B quads, B calves\"    Lumbar AROM:   Flexion: 80%  Extension: 20% w/ LBP  Sidebending: R 50% w/ mild back tightness; L 50% w/ mild back tightness    Strength: (* denotes performed with pain)  LE   Hip flexion (L2): R 5/5; L 5/5  Hip abduction: R 4-/5; L 4-/5   Knee Flexion: R 5/5; L 5/5   Knee extension (L3): R 4/5 w/ mm fasciculations; L 4/5 w/ mm fasciculations   DF (L4): R 5/5; L 5/5  PF (S1): R 5/5; L 5/5  Great Toe Ext (L5): R 4+/5, L 4+/5     Special tests:     LLD: WNL     FABERs: (+) on R for tightness  FADDIRs: (+) on bilaterally for discomfort and impaired ROM/stiffness    Patellar Grind Test: (+) bilaterally for pain, crepitus on R    RFIS: Mild increase in LBP  YAYA: Limited ROM, Mild increase in LBP    Slump Test: (-)  SLR: (+) bilaterally for mild LBP    Gait: pt ambulates on level ground with normal mechanics  Balance: SLS: R NT sec, L NT sec      Assessment:   Pt tolerated PT tx well today. Added LTRs for improved lumbar mobility - added to HEP. Progressed pt to bridges w/ adductor ball squeeze for improved glute activation and pelvic stability. Added stretching to address neural tension. Added shuttle leg press for improved functional LE strength for improved function w/ STS transfers. Pt performs exercises slowly but purposefully. Goals:   (to be met in 10 visits)  Pt will have improved hip AROM for decreased pain w/ ADLs  Pt will improve hip ABD and ER strength to 5/5 to increase ease with standing and walking   Pt will be independent and compliant with comprehensive HEP to maintain progress achieved in PT   Pt will report 1/10 pain at worst w/ activity  Pt will demo improved hip and quad strength/control  Pt will improve transversus abdominis recruitment to perform proper isometric contraction without requiring verbal or tactile cuing to promote advancement of therex   Pt will demonstrate good understanding of proper posture and body mechanics to decrease pain and improve spinal safety   Pt will improve lumbar spine AROM to allow increase ease with ADLs such as bending forward to don shoes   Pt will report improved symptom centralization and absence of radicular symptoms for 3 consecutive days to improve function with ADL    Pt will demo (-) SLR    Plan: Next visit consider -   Date: 7/25/2023  TX#: 2/10 Date:                 TX#: 3/10 Date:                 TX#: 4/10 Date:                 TX#: 5/10 Date:    Tx#: 6/10   Nustep - S14 - L5 - 5'  B hooklying adductor squeeze w/ ball - 20x5\"  B hooklying adductor squeeze w/ ball bridge 2x10  B prostretch 3x20\"  R/L seated DKSA nn tensioning x10  LTRs x20  RSB HS rolls x20  Shuttle DLP: 150# x20                            HEP: BID: R/L clams, hip adductor ball squeeze, R/L mod piri stretch, LTRs,     Charges: 3 therex       Total Timed Treatment: 43 min  Total Treatment Time: 43 min

## 2023-07-25 ENCOUNTER — OFFICE VISIT (OUTPATIENT)
Dept: PHYSICAL THERAPY | Age: 73
End: 2023-07-25
Attending: NURSE PRACTITIONER
Payer: MEDICARE

## 2023-07-25 PROCEDURE — 97110 THERAPEUTIC EXERCISES: CPT

## 2023-07-26 ENCOUNTER — OFFICE VISIT (OUTPATIENT)
Dept: PAIN CLINIC | Facility: CLINIC | Age: 73
End: 2023-07-26
Payer: MEDICARE

## 2023-07-26 VITALS — OXYGEN SATURATION: 97 % | HEART RATE: 66 BPM | DIASTOLIC BLOOD PRESSURE: 60 MMHG | SYSTOLIC BLOOD PRESSURE: 118 MMHG

## 2023-07-26 DIAGNOSIS — M16.0 OSTEOARTHRITIS OF BOTH HIPS, UNSPECIFIED OSTEOARTHRITIS TYPE: Primary | ICD-10-CM

## 2023-07-26 PROCEDURE — 99214 OFFICE O/P EST MOD 30 MIN: CPT | Performed by: ANESTHESIOLOGY

## 2023-07-26 NOTE — PROGRESS NOTES
Last procedure: Bilateral SIJI    Date: 7/11/23  Percentage of relief obtained: 0%  Duration of relief: Consistently 0%, PT is helping    Current Pain Score: 4

## 2023-07-26 NOTE — PROGRESS NOTES
Name: Melia Lauren   : 1950   DOS: 2023     Pain Clinic Follow Up Visit:   Patient presents with:  Procedure Follow Up: F/U after Bilateral Jeffrey Syed is a 68year old male with a history of low back, hip, and quadriceps discomfort following up after SI joint injection. Patient states that he is able to sit down better than prior to the injection. However, does not quantify significant improvement in overall symptoms. Has started physical therapy which is helping. Pt denies any chills, fever, or weakness. There is no bladder or bowel incontinence associated with the pain. REVIEW OF SYSTEMS:  A ten point review of systems was performed with pertinent positives and negatives in the HPI. Atorvastatin            MYALGIA    Comment:Oral  Carvedilol              OTHER (SEE COMMENTS)    Comment:Oral, mental status changes  Pravastatin             OTHER (SEE COMMENTS)    Comment:Oral  Seasonal                OTHER (SEE COMMENTS)    Comment:rinoitis    Current Outpatient Medications   Medication Sig Dispense Refill    CANNABIDIOL OR Take by mouth. GLIPIZIDE 10 MG Oral Tab TAKE 1 TABLET BEFORE       BREAKFAST 90 tablet 3    Meloxicam 7.5 MG Oral Tab Take 1 tablet (7.5 mg total) by mouth daily. 90 tablet 0    furosemide 20 MG Oral Tab Take 1 tablet (20 mg total) by mouth 2 (two) times daily. amLODIPine 5 MG Oral Tab Take 1 tablet (5 mg total) by mouth nightly. TAKE AT BEDTIME      losartan 50 MG Oral Tab Take 1 tablet (50 mg total) by mouth 2 (two) times daily. Potassium Citrate ER (UROCIT-K 15) 15 MEQ (1620 MG) Oral Tab CR Take 1 tablet by mouth daily. 90 tablet 5    PEG 3350-KCl-Na Bicarb-NaCl 420 g Oral Recon Soln Take 4,000 mL by mouth As Directed. 4000 mL 0    pantoprazole 40 MG Oral Tab EC Take 1 tablet (40 mg total) by mouth daily. 90 tablet 3    allopurinol 300 MG Oral Tab Take 1 tablet (300 mg total) by mouth daily.  90 tablet 3    fenofibrate 48 MG Oral Tab       metoprolol succinate  MG Oral Tablet 24 Hr Take 1 tablet (100 mg total) by mouth daily. rosuvastatin 40 MG Oral Tab       Multiple Vitamins-Minerals (MULTIVITAMIN ADULT OR) Take 1 tablet by mouth daily. aspirin 325 MG Oral Tab Take 1 tablet (325 mg total) by mouth daily. Triamcinolone Acetonide (NASACORT) 55 MCG/ACT Nasal Aerosol 1 spray by Nasal route daily. albuterol (PROAIR HFA) 108 (90 Base) MCG/ACT Inhalation Aero Soln Inhale 2 puffs into the lungs every 4 (four) hours as needed for Wheezing. (Patient not taking: Reported on 7/26/2023) 18 g 0    Sildenafil Citrate 100 MG Oral Tab Take 1 tablet (100 mg total) by mouth daily as needed for Erectile Dysfunction. Take ~ one hour prior to sexual activity on an empty stomach 30 tablet 5    ketorolac 0.5 % Ophthalmic Solution  (Patient not taking: Reported on 7/26/2023)      ofloxacin 0.3 % Ophthalmic Solution  (Patient not taking: Reported on 7/26/2023)      prednisoLONE 1 % Ophthalmic Suspension  (Patient not taking: Reported on 7/26/2023)      latanoprost 0.005 % Ophthalmic Solution  (Patient not taking: Reported on 7/26/2023)      AZELASTINE HCL 0.1 % Nasal Solution USE 2 SPRAYS IN EACH       NOSTRIL TWICE DAILY (Patient not taking: Reported on 7/26/2023) 90 mL 1    acetaminophen 325 MG Oral Tab Take 2 tablets (650 mg total) by mouth every 6 (six) hours as needed. (Patient not taking: Reported on 7/26/2023)           EXAM:   /60 (BP Location: Right arm, Patient Position: Sitting)   Pulse 66   SpO2 97%   General:  Patient is a(n) 68year old year old male in no acute distress. Neurologic[de-identified] WNL-Orientation to time, place and person, normal mood & affect, concentration & attention span intact. Inspection:  Ambulates with well-coordinated, fluid, non-antalgic gait. Gait is normal.  Neck: Full range of motion  Back: Gait intact.   Injection site is clear  Respiratory: Speech is nonlabored  Cranial nerve: Grossly intact    IMAGES:   Intra-Op fluoroscopy reviewed with needle placement consistent with SI joint injection      ASSESSMENT AND PLAN:   SI joint pain    The patient is a pleasant 55-year-old gentleman with a history of discomfort in the low back, and hips. He is following up after recent SI joint injection with modest improvement. He has started physical therapy. Had a detailed conversation with the patient and encouraged him to continue with PT. Patient not interested in repeating injections. He can follow-up on as-needed basis. Radiology orders and consultations:None  The patient indicates understanding of these issues and agrees to the plan. No follow-ups on file.     Arnol Hairston MD, 7/26/2023, 10:28 AM

## 2023-07-27 ENCOUNTER — TELEPHONE (OUTPATIENT)
Dept: PHYSICAL THERAPY | Facility: HOSPITAL | Age: 73
End: 2023-07-27

## 2023-07-27 ENCOUNTER — APPOINTMENT (OUTPATIENT)
Dept: PHYSICAL THERAPY | Age: 73
End: 2023-07-27
Attending: NURSE PRACTITIONER
Payer: MEDICARE

## 2023-07-27 DIAGNOSIS — M10.9 GOUTY ARTHROPATHY: ICD-10-CM

## 2023-07-27 DIAGNOSIS — K21.9 GASTROESOPHAGEAL REFLUX DISEASE: ICD-10-CM

## 2023-07-28 ENCOUNTER — TELEPHONE (OUTPATIENT)
Dept: FAMILY MEDICINE CLINIC | Facility: CLINIC | Age: 73
End: 2023-07-28

## 2023-07-28 ENCOUNTER — OFFICE VISIT (OUTPATIENT)
Dept: FAMILY MEDICINE CLINIC | Facility: CLINIC | Age: 73
End: 2023-07-28
Payer: MEDICARE

## 2023-07-28 VITALS
HEIGHT: 75 IN | HEART RATE: 64 BPM | DIASTOLIC BLOOD PRESSURE: 60 MMHG | OXYGEN SATURATION: 96 % | BODY MASS INDEX: 32.25 KG/M2 | SYSTOLIC BLOOD PRESSURE: 120 MMHG | WEIGHT: 259.38 LBS

## 2023-07-28 DIAGNOSIS — I10 HYPERTENSION, BENIGN: ICD-10-CM

## 2023-07-28 DIAGNOSIS — E11.9 TYPE 2 DIABETES MELLITUS WITHOUT COMPLICATION, WITHOUT LONG-TERM CURRENT USE OF INSULIN (HCC): Primary | ICD-10-CM

## 2023-07-28 DIAGNOSIS — M53.3 DISORDER OF SI (SACROILIAC) JOINT: ICD-10-CM

## 2023-07-28 PROCEDURE — 99214 OFFICE O/P EST MOD 30 MIN: CPT | Performed by: FAMILY MEDICINE

## 2023-07-28 NOTE — TELEPHONE ENCOUNTER
Please enter lab orders for the patient's upcoming physical appointment. Physical scheduled: Your appointments       Date & Time Appointment Department [facility])    Jan 29, 2024  9:00 AM CST Medicare Annual Well Visit with MD Teresa Covarrubias, [address], [place] ([place])              [facility] Swann [address] [phone]           Preferred lab: [facility]     The patient has been notified to complete fasting labs prior to their physical appointment.

## 2023-07-31 RX ORDER — PANTOPRAZOLE SODIUM 40 MG/1
40 TABLET, DELAYED RELEASE ORAL DAILY
Qty: 90 TABLET | Refills: 3 | Status: SHIPPED | OUTPATIENT
Start: 2023-07-31

## 2023-07-31 RX ORDER — ALLOPURINOL 300 MG/1
300 TABLET ORAL DAILY
Qty: 90 TABLET | Refills: 3 | Status: SHIPPED | OUTPATIENT
Start: 2023-07-31

## 2023-07-31 NOTE — TELEPHONE ENCOUNTER
Refill request for:    Requested Prescriptions     Pending Prescriptions Disp Refills    PANTOPRAZOLE 40 MG Oral Tab EC [Pharmacy Med Name: PANTOPRAZOLE TAB 40MG DR] 90 tablet 3     Sig: TAKE 1 TABLET DAILY    ALLOPURINOL 300 MG Oral Tab [Pharmacy Med Name: ALLOPURINOL  TAB 300MG] 90 tablet 3     Sig: TAKE 1 TABLET DAILY        Last Prescribed Quantity Refills          LOV 7/28/2023     Patient was asked to follow-up in: not documented in note    Appointment scheduled: 1/29/24    Medication not on protocol.      Please advise

## 2023-08-01 ENCOUNTER — OFFICE VISIT (OUTPATIENT)
Dept: PHYSICAL THERAPY | Age: 73
End: 2023-08-01
Attending: NURSE PRACTITIONER
Payer: MEDICARE

## 2023-08-01 ENCOUNTER — TELEPHONE (OUTPATIENT)
Dept: FAMILY MEDICINE CLINIC | Facility: CLINIC | Age: 73
End: 2023-08-01

## 2023-08-01 PROCEDURE — 97110 THERAPEUTIC EXERCISES: CPT

## 2023-08-01 NOTE — TELEPHONE ENCOUNTER
Sariah Ala, your vitamin D level is at the low end of the normal range, which is good. I would recommend taking an over the counter vitamin D supplement, 1000units daily to help maintain this. It will also help it gradually improve some as well. Please call the office with any questions.  Thanks, Valerio Snell   Written by Quyen Allen NP on 7/12/2023  1:00 PM CDT

## 2023-08-01 NOTE — PROGRESS NOTES
Diagnosis:   - Bilateral hip pain (M25.551,M25.552)  - Acute pain of both knees (M25.561,M25.562)  - pt reports SI joint dysfunction  - Lumbar radiculopathy   Referring Provider: Gina Ritchie  Date of Evaluation:    7/21/23    Precautions:  None Next MD visit:   none scheduled  Date of Surgery: n/a   Insurance Primary/Secondary: Eliana Calabrese / [place] [employer]unk[employer]     # Auth Visits: 10 ([facility])            Subjective: pt arrives to PT today and states he continues to have pain in low back, legs. States the more he moves the better he feels. States he may go to the chiropractor.     Pain: 3/10      Objective:     PULLED FROM INITIAL EVAL  Observation: NT  Palpation: NT  Sensation: R foot neuropathy  Accessory motion: NT    AROM: (* denotes performed with pain)  Hip Knee   Flexion: R WNL w/ mild tightness in glute; L WNL w/ mild tightness in glute  Abduction: R WNL; L WNL  ER: R tighter than L; L WNL  IR: R limited; L limited  Flexion: R WNL; L WNL  Extension: R WNL; L WNL        Accessory motion: NT    Flexibility:  Hip Flexor: R NT, L NT  Hamstrings: R mild tightness; L mild tightness  Piriformis: R NT; L NT  Quads: R NT; L NT  Gastroc-soleus: R NT; L NT    Neuro Screen: R foot neuropathy, \"tightness of B glutes, B quads, B calves\"    Lumbar AROM:   Flexion: 80%  Extension: 20% w/ LBP  Sidebending: R 50% w/ mild back tightness; L 50% w/ mild back tightness    Strength: (* denotes performed with pain)  LE   Hip flexion (L2): R 5/5; L 5/5  Hip abduction: R 4-/5; L 4-/5   Knee Flexion: R 5/5; L 5/5   Knee extension (L3): R 4/5 w/ mm fasciculations; L 4/5 w/ mm fasciculations   DF (L4): R 5/5; L 5/5  PF (S1): R 5/5; L 5/5  Great Toe Ext (L5): R 4+/5, L 4+/5     Special tests:     LLD: WNL     FABERs: (+) on R for tightness  FADDIRs: (+) on bilaterally for discomfort and impaired ROM/stiffness    Patellar Grind Test: (+) bilaterally for pain, crepitus on R    RFIS: Mild increase in LBP  YAYA: Limited ROM, Mild increase in LBP    Slump Test: (-)  SLR: (+) bilaterally for mild LBP    Gait: pt ambulates on level ground with normal mechanics  Balance: SLS: R NT sec, L NT sec      Assessment:   Pt tolerated PT tx well today. Added muscle energy techniques today. Added R/L self unilat ipsilateral hip flexion ME in supine to HEP. Added supine sciatic nn tensioning for improved flexibility. Added stretching and nn tensioning to address flexibility. Goals:   (to be met in 10 visits)  Pt will have improved hip AROM for decreased pain w/ ADLs  Pt will improve hip ABD and ER strength to 5/5 to increase ease with standing and walking   Pt will be independent and compliant with comprehensive HEP to maintain progress achieved in PT   Pt will report 1/10 pain at worst w/ activity  Pt will demo improved hip and quad strength/control  Pt will improve transversus abdominis recruitment to perform proper isometric contraction without requiring verbal or tactile cuing to promote advancement of therex   Pt will demonstrate good understanding of proper posture and body mechanics to decrease pain and improve spinal safety   Pt will improve lumbar spine AROM to allow increase ease with ADLs such as bending forward to don shoes   Pt will report improved symptom centralization and absence of radicular symptoms for 3 consecutive days to improve function with ADL    Pt will demo (-) SLR    Plan: Next visit consider -   Date: 7/25/2023  TX#: 2/10 Date: 8/1/23                TX#: 3/10 Date:                 TX#: 4/10 Date:                 TX#: 5/10 Date:    Tx#: 6/10   Nustep - S14 - L5 - 5'  B hooklying adductor squeeze w/ ball - 20x5\"  B hooklying adductor squeeze w/ ball bridge 2x10  B prostretch 3x20\"  R/L seated DKSA nn tensioning x10  LTRs x20  RSB HS rolls x20  Shuttle DLP: 150# x20 Nustep - S14 - L5 - 5'  B hooklying adductor squeeze w/ ball - 20x5\"  B hooklying adductor squeeze w/ ball bridge 2x10  R/L RSB unilat hip ext ME in supine x20, 5\"  R/L self unilat ipsilateral hip flexion ME in supine x20, 5\"  R/L supine sciatic nn tensioning x10  R/L supine strap HS stretch 3x20\"                             HEP: BID: R/L clams, hip adductor ball squeeze, R/L mod piri stretch, LTRs, R/L self unilat ipsilateral hip flexion ME in supine    Charges: 3 therex       Total Timed Treatment: 43 min  Total Treatment Time: 43 min

## 2023-08-02 ENCOUNTER — PATIENT OUTREACH (OUTPATIENT)
Dept: CASE MANAGEMENT | Age: 73
End: 2023-08-02

## 2023-08-02 DIAGNOSIS — J30.2 SEASONAL ALLERGIC RHINITIS, UNSPECIFIED TRIGGER: ICD-10-CM

## 2023-08-02 DIAGNOSIS — M16.0 OSTEOARTHRITIS OF BOTH HIPS, UNSPECIFIED OSTEOARTHRITIS TYPE: ICD-10-CM

## 2023-08-02 DIAGNOSIS — K21.9 GASTROESOPHAGEAL REFLUX DISEASE, UNSPECIFIED WHETHER ESOPHAGITIS PRESENT: ICD-10-CM

## 2023-08-02 DIAGNOSIS — E78.00 PURE HYPERCHOLESTEROLEMIA: ICD-10-CM

## 2023-08-02 DIAGNOSIS — I10 HYPERTENSION, BENIGN: ICD-10-CM

## 2023-08-02 DIAGNOSIS — E11.29 TYPE 2 DIABETES MELLITUS WITH MICROALBUMINURIA, WITHOUT LONG-TERM CURRENT USE OF INSULIN: ICD-10-CM

## 2023-08-02 DIAGNOSIS — E55.9 VITAMIN D DEFICIENCY: ICD-10-CM

## 2023-08-02 DIAGNOSIS — G47.33 OSA ON CPAP: Primary | ICD-10-CM

## 2023-08-02 DIAGNOSIS — R80.9 TYPE 2 DIABETES MELLITUS WITH MICROALBUMINURIA, WITHOUT LONG-TERM CURRENT USE OF INSULIN: ICD-10-CM

## 2023-08-02 NOTE — PROGRESS NOTES
Spoke to [place] for [facility]. Updates to patient care team/comments: UTD  Patient reported changes in medications: UTD  Med Adherence  Comment: Pt taking medications as prescribed     Health Maintenance: Lung Cancer Screening Never done  Zoster Vaccines(2 of 3) due on 12/06/2012  Diabetes Care Dilated Eye Exam due on 01/25/2022  COVID-19 Vaccine(5 - Pfizer series) due on 03/25/2023  Diabetes Care: GFR due on 03/30/2023  Influenza Vaccine(1) due on 10/01/2023  Diabetes Care A1C due on 12/15/2023  Tobacco Cessation Counseling 1 Year due on 01/30/2024  Annual Physical due on 01/30/2024  Diabetes Care: Microalb/Creat Ratio due on 01/31/2024  PSA due on 07/19/2024  Colorectal Cancer Screening due on 12/08/2025  Annual Depression Screening Completed  Fall Risk Screening (Annual) Completed  Diabetes Care Foot Exam (Annual) Completed  Pneumococcal Vaccine: 65+ Years Completed    Patient updates/concerns:       Pt had been seeing a chiropractor without any improvement for pain in his hip/leg before seeing Dr Dominguez Blake. Dr Dominguez Blake uncovered SI issues after x ray and recommended shots before physical therapy. Pt states the shots did not provide any relief and started to undergo physical therapy. Pt has two sessions remaining of PT. The exercises he has been instructed to perform by pt are helping but not to the extent yet that pt had hoped. Pt performs the exercises two times every day. Once after waking and once when going to sleep. Pt is also getting ready to return to the gym on mondays and wednesdays so he can incorporate some extra weight bearing exercise. 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 Pt states that he has to take frequent breaks when on his feet for longer periods of time like when mowing the lawn.   Pt states that pain comes and goes with triggers being the act of sitting down or walking/on feet for long periods  pt states there is no surgical option available. Pt states that PT is his only option, pt states that he does have to rely on 2 tylenol every morning. Pt has carotid ultrasound ordered, pt will schedule. Pt has noticed some swelling in his feet. Pt has been reassured that based on the meds he takes that this could be expected. Pt states that swelling is not severe, just new to him. He has not been advised to chart daily weights, but pt still weighs himself every day and today was 256. Pt states that the weight usually stays within a few pounds of this. Pt bp has been normal every time going to the office and does not check it at home. Pt will be getting a heart monitor test done prior to seeing dr Sri Hebert for the next visit. Pt will be purchasing OTC vit d, reviewed guidance on strength and frequency. Pt verbalized understanding. Goals/Action Plan:     Active goal from previous outreach: ongoing    Patient reported progress towards goals: staying healthy     Pt does his pt exercises every day twice and wants to start adding trips to the gym           - What: Regular fitness center routine           - When: during the week           - How: weight lifting/machine use           - How Often: Monday and Wednesday             - Where: community fitness center  Patient Reported Barriers and Concerns: SI pain                   - Plan for overcoming barriers: continued PT exercises at home    Care Managers Interventions: continue to provide encouragement and education for healthy coping and dx    Future Appointments:   Future Appointments   Date Time Provider Cindi Calhoun   8/3/2023  9:30 AM Terrell Winslow, PT [facility]   8/8/2023  9:30 AM Terrell Winslow PT [facility]   1/17/2024  1:15 PM Eulalio Pierson MD [facility]   1/29/2024  9:00 AM Lynnette Echols MD [facility] 3 [facility] [place]         Next Care Manager Follow Up Date: one month    Reason For Follow Up: review progress and or barriers towards patient's goals. Time Spent This Encounter Total: 25 min medical record review, telephone communication, care plan updates where needed, education, goals, and action plan recreation/update. Provided acknowledgment and validation to patient's concerns.    Monthly Minute Total including today: 25  Physical assessment, complete health history, and need for CCM established by Darshana Tejada MD.

## 2023-08-02 NOTE — PROGRESS NOTES
Contacting patient to follow up on CCM for the month.  LMCB    Total time -  min  Total Monthly time-  3 min

## 2023-08-03 ENCOUNTER — OFFICE VISIT (OUTPATIENT)
Dept: PHYSICAL THERAPY | Age: 73
End: 2023-08-03
Attending: NURSE PRACTITIONER
Payer: MEDICARE

## 2023-08-03 PROCEDURE — 97110 THERAPEUTIC EXERCISES: CPT

## 2023-08-03 PROCEDURE — 97140 MANUAL THERAPY 1/> REGIONS: CPT

## 2023-08-03 NOTE — PROGRESS NOTES
Diagnosis:   - Bilateral hip pain (M25.551,M25.552)  - Acute pain of both knees (M25.561,M25.562)  - pt reports SI joint dysfunction  - Lumbar radiculopathy   Referring Provider: Cata Gee  Date of Evaluation:    7/21/23    Precautions:  None Next MD visit:   none scheduled  Date of Surgery: n/a   Insurance Primary/Secondary: Ferdinand Echevarria / Edgar Ferrer [employer]     # Auth Visits: 10 ([address] [facility])            Subjective: pt arrives to PT today and states he had a bad day yesterday, increased R leg pain and noticing more numbness in R foot. States he had to take breaks while cutting grass yesterday d/t pain. Pt states he sees chiropractor later today and will report back next week.      Pain:   3/10 @ start of session  1/10 @ end of session      Objective:     PULLED FROM INITIAL EVAL  Observation: NT  Palpation: NT  Sensation: R foot neuropathy  Accessory motion: NT    AROM: (* denotes performed with pain)  Hip Knee   Flexion: R WNL w/ mild tightness in glute; L WNL w/ mild tightness in glute  Abduction: R WNL; L WNL  ER: R tighter than L; L WNL  IR: R limited; L limited  Flexion: R WNL; L WNL  Extension: R WNL; L WNL        Accessory motion: NT    Flexibility:  Hip Flexor: R NT, L NT  Hamstrings: R mild tightness; L mild tightness  Piriformis: R NT; L NT  Quads: R NT; L NT  Gastroc-soleus: R NT; L NT    Neuro Screen: R foot neuropathy, \"tightness of B glutes, B quads, B calves\"    Lumbar AROM:   Flexion: 80%  Extension: 20% w/ LBP  Sidebending: R 50% w/ mild back tightness; L 50% w/ mild back tightness    Strength: (* denotes performed with pain)  LE   Hip flexion (L2): R 5/5; L 5/5  Hip abduction: R 4-/5; L 4-/5   Knee Flexion: R 5/5; L 5/5   Knee extension (L3): R 4/5 w/ mm fasciculations; L 4/5 w/ mm fasciculations   DF (L4): R 5/5; L 5/5  PF (S1): R 5/5; L 5/5  Great Toe Ext (L5): R 4+/5, L 4+/5     Special tests:     LLD: WNL     FABERs: (+) on R for tightness  FADDIRs: (+) on bilaterally for discomfort and impaired ROM/stiffness    Patellar Grind Test: (+) bilaterally for pain, crepitus on R    RFIS: Mild increase in LBP  YAYA: Limited ROM, Mild increase in LBP    Slump Test: (-)  SLR: (+) bilaterally for mild LBP    Gait: pt ambulates on level ground with normal mechanics  Balance: SLS: R NT sec, L NT sec      Assessment:   Pt tolerated PT tx well today. Trialed prone on elbows to address potential radicular s/s into LE - added to HEP. Added seated sciatic nn glides to address neural tension. Added hypervolt IASTM to glutes to address tenderness/soreness. Pt verbalized decreased pain at end of session. Goals:   (to be met in 10 visits)  Pt will have improved hip AROM for decreased pain w/ ADLs  Pt will improve hip ABD and ER strength to 5/5 to increase ease with standing and walking   Pt will be independent and compliant with comprehensive HEP to maintain progress achieved in PT   Pt will report 1/10 pain at worst w/ activity  Pt will demo improved hip and quad strength/control  Pt will improve transversus abdominis recruitment to perform proper isometric contraction without requiring verbal or tactile cuing to promote advancement of therex   Pt will demonstrate good understanding of proper posture and body mechanics to decrease pain and improve spinal safety   Pt will improve lumbar spine AROM to allow increase ease with ADLs such as bending forward to don shoes   Pt will report improved symptom centralization and absence of radicular symptoms for 3 consecutive days to improve function with ADL    Pt will demo (-) SLR    Plan: Next visit consider -   Date: 7/25/2023  TX#: 2/10 Date: 8/1/23                TX#: 3/10 Date: 8/3/23  TX#: 4/10 Date:                 TX#: 5/10 Date:    Tx#: 6/10   Nustep - S14 - L5 - 5'  B hooklying adductor squeeze w/ ball - 20x5\"  B hooklying adductor squeeze w/ ball bridge 2x10  B prostretch 3x20\"  R/L seated DKSA nn tensioning x10  LTRs x20  RSB HS rolls x20  Shuttle DLP: 150# x20 Nustep - S14 - L5 - 5'  B hooklying adductor squeeze w/ ball - 20x5\"  B hooklying adductor squeeze w/ ball bridge 2x10  R/L RSB unilat hip ext ME in supine x20, 5\"  R/L self unilat ipsilateral hip flexion ME in supine x20, 5\"  R/L supine sciatic nn tensioning x10  R/L supine strap HS stretch 3x20\"   Nustep - S14 - L5 - 5'  SHEMAR - 5'  B prostretch 3x30\"  R/L seated sciatic nn glide x20  B hooklying adductor squeeze w/ ball - 20x5\"  R/L self unilat ipsilateral hip flexion ME in supine x20, 5\"                        Manual:  - Hypervolt IASTM - R/L glute med in sidelying - 10' (5' each side)      HEP: BID: R/L clams, hip adductor ball squeeze, R/L mod piri stretch, LTRs, R/L self unilat ipsilateral hip flexion ME in supine    Charges: 2 therex,1 manual       Total Timed Treatment: 41 min  Total Treatment Time: 41 min

## 2023-08-08 ENCOUNTER — OFFICE VISIT (OUTPATIENT)
Dept: PHYSICAL THERAPY | Age: 73
End: 2023-08-08
Attending: NURSE PRACTITIONER
Payer: MEDICARE

## 2023-08-08 PROCEDURE — 97110 THERAPEUTIC EXERCISES: CPT

## 2023-08-08 PROCEDURE — 97140 MANUAL THERAPY 1/> REGIONS: CPT

## 2023-08-08 NOTE — PROGRESS NOTES
Diagnosis:   - Bilateral hip pain (M25.551,M25.552)  - Acute pain of both knees (M25.561,M25.562)  - pt reports SI joint dysfunction  - Lumbar radiculopathy   Referring Provider: aZria Haney  Date of Evaluation:    7/21/23    Precautions:  None Next MD visit:   none scheduled  Date of Surgery: n/a   Insurance Primary/Secondary: Michel Brittle / Serafin Robertson [employer]     # Auth Visits: 10 ([address])            Subjective: pt arrives to PT today and states he went to chiro and got an adjustment but doesn't notice too much difference from just one visit. He states L glute and HS area are sore.      Pain:   3/10 @ start of session  1/10 @ end of session      Objective:     PULLED FROM INITIAL EVAL  Observation: NT  Palpation: NT  Sensation: R foot neuropathy  Accessory motion: NT    AROM: (* denotes performed with pain)  Hip Knee   Flexion: R WNL w/ mild tightness in glute; L WNL w/ mild tightness in glute  Abduction: R WNL; L WNL  ER: R tighter than L; L WNL  IR: R limited; L limited  Flexion: R WNL; L WNL  Extension: R WNL; L WNL        Accessory motion: NT    Flexibility:  Hip Flexor: R NT, L NT  Hamstrings: R mild tightness; L mild tightness  Piriformis: R NT; L NT  Quads: R NT; L NT  Gastroc-soleus: R NT; L NT    Neuro Screen: R foot neuropathy, \"tightness of B glutes, B quads, B calves\"    Lumbar AROM:   Flexion: 80%  Extension: 20% w/ LBP  Sidebending: R 50% w/ mild back tightness; L 50% w/ mild back tightness    Strength: (* denotes performed with pain)  LE   Hip flexion (L2): R 5/5; L 5/5  Hip abduction: R 4-/5; L 4-/5   Knee Flexion: R 5/5; L 5/5   Knee extension (L3): R 4/5 w/ mm fasciculations; L 4/5 w/ mm fasciculations   DF (L4): R 5/5; L 5/5  PF (S1): R 5/5; L 5/5  Great Toe Ext (L5): R 4+/5, L 4+/5     Special tests:     LLD: WNL     FABERs: (+) on R for tightness  FADDIRs: (+) on bilaterally for discomfort and impaired ROM/stiffness    Patellar Grind Test: (+) bilaterally for pain, crepitus on R    RFIS: Mild increase in LBP  YAYA: Limited ROM, Mild increase in LBP    Slump Test: (-)  SLR: (+) bilaterally for mild LBP    Gait: pt ambulates on level ground with normal mechanics  Balance: SLS: R WNL, L moderate instability. Assessment:   Pt tolerated PT tx well today. Pt verbalized decreased L glute pain after hypervolt IASTM. Added SLS to challenge LE stability and balance - pt more challenged on L and reported L glute fatigue. Added sidelying hip abd SLR to challenge glute med strength. Added theraband to bridges around knees for concurrent hip abductor/glute med activation w/ bridges. Added theraband bilateral supine clams for improved hip strength. Pt verbalized decreased pain at end of session. Goals:   (to be met in 10 visits)  Pt will have improved hip AROM for decreased pain w/ ADLs  Pt will improve hip ABD and ER strength to 5/5 to increase ease with standing and walking   Pt will be independent and compliant with comprehensive HEP to maintain progress achieved in PT   Pt will report 1/10 pain at worst w/ activity  Pt will demo improved hip and quad strength/control  Pt will improve transversus abdominis recruitment to perform proper isometric contraction without requiring verbal or tactile cuing to promote advancement of therex   Pt will demonstrate good understanding of proper posture and body mechanics to decrease pain and improve spinal safety   Pt will improve lumbar spine AROM to allow increase ease with ADLs such as bending forward to don shoes   Pt will report improved symptom centralization and absence of radicular symptoms for 3 consecutive days to improve function with ADL    Pt will demo (-) SLR    Plan: Next visit consider -   Date: 7/25/2023  TX#: 2/10 Date: 8/1/23                TX#: 3/10 Date: 8/3/23    TX#: 4/10 Date:  8/8/23               TX#: 5/10 Date:    Tx#: 6/10   Nustep - S14 - L5 - 5'  B hooklying adductor squeeze w/ ball - 20x5\"  B hooklying adductor squeeze w/ ball bridge 2x10  B prostretch 3x20\"  R/L seated DKSA nn tensioning x10  LTRs x20  RSB HS rolls x20  Shuttle DLP: 150# x20 Nustep - S14 - L5 - 5'  B hooklying adductor squeeze w/ ball - 20x5\"  B hooklying adductor squeeze w/ ball bridge 2x10  R/L RSB unilat hip ext ME in supine x20, 5\"  R/L self unilat ipsilateral hip flexion ME in supine x20, 5\"  R/L supine sciatic nn tensioning x10  R/L supine strap HS stretch 3x20\"   Nustep - S14 - L5 - 5'  SHEMAR - 5'  B prostretch 3x30\"  R/L seated sciatic nn glide x20  B hooklying adductor squeeze w/ ball - 20x5\"  R/L self unilat ipsilateral hip flexion ME in supine x20, 5\"    Nustep - S14 - L5 - 5'  R/L SLS 20\" x2  L hip abd SLR 2x10  L clams 2x10  Gray TB hip abd iso w/ bridge 2x10  Gray TB - B supine clams x30                      Manual:  - Hypervolt IASTM - R/L glute med in sidelying - 10' (5' each side)  Manual:  - Hypervolt IASTM - R/L glute med in sidelying - 23'    HEP: BID: R/L clams, hip adductor ball squeeze, R/L mod piri stretch, LTRs, R/L self unilat ipsilateral hip flexion ME in supine    Charges: 1 therex,2 manual       Total Timed Treatment: 42 min  Total Treatment Time: 42 min

## 2023-08-22 ENCOUNTER — OFFICE VISIT (OUTPATIENT)
Dept: PHYSICAL THERAPY | Age: 73
End: 2023-08-22
Attending: NURSE PRACTITIONER
Payer: MEDICARE

## 2023-08-22 PROCEDURE — 97110 THERAPEUTIC EXERCISES: CPT

## 2023-08-22 PROCEDURE — 97140 MANUAL THERAPY 1/> REGIONS: CPT

## 2023-08-22 NOTE — PROGRESS NOTES
Diagnosis:   - Bilateral hip pain (M25.551,M25.552)  - Acute pain of both knees (M25.561,M25.562)  - pt reports SI joint dysfunction  - Lumbar radiculopathy   Referring Provider: No ref. provider found  Date of Evaluation:    7/21/23    Precautions:  None Next MD visit:   none scheduled  Date of Surgery: n/a   Insurance Primary/Secondary: MEDICARE / [employer]ijaya Angles [employer]     # Auth Visits: 10 ([address])            Subjective: pt arrives to PT today and states it has not been a good week, states he had a 5 hour car drive to and from [facility] but then had to [employer] which increased pain day after.      Pain:   4/10 @ start of session  1/10 @ end of session      Objective:     PULLED FROM INITIAL EVAL  Observation: NT  Palpation: NT  Sensation: R foot neuropathy  Accessory motion: NT    AROM: (* denotes performed with pain)  Hip Knee   Flexion: R WNL w/ mild tightness in glute; L WNL w/ mild tightness in glute  Abduction: R WNL; L WNL  ER: R tighter than L; L WNL  IR: R limited; L limited  Flexion: R WNL; L WNL  Extension: R WNL; L WNL        Accessory motion: NT    Flexibility:  Hip Flexor: R NT, L NT  Hamstrings: R mild tightness; L mild tightness  Piriformis: R NT; L NT  Quads: R NT; L NT  Gastroc-soleus: R NT; L NT    Neuro Screen: R foot neuropathy, \"tightness of B glutes, B quads, B calves\"    Lumbar AROM:   Flexion: 80%  Extension: 20% w/ LBP  Sidebending: R 50% w/ mild back tightness; L 50% w/ mild back tightness    Strength: (* denotes performed with pain)  LE   Hip flexion (L2): R 5/5; L 5/5  Hip abduction: R 4-/5; L 4-/5   Knee Flexion: R 5/5; L 5/5   Knee extension (L3): R 4/5 w/ mm fasciculations; L 4/5 w/ mm fasciculations   DF (L4): R 5/5; L 5/5  PF (S1): R 5/5; L 5/5  Great Toe Ext (L5): R 4+/5, L 4+/5     Special tests:     LLD: WNL     FABERs: (+) on R for tightness  FADDIRs: (+) on bilaterally for discomfort and impaired ROM/stiffness    Patellar Grind Test: (+) bilaterally for pain, crepitus on R    RFIS: Mild increase in LBP  YAYA: Limited ROM, Mild increase in LBP    Slump Test: (-)  SLR: (+) bilaterally for mild LBP    Gait: pt ambulates on level ground with normal mechanics  Balance: SLS: R WNL, L moderate instability. Assessment:   Pt tolerated PT tx well today. Initiated core/TA activation today - pt challenged and required verbal visual and tactile cuing for proper form and technique. Pt verbalized decreased pain after IASTM to glute.             Goals:   (to be met in 10 visits)  Pt will have improved hip AROM for decreased pain w/ ADLs  Pt will improve hip ABD and ER strength to 5/5 to increase ease with standing and walking   Pt will be independent and compliant with comprehensive HEP to maintain progress achieved in PT   Pt will report 1/10 pain at worst w/ activity  Pt will demo improved hip and quad strength/control  Pt will improve transversus abdominis recruitment to perform proper isometric contraction without requiring verbal or tactile cuing to promote advancement of therex   Pt will demonstrate good understanding of proper posture and body mechanics to decrease pain and improve spinal safety   Pt will improve lumbar spine AROM to allow increase ease with ADLs such as bending forward to don shoes   Pt will report improved symptom centralization and absence of radicular symptoms for 3 consecutive days to improve function with ADL    Pt will demo (-) SLR    Plan: Next visit consider - PPTs w/ march  Date: 7/25/2023  TX#: 2/10 Date: 8/1/23                TX#: 3/10 Date: 8/3/23    TX#: 4/10 Date:  8/8/23               TX#: 5/10 Date: 8/22/23  Tx#: 6/10   Nustep - S14 - L5 - 5'  B hooklying adductor squeeze w/ ball - 20x5\"  B hooklying adductor squeeze w/ ball bridge 2x10  B prostretch 3x20\"  R/L seated DKSA nn tensioning x10  LTRs x20  RSB HS rolls x20  Shuttle DLP: 150# x20 Nustep - S14 - L5 - 5'  B hooklying adductor squeeze w/ ball - 20x5\"  B hooklying adductor squeeze w/ ball bridge 2x10  R/L RSB unilat hip ext ME in supine x20, 5\"  R/L self unilat ipsilateral hip flexion ME in supine x20, 5\"  R/L supine sciatic nn tensioning x10  R/L supine strap HS stretch 3x20\"   Nustep - S14 - L5 - 5'  SHEMAR - 5'  B prostretch 3x30\"  R/L seated sciatic nn glide x20  B hooklying adductor squeeze w/ ball - 20x5\"  R/L self unilat ipsilateral hip flexion ME in supine x20, 5\"    Nustep - S14 - L5 - 5'  R/L SLS 20\" x2  L hip abd SLR 2x10  L clams 2x10  Gray TB hip abd iso w/ bridge 2x10  Gray TB - B supine clams x30   Nustep - S14 - L5 - 7'  L hip abd SLR 2x10  B hip adductor ball squeeze x10, 5\"  B hip adductor ball squeeze w/ bridge x20  R/L supine sciatic nn tensioning x10  PPTs x15  PPTs w/ SB pressdown in hooklying x20                     Manual:  - Hypervolt IASTM - R/L glute med in sidelying - 10' (5' each side)  Manual:  - Hypervolt IASTM - R/L glute med in sidelying - 23' Manual:  - Hypervolt IASTM - R/L glute med in sidelying - 23'   HEP: BID: R/L clams, hip adductor ball squeeze, R/L mod piri stretch, LTRs, R/L self unilat ipsilateral hip flexion ME in supine    Charges: 1 therex,2 manual       Total Timed Treatment: 41 min  Total Treatment Time: 41 min

## 2023-08-25 ENCOUNTER — TELEPHONE (OUTPATIENT)
Dept: PHYSICAL THERAPY | Facility: HOSPITAL | Age: 73
End: 2023-08-25

## 2023-08-28 ENCOUNTER — TELEPHONE (OUTPATIENT)
Dept: PHYSICAL THERAPY | Facility: HOSPITAL | Age: 73
End: 2023-08-28

## 2023-08-30 ENCOUNTER — TELEPHONE (OUTPATIENT)
Dept: PHYSICAL THERAPY | Facility: HOSPITAL | Age: 73
End: 2023-08-30

## 2023-09-06 ENCOUNTER — TELEPHONE (OUTPATIENT)
Dept: FAMILY MEDICINE CLINIC | Facility: CLINIC | Age: 73
End: 2023-09-06

## 2023-09-06 ENCOUNTER — OFFICE VISIT (OUTPATIENT)
Dept: PHYSICAL THERAPY | Age: 73
End: 2023-09-06
Attending: NURSE PRACTITIONER
Payer: MEDICARE

## 2023-09-06 DIAGNOSIS — M53.3 DISORDER OF SI (SACROILIAC) JOINT: Primary | ICD-10-CM

## 2023-09-06 PROCEDURE — 97140 MANUAL THERAPY 1/> REGIONS: CPT

## 2023-09-06 PROCEDURE — 97110 THERAPEUTIC EXERCISES: CPT

## 2023-09-06 NOTE — PROGRESS NOTES
Diagnosis:   - Bilateral hip pain (M25.551,M25.552)  - Acute pain of both knees (M25.561,M25.562)  - pt reports SI joint dysfunction  - Lumbar radiculopathy   Referring Provider: No ref. provider found  Date of Evaluation:    7/21/23    Precautions:  None Next MD visit:   none scheduled  Date of Surgery: n/a   Insurance Primary/Secondary: MEDICARE / [employer]     # Auth Visits: 10 ([facility])            Subjective: pt arrives to PT today and states he does not feel much better. States he saw chiropractor but continues to have the same s/s. He states he got a supplement for sciatica. Pt states R foot neuropathy has gotten better, still comes and goes, a little numbness but also has balance issues.      Pain:   4/10 @ start of session  2/10 @ end of session      Objective:     PULLED FROM INITIAL EVAL  Observation: NT  Palpation: NT  Sensation: R foot neuropathy  Accessory motion: NT    AROM: (* denotes performed with pain)  Hip Knee   Flexion: R WNL w/ mild tightness in glute; L WNL w/ mild tightness in glute  Abduction: R WNL; L WNL  ER: R tighter than L; L WNL  IR: R limited; L limited  Flexion: R WNL; L WNL  Extension: R WNL; L WNL        Accessory motion: NT    Flexibility:  Hip Flexor: R NT, L NT  Hamstrings: R mild tightness; L mild tightness  Piriformis: R NT; L NT  Quads: R NT; L NT  Gastroc-soleus: R NT; L NT    Neuro Screen: R foot neuropathy, \"tightness of B glutes, B quads, B calves\"    Lumbar AROM:   Flexion: 80%  Extension: 20% w/ LBP  Sidebending: R 50% w/ mild back tightness; L 50% w/ mild back tightness    Strength: (* denotes performed with pain)  LE   Hip flexion (L2): R 5/5; L 5/5  Hip abduction: R 4-/5; L 4-/5   Knee Flexion: R 5/5; L 5/5   Knee extension (L3): R 4/5 w/ mm fasciculations; L 4/5 w/ mm fasciculations   DF (L4): R 5/5; L 5/5  PF (S1): R 5/5; L 5/5  Great Toe Ext (L5): R 4+/5, L 4+/5     Special tests:     LLD: WNL     FABERs: (+) on R for tightness  FADDIRs: (+) on bilaterally for discomfort and impaired ROM/stiffness    Patellar Grind Test: (+) bilaterally for pain, crepitus on R    RFIS: Mild increase in LBP  YAYA: Limited ROM, Mild increase in LBP    Slump Test: (-)  SLR: (+) bilaterally for mild LBP    Gait: pt ambulates on level ground with normal mechanics  Balance: SLS: R WNL, L moderate instability. Assessment:   Pt tolerated PT tx well today. Re introduced shuttle leg press. Recommended pt consider returning to see ortho physician d/t continued s/s with little to no change reported by the pt.              Goals:   (to be met in 10 visits)  Pt will have improved hip AROM for decreased pain w/ ADLs  Pt will improve hip ABD and ER strength to 5/5 to increase ease with standing and walking   Pt will be independent and compliant with comprehensive HEP to maintain progress achieved in PT   Pt will report 1/10 pain at worst w/ activity  Pt will demo improved hip and quad strength/control  Pt will improve transversus abdominis recruitment to perform proper isometric contraction without requiring verbal or tactile cuing to promote advancement of therex   Pt will demonstrate good understanding of proper posture and body mechanics to decrease pain and improve spinal safety   Pt will improve lumbar spine AROM to allow increase ease with ADLs such as bending forward to don shoes   Pt will report improved symptom centralization and absence of radicular symptoms for 3 consecutive days to improve function with ADL    Pt will demo (-) SLR    Plan: Next visit consider - PPTs w/ march  Date: 8/1/23                TX#: 3/10 Date: 8/3/23    TX#: 4/10 Date:  8/8/23               TX#: 5/10 Date: 8/22/23  Tx#: 6/10 Date: 9/6/23  Tx#: 7/10  RE and hold PT   Nustep - S14 - L5 - 5'  B hooklying adductor squeeze w/ ball - 20x5\"  B hooklying adductor squeeze w/ ball bridge 2x10  R/L RSB unilat hip ext ME in supine x20, 5\"  R/L self unilat ipsilateral hip flexion ME in supine x20, 5\"  R/L supine sciatic nn tensioning x10  R/L supine strap HS stretch 3x20\"   Nustep - S14 - L5 - 5'  SHEMAR - 5'  B prostretch 3x30\"  R/L seated sciatic nn glide x20  B hooklying adductor squeeze w/ ball - 20x5\"  R/L self unilat ipsilateral hip flexion ME in supine x20, 5\"    Nustep - S14 - L5 - 5'  R/L SLS 20\" x2  L hip abd SLR 2x10  L clams 2x10  Gray TB hip abd iso w/ bridge 2x10  Gray TB - B supine clams x30   Nustep - S14 - L5 - 7'  L hip abd SLR 2x10  B hip adductor ball squeeze x10, 5\"  B hip adductor ball squeeze w/ bridge x20  R/L supine sciatic nn tensioning x10  PPTs x15  PPTs w/ SB pressdown in hooklying x20   Nustep - S14 - L5 - 5'  B hip adductor ball squeeze x20, 5\"  B hooklying adductor squeeze w/ ball bridge 2x10  R/L self unilat ipsilateral hip flexion ME in supine x15, 5\"   R/L supine strap HS stretch 3x20\"  Shuttle # x20                  Manual:  - Hypervolt IASTM - R/L glute med in sidelying - 10' (5' each side)  Manual:  - Hypervolt IASTM - R/L glute med in sidelying - 23' Manual:  - Hypervolt IASTM - R/L glute med in sidelying - 23' Manual:  - Hypervolt IASTM - R/L glute med in sidelying - 10' (5' each side)    HEP: BID: R/L clams, hip adductor ball squeeze, R/L mod piri stretch, LTRs, R/L self unilat ipsilateral hip flexion ME in supine    Charges: 1 therex,1 manual       Total Timed Treatment: 33 min  Total Treatment Time: 33 min

## 2023-09-12 ENCOUNTER — OFFICE VISIT (OUTPATIENT)
Dept: PHYSICAL THERAPY | Age: 73
End: 2023-09-12
Attending: NURSE PRACTITIONER
Payer: MEDICARE

## 2023-09-12 ENCOUNTER — PATIENT OUTREACH (OUTPATIENT)
Dept: CASE MANAGEMENT | Age: 73
End: 2023-09-12

## 2023-09-12 PROCEDURE — 97535 SELF CARE MNGMENT TRAINING: CPT

## 2023-09-12 PROCEDURE — 97110 THERAPEUTIC EXERCISES: CPT

## 2023-09-12 RX ORDER — ROSUVASTATIN CALCIUM 40 MG/1
TABLET, COATED ORAL
Qty: 90 TABLET | Refills: 1 | Status: SHIPPED | OUTPATIENT
Start: 2023-09-12

## 2023-10-02 ENCOUNTER — ANCILLARY PROCEDURE (OUTPATIENT)
Dept: CARDIOLOGY | Age: 73
End: 2023-10-02
Attending: INTERNAL MEDICINE

## 2023-10-02 DIAGNOSIS — I12.9 BENIGN HYPERTENSION WITH CKD (CHRONIC KIDNEY DISEASE) STAGE III (CMD): ICD-10-CM

## 2023-10-02 DIAGNOSIS — Z95.1 HX OF CABG: ICD-10-CM

## 2023-10-02 DIAGNOSIS — I65.23 BILATERAL CAROTID ARTERY STENOSIS: ICD-10-CM

## 2023-10-02 DIAGNOSIS — G47.33 OSA (OBSTRUCTIVE SLEEP APNEA): ICD-10-CM

## 2023-10-02 DIAGNOSIS — I10 HYPERTENSION, BENIGN: ICD-10-CM

## 2023-10-02 DIAGNOSIS — N18.30 BENIGN HYPERTENSION WITH CKD (CHRONIC KIDNEY DISEASE) STAGE III (CMD): ICD-10-CM

## 2023-10-02 DIAGNOSIS — E78.2 HYPERLIPIDEMIA, MIXED: ICD-10-CM

## 2023-10-02 PROCEDURE — 93880 EXTRACRANIAL BILAT STUDY: CPT | Performed by: INTERNAL MEDICINE

## 2023-10-19 RX ORDER — FUROSEMIDE 20 MG/1
20 TABLET ORAL DAILY
Qty: 90 TABLET | Refills: 2 | Status: SHIPPED | OUTPATIENT
Start: 2023-10-19

## 2023-10-25 ENCOUNTER — TELEPHONE (OUTPATIENT)
Dept: FAMILY MEDICINE CLINIC | Facility: CLINIC | Age: 73
End: 2023-10-25

## 2023-10-25 DIAGNOSIS — Z01.818 PRE-OP TESTING: Primary | ICD-10-CM

## 2023-10-25 NOTE — TELEPHONE ENCOUNTER
Having L3-4 fusion at [facility] hospital needs to get labs done CBC, CMP, PT, PTT/INR, A1c, UA with reflex to culture, EKG and CXR FAX results to PAT at [facility].  Labs palced at [facility] will be done at [facility] and he is seeing the cardiologist and will get the EKG from them

## 2023-10-25 NOTE — TELEPHONE ENCOUNTER
Received a pre op fax from [address] [place] [address] for back surgery 11/21/23 , call pt and schedule a pre op clearance on 11/07/23  4:30 pm with Dr. Temi Ballesteros , paperwork is on triage front printer for review.

## 2023-10-26 ENCOUNTER — ANCILLARY PROCEDURE (OUTPATIENT)
Dept: CARDIOLOGY | Age: 73
End: 2023-10-26
Attending: INTERNAL MEDICINE

## 2023-10-26 DIAGNOSIS — I49.3 PVC (PREMATURE VENTRICULAR CONTRACTION): ICD-10-CM

## 2023-10-26 DIAGNOSIS — Z01.818 PREOPERATIVE CLEARANCE: Primary | ICD-10-CM

## 2023-10-27 ENCOUNTER — TELEPHONE (OUTPATIENT)
Dept: CARDIOLOGY | Age: 73
End: 2023-10-27

## 2023-10-27 ENCOUNTER — CLINICAL DOCUMENTATION (OUTPATIENT)
Dept: CARDIOLOGY | Age: 73
End: 2023-10-27

## 2023-10-30 PROCEDURE — 93227 XTRNL ECG REC<48 HR R&I: CPT | Performed by: INTERNAL MEDICINE

## 2023-10-31 ENCOUNTER — HOSPITAL ENCOUNTER (OUTPATIENT)
Dept: GENERAL RADIOLOGY | Age: 73
Discharge: HOME | End: 2023-10-31
Attending: FAMILY MEDICINE
Payer: MEDICARE

## 2023-10-31 DIAGNOSIS — Z01.818 PRE-OP TESTING: ICD-10-CM

## 2023-10-31 PROCEDURE — 71046 X-RAY EXAM CHEST 2 VIEWS: CPT | Performed by: FAMILY MEDICINE

## 2023-11-02 ENCOUNTER — PATIENT OUTREACH (OUTPATIENT)
Dept: CASE MANAGEMENT | Age: 73
End: 2023-11-02

## 2023-11-02 ENCOUNTER — TELEPHONE (OUTPATIENT)
Dept: CARDIOLOGY | Age: 73
End: 2023-11-02

## 2023-11-02 DIAGNOSIS — R13.10 DYSPHAGIA, UNSPECIFIED TYPE: Primary | ICD-10-CM

## 2023-11-02 DIAGNOSIS — E11.29 TYPE 2 DIABETES MELLITUS WITH MICROALBUMINURIA, WITHOUT LONG-TERM CURRENT USE OF INSULIN: ICD-10-CM

## 2023-11-02 DIAGNOSIS — I10 HYPERTENSION, BENIGN: ICD-10-CM

## 2023-11-02 DIAGNOSIS — N52.9 ERECTILE DYSFUNCTION, UNSPECIFIED ERECTILE DYSFUNCTION TYPE: ICD-10-CM

## 2023-11-02 DIAGNOSIS — I65.21 STENOSIS OF RIGHT CAROTID ARTERY: ICD-10-CM

## 2023-11-02 DIAGNOSIS — K21.9 GASTROESOPHAGEAL REFLUX DISEASE, UNSPECIFIED WHETHER ESOPHAGITIS PRESENT: ICD-10-CM

## 2023-11-02 DIAGNOSIS — I25.10 CORONARY ARTERY DISEASE INVOLVING NATIVE CORONARY ARTERY OF NATIVE HEART WITHOUT ANGINA PECTORIS: ICD-10-CM

## 2023-11-02 DIAGNOSIS — E55.9 VITAMIN D DEFICIENCY: ICD-10-CM

## 2023-11-02 DIAGNOSIS — E78.00 PURE HYPERCHOLESTEROLEMIA: ICD-10-CM

## 2023-11-02 DIAGNOSIS — R73.9 HYPERGLYCEMIA: ICD-10-CM

## 2023-11-02 DIAGNOSIS — R80.9 TYPE 2 DIABETES MELLITUS WITH MICROALBUMINURIA, WITHOUT LONG-TERM CURRENT USE OF INSULIN: ICD-10-CM

## 2023-11-02 DIAGNOSIS — M16.0 OSTEOARTHRITIS OF BOTH HIPS, UNSPECIFIED OSTEOARTHRITIS TYPE: ICD-10-CM

## 2023-11-02 NOTE — PROGRESS NOTES
Left message for pt to call back.  Intended to discuss instructions for upcoming surgery, condition since PT and workout regimen

## 2023-11-03 NOTE — PROGRESS NOTES
Spoke to [place] for [facility]. Updates to patient care team/comments: UTD  Patient reported changes in medications: UTD  Med Adherence  Comment: Pt taking medications as prescribed     Health Maintenance: Lung Cancer Screening Never done  Zoster Vaccines(2 of 3) due on 12/06/2012  Diabetes Care Dilated Eye Exam due on 01/25/2022  Diabetes Care: GFR due on 03/30/2023  COVID-19 Vaccine(5 - 2023-24 season) due on 09/01/2023  Influenza Vaccine(1) due on 10/01/2023  Annual Physical due on 01/30/2024  Diabetes Care A1C due on 12/15/2023  Tobacco Cessation Counseling 1 Year due on 01/30/2024  Diabetes Care: Microalb/Creat Ratio due on 01/31/2024  PSA due on 07/19/2024  Colorectal Cancer Screening due on 12/08/2025  Annual Depression Screening Completed  Fall Risk Screening (Annual) Completed  Diabetes Care Foot Exam (Annual) Completed  Pneumococcal Vaccine: 65+ Years Completed    Patient updates/concerns:    Pt is having back surgery and is comfortable with instructions given to him. He has reviewed the list of medications to stop and has reviewed which ones he can stay on. Pt has completed most of the pre op testing and has blood work left to complete and has scheduled pre op exam with pcp. Pt has been doing a lot of research on the procedure itself and what to expect in recovery. He anticipates using a back brace for a while and will discuss at follow up if there are benefits to a bone growth stimulator or if they are recommended. Pt is certain there will be some kind of lifting restriction and hopes that he is not in the hospital for more than a  few days. He did not think an in patient rehab will be necessary. Pt states his wife will be helping him manage recovery and will be accompany him to procedure. Once patient recovers he is considering joining a jump start your heart class at the health club to work on his balance.  At this point he cannot walk a block without sitting because of the pain, and hopes that he will slowly increase his capacity. Pt will also consider pool aerobics when he has been cleared to start using pool again. Pt weight hovers around 255. Pt feels that his diet is responsible and he typically only eats 2 meals. He feels he can stimulate a calorie deficit by eliminating a serving of alcohol every day. Pt received flu shot and Covid shot on 11/1/23 pt did not have any adverse reactions. Pt had carotid ultra sound and wore heart monitor for 1 day. Pt f/u with cardiologist and was satisfied with results and indicated his PVC's were low and at acceptable level. No medications were adjusted. Pt does not take water pill any more. Pt has not had any difficulty with swelling. Pt has seen eye doc. Pt has developed minor cataract in left eye but was advised that he does not have to consider any procedures at this time. Pt sleep is still inconsistent. He will get up several times a night and feels he needs stop having any fluids 2 hours before he goes to bed. Goals/Action Plan: Active goal from previous outreach: staying healthy    Patient reported progress towards goals: pt continues to see providers as needed and take medications as prescribed               - What: Increased exercise           - Where/When/How: pt will be having back surgery and then therapy to recover. Pt hopes this will provide him the capacity to join fitness classes again at the health club and walk longer distances.   Patient Reported Barriers and Concerns: n/a                   - Plan for overcoming barriers: none    Care Managers Interventions: continue to provide encouragement and education for healthy coping and dx    Future Appointments:   Future Appointments   Date Time Provider Cindi Calhoun   11/7/2023  4:30 PM Reji Caballero MD [facility] [place]   1/17/2024  1:15 PM Chloé Aiken MD [facility] 4   1/29/2024  9:00 AM Reji Caballero MD [facility] 3 [facility] [place]         Next Care Manager Follow Up Date: one month    Reason For Follow Up: review progress and or barriers towards patient's goals. Time Spent This Encounter Total: 33 min medical record review, telephone communication, care plan updates where needed, education, goals, and action plan recreation/update. Provided acknowledgment and validation to patient's concerns.    Monthly Minute Total including today: 33  Physical assessment, complete health history, and need for CCM established by Renee Delvalle MD.

## 2023-11-06 ENCOUNTER — LAB ENCOUNTER (OUTPATIENT)
Dept: LAB | Age: 73
End: 2023-11-06
Attending: FAMILY MEDICINE
Payer: MEDICARE

## 2023-11-06 DIAGNOSIS — Z12.5 SPECIAL SCREENING FOR MALIGNANT NEOPLASM OF PROSTATE: Primary | ICD-10-CM

## 2023-11-06 DIAGNOSIS — N40.1 HYPERTROPHY OF PROSTATE WITH URINARY OBSTRUCTION: ICD-10-CM

## 2023-11-06 DIAGNOSIS — R35.1 NOCTURIA: ICD-10-CM

## 2023-11-06 DIAGNOSIS — N13.8 HYPERTROPHY OF PROSTATE WITH URINARY OBSTRUCTION: ICD-10-CM

## 2023-11-06 LAB
ALBUMIN SERPL-MCNC: 3.8 G/DL (ref 3.4–5)
ALBUMIN/GLOB SERPL: 1 {RATIO} (ref 1–2)
ALP LIVER SERPL-CCNC: 65 U/L
ALT SERPL-CCNC: 37 U/L
ANION GAP SERPL CALC-SCNC: 7 MMOL/L (ref 0–18)
APTT PPP: 30.1 SECONDS (ref 23.3–35.6)
AST SERPL-CCNC: 40 U/L (ref 15–37)
BASOPHILS # BLD AUTO: 0.05 X10(3) UL (ref 0–0.2)
BASOPHILS NFR BLD AUTO: 0.9 %
BILIRUB SERPL-MCNC: 0.4 MG/DL (ref 0.1–2)
BILIRUB UR QL STRIP.AUTO: NEGATIVE
BUN BLD-MCNC: 16 MG/DL (ref 9–23)
CALCIUM BLD-MCNC: 9.6 MG/DL (ref 8.5–10.1)
CHLORIDE SERPL-SCNC: 107 MMOL/L (ref 98–112)
CHOLEST SERPL-MCNC: 140 MG/DL (ref ?–200)
CLARITY UR REFRACT.AUTO: CLEAR
CO2 SERPL-SCNC: 26 MMOL/L (ref 21–32)
COLOR UR AUTO: YELLOW
CREAT BLD-MCNC: 1.23 MG/DL
CREAT UR-SCNC: 172 MG/DL
EGFRCR SERPLBLD CKD-EPI 2021: 62 ML/MIN/1.73M2 (ref 60–?)
EOSINOPHIL # BLD AUTO: 0.25 X10(3) UL (ref 0–0.7)
EOSINOPHIL NFR BLD AUTO: 4.3 %
ERYTHROCYTE [DISTWIDTH] IN BLOOD BY AUTOMATED COUNT: 12.6 %
EST. AVERAGE GLUCOSE BLD GHB EST-MCNC: 143 MG/DL (ref 68–126)
FASTING PATIENT LIPID ANSWER: YES
FASTING STATUS PATIENT QL REPORTED: YES
GLOBULIN PLAS-MCNC: 3.8 G/DL (ref 2.8–4.4)
GLUCOSE BLD-MCNC: 143 MG/DL (ref 70–99)
GLUCOSE UR STRIP.AUTO-MCNC: NORMAL MG/DL
HBA1C MFR BLD: 6.6 % (ref ?–5.7)
HCT VFR BLD AUTO: 46.5 %
HDLC SERPL-MCNC: 41 MG/DL (ref 40–59)
HGB BLD-MCNC: 15.2 G/DL
IMM GRANULOCYTES # BLD AUTO: 0.01 X10(3) UL (ref 0–1)
IMM GRANULOCYTES NFR BLD: 0.2 %
INR BLD: 1.06 (ref 0.8–1.2)
KETONES UR STRIP.AUTO-MCNC: NEGATIVE MG/DL
LDLC SERPL CALC-MCNC: 61 MG/DL (ref ?–100)
LEUKOCYTE ESTERASE UR QL STRIP.AUTO: NEGATIVE
LYMPHOCYTES # BLD AUTO: 1.82 X10(3) UL (ref 1–4)
LYMPHOCYTES NFR BLD AUTO: 31.5 %
MCH RBC QN AUTO: 29.2 PG (ref 26–34)
MCHC RBC AUTO-ENTMCNC: 32.7 G/DL (ref 31–37)
MCV RBC AUTO: 89.3 FL
MICROALBUMIN UR-MCNC: 23.5 MG/DL
MICROALBUMIN/CREAT 24H UR-RTO: 136.6 UG/MG (ref ?–30)
MONOCYTES # BLD AUTO: 0.42 X10(3) UL (ref 0.1–1)
MONOCYTES NFR BLD AUTO: 7.3 %
NEUTROPHILS # BLD AUTO: 3.22 X10 (3) UL (ref 1.5–7.7)
NEUTROPHILS # BLD AUTO: 3.22 X10(3) UL (ref 1.5–7.7)
NEUTROPHILS NFR BLD AUTO: 55.8 %
NITRITE UR QL STRIP.AUTO: NEGATIVE
NONHDLC SERPL-MCNC: 99 MG/DL (ref ?–130)
OSMOLALITY SERPL CALC.SUM OF ELEC: 294 MOSM/KG (ref 275–295)
PH UR STRIP.AUTO: 6.5 [PH] (ref 5–8)
PLATELET # BLD AUTO: 170 10(3)UL (ref 150–450)
POTASSIUM SERPL-SCNC: 4 MMOL/L (ref 3.5–5.1)
PROT SERPL-MCNC: 7.6 G/DL (ref 6.4–8.2)
PROT UR STRIP.AUTO-MCNC: 50 MG/DL
PROTHROMBIN TIME: 13.9 SECONDS (ref 11.6–14.8)
PSA SERPL-MCNC: 2.53 NG/ML (ref ?–4)
RBC # BLD AUTO: 5.21 X10(6)UL
RBC UR QL AUTO: NEGATIVE
SODIUM SERPL-SCNC: 140 MMOL/L (ref 136–145)
SP GR UR STRIP.AUTO: 1.02 (ref 1–1.03)
TRIGL SERPL-MCNC: 233 MG/DL (ref 30–149)
URATE SERPL-MCNC: 2.7 MG/DL
UROBILINOGEN UR STRIP.AUTO-MCNC: NORMAL MG/DL
VLDLC SERPL CALC-MCNC: 35 MG/DL (ref 0–30)
WBC # BLD AUTO: 5.8 X10(3) UL (ref 4–11)

## 2023-11-06 PROCEDURE — 81001 URINALYSIS AUTO W/SCOPE: CPT | Performed by: FAMILY MEDICINE

## 2023-11-06 PROCEDURE — 80053 COMPREHEN METABOLIC PANEL: CPT | Performed by: NURSE PRACTITIONER

## 2023-11-06 PROCEDURE — 82043 UR ALBUMIN QUANTITATIVE: CPT | Performed by: FAMILY MEDICINE

## 2023-11-06 PROCEDURE — 84550 ASSAY OF BLOOD/URIC ACID: CPT | Performed by: NURSE PRACTITIONER

## 2023-11-06 PROCEDURE — 84153 ASSAY OF PSA TOTAL: CPT

## 2023-11-06 PROCEDURE — 83036 HEMOGLOBIN GLYCOSYLATED A1C: CPT | Performed by: NURSE PRACTITIONER

## 2023-11-06 PROCEDURE — 80061 LIPID PANEL: CPT | Performed by: NURSE PRACTITIONER

## 2023-11-06 PROCEDURE — 85025 COMPLETE CBC W/AUTO DIFF WBC: CPT | Performed by: NURSE PRACTITIONER

## 2023-11-06 PROCEDURE — 85730 THROMBOPLASTIN TIME PARTIAL: CPT | Performed by: FAMILY MEDICINE

## 2023-11-06 PROCEDURE — 82570 ASSAY OF URINE CREATININE: CPT | Performed by: FAMILY MEDICINE

## 2023-11-06 PROCEDURE — 85610 PROTHROMBIN TIME: CPT | Performed by: FAMILY MEDICINE

## 2023-11-07 ENCOUNTER — OFFICE VISIT (OUTPATIENT)
Dept: FAMILY MEDICINE CLINIC | Facility: CLINIC | Age: 73
End: 2023-11-07
Payer: MEDICARE

## 2023-11-07 VITALS
RESPIRATION RATE: 16 BRPM | HEART RATE: 54 BPM | SYSTOLIC BLOOD PRESSURE: 130 MMHG | DIASTOLIC BLOOD PRESSURE: 70 MMHG | WEIGHT: 260.5 LBS | HEIGHT: 75 IN | BODY MASS INDEX: 32.39 KG/M2 | OXYGEN SATURATION: 98 %

## 2023-11-07 DIAGNOSIS — I10 HYPERTENSION, BENIGN: ICD-10-CM

## 2023-11-07 DIAGNOSIS — E11.9 TYPE 2 DIABETES MELLITUS WITHOUT COMPLICATION, WITHOUT LONG-TERM CURRENT USE OF INSULIN (HCC): ICD-10-CM

## 2023-11-07 DIAGNOSIS — F17.200 CURRENT SMOKER: ICD-10-CM

## 2023-11-07 DIAGNOSIS — M53.3 DISORDER OF SI (SACROILIAC) JOINT: ICD-10-CM

## 2023-11-07 DIAGNOSIS — M48.062 SPINAL STENOSIS OF LUMBAR REGION WITH NEUROGENIC CLAUDICATION: ICD-10-CM

## 2023-11-07 DIAGNOSIS — I25.10 CORONARY ARTERY DISEASE INVOLVING NATIVE CORONARY ARTERY OF NATIVE HEART WITHOUT ANGINA PECTORIS: ICD-10-CM

## 2023-11-07 DIAGNOSIS — Z01.818 PREOP EXAMINATION: Primary | ICD-10-CM

## 2023-11-07 DIAGNOSIS — M10.9 GOUTY ARTHROPATHY: ICD-10-CM

## 2023-11-07 PROCEDURE — 99214 OFFICE O/P EST MOD 30 MIN: CPT | Performed by: FAMILY MEDICINE

## 2023-11-07 RX ORDER — METOPROLOL SUCCINATE 25 MG/1
25 TABLET, EXTENDED RELEASE ORAL DAILY
COMMUNITY
Start: 2023-11-07

## 2023-11-07 RX ORDER — METOPROLOL SUCCINATE 25 MG/1
25 TABLET, EXTENDED RELEASE ORAL NIGHTLY
Qty: 90 TABLET | Refills: 2 | Status: SHIPPED | OUTPATIENT
Start: 2023-11-07

## 2023-11-24 RX ORDER — ACETAMINOPHEN 500 MG
1000 TABLET ORAL EVERY 6 HOURS PRN
COMMUNITY

## 2023-11-24 ASSESSMENT — ACTIVITIES OF DAILY LIVING (ADL)
ADL_BEFORE_ADMISSION: INDEPENDENT
SENSORY_SUPPORT_DEVICES: EYEGLASSES
ADL_SCORE: 12

## 2023-11-26 ENCOUNTER — HOSPITAL ENCOUNTER (OUTPATIENT)
Dept: MRI IMAGING | Facility: HOSPITAL | Age: 73
Discharge: HOME | End: 2023-11-26
Attending: NEUROLOGICAL SURGERY
Payer: MEDICARE

## 2023-11-26 ENCOUNTER — HOSPITAL ENCOUNTER (OUTPATIENT)
Dept: MRI IMAGING | Facility: HOSPITAL | Age: 73
End: 2023-11-26
Attending: NEUROLOGICAL SURGERY
Payer: MEDICARE

## 2023-11-26 DIAGNOSIS — D42.0 ATYPICAL INTRACRANIAL MENINGIOMA (HCC): ICD-10-CM

## 2023-11-26 PROCEDURE — A9575 INJ GADOTERATE MEGLUMI 0.1ML: HCPCS | Performed by: RADIOLOGY

## 2023-11-26 PROCEDURE — 70553 MRI BRAIN STEM W/O & W/DYE: CPT | Performed by: NEUROLOGICAL SURGERY

## 2023-11-26 RX ORDER — GADOTERATE MEGLUMINE 376.9 MG/ML
30 INJECTION INTRAVENOUS
Status: COMPLETED | OUTPATIENT
Start: 2023-11-26 | End: 2023-11-26

## 2023-11-26 RX ADMIN — GADOTERATE MEGLUMINE 30 ML: 376.9 INJECTION INTRAVENOUS at 09:04:00

## 2023-11-28 ENCOUNTER — ANESTHESIA (OUTPATIENT)
Dept: SURGERY | Age: 73
End: 2023-11-28

## 2023-11-28 ENCOUNTER — ANESTHESIA EVENT (OUTPATIENT)
Dept: SURGERY | Age: 73
End: 2023-11-28

## 2023-11-28 ENCOUNTER — HOSPITAL ENCOUNTER (INPATIENT)
Age: 73
LOS: 2 days | Discharge: HOME | End: 2023-11-30
Attending: ORTHOPAEDIC SURGERY | Admitting: ORTHOPAEDIC SURGERY

## 2023-11-28 ENCOUNTER — APPOINTMENT (OUTPATIENT)
Dept: GENERAL RADIOLOGY | Age: 73
End: 2023-11-28
Attending: ORTHOPAEDIC SURGERY

## 2023-11-28 DIAGNOSIS — Z98.1 S/P LUMBAR SPINAL FUSION: ICD-10-CM

## 2023-11-28 DIAGNOSIS — N18.30 BENIGN HYPERTENSION WITH CKD (CHRONIC KIDNEY DISEASE) STAGE III (CMD): Primary | ICD-10-CM

## 2023-11-28 DIAGNOSIS — E11.9 TYPE 2 DIABETES MELLITUS WITHOUT COMPLICATION, WITHOUT LONG-TERM CURRENT USE OF INSULIN (CMD): ICD-10-CM

## 2023-11-28 DIAGNOSIS — I12.9 BENIGN HYPERTENSION WITH CKD (CHRONIC KIDNEY DISEASE) STAGE III (CMD): Primary | ICD-10-CM

## 2023-11-28 LAB
GLUCOSE BLDC GLUCOMTR-MCNC: 150 MG/DL (ref 70–99)
GLUCOSE BLDC GLUCOMTR-MCNC: 158 MG/DL (ref 70–99)
GLUCOSE BLDC GLUCOMTR-MCNC: 173 MG/DL (ref 70–99)

## 2023-11-28 PROCEDURE — 10002800 HB RX 250 W HCPCS: Performed by: PHYSICIAN ASSISTANT

## 2023-11-28 PROCEDURE — 10000002 HB ROOM CHARGE MED SURG

## 2023-11-28 PROCEDURE — 13000003 HB ANESTHESIA  GENERAL EA ADD MINUTE: Performed by: ORTHOPAEDIC SURGERY

## 2023-11-28 PROCEDURE — 0SG00A0 FUSION OF LUMBAR VERTEBRAL JOINT WITH INTERBODY FUSION DEVICE, ANTERIOR APPROACH, ANTERIOR COLUMN, OPEN APPROACH: ICD-10-PCS | Performed by: ORTHOPAEDIC SURGERY

## 2023-11-28 PROCEDURE — 10002016 HB COUNTER INCENTIVE SPIROMETRY

## 2023-11-28 PROCEDURE — 10002801 HB RX 250 W/O HCPCS: Performed by: ORTHOPAEDIC SURGERY

## 2023-11-28 PROCEDURE — C1769 GUIDE WIRE: HCPCS | Performed by: ORTHOPAEDIC SURGERY

## 2023-11-28 PROCEDURE — 10002803 HB RX 637: Performed by: ORTHOPAEDIC SURGERY

## 2023-11-28 PROCEDURE — 10002800 HB RX 250 W HCPCS: Performed by: ANESTHESIOLOGY

## 2023-11-28 PROCEDURE — 13000002 HB ANESTHESIA  GENERAL  S/U + 1ST 15 MIN: Performed by: ORTHOPAEDIC SURGERY

## 2023-11-28 PROCEDURE — 10002807 HB RX 258: Performed by: ANESTHESIOLOGY

## 2023-11-28 PROCEDURE — 10006027 HB SUPPLY 278: Performed by: ORTHOPAEDIC SURGERY

## 2023-11-28 PROCEDURE — 10002803 HB RX 637: Performed by: HOSPITALIST

## 2023-11-28 PROCEDURE — 10006023 HB SUPPLY 272: Performed by: ORTHOPAEDIC SURGERY

## 2023-11-28 PROCEDURE — 10002800 HB RX 250 W HCPCS

## 2023-11-28 PROCEDURE — 13000113 HB NEURO MAJOR COMPLEX CASE EA ADD MINUTE: Performed by: ORTHOPAEDIC SURGERY

## 2023-11-28 PROCEDURE — 13000112 HB NEURO MAJOR COMPLEX CASE S/U + 1ST 15 MIN: Performed by: ORTHOPAEDIC SURGERY

## 2023-11-28 PROCEDURE — 10004451 HB PACU RECOVERY 1ST 30 MINUTES: Performed by: ORTHOPAEDIC SURGERY

## 2023-11-28 PROCEDURE — 10004651 HB RX, NO CHARGE ITEM: Performed by: ORTHOPAEDIC SURGERY

## 2023-11-28 PROCEDURE — 82962 GLUCOSE BLOOD TEST: CPT

## 2023-11-28 PROCEDURE — 10004452 HB PACU ADDL 30 MINUTES: Performed by: ORTHOPAEDIC SURGERY

## 2023-11-28 PROCEDURE — 10002800 HB RX 250 W HCPCS: Performed by: ORTHOPAEDIC SURGERY

## 2023-11-28 PROCEDURE — 10005281 FL INTRAOPERATIVE C ARM WITH REPORT

## 2023-11-28 PROCEDURE — 0SG0071 FUSION OF LUMBAR VERTEBRAL JOINT WITH AUTOLOGOUS TISSUE SUBSTITUTE, POSTERIOR APPROACH, POSTERIOR COLUMN, OPEN APPROACH: ICD-10-PCS | Performed by: ORTHOPAEDIC SURGERY

## 2023-11-28 PROCEDURE — 10002803 HB RX 637: Performed by: PHYSICIAN ASSISTANT

## 2023-11-28 PROCEDURE — 10002801 HB RX 250 W/O HCPCS: Performed by: ANESTHESIOLOGY

## 2023-11-28 PROCEDURE — 13001086 HB INCENTIVE SPIROMETER W INSTRUCT

## 2023-11-28 PROCEDURE — 10004180 HB COUNTER-TRANSPORT

## 2023-11-28 PROCEDURE — C1762 CONN TISS, HUMAN(INC FASCIA): HCPCS | Performed by: ORTHOPAEDIC SURGERY

## 2023-11-28 PROCEDURE — C1713 ANCHOR/SCREW BN/BN,TIS/BN: HCPCS | Performed by: ORTHOPAEDIC SURGERY

## 2023-11-28 PROCEDURE — 10002807 HB RX 258: Performed by: ORTHOPAEDIC SURGERY

## 2023-11-28 DEVICE — FLOSEAL WITH RECOTHROM - 10ML.
Type: IMPLANTABLE DEVICE | Site: SPINE LUMBAR | Status: FUNCTIONAL
Brand: FLOSEAL HEMOSTATIC MATRIX

## 2023-11-28 DEVICE — BLOCKS 7610310 MSTRGRFT MATRIX EXT 10CC
Type: IMPLANTABLE DEVICE | Site: SPINE LUMBAR | Status: FUNCTIONAL
Brand: MASTERGRAFT® MATRIX EXT

## 2023-11-28 DEVICE — SCREW SET ASTRA T30 NS SPINE: Type: IMPLANTABLE DEVICE | Site: SPINE LUMBAR | Status: FUNCTIONAL

## 2023-11-28 DEVICE — SCREW BN 6.5MM 50MM ASTRA POLYAXIAL CANNULATED XTD TI NS: Type: IMPLANTABLE DEVICE | Site: SPINE LUMBAR | Status: FUNCTIONAL

## 2023-11-28 DEVICE — IMPLANTABLE DEVICE: Type: IMPLANTABLE DEVICE | Site: SPINE LUMBAR | Status: FUNCTIONAL

## 2023-11-28 DEVICE — BONE GRAFT KIT 7510100 INFUSE X SMALL
Type: IMPLANTABLE DEVICE | Site: SPINE LUMBAR | Status: FUNCTIONAL
Brand: INFUSE® BONE GRAFT

## 2023-11-28 DEVICE — IMPLANTABLE DEVICE
Type: IMPLANTABLE DEVICE | Site: SPINE LUMBAR | Status: FUNCTIONAL
Brand: SURGIFOAM® ABSORBABLE GELATIN SPONGE, U.S.P.

## 2023-11-28 RX ORDER — METOPROLOL SUCCINATE 25 MG/1
25 TABLET, EXTENDED RELEASE ORAL NIGHTLY
Status: DISCONTINUED | OUTPATIENT
Start: 2023-11-28 | End: 2023-11-30 | Stop reason: HOSPADM

## 2023-11-28 RX ORDER — LOSARTAN POTASSIUM 50 MG/1
50 TABLET ORAL EVERY 12 HOURS SCHEDULED
Status: DISCONTINUED | OUTPATIENT
Start: 2023-11-28 | End: 2023-11-30 | Stop reason: HOSPADM

## 2023-11-28 RX ORDER — DIPHENHYDRAMINE HCL 25 MG
25 CAPSULE ORAL EVERY 4 HOURS PRN
Status: DISCONTINUED | OUTPATIENT
Start: 2023-11-28 | End: 2023-11-30 | Stop reason: HOSPADM

## 2023-11-28 RX ORDER — METOCLOPRAMIDE HYDROCHLORIDE 5 MG/ML
5 INJECTION INTRAMUSCULAR; INTRAVENOUS EVERY 6 HOURS PRN
Status: DISCONTINUED | OUTPATIENT
Start: 2023-11-28 | End: 2023-11-28

## 2023-11-28 RX ORDER — TIZANIDINE 2 MG/1
2-4 TABLET ORAL EVERY 6 HOURS PRN
Qty: 30 TABLET | Refills: 1 | Status: SHIPPED | OUTPATIENT
Start: 2023-11-28

## 2023-11-28 RX ORDER — ACETAMINOPHEN 325 MG/1
650 TABLET ORAL EVERY 6 HOURS PRN
Status: DISCONTINUED | OUTPATIENT
Start: 2023-11-28 | End: 2023-11-30 | Stop reason: HOSPADM

## 2023-11-28 RX ORDER — NICOTINE POLACRILEX 4 MG
30 LOZENGE BUCCAL PRN
Status: DISCONTINUED | OUTPATIENT
Start: 2023-11-28 | End: 2023-11-30 | Stop reason: HOSPADM

## 2023-11-28 RX ORDER — 0.9 % SODIUM CHLORIDE 0.9 %
2 VIAL (ML) INJECTION EVERY 12 HOURS SCHEDULED
Status: DISCONTINUED | OUTPATIENT
Start: 2023-11-28 | End: 2023-11-28 | Stop reason: HOSPADM

## 2023-11-28 RX ORDER — PROPOFOL 10 MG/ML
INJECTION, EMULSION INTRAVENOUS PRN
Status: DISCONTINUED | OUTPATIENT
Start: 2023-11-28 | End: 2023-11-28

## 2023-11-28 RX ORDER — METOPROLOL SUCCINATE 50 MG/1
100 TABLET, EXTENDED RELEASE ORAL DAILY
Status: DISCONTINUED | OUTPATIENT
Start: 2023-11-29 | End: 2023-11-30 | Stop reason: HOSPADM

## 2023-11-28 RX ORDER — CALCITONIN SALMON 200 [IU]/.09ML
1 SPRAY, METERED NASAL DAILY
Qty: 3.7 ML | Refills: 1 | Status: SHIPPED | OUTPATIENT
Start: 2023-11-28 | End: 2024-01-27

## 2023-11-28 RX ORDER — CHLORHEXIDINE GLUCONATE ORAL RINSE 1.2 MG/ML
15 SOLUTION DENTAL
Status: COMPLETED | OUTPATIENT
Start: 2023-11-28 | End: 2023-11-28

## 2023-11-28 RX ORDER — MULTIVIT WITH MINERALS/LUTEIN
1000 TABLET ORAL 2 TIMES DAILY WITH MEALS
Qty: 60 TABLET | Refills: 0 | Status: SHIPPED | OUTPATIENT
Start: 2023-11-28

## 2023-11-28 RX ORDER — AMLODIPINE BESYLATE 5 MG/1
5 TABLET ORAL 2 TIMES DAILY
Status: DISCONTINUED | OUTPATIENT
Start: 2023-11-28 | End: 2023-11-30 | Stop reason: HOSPADM

## 2023-11-28 RX ORDER — ALLOPURINOL 100 MG/1
300 TABLET ORAL DAILY
Status: DISCONTINUED | OUTPATIENT
Start: 2023-11-29 | End: 2023-11-30 | Stop reason: HOSPADM

## 2023-11-28 RX ORDER — POLYETHYLENE GLYCOL 3350 17 G/17G
17 POWDER, FOR SOLUTION ORAL DAILY
Status: DISCONTINUED | OUTPATIENT
Start: 2023-11-28 | End: 2023-11-30 | Stop reason: HOSPADM

## 2023-11-28 RX ORDER — TIZANIDINE 2 MG/1
2 TABLET ORAL EVERY 6 HOURS PRN
Status: DISCONTINUED | OUTPATIENT
Start: 2023-11-28 | End: 2023-11-30 | Stop reason: HOSPADM

## 2023-11-28 RX ORDER — ONDANSETRON 2 MG/ML
4 INJECTION INTRAMUSCULAR; INTRAVENOUS 2 TIMES DAILY PRN
Status: DISCONTINUED | OUTPATIENT
Start: 2023-11-28 | End: 2023-11-28

## 2023-11-28 RX ORDER — DIAZEPAM 5 MG/ML
5 INJECTION, SOLUTION INTRAMUSCULAR; INTRAVENOUS
Status: DISCONTINUED | OUTPATIENT
Start: 2023-11-28 | End: 2023-11-29

## 2023-11-28 RX ORDER — SODIUM CHLORIDE, SODIUM LACTATE, POTASSIUM CHLORIDE, CALCIUM CHLORIDE 600; 310; 30; 20 MG/100ML; MG/100ML; MG/100ML; MG/100ML
INJECTION, SOLUTION INTRAVENOUS CONTINUOUS PRN
Status: DISCONTINUED | OUTPATIENT
Start: 2023-11-28 | End: 2023-11-28

## 2023-11-28 RX ORDER — AMOXICILLIN 250 MG
2 CAPSULE ORAL 2 TIMES DAILY
Status: DISCONTINUED | OUTPATIENT
Start: 2023-11-28 | End: 2023-11-30 | Stop reason: HOSPADM

## 2023-11-28 RX ORDER — SODIUM CHLORIDE, SODIUM LACTATE, POTASSIUM CHLORIDE, CALCIUM CHLORIDE 600; 310; 30; 20 MG/100ML; MG/100ML; MG/100ML; MG/100ML
INJECTION, SOLUTION INTRAVENOUS CONTINUOUS
Status: DISCONTINUED | OUTPATIENT
Start: 2023-11-28 | End: 2023-11-29

## 2023-11-28 RX ORDER — PROCHLORPERAZINE EDISYLATE 5 MG/ML
5 INJECTION INTRAMUSCULAR; INTRAVENOUS EVERY 4 HOURS PRN
Status: DISCONTINUED | OUTPATIENT
Start: 2023-11-28 | End: 2023-11-30 | Stop reason: HOSPADM

## 2023-11-28 RX ORDER — PANTOPRAZOLE SODIUM 40 MG/1
40 TABLET, DELAYED RELEASE ORAL DAILY
Status: DISCONTINUED | OUTPATIENT
Start: 2023-11-29 | End: 2023-11-30 | Stop reason: HOSPADM

## 2023-11-28 RX ORDER — FENOFIBRATE 54 MG/1
54 TABLET ORAL DAILY
Status: DISCONTINUED | OUTPATIENT
Start: 2023-11-29 | End: 2023-11-30 | Stop reason: HOSPADM

## 2023-11-28 RX ORDER — ASCORBIC ACID 500 MG
1000 TABLET ORAL
Status: DISCONTINUED | OUTPATIENT
Start: 2023-11-28 | End: 2023-11-30 | Stop reason: HOSPADM

## 2023-11-28 RX ORDER — DEXTROSE MONOHYDRATE 25 G/50ML
25 INJECTION, SOLUTION INTRAVENOUS PRN
Status: DISCONTINUED | OUTPATIENT
Start: 2023-11-28 | End: 2023-11-30 | Stop reason: HOSPADM

## 2023-11-28 RX ORDER — ONDANSETRON 2 MG/ML
4 INJECTION INTRAMUSCULAR; INTRAVENOUS EVERY 6 HOURS PRN
Status: DISCONTINUED | OUTPATIENT
Start: 2023-11-28 | End: 2023-11-30 | Stop reason: HOSPADM

## 2023-11-28 RX ORDER — EPHEDRINE SULFATE/0.9% NACL/PF 50 MG/10ML
SYRINGE (ML) INTRAVENOUS PRN
Status: DISCONTINUED | OUTPATIENT
Start: 2023-11-28 | End: 2023-11-28

## 2023-11-28 RX ORDER — OXYCODONE HYDROCHLORIDE 5 MG/1
5 TABLET ORAL EVERY 4 HOURS PRN
Status: DISCONTINUED | OUTPATIENT
Start: 2023-11-28 | End: 2023-11-30 | Stop reason: HOSPADM

## 2023-11-28 RX ORDER — HYDROCODONE BITARTRATE AND ACETAMINOPHEN 10; 325 MG/1; MG/1
1 TABLET ORAL EVERY 4 HOURS PRN
Qty: 40 TABLET | Refills: 0 | Status: SHIPPED | OUTPATIENT
Start: 2023-11-28

## 2023-11-28 RX ORDER — LATANOPROST 50 UG/ML
1 SOLUTION/ DROPS OPHTHALMIC NIGHTLY
Status: DISCONTINUED | OUTPATIENT
Start: 2023-11-28 | End: 2023-11-30 | Stop reason: HOSPADM

## 2023-11-28 RX ORDER — NICOTINE POLACRILEX 4 MG
15 LOZENGE BUCCAL PRN
Status: DISCONTINUED | OUTPATIENT
Start: 2023-11-28 | End: 2023-11-30 | Stop reason: HOSPADM

## 2023-11-28 RX ORDER — BISACODYL 10 MG
10 SUPPOSITORY, RECTAL RECTAL DAILY PRN
Status: DISCONTINUED | OUTPATIENT
Start: 2023-11-28 | End: 2023-11-30 | Stop reason: HOSPADM

## 2023-11-28 RX ORDER — ROSUVASTATIN CALCIUM 20 MG/1
40 TABLET, COATED ORAL DAILY
Status: DISCONTINUED | OUTPATIENT
Start: 2023-11-29 | End: 2023-11-30 | Stop reason: HOSPADM

## 2023-11-28 RX ORDER — CALCIUM CARBONATE 500 MG/1
1000 TABLET, CHEWABLE ORAL EVERY 4 HOURS PRN
Status: DISCONTINUED | OUTPATIENT
Start: 2023-11-28 | End: 2023-11-30 | Stop reason: HOSPADM

## 2023-11-28 RX ORDER — SODIUM CHLORIDE, SODIUM LACTATE, POTASSIUM CHLORIDE, CALCIUM CHLORIDE 600; 310; 30; 20 MG/100ML; MG/100ML; MG/100ML; MG/100ML
INJECTION, SOLUTION INTRAVENOUS CONTINUOUS
Status: DISCONTINUED | OUTPATIENT
Start: 2023-11-28 | End: 2023-11-30 | Stop reason: HOSPADM

## 2023-11-28 RX ORDER — DEXTROSE MONOHYDRATE 25 G/50ML
12.5 INJECTION, SOLUTION INTRAVENOUS PRN
Status: DISCONTINUED | OUTPATIENT
Start: 2023-11-28 | End: 2023-11-30 | Stop reason: HOSPADM

## 2023-11-28 RX ORDER — CELECOXIB 200 MG/1
200 CAPSULE ORAL
Status: COMPLETED | OUTPATIENT
Start: 2023-11-28 | End: 2023-11-28

## 2023-11-28 RX ORDER — DEXAMETHASONE SODIUM PHOSPHATE 4 MG/ML
INJECTION, SOLUTION INTRA-ARTICULAR; INTRALESIONAL; INTRAMUSCULAR; INTRAVENOUS; SOFT TISSUE PRN
Status: DISCONTINUED | OUTPATIENT
Start: 2023-11-28 | End: 2023-11-28

## 2023-11-28 RX ORDER — PROCHLORPERAZINE EDISYLATE 5 MG/ML
5 INJECTION INTRAMUSCULAR; INTRAVENOUS EVERY 4 HOURS PRN
Status: DISCONTINUED | OUTPATIENT
Start: 2023-11-28 | End: 2023-11-28

## 2023-11-28 RX ORDER — HUMAN INSULIN 100 [IU]/ML
INJECTION, SOLUTION SUBCUTANEOUS
Status: DISCONTINUED | OUTPATIENT
Start: 2023-11-28 | End: 2023-11-28 | Stop reason: HOSPADM

## 2023-11-28 RX ORDER — DROPERIDOL 2.5 MG/ML
0.62 INJECTION, SOLUTION INTRAMUSCULAR; INTRAVENOUS
Status: DISCONTINUED | OUTPATIENT
Start: 2023-11-28 | End: 2023-11-28

## 2023-11-28 RX ORDER — ONDANSETRON 4 MG/1
4 TABLET, ORALLY DISINTEGRATING ORAL EVERY 6 HOURS PRN
Status: DISCONTINUED | OUTPATIENT
Start: 2023-11-28 | End: 2023-11-30 | Stop reason: HOSPADM

## 2023-11-28 RX ORDER — MIDAZOLAM HYDROCHLORIDE 1 MG/ML
INJECTION, SOLUTION INTRAMUSCULAR; INTRAVENOUS PRN
Status: DISCONTINUED | OUTPATIENT
Start: 2023-11-28 | End: 2023-11-28

## 2023-11-28 RX ORDER — 0.9 % SODIUM CHLORIDE 0.9 %
2 VIAL (ML) INJECTION EVERY 12 HOURS SCHEDULED
Status: DISCONTINUED | OUTPATIENT
Start: 2023-11-28 | End: 2023-11-30 | Stop reason: HOSPADM

## 2023-11-28 RX ORDER — ACETAMINOPHEN 500 MG
1000 TABLET ORAL
Status: DISCONTINUED | OUTPATIENT
Start: 2023-11-28 | End: 2023-11-28 | Stop reason: HOSPADM

## 2023-11-28 RX ORDER — VANCOMYCIN HYDROCHLORIDE 1 G/20ML
INJECTION, POWDER, LYOPHILIZED, FOR SOLUTION INTRAVENOUS PRN
Status: DISCONTINUED | OUTPATIENT
Start: 2023-11-28 | End: 2023-11-28 | Stop reason: HOSPADM

## 2023-11-28 RX ORDER — OXYCODONE HYDROCHLORIDE 5 MG/1
10 TABLET ORAL EVERY 4 HOURS PRN
Status: DISCONTINUED | OUTPATIENT
Start: 2023-11-28 | End: 2023-11-30 | Stop reason: HOSPADM

## 2023-11-28 RX ORDER — CHLORHEXIDINE GLUCONATE ORAL RINSE 1.2 MG/ML
15 SOLUTION DENTAL AT BEDTIME
Status: DISCONTINUED | OUTPATIENT
Start: 2023-11-28 | End: 2023-11-30 | Stop reason: HOSPADM

## 2023-11-28 RX ORDER — NALOXONE HCL 0.4 MG/ML
0.2 VIAL (ML) INJECTION PRN
Status: DISCONTINUED | OUTPATIENT
Start: 2023-11-28 | End: 2023-11-30 | Stop reason: HOSPADM

## 2023-11-28 RX ORDER — PROCHLORPERAZINE MALEATE 5 MG/1
5 TABLET ORAL EVERY 4 HOURS PRN
Status: DISCONTINUED | OUTPATIENT
Start: 2023-11-28 | End: 2023-11-30 | Stop reason: HOSPADM

## 2023-11-28 RX ADMIN — SODIUM CHLORIDE, PRESERVATIVE FREE 2 ML: 5 INJECTION INTRAVENOUS at 21:51

## 2023-11-28 RX ADMIN — HYDROMORPHONE HYDROCHLORIDE 0.2 MG: 1 INJECTION, SOLUTION INTRAMUSCULAR; INTRAVENOUS; SUBCUTANEOUS at 15:47

## 2023-11-28 RX ADMIN — SODIUM CHLORIDE, POTASSIUM CHLORIDE, SODIUM LACTATE AND CALCIUM CHLORIDE: 600; 310; 30; 20 INJECTION, SOLUTION INTRAVENOUS at 17:38

## 2023-11-28 RX ADMIN — CHLORHEXIDINE GLUCONATE 15 ML: 1.2 RINSE ORAL at 08:07

## 2023-11-28 RX ADMIN — EPHEDRINE SULFATE 15 MG: 5 INJECTION INTRAVENOUS at 11:42

## 2023-11-28 RX ADMIN — HYDROMORPHONE HYDROCHLORIDE 0.2 MG: 1 INJECTION, SOLUTION INTRAMUSCULAR; INTRAVENOUS; SUBCUTANEOUS at 16:07

## 2023-11-28 RX ADMIN — MIDAZOLAM HYDROCHLORIDE 2 MG: 1 INJECTION, SOLUTION INTRAMUSCULAR; INTRAVENOUS at 10:50

## 2023-11-28 RX ADMIN — ONDANSETRON 4 MG: 2 INJECTION INTRAMUSCULAR; INTRAVENOUS at 22:30

## 2023-11-28 RX ADMIN — AMLODIPINE BESYLATE 5 MG: 5 TABLET ORAL at 21:49

## 2023-11-28 RX ADMIN — PROPOFOL 150 MG: 10 INJECTION, EMULSION INTRAVENOUS at 10:55

## 2023-11-28 RX ADMIN — CEFAZOLIN SODIUM 2000 MG: 300 INJECTION, POWDER, LYOPHILIZED, FOR SOLUTION INTRAVENOUS at 18:30

## 2023-11-28 RX ADMIN — CEFAZOLIN SODIUM 2000 MG: 300 INJECTION, POWDER, LYOPHILIZED, FOR SOLUTION INTRAVENOUS at 11:19

## 2023-11-28 RX ADMIN — LOSARTAN POTASSIUM 50 MG: 50 TABLET, FILM COATED ORAL at 21:49

## 2023-11-28 RX ADMIN — EPHEDRINE SULFATE 20 MG: 5 INJECTION INTRAVENOUS at 13:44

## 2023-11-28 RX ADMIN — HYDROMORPHONE HYDROCHLORIDE 0.2 MG: 1 INJECTION, SOLUTION INTRAMUSCULAR; INTRAVENOUS; SUBCUTANEOUS at 15:27

## 2023-11-28 RX ADMIN — REMIFENTANIL HYDROCHLORIDE 0.1 MCG/KG/MIN: 1 INJECTION, POWDER, LYOPHILIZED, FOR SOLUTION INTRAVENOUS at 11:06

## 2023-11-28 RX ADMIN — METOPROLOL SUCCINATE 25 MG: 25 TABLET, EXTENDED RELEASE ORAL at 21:49

## 2023-11-28 RX ADMIN — PROPOFOL INJECTABLE EMULSION 100 MCG/KG/MIN: 10 INJECTION, EMULSION INTRAVENOUS at 11:01

## 2023-11-28 RX ADMIN — SODIUM CHLORIDE, POTASSIUM CHLORIDE, SODIUM LACTATE AND CALCIUM CHLORIDE: 600; 310; 30; 20 INJECTION, SOLUTION INTRAVENOUS at 10:48

## 2023-11-28 RX ADMIN — EPHEDRINE SULFATE 15 MG: 5 INJECTION INTRAVENOUS at 12:38

## 2023-11-28 RX ADMIN — CHLORHEXIDINE GLUCONATE 15 ML: 1.2 RINSE ORAL at 21:51

## 2023-11-28 RX ADMIN — DEXAMETHASONE SODIUM PHOSPHATE 10 MG: 4 INJECTION INTRA-ARTICULAR; INTRALESIONAL; INTRAMUSCULAR; INTRAVENOUS; SOFT TISSUE at 11:13

## 2023-11-28 RX ADMIN — Medication 25 MCG: at 18:29

## 2023-11-28 RX ADMIN — LATANOPROST 1 DROP: 50 SOLUTION/ DROPS OPHTHALMIC at 22:30

## 2023-11-28 RX ADMIN — Medication 180 MG: at 10:55

## 2023-11-28 RX ADMIN — SENNOSIDES AND DOCUSATE SODIUM 2 TABLET: 8.6; 5 TABLET ORAL at 21:48

## 2023-11-28 RX ADMIN — CELECOXIB 200 MG: 200 CAPSULE ORAL at 08:08

## 2023-11-28 SDOH — ECONOMIC STABILITY: TRANSPORTATION INSECURITY
IN THE PAST 12 MONTHS, HAS LACK OF TRANSPORTATION KEPT YOU FROM MEETINGS, WORK, OR FROM GETTING THINGS NEEDED FOR DAILY LIVING?: NO

## 2023-11-28 SDOH — HEALTH STABILITY: GENERAL: BECAUSE OF A PHYSICAL, MENTAL, OR EMOTIONAL CONDITION, DO YOU HAVE DIFFICULTY DOING ERRANDS ALONE?: NO

## 2023-11-28 SDOH — ECONOMIC STABILITY: HOUSING INSECURITY: ARE YOU WORRIED ABOUT LOSING YOUR HOUSING?: NO

## 2023-11-28 SDOH — ECONOMIC STABILITY: GENERAL

## 2023-11-28 SDOH — SOCIAL STABILITY: SOCIAL NETWORK
HOW OFTEN DO YOU SEE OR TALK TO PEOPLE THAT YOU CARE ABOUT AND FEEL CLOSE TO? (FOR EXAMPLE: TALKING TO FRIENDS ON THE PHONE, VISITING FRIENDS OR FAMILY, GOING TO CHURCH OR CLUB MEETINGS): 5 OR MORE TIMES A WEEK

## 2023-11-28 SDOH — ECONOMIC STABILITY: HOUSING INSECURITY: WHAT IS YOUR LIVING SITUATION TODAY?: HOUSE

## 2023-11-28 SDOH — ECONOMIC STABILITY: TRANSPORTATION INSECURITY
IN THE PAST 12 MONTHS, HAS THE LACK OF TRANSPORTATION KEPT YOU FROM MEDICAL APPOINTMENTS OR FROM GETTING MEDICATIONS?: NO

## 2023-11-28 SDOH — ECONOMIC STABILITY: HOUSING INSECURITY: WHAT IS YOUR LIVING SITUATION TODAY?: SPOUSE

## 2023-11-28 SDOH — ECONOMIC STABILITY: FOOD INSECURITY: HOW OFTEN IN THE PAST 12 MONTHS WERE YOU WORRIED OR STRESSED ABOUT HAVING ENOUGH MONEY TO BUY NUTRITIOUS MEALS?: NEVER

## 2023-11-28 SDOH — SOCIAL STABILITY: SOCIAL NETWORK: SUPPORT SYSTEMS: CHILDREN;SPOUSE

## 2023-11-28 SDOH — HEALTH STABILITY: PHYSICAL HEALTH: DO YOU HAVE DIFFICULTY DRESSING OR BATHING?: NO

## 2023-11-28 SDOH — HEALTH STABILITY: GENERAL
BECAUSE OF A PHYSICAL, MENTAL, OR EMOTIONAL CONDITION, DO YOU HAVE SERIOUS DIFFICULTY CONCENTRATING, REMEMBERING OR MAKING DECISIONS?: NO

## 2023-11-28 SDOH — HEALTH STABILITY: PHYSICAL HEALTH: DO YOU HAVE SERIOUS DIFFICULTY WALKING OR CLIMBING STAIRS?: NO

## 2023-11-28 ASSESSMENT — ACTIVITIES OF DAILY LIVING (ADL)
ADL_BEFORE_ADMISSION: INDEPENDENT
ADL_SCORE: 12
ADL_SHORT_OF_BREATH: NO
RECENT_DECLINE_ADL: NO

## 2023-11-28 ASSESSMENT — LIFESTYLE VARIABLES
ALCOHOL_USE_STATUS: NO OR LOW RISK WITH VALIDATED TOOL
HOW MANY STANDARD DRINKS CONTAINING ALCOHOL DO YOU HAVE ON A TYPICAL DAY: 0,1 OR 2
HOW OFTEN DO YOU HAVE 6 OR MORE DRINKS ON ONE OCCASION: NEVER
HOW OFTEN DO YOU HAVE A DRINK CONTAINING ALCOHOL: NEVER
AUDIT-C TOTAL SCORE: 0

## 2023-11-28 ASSESSMENT — PAIN SCALES - GENERAL
PAINLEVEL_OUTOF10: 7
PAINLEVEL_OUTOF10: 2
PAINLEVEL_OUTOF10: 0
PAINLEVEL_OUTOF10: 7
PAINLEVEL_OUTOF10: 6
PAINLEVEL_OUTOF10: 3
PAINLEVEL_OUTOF10: 7
PAINLEVEL_OUTOF10: 0
PAINLEVEL_OUTOF10: 7
PAINLEVEL_OUTOF10: 4
PAINLEVEL_OUTOF10: 0

## 2023-11-28 ASSESSMENT — PATIENT HEALTH QUESTIONNAIRE - PHQ9
SUM OF ALL RESPONSES TO PHQ9 QUESTIONS 1 AND 2: 0
SUM OF ALL RESPONSES TO PHQ9 QUESTIONS 1 AND 2: 0
IS PATIENT ABLE TO COMPLETE PHQ2 OR PHQ9: YES
2. FEELING DOWN, DEPRESSED OR HOPELESS: NOT AT ALL
SUM OF ALL RESPONSES TO PHQ9 QUESTIONS 1 AND 2: 0
CLINICAL INTERPRETATION OF PHQ2 SCORE: NO FURTHER SCREENING NEEDED
1. LITTLE INTEREST OR PLEASURE IN DOING THINGS: NOT AT ALL
CLINICAL INTERPRETATION OF PHQ2 SCORE: NO FURTHER SCREENING NEEDED
IS PATIENT ABLE TO COMPLETE PHQ2 OR PHQ9: YES

## 2023-11-28 ASSESSMENT — ORIENTATION MEMORY CONCENTRATION TEST (OMCT)
OMCT INTERPRETATION: 0-6: NO SIGNIFICANT IMPAIRMENT
WHAT TIME IS IT (NO WATCH OR CLOCK): CORRECT
WHAT YEAR IS IT NOW (MUST BE EXACT): CORRECT
COUNT BACKWARDS FROM 20 TO 1: CORRECT
REPEAT THE NAME AND ADDRESS I ASKED YOU TO REMEMBER: CORRECT
OMCT SCORE: 0
SAY THE MONTHS IN REVERSE ORDER STARTING WITH LAST MONTH: CORRECT
WHAT MONTH IS IT NOW: CORRECT

## 2023-11-28 ASSESSMENT — PAIN DESCRIPTION - PAIN TYPE
TYPE: ACUTE PAIN

## 2023-11-28 ASSESSMENT — COLUMBIA-SUICIDE SEVERITY RATING SCALE - C-SSRS
6. HAVE YOU EVER DONE ANYTHING, STARTED TO DO ANYTHING, OR PREPARED TO DO ANYTHING TO END YOUR LIFE?: NO
2. HAVE YOU ACTUALLY HAD ANY THOUGHTS OF KILLING YOURSELF?: NO
1. WITHIN THE PAST MONTH, HAVE YOU WISHED YOU WERE DEAD OR WISHED YOU COULD GO TO SLEEP AND NOT WAKE UP?: NO
IS THE PATIENT ABLE TO COMPLETE C-SSRS: YES

## 2023-11-29 LAB
DEPRECATED RDW RBC: 43.6 FL (ref 39–50)
ERYTHROCYTE [DISTWIDTH] IN BLOOD: 13.2 % (ref 11–15)
GLUCOSE BLDC GLUCOMTR-MCNC: 142 MG/DL (ref 70–99)
GLUCOSE BLDC GLUCOMTR-MCNC: 144 MG/DL (ref 70–99)
GLUCOSE BLDC GLUCOMTR-MCNC: 181 MG/DL (ref 70–99)
GLUCOSE BLDC GLUCOMTR-MCNC: 214 MG/DL (ref 70–99)
HCT VFR BLD CALC: 40.2 % (ref 39–51)
HGB BLD-MCNC: 13 G/DL (ref 13–17)
MCH RBC QN AUTO: 29.1 PG (ref 26–34)
MCHC RBC AUTO-ENTMCNC: 32.3 G/DL (ref 32–36.5)
MCV RBC AUTO: 89.9 FL (ref 78–100)
NRBC BLD MANUAL-RTO: 0 /100 WBC
PLATELET # BLD AUTO: 152 K/MCL (ref 140–450)
RAINBOW EXTRA TUBES HOLD SPECIMEN: NORMAL
RBC # BLD: 4.47 MIL/MCL (ref 4.5–5.9)
WBC # BLD: 12.5 K/MCL (ref 4.2–11)

## 2023-11-29 PROCEDURE — 10002800 HB RX 250 W HCPCS: Performed by: ORTHOPAEDIC SURGERY

## 2023-11-29 PROCEDURE — 97535 SELF CARE MNGMENT TRAINING: CPT

## 2023-11-29 PROCEDURE — 10006031 HB ROOM CHARGE TELEMETRY

## 2023-11-29 PROCEDURE — 36415 COLL VENOUS BLD VENIPUNCTURE: CPT | Performed by: ORTHOPAEDIC SURGERY

## 2023-11-29 PROCEDURE — 97162 PT EVAL MOD COMPLEX 30 MIN: CPT

## 2023-11-29 PROCEDURE — 10002803 HB RX 637: Performed by: ORTHOPAEDIC SURGERY

## 2023-11-29 PROCEDURE — 10002803 HB RX 637: Performed by: PHYSICIAN ASSISTANT

## 2023-11-29 PROCEDURE — 10004651 HB RX, NO CHARGE ITEM: Performed by: ORTHOPAEDIC SURGERY

## 2023-11-29 PROCEDURE — 10002800 HB RX 250 W HCPCS: Performed by: HOSPITALIST

## 2023-11-29 PROCEDURE — 97165 OT EVAL LOW COMPLEX 30 MIN: CPT

## 2023-11-29 PROCEDURE — 97530 THERAPEUTIC ACTIVITIES: CPT

## 2023-11-29 PROCEDURE — 85027 COMPLETE CBC AUTOMATED: CPT | Performed by: HOSPITALIST

## 2023-11-29 PROCEDURE — 10002803 HB RX 637: Performed by: HOSPITALIST

## 2023-11-29 PROCEDURE — 96372 THER/PROPH/DIAG INJ SC/IM: CPT | Performed by: HOSPITALIST

## 2023-11-29 RX ORDER — ASPIRIN 325 MG
325 TABLET ORAL DAILY
Status: SHIPPED | COMMUNITY
Start: 2023-12-03

## 2023-11-29 RX ORDER — TAMSULOSIN HYDROCHLORIDE 0.4 MG/1
0.4 CAPSULE ORAL DAILY
Status: DISCONTINUED | OUTPATIENT
Start: 2023-11-29 | End: 2023-11-30 | Stop reason: HOSPADM

## 2023-11-29 RX ORDER — INSULIN GLARGINE 100 [IU]/ML
10 INJECTION, SOLUTION SUBCUTANEOUS DAILY
Status: DISCONTINUED | OUTPATIENT
Start: 2023-11-29 | End: 2023-11-30 | Stop reason: HOSPADM

## 2023-11-29 RX ORDER — DIAZEPAM 5 MG/1
5 TABLET ORAL EVERY 6 HOURS PRN
Status: DISCONTINUED | OUTPATIENT
Start: 2023-11-29 | End: 2023-11-30 | Stop reason: HOSPADM

## 2023-11-29 RX ORDER — PSEUDOEPHEDRINE HCL 30 MG
30 TABLET ORAL ONCE
Status: COMPLETED | OUTPATIENT
Start: 2023-11-29 | End: 2023-11-29

## 2023-11-29 RX ADMIN — INSULIN LISPRO 2 UNITS: 100 INJECTION, SOLUTION INTRAVENOUS; SUBCUTANEOUS at 09:07

## 2023-11-29 RX ADMIN — SODIUM CHLORIDE, PRESERVATIVE FREE 2 ML: 5 INJECTION INTRAVENOUS at 20:13

## 2023-11-29 RX ADMIN — TIZANIDINE 2 MG: 2 TABLET ORAL at 06:02

## 2023-11-29 RX ADMIN — Medication 25 MCG: at 09:08

## 2023-11-29 RX ADMIN — OXYCODONE HYDROCHLORIDE AND ACETAMINOPHEN 1000 MG: 500 TABLET ORAL at 13:23

## 2023-11-29 RX ADMIN — LOSARTAN POTASSIUM 50 MG: 50 TABLET, FILM COATED ORAL at 20:11

## 2023-11-29 RX ADMIN — METOPROLOL SUCCINATE 25 MG: 25 TABLET, EXTENDED RELEASE ORAL at 20:12

## 2023-11-29 RX ADMIN — SODIUM CHLORIDE, PRESERVATIVE FREE 2 ML: 5 INJECTION INTRAVENOUS at 09:09

## 2023-11-29 RX ADMIN — PANTOPRAZOLE SODIUM 40 MG: 40 TABLET, DELAYED RELEASE ORAL at 09:09

## 2023-11-29 RX ADMIN — CEFAZOLIN SODIUM 2000 MG: 300 INJECTION, POWDER, LYOPHILIZED, FOR SOLUTION INTRAVENOUS at 02:05

## 2023-11-29 RX ADMIN — PSEUDOEPHEDRINE HCL 30 MG: 30 TABLET, FILM COATED ORAL at 13:38

## 2023-11-29 RX ADMIN — ROSUVASTATIN CALCIUM 40 MG: 20 TABLET, FILM COATED ORAL at 09:09

## 2023-11-29 RX ADMIN — OXYCODONE HYDROCHLORIDE AND ACETAMINOPHEN 1000 MG: 500 TABLET ORAL at 18:28

## 2023-11-29 RX ADMIN — SENNOSIDES AND DOCUSATE SODIUM 2 TABLET: 8.6; 5 TABLET ORAL at 20:16

## 2023-11-29 RX ADMIN — METOPROLOL SUCCINATE 100 MG: 50 TABLET, EXTENDED RELEASE ORAL at 09:08

## 2023-11-29 RX ADMIN — INSULIN GLARGINE 10 UNITS: 100 INJECTION, SOLUTION SUBCUTANEOUS at 13:24

## 2023-11-29 RX ADMIN — ACETAMINOPHEN 650 MG: 325 TABLET ORAL at 20:35

## 2023-11-29 RX ADMIN — POLYETHYLENE GLYCOL (3350) 17 G: 17 POWDER, FOR SOLUTION ORAL at 09:09

## 2023-11-29 RX ADMIN — AMLODIPINE BESYLATE 5 MG: 5 TABLET ORAL at 09:09

## 2023-11-29 RX ADMIN — ACETAMINOPHEN 650 MG: 325 TABLET ORAL at 06:02

## 2023-11-29 RX ADMIN — CHLORHEXIDINE GLUCONATE 15 ML: 1.2 RINSE ORAL at 20:15

## 2023-11-29 RX ADMIN — LATANOPROST 1 DROP: 50 SOLUTION/ DROPS OPHTHALMIC at 20:13

## 2023-11-29 RX ADMIN — LOSARTAN POTASSIUM 50 MG: 50 TABLET, FILM COATED ORAL at 09:09

## 2023-11-29 RX ADMIN — SENNOSIDES AND DOCUSATE SODIUM 2 TABLET: 8.6; 5 TABLET ORAL at 09:09

## 2023-11-29 RX ADMIN — TAMSULOSIN HYDROCHLORIDE 0.4 MG: 0.4 CAPSULE ORAL at 20:12

## 2023-11-29 RX ADMIN — AMLODIPINE BESYLATE 5 MG: 5 TABLET ORAL at 20:12

## 2023-11-29 RX ADMIN — FENOFIBRATE 54 MG: 54 TABLET ORAL at 09:08

## 2023-11-29 RX ADMIN — ALLOPURINOL 300 MG: 100 TABLET ORAL at 09:09

## 2023-11-29 RX ADMIN — OXYCODONE HYDROCHLORIDE AND ACETAMINOPHEN 1000 MG: 500 TABLET ORAL at 09:08

## 2023-11-29 ASSESSMENT — ACTIVITIES OF DAILY LIVING (ADL)
PRIOR_ADL: INDEPENDENT
HOME_MANAGEMENT_TIME_ENTRY: 12

## 2023-11-29 ASSESSMENT — COGNITIVE AND FUNCTIONAL STATUS - GENERAL
BASIC_MOBILITY_CONVERTED_SCORE: 41.05
BASIC_MOBILITY_RAW_SCORE: 18
DAILY_ACTIVITY_RAW_SCORE: 24
DAILY_ACTIVITY_CONVERTED_SCORE: 57.54

## 2023-11-29 ASSESSMENT — PAIN SCALES - GENERAL
PAINLEVEL_OUTOF10: 4
PAINLEVEL_OUTOF10: 5
PAINLEVEL_OUTOF10: 4
PAINLEVEL_OUTOF10: 6
PAINLEVEL_OUTOF10: 4
PAINLEVEL_OUTOF10: 6

## 2023-11-29 ASSESSMENT — PAIN SCALES - WONG BAKER
WONGBAKER_NUMERICALRESPONSE: 0
WONGBAKER_NUMERICALRESPONSE: 0

## 2023-11-29 ASSESSMENT — ENCOUNTER SYMPTOMS: PAIN SEVERITY NOW: 5

## 2023-11-30 VITALS
DIASTOLIC BLOOD PRESSURE: 64 MMHG | HEIGHT: 75 IN | HEART RATE: 80 BPM | OXYGEN SATURATION: 95 % | TEMPERATURE: 97.5 F | BODY MASS INDEX: 32.21 KG/M2 | RESPIRATION RATE: 14 BRPM | WEIGHT: 259.04 LBS | SYSTOLIC BLOOD PRESSURE: 130 MMHG

## 2023-11-30 LAB
DEPRECATED RDW RBC: 44.9 FL (ref 39–50)
ERYTHROCYTE [DISTWIDTH] IN BLOOD: 13.5 % (ref 11–15)
GLUCOSE BLDC GLUCOMTR-MCNC: 164 MG/DL (ref 70–99)
GLUCOSE BLDC GLUCOMTR-MCNC: 185 MG/DL (ref 70–99)
HCT VFR BLD CALC: 38.9 % (ref 39–51)
HGB BLD-MCNC: 12.5 G/DL (ref 13–17)
MCH RBC QN AUTO: 29.1 PG (ref 26–34)
MCHC RBC AUTO-ENTMCNC: 32.1 G/DL (ref 32–36.5)
MCV RBC AUTO: 90.5 FL (ref 78–100)
NRBC BLD MANUAL-RTO: 0 /100 WBC
PLATELET # BLD AUTO: 120 K/MCL (ref 140–450)
RAINBOW EXTRA TUBES HOLD SPECIMEN: NORMAL
RBC # BLD: 4.3 MIL/MCL (ref 4.5–5.9)
WBC # BLD: 10 K/MCL (ref 4.2–11)

## 2023-11-30 PROCEDURE — 10002800 HB RX 250 W HCPCS: Performed by: HOSPITALIST

## 2023-11-30 PROCEDURE — 10002803 HB RX 637: Performed by: PHYSICIAN ASSISTANT

## 2023-11-30 PROCEDURE — 10002803 HB RX 637: Performed by: HOSPITALIST

## 2023-11-30 PROCEDURE — 10002803 HB RX 637: Performed by: ORTHOPAEDIC SURGERY

## 2023-11-30 PROCEDURE — 36415 COLL VENOUS BLD VENIPUNCTURE: CPT | Performed by: ORTHOPAEDIC SURGERY

## 2023-11-30 PROCEDURE — 85027 COMPLETE CBC AUTOMATED: CPT | Performed by: HOSPITALIST

## 2023-11-30 PROCEDURE — 10004651 HB RX, NO CHARGE ITEM: Performed by: ORTHOPAEDIC SURGERY

## 2023-11-30 PROCEDURE — 96372 THER/PROPH/DIAG INJ SC/IM: CPT | Performed by: HOSPITALIST

## 2023-11-30 PROCEDURE — 10002800 HB RX 250 W HCPCS: Performed by: PHYSICIAN ASSISTANT

## 2023-11-30 PROCEDURE — 13003289 HB OXYGEN THERAPY DAILY

## 2023-11-30 RX ORDER — DEXAMETHASONE SODIUM PHOSPHATE 4 MG/ML
10 INJECTION, SOLUTION INTRA-ARTICULAR; INTRALESIONAL; INTRAMUSCULAR; INTRAVENOUS; SOFT TISSUE ONCE
Status: COMPLETED | OUTPATIENT
Start: 2023-11-30 | End: 2023-11-30

## 2023-11-30 RX ADMIN — LOSARTAN POTASSIUM 50 MG: 50 TABLET, FILM COATED ORAL at 08:25

## 2023-11-30 RX ADMIN — ALLOPURINOL 300 MG: 100 TABLET ORAL at 08:25

## 2023-11-30 RX ADMIN — TAMSULOSIN HYDROCHLORIDE 0.4 MG: 0.4 CAPSULE ORAL at 08:25

## 2023-11-30 RX ADMIN — INSULIN GLARGINE 10 UNITS: 100 INJECTION, SOLUTION SUBCUTANEOUS at 13:54

## 2023-11-30 RX ADMIN — PANTOPRAZOLE SODIUM 40 MG: 40 TABLET, DELAYED RELEASE ORAL at 08:25

## 2023-11-30 RX ADMIN — DEXAMETHASONE SODIUM PHOSPHATE 10 MG: 4 INJECTION INTRA-ARTICULAR; INTRALESIONAL; INTRAMUSCULAR; INTRAVENOUS; SOFT TISSUE at 10:30

## 2023-11-30 RX ADMIN — SODIUM CHLORIDE, PRESERVATIVE FREE 2 ML: 5 INJECTION INTRAVENOUS at 08:30

## 2023-11-30 RX ADMIN — OXYCODONE HYDROCHLORIDE AND ACETAMINOPHEN 1000 MG: 500 TABLET ORAL at 08:25

## 2023-11-30 RX ADMIN — POLYETHYLENE GLYCOL (3350) 17 G: 17 POWDER, FOR SOLUTION ORAL at 08:25

## 2023-11-30 RX ADMIN — DIAZEPAM 5 MG: 5 TABLET ORAL at 02:27

## 2023-11-30 RX ADMIN — METOPROLOL SUCCINATE 100 MG: 50 TABLET, EXTENDED RELEASE ORAL at 08:25

## 2023-11-30 RX ADMIN — OXYCODONE HYDROCHLORIDE AND ACETAMINOPHEN 1000 MG: 500 TABLET ORAL at 13:54

## 2023-11-30 RX ADMIN — INSULIN LISPRO 1 UNITS: 100 INJECTION, SOLUTION INTRAVENOUS; SUBCUTANEOUS at 10:30

## 2023-11-30 RX ADMIN — SENNOSIDES AND DOCUSATE SODIUM 2 TABLET: 8.6; 5 TABLET ORAL at 08:24

## 2023-11-30 RX ADMIN — ROSUVASTATIN CALCIUM 40 MG: 20 TABLET, FILM COATED ORAL at 08:25

## 2023-11-30 RX ADMIN — Medication 25 MCG: at 08:25

## 2023-11-30 RX ADMIN — AMLODIPINE BESYLATE 5 MG: 5 TABLET ORAL at 08:25

## 2023-11-30 RX ADMIN — OXYCODONE HYDROCHLORIDE 10 MG: 5 TABLET ORAL at 09:32

## 2023-11-30 RX ADMIN — FENOFIBRATE 54 MG: 54 TABLET ORAL at 08:24

## 2023-11-30 RX ADMIN — INSULIN LISPRO 1 UNITS: 100 INJECTION, SOLUTION INTRAVENOUS; SUBCUTANEOUS at 13:56

## 2023-11-30 RX ADMIN — OXYCODONE HYDROCHLORIDE 10 MG: 5 TABLET ORAL at 04:18

## 2023-11-30 ASSESSMENT — PAIN SCALES - GENERAL
PAINLEVEL_OUTOF10: 8
PAINLEVEL_OUTOF10: 6
PAINLEVEL_OUTOF10: 7
PAINLEVEL_OUTOF10: 9
PAINLEVEL_OUTOF10: 7

## 2023-12-05 ENCOUNTER — PATIENT OUTREACH (OUTPATIENT)
Dept: CASE MANAGEMENT | Age: 73
End: 2023-12-05

## 2023-12-05 DIAGNOSIS — G47.33 OSA ON CPAP: ICD-10-CM

## 2023-12-05 DIAGNOSIS — M16.0 OSTEOARTHRITIS OF BOTH HIPS, UNSPECIFIED OSTEOARTHRITIS TYPE: ICD-10-CM

## 2023-12-05 DIAGNOSIS — E11.29 TYPE 2 DIABETES MELLITUS WITH MICROALBUMINURIA, WITHOUT LONG-TERM CURRENT USE OF INSULIN: ICD-10-CM

## 2023-12-05 DIAGNOSIS — E78.00 PURE HYPERCHOLESTEROLEMIA: ICD-10-CM

## 2023-12-05 DIAGNOSIS — K21.9 GASTROESOPHAGEAL REFLUX DISEASE, UNSPECIFIED WHETHER ESOPHAGITIS PRESENT: ICD-10-CM

## 2023-12-05 DIAGNOSIS — R13.10 DYSPHAGIA, UNSPECIFIED TYPE: ICD-10-CM

## 2023-12-05 DIAGNOSIS — N52.9 ERECTILE DYSFUNCTION, UNSPECIFIED ERECTILE DYSFUNCTION TYPE: ICD-10-CM

## 2023-12-05 DIAGNOSIS — R73.9 HYPERGLYCEMIA: ICD-10-CM

## 2023-12-05 DIAGNOSIS — I10 HYPERTENSION, BENIGN: ICD-10-CM

## 2023-12-05 DIAGNOSIS — I70.0 ABDOMINAL AORTIC ATHEROSCLEROSIS (HCC): ICD-10-CM

## 2023-12-05 DIAGNOSIS — R80.9 TYPE 2 DIABETES MELLITUS WITH MICROALBUMINURIA, WITHOUT LONG-TERM CURRENT USE OF INSULIN: ICD-10-CM

## 2023-12-05 DIAGNOSIS — E55.9 VITAMIN D DEFICIENCY: Primary | ICD-10-CM

## 2023-12-05 DIAGNOSIS — I25.10 CORONARY ARTERY DISEASE INVOLVING NATIVE CORONARY ARTERY OF NATIVE HEART WITHOUT ANGINA PECTORIS: ICD-10-CM

## 2023-12-05 RX ORDER — HYDROCODONE BITARTRATE AND ACETAMINOPHEN 10; 325 MG/1; MG/1
1 TABLET ORAL EVERY 4 HOURS PRN
COMMUNITY
Start: 2023-11-28

## 2023-12-05 RX ORDER — CALCITONIN SALMON 200 [IU]/.09ML
SPRAY, METERED NASAL
COMMUNITY

## 2023-12-05 RX ORDER — MULTIVIT WITH MINERALS/LUTEIN
1000 TABLET ORAL 2 TIMES DAILY WITH MEALS
COMMUNITY
Start: 2023-11-28

## 2023-12-05 RX ORDER — TIZANIDINE 2 MG/1
TABLET ORAL EVERY 6 HOURS PRN
COMMUNITY

## 2023-12-05 NOTE — PROGRESS NOTES
Spoke to [place] for [facility]. Updates to patient care team/comments: utd  Patient reported changes in medications: utd  Med Adherence  Comment: pt taking medication as prescribed     Health Maintenance:   Health Maintenance   Topic Date Due    Lung Cancer Screening  Never done    Zoster Vaccines (2 of 3) 12/06/2012    Diabetes Care Dilated Eye Exam  01/25/2022    Annual Physical  01/30/2024    Tobacco Cessation Counseling 1 Year  01/30/2024    Diabetes Care A1C  05/06/2024    Diabetes Care: GFR  11/06/2024    Diabetes Care: Microalb/Creat Ratio  11/06/2024    PSA  11/06/2025    Colorectal Cancer Screening  12/08/2025    Influenza Vaccine  Completed    Annual Depression Screening  Completed    Fall Risk Screening (Annual)  Completed    Diabetes Care Foot Exam (Annual)  Completed    Pneumococcal Vaccine: 65+ Years  Completed    COVID-19 Vaccine  Completed       Patient updates/concerns:    Pt continues to recover from back surgery. Pt states that although he was prepared for what to expect in recovery, the pain management has been more significant this time. Pt states that he is not so much in pain but experiences a constant discomfort. He states that he takes a half a norco daily and a muscle relaxer both in the evening and is able to sleep well and wake rested. Pt was instructed to wear the back brace and pt continues to do this for as long as he can tolerate. Pt states that he can typically keep it on all day despite the pressure and discomfort at incision site and will often take it off over night because it is interfering with his sleep. Pt will try and wear it at night again after the incision wound heals. Pt states that he was given a catheter and following removal was unable to urinate for three days. Pt states that urination is returning to normal but he is still experiences mild constipation because of norco use.    Pt appetite has returned to normal. He has been having some cereal in the morning and dinner is usually pretty light but well balanced. Pt understands the restrictions he has been given and has had no difficulty complying. Pt is under a lifting restriction of ten lbs and no bending or twisting. Pt will have a f/u Monday with surgeon to discuss further aftercare and how he expects physical therapy to be scheduled. Pt feels his balance is appropriate. He is comfortable without assistive device in the home but will use a walker when out of the house. Pt was prepared for about a 10 week recovery time. Pt confirmed that he sees dr Carly Martines for eyes. [facility] left message with request to forward most recent retinopathy result to pcp office. Pt had mri and f/u schedule don jan 4th. Pt did not have any new questions or concerns for pcp or nurses and declined [facility] assistance in scheduling hfu with pcp. Pt has f/u with surgeon on dec 11  Goals/Action Plan: Active goal from previous outreach: staying healthy    Patient reported progress towards goals: pt has recently had back surgery and is expecting an 10 week recovery. Pt has necessary f/u scheduled with all providers and pcp               - What: activity           - Where/When/How: pt expects to start physical therapy shortly after visit with specialist on dec 11  Patient Reported Barriers and Concerns: back surgery                   - Plan for overcoming barriers: recovering from back surgery fully     Care Managers Interventions: continue to provide encouragement and education for healthy coping and dx    Future Appointments:   Future Appointments   Date Time Provider Cindi Calhoun   1/29/2024  9:00 AM Gilda Scruggs MD [facility] 3 [facility] [place] Alonso Care Manager Follow Up Date: one month    Reason For Follow Up: review progress and or barriers towards patient's goals. Time Spent This Encounter Total: 31 min medical record review, telephone communication, care plan updates where needed, education, goals, and action plan recreation/update. Provided acknowledgment and validation to patient's concerns.    Monthly Minute Total including today: 31  Physical assessment, complete health history, and need for CCM established by Ursula Walker MD.

## 2023-12-13 ENCOUNTER — APPOINTMENT (OUTPATIENT)
Dept: CARDIOLOGY | Age: 73
End: 2023-12-13

## 2023-12-13 VITALS
HEART RATE: 46 BPM | WEIGHT: 252 LBS | BODY MASS INDEX: 30.69 KG/M2 | DIASTOLIC BLOOD PRESSURE: 74 MMHG | OXYGEN SATURATION: 98 % | HEIGHT: 76 IN | SYSTOLIC BLOOD PRESSURE: 123 MMHG

## 2023-12-13 DIAGNOSIS — I47.29 NSVT (NONSUSTAINED VENTRICULAR TACHYCARDIA) (CMD): ICD-10-CM

## 2023-12-13 DIAGNOSIS — I10 HYPERTENSION, BENIGN: ICD-10-CM

## 2023-12-13 DIAGNOSIS — E78.2 HYPERLIPIDEMIA, MIXED: ICD-10-CM

## 2023-12-13 DIAGNOSIS — I65.23 BILATERAL CAROTID ARTERY STENOSIS: ICD-10-CM

## 2023-12-13 DIAGNOSIS — E55.9 VITAMIN D DEFICIENCY: ICD-10-CM

## 2023-12-13 DIAGNOSIS — Z95.1 HX OF CABG: Primary | ICD-10-CM

## 2023-12-13 PROCEDURE — 99215 OFFICE O/P EST HI 40 MIN: CPT | Performed by: INTERNAL MEDICINE

## 2023-12-13 SDOH — HEALTH STABILITY: PHYSICAL HEALTH: ON AVERAGE, HOW MANY DAYS PER WEEK DO YOU ENGAGE IN MODERATE TO STRENUOUS EXERCISE (LIKE A BRISK WALK)?: 0 DAYS

## 2023-12-13 SDOH — HEALTH STABILITY: MENTAL HEALTH: DEPRESSION SCREENING SCORE: 0

## 2023-12-13 SDOH — HEALTH STABILITY: MENTAL HEALTH: FEELING DOWN, DEPRESSED OR HOPELESS?: NOT AT ALL

## 2023-12-13 SDOH — HEALTH STABILITY: MENTAL HEALTH: PHQ2 INTERPRETATION: NO FURTHER SCREENING NEEDED

## 2023-12-13 SDOH — HEALTH STABILITY: PHYSICAL HEALTH: ON AVERAGE, HOW MANY MINUTES DO YOU ENGAGE IN EXERCISE AT THIS LEVEL?: 0 MIN

## 2023-12-13 SDOH — HEALTH STABILITY: MENTAL HEALTH: LITTLE INTEREST OR PLEASURE IN ACTIVITY?: NOT AT ALL

## 2023-12-13 ASSESSMENT — PATIENT HEALTH QUESTIONNAIRE - PHQ9: SUM OF ALL RESPONSES TO PHQ9 QUESTIONS 1 AND 2: 0

## 2024-01-03 NOTE — PROGRESS NOTES
HPI:     Mt [place] is a 73 year old male with a PMH of meningioma, HTN, HL, DM, BONIFACIO (CPAP), CAD (s/p CABG x 2010), GERD, gout.  Following for:  1. BPH/LUTS  - no meds  2. H/o kidney stones  -  one URS in 2000, no other stones  - on 300 allopurinol  - 15 urocit ([place]), was 10 urocit since ~ 2000 following 24 h urine with Dr CRAWFORD but stopped d/t cost. Was also on chlorthalidone for extreme hypercalciuria but stopped a few y ago per Cards recommendation due to hypokalemia.  3. H/o hypogonadism  4. ED  - 100 mg viagra prn ([place])  - on 5, 20 mg cialis; 100 mg viagra in the past  5. Occasional mild jock itch  - resolves with OTC powder    PCP - Stu  Prior Urologist - Zahida (2017)  Card - [place]    LOV 1/17/2023   He is originally from [facility]. Had back surgery recently. Having nocturia and drinks EtOH prior to bedtime which probably contributes.  Had cardiac surgery in Nov 2023 and feels OK currently.    He is taking 15 urocit, allopurinol. No flank pain, hematuria, dysuria.    Snoring: none  Exercise: none 2/2 back issues  Fluids prior to bedtime: yes  Occupation: retired    AUA SS is 12/35 with 5 f; 4 n; 3 w. Mostly unhappy with LUTS but slightly bothered by nocturia.  Incontinence: mild PVD  Penoscrotal: persistent mild redness to b/l inguinal folds. Pt uses jock itch powder for this which works well. Declines lotrisone.  ISAI: > 40 g prostate, no nodules or tenderness    UA is negative and PVR is zero - was zero    Reported ~ 30 oz water, 20 scotch, 12 wine, 12 oz coffee, 12 juice with medium yellow urine. Still drinking about the same amount.    T 241 5/3/16     PSA 2.53 11/6/23    Renal US 1/18/23: 1.9 LLP likely hyperdense cyst (noted on CT in 2018). No other abnormalities   CT SP 10/23/18: no obvious stones in either kidney, 1.5 cm proteinaceous left renal cyst    Poor potency without meds and poor potency with viagra. Reported partial erections with viagra and cialis in the past but was expensive  prior to coupon. Not interested in trimix.    For nocturia, I'd recommend the patient drink plenty of fluids first thing in the morning and avoid drinking anything at least 2-3 hours prior to bedtime. If the patient takes diuretic I would recommend they take them first thing in the morning. If the patient takes NSAIDs I would consider they switch to taking prior to bedtime. I'd also encourage regular physical activity (for at least 30 minutes at least three times per week) as this has been shown to help with nocturia. Finally we discussed that setting a regular bedtime can help regulate nocturnal levels of melatonin and ADH, which can help with sleep and reduce nocturia.    He will try to increase water intake and avoid fluids prior to bedtime to help with nocturia. Will perhaps cut back on EtOH to help with OAB symptoms. Continue 15 urocit ([place]), allopurinol and he declines repeat 24 h urine for h/o stones.   Trial proscar for BPH/LUTS. F/u in 1 y and will check ISAI at that time.    Prior note:  Reports 1 y h/o LUTS. Most bothered by nocturia but thinks he gets up due to CPAP irritating him and drinking scotch.  Prior BPH/OAB meds: none    UTI hx: prostatitis ~ 50 y ago  Gross hematuria: none  Tobacco hx: 18 pack years, quit ~ 1980  Fam h/o  malignancy: none.    HISTORY:  Past Medical History:   Diagnosis Date    Abdominal pain     Back problem     Calculus of kidney     Coronary atherosclerosis     S/P QUADRUPLE CABG 2010    Esophageal reflux     Essential hypertension     Fatigue     Gout     Hearing loss     Heart palpitations     High blood pressure     High cholesterol     Night sweats     Sleep apnea     Sleep disturbance     Stress     Visual impairment     GLASSES    Wears glasses       Past Surgical History:   Procedure Laterality Date    BACK SURGERY      CABG      CABG, ARTERY-VEIN, FOUR  07/2010    QUADRUPLE    COLONOSCOPY  02/2012    COLONOSCOPY      FEMUR/KNEE SURG UNLISTED  1987 1998    ORBIT  SURGERY PROC UNLISTED      \"Closed treatment of orbital fracture (non-'blowout')\"    OTHER SURGICAL HISTORY N/A 2015    Procedure: EXCISION OF LESION/LIPOMA;  Surgeon: Aury Henderson MD;  Location: [facility] MAIN OR    SIGMOIDOSCOPY,DIAGNOSTIC      SINUS SURGERY        SPINE SURGERY PROCEDURE UNLISTED      LOWER BACK SURGERY X2-MICRODISCECTOMY AND LAMINECTOMY      Family History   Problem Relation Age of Onset    Heart Disease Father     Cancer Father     Breast Cancer Mother     Lung Disorder Maternal Grandmother     Heart Disease Paternal Grandmother         CAD    Cancer Paternal Grandfather     Breast Cancer Other     Alcohol and Other Disorders Associated Other     Heart Attack Other       Social History:   Social History     Socioeconomic History    Marital status:    Tobacco Use    Smoking status: Light Smoker     Packs/day: 2.00     Years: 17.00     Additional pack years: 0.00     Total pack years: 34.00     Types: Cigars, Cigarettes     Last attempt to quit: 1988     Years since quittin.0    Smokeless tobacco: Never    Tobacco comments:     2 cigar/month   Vaping Use    Vaping Use: Never used   Substance and Sexual Activity    Alcohol use: Yes     Alcohol/week: 12.0 standard drinks of alcohol     Types: 2 Glasses of wine, 2 Cans of beer, 8 Shots of liquor per week     Comment: 2 drinks of scotch daily    Drug use: No   Other Topics Concern     Service No    Caffeine Concern Yes     Comment: 1-2 coffee daily    Exercise Yes     Comment: PT 2x week, doing at home daily    Seat Belt Yes     Social Determinants of Health     Financial Resource Strain: Low Risk  (2023)    Financial Resource Strain     Difficulty of Paying Living Expenses: Not hard at all     Med Affordability: No   Food Insecurity: No Food Insecurity (2023)    Food Insecurity     Food Insecurity: Never true   Transportation Needs: No Transportation Needs (2023)    Transportation Needs     Lack of  Transportation: No   Physical Activity: Sufficiently Active (2/24/2023)    Exercise Vital Sign     Days of Exercise per Week: 2 days     Minutes of Exercise per Session: 80 min   Stress: No Stress Concern Present (2/24/2023)    Stress     Feeling of Stress : No   Social Connections: Socially Integrated (2/24/2023)    Social Connections     Frequency of Socialization with Friends and Family: 3   Housing Stability: Low Risk  (2/24/2023)    Housing Stability     Housing Instability: No        Medications (Active prior to today's visit):  Current Outpatient Medications   Medication Sig Dispense Refill    allopurinol 300 MG Oral Tab Take 1 tablet (300 mg total) by mouth daily. 90 tablet 3    Potassium Citrate ER (UROCIT-K 15) 15 MEQ (1620 MG) Oral Tab CR Take 1 tablet by mouth daily. 90 tablet 5    finasteride 5 MG Oral Tab Take 1 tablet (5 mg total) by mouth daily. 90 tablet 6    tiZANidine 2 MG Oral Tab Take 1-2 tablets (2-4 mg total) by mouth every 6 (six) hours as needed.      HYDROcodone-acetaminophen  MG Oral Tab Take 1 tablet by mouth every 4 (four) hours as needed for Pain.      calcitonin, salmon, 200 UNIT/ACT Nasal Solution SPRAY 1 SPRAY IN ALTERNATING NOSTRILS DAILY      Ascorbic Acid (VITAMIN C) 1000 MG Oral Tab Take 1 tablet (1,000 mg total) by mouth 2 (two) times daily with meals.      metoprolol succinate ER 25 MG Oral Tablet 24 Hr Take 1 tablet (25 mg total) by mouth daily.      pantoprazole 40 MG Oral Tab EC Take 1 tablet (40 mg total) by mouth daily. 90 tablet 3    CANNABIDIOL OR Take by mouth.      GLIPIZIDE 10 MG Oral Tab TAKE 1 TABLET BEFORE       BREAKFAST 90 tablet 3    Meloxicam 7.5 MG Oral Tab Take 1 tablet (7.5 mg total) by mouth daily. 90 tablet 0    furosemide 20 MG Oral Tab Take 1 tablet (20 mg total) by mouth 2 (two) times daily.      amLODIPine 5 MG Oral Tab Take 1 tablet (5 mg total) by mouth nightly. TAKE AT BEDTIME      losartan 50 MG Oral Tab Take 1 tablet (50 mg total) by mouth 2  (two) times daily.      Potassium Citrate ER (UROCIT-K 15) 15 MEQ (1620 MG) Oral Tab CR Take 1 tablet by mouth daily. 90 tablet 5    PEG 3350-KCl-Na Bicarb-NaCl 420 g Oral Recon Soln Take 4,000 mL by mouth As Directed. 4000 mL 0    Sildenafil Citrate 100 MG Oral Tab Take 1 tablet (100 mg total) by mouth daily as needed for Erectile Dysfunction. Take ~ one hour prior to sexual activity on an empty stomach 30 tablet 5    ketorolac 0.5 % Ophthalmic Solution       ofloxacin 0.3 % Ophthalmic Solution       prednisoLONE 1 % Ophthalmic Suspension       fenofibrate 48 MG Oral Tab       latanoprost 0.005 % Ophthalmic Solution       metoprolol succinate  MG Oral Tablet 24 Hr Take 1 tablet (100 mg total) by mouth daily.      rosuvastatin 40 MG Oral Tab       AZELASTINE HCL 0.1 % Nasal Solution USE 2 SPRAYS IN EACH       NOSTRIL TWICE DAILY 90 mL 1    Multiple Vitamins-Minerals (MULTIVITAMIN ADULT OR) Take 1 tablet by mouth daily.      acetaminophen 325 MG Oral Tab Take 2 tablets (650 mg total) by mouth every 6 (six) hours as needed.      aspirin 325 MG Oral Tab Take 1 tablet (325 mg total) by mouth daily.      Triamcinolone Acetonide (NASACORT) 55 MCG/ACT Nasal Aerosol 1 spray by Nasal route daily.         Allergies:  Allergies   Allergen Reactions    Atorvastatin MYALGIA     Oral    Carvedilol OTHER (SEE COMMENTS)     Oral, mental status changes    Pravastatin OTHER (SEE COMMENTS)     Oral    Seasonal OTHER (SEE COMMENTS)     rinoitis          ROS:     A comprehensive 10 point review of systems was completed.  Pertinent positives and negatives noted in the the HPI.    PHYSICAL EXAM:     GENERAL APPEARANCE: well, developed, well nourished, in no acute distress  NEUROLOGIC: nonfocal, alert and oriented  HEAD: normocephalic, atraumatic  EYES: sclera non-icteric  EARS: hearing intact  ORAL CAVITY: mucosa moist  NECK/THYROID: no obvious goiter or masses  LUNGS: nonlabored breathing  ABDOMEN: soft, no obvious masses or  tenderness  SKIN: no obvious rashes    : as noted above    ASSESSMENT/PLAN:   Diagnoses and all orders for this visit:    Urine finding  -     POC Urinalysis, Automated Dip without microscopy (PCA and EMMG ONLY) [53010]    BPH with obstruction/lower urinary tract symptoms  -     finasteride 5 MG Oral Tab; Take 1 tablet (5 mg total) by mouth daily.    Recurrent kidney stones  -     allopurinol 300 MG Oral Tab; Take 1 tablet (300 mg total) by mouth daily.    Recurrent UTI    Erectile dysfunction, unspecified erectile dysfunction type    Hypocitraturia  -     Potassium Citrate ER (UROCIT-K 15) 15 MEQ (1620 MG) Oral Tab CR; Take 1 tablet by mouth daily.    Nocturia    Skin rash    Gouty arthropathy    - as noted above.    Thanks again for this consult.    Isidoro Meneses MD, FACS  Urologist  [facility]  Office: [phone]

## 2024-01-11 ENCOUNTER — TELEPHONE (OUTPATIENT)
Dept: CARDIOLOGY | Age: 74
End: 2024-01-11

## 2024-01-15 ENCOUNTER — OFFICE VISIT (OUTPATIENT)
Dept: SURGERY | Facility: CLINIC | Age: 74
End: 2024-01-15
Payer: MEDICARE

## 2024-01-15 DIAGNOSIS — N40.1 BPH WITH OBSTRUCTION/LOWER URINARY TRACT SYMPTOMS: ICD-10-CM

## 2024-01-15 DIAGNOSIS — N52.9 ERECTILE DYSFUNCTION, UNSPECIFIED ERECTILE DYSFUNCTION TYPE: ICD-10-CM

## 2024-01-15 DIAGNOSIS — N13.8 BPH WITH OBSTRUCTION/LOWER URINARY TRACT SYMPTOMS: ICD-10-CM

## 2024-01-15 DIAGNOSIS — R82.991 HYPOCITRATURIA: ICD-10-CM

## 2024-01-15 DIAGNOSIS — R21 SKIN RASH: ICD-10-CM

## 2024-01-15 DIAGNOSIS — R82.90 URINE FINDING: Primary | ICD-10-CM

## 2024-01-15 DIAGNOSIS — N39.0 RECURRENT UTI: ICD-10-CM

## 2024-01-15 DIAGNOSIS — R35.1 NOCTURIA: ICD-10-CM

## 2024-01-15 DIAGNOSIS — M10.9 GOUTY ARTHROPATHY: ICD-10-CM

## 2024-01-15 DIAGNOSIS — N20.0 RECURRENT KIDNEY STONES: ICD-10-CM

## 2024-01-15 LAB
APPEARANCE: CLEAR
BILIRUBIN: NEGATIVE
GLUCOSE (URINE DIPSTICK): NEGATIVE MG/DL
LEUKOCYTES: NEGATIVE
MULTISTIX LOT#: ABNORMAL NUMERIC
NITRITE, URINE: NEGATIVE
OCCULT BLOOD: NEGATIVE
PH, URINE: 5.5 (ref 4.5–8)
PROTEIN (URINE DIPSTICK): 100 MG/DL
SPECIFIC GRAVITY: 1.02 (ref 1–1.03)
UROBILINOGEN,SEMI-QN: 2 MG/DL (ref 0–1.9)

## 2024-01-15 PROCEDURE — 99214 OFFICE O/P EST MOD 30 MIN: CPT | Performed by: UROLOGY

## 2024-01-15 PROCEDURE — 81003 URINALYSIS AUTO W/O SCOPE: CPT | Performed by: UROLOGY

## 2024-01-15 RX ORDER — ALLOPURINOL 300 MG/1
300 TABLET ORAL DAILY
Qty: 90 TABLET | Refills: 3 | Status: SHIPPED | OUTPATIENT
Start: 2024-01-15

## 2024-01-15 RX ORDER — POTASSIUM CITRATE 15 MEQ/1
1 TABLET, EXTENDED RELEASE ORAL DAILY
Qty: 90 TABLET | Refills: 5 | Status: SHIPPED | OUTPATIENT
Start: 2024-01-15

## 2024-01-15 RX ORDER — FINASTERIDE 5 MG/1
5 TABLET, FILM COATED ORAL DAILY
Qty: 90 TABLET | Refills: 6 | Status: SHIPPED | OUTPATIENT
Start: 2024-01-15

## 2024-01-15 NOTE — PATIENT INSTRUCTIONS
Ways to Reduce Nocturia (voiding frequently at night):    Try to drink plenty of water first thing in the morning. Avoid drinking anything at least 2-3 hours before bedtime. Unless you are on a fluid-restriction we typically recommend drinking 40-60 ounces water per day.  Avoid/limit dietary irritants such as alcohol, juice, sugary things, citrus fruits, soda, carbonated beverages, coffee (including decaf), tea, spicy foods.  Try to exercise at least 3 times per week for at least 30 minutes at a time. We recommend cardiovascular exercise such as jogging, elliptical, biking, speed-walking.  Go to bed around the same time every night. This helps maintain normal nightly levels of ADH and melatonin - both of which are needed for a good night's sleep.  Practice good sleep hygiene - Try to only use your bed for sleeping and sex. You should specifically try to avoid doing the following in bed: eating/drinking, reading, watching TV, or using your laptop/phone.  If you take a diuretic, you may benefit from taking this in the morning rather than the evening. Talk to your PCP if you do take a diuretic at night to see if you can switch.  If you take NSAIDs (such as motrin, aleve, ibuprofen, naproxen) you may benefit from taking this at night rather than the morning. These medications tell your kidneys not to work as hard for a few hours. I would NOT recommend taking extra doses of these medications just to sleep better at night.  Fluid accumulation in the lower extremities that gets reabsorbed into the circulation at night is another cause for nocturnal polyuria. If you accumulate fluid in your legs we recommend you try to ambulate/move around as much as safely possible and avoid prolonged sitting. If you are sitting we would also recommend you elevate your legs to prevent fluid from accumulating.

## 2024-01-23 ENCOUNTER — PATIENT OUTREACH (OUTPATIENT)
Dept: CASE MANAGEMENT | Age: 74
End: 2024-01-23

## 2024-01-23 DIAGNOSIS — E78.00 PURE HYPERCHOLESTEROLEMIA: ICD-10-CM

## 2024-01-23 DIAGNOSIS — E55.9 VITAMIN D DEFICIENCY: ICD-10-CM

## 2024-01-23 DIAGNOSIS — G47.33 OSA ON CPAP: ICD-10-CM

## 2024-01-23 DIAGNOSIS — I10 HYPERTENSION, BENIGN: ICD-10-CM

## 2024-01-23 DIAGNOSIS — M16.0 OSTEOARTHRITIS OF BOTH HIPS, UNSPECIFIED OSTEOARTHRITIS TYPE: ICD-10-CM

## 2024-01-23 DIAGNOSIS — R73.9 HYPERGLYCEMIA: ICD-10-CM

## 2024-01-23 DIAGNOSIS — E11.29 TYPE 2 DIABETES MELLITUS WITH MICROALBUMINURIA, WITHOUT LONG-TERM CURRENT USE OF INSULIN  (HCC): ICD-10-CM

## 2024-01-23 DIAGNOSIS — R13.10 DYSPHAGIA, UNSPECIFIED TYPE: Primary | ICD-10-CM

## 2024-01-23 DIAGNOSIS — R80.9 TYPE 2 DIABETES MELLITUS WITH MICROALBUMINURIA, WITHOUT LONG-TERM CURRENT USE OF INSULIN  (HCC): ICD-10-CM

## 2024-01-23 DIAGNOSIS — M10.9 GOUTY ARTHROPATHY: ICD-10-CM

## 2024-01-24 ENCOUNTER — TELEPHONE (OUTPATIENT)
Dept: FAMILY MEDICINE CLINIC | Facility: CLINIC | Age: 74
End: 2024-01-24

## 2024-01-24 DIAGNOSIS — N40.1 BPH ASSOCIATED WITH NOCTURIA: ICD-10-CM

## 2024-01-24 DIAGNOSIS — M10.9 GOUTY ARTHROPATHY: ICD-10-CM

## 2024-01-24 DIAGNOSIS — E11.29 TYPE 2 DIABETES MELLITUS WITH MICROALBUMINURIA, WITHOUT LONG-TERM CURRENT USE OF INSULIN  (HCC): ICD-10-CM

## 2024-01-24 DIAGNOSIS — R35.1 BPH ASSOCIATED WITH NOCTURIA: ICD-10-CM

## 2024-01-24 DIAGNOSIS — E78.00 PURE HYPERCHOLESTEROLEMIA: Primary | ICD-10-CM

## 2024-01-24 DIAGNOSIS — E55.9 VITAMIN D DEFICIENCY: ICD-10-CM

## 2024-01-24 DIAGNOSIS — R80.9 TYPE 2 DIABETES MELLITUS WITH MICROALBUMINURIA, WITHOUT LONG-TERM CURRENT USE OF INSULIN  (HCC): ICD-10-CM

## 2024-01-24 NOTE — TELEPHONE ENCOUNTER
Please enter lab orders for the patient's upcoming physical appointment.     Physical scheduled:   Your appointments       Date & Time Appointment Department ([place])    Jan 29, 2024  9:00 AM CST Medicare Annual Well Visit with Andrew Peraza MD [facility] ([facility])              [facility]  [address] [place] [address] [place] [id number]  [phone]           Preferred lab: [facility]     The patient has been notified to complete fasting labs prior to their physical appointment.

## 2024-01-24 NOTE — PROGRESS NOTES
Spoke to Mt for CCM.      Updates to patient care team/comments: UTD  Patient reported changes in medications: UTD  Med Adherence  Comment: pt taking medications as prescribed     Health Maintenance:   Health Maintenance   Topic Date Due    Lung Cancer Screening  Never done    Zoster Vaccines (2 of 3) 12/06/2012    Diabetes Care Dilated Eye Exam  01/25/2022    Annual Depression Screening  01/01/2024    Fall Risk Screening (Annual)  01/01/2024    Diabetes Care Foot Exam (Annual)  01/01/2024    Tobacco Cessation Counseling 1 Year  01/30/2024    Annual Physical  01/30/2024    Diabetes Care A1C  05/06/2024    Diabetes Care: GFR  11/06/2024    Diabetes Care: Microalb/Creat Ratio  11/06/2024    PSA  11/06/2025    Colorectal Cancer Screening  12/08/2025    Influenza Vaccine  Completed    Pneumococcal Vaccine: 65+ Years  Completed    COVID-19 Vaccine  Completed       Patient updates/concerns:    Spoke to pt for monthly ccm outreach   Pt has seen cardiology and was advised to begin tracking blood pressure results daily and share them with cardiology. Pt takes bp  about 30 minutes after waking and taking his medication. Pt is seeing readings around 130/80 consistently and cardiologist would like pt to be consistently closer to 120/70. Pt has not been advised to take medication differently, but the cardiologist will consider this based on continued results.    Pt has been recommended to increase water intake to about 6-8 glasses a day. Pt estimates that he drinks about 4-6 every day. Discussed with pt the benefits to drinking water first thing in the morning for help with metabolism. Pt feels that he can very easily add more water to his routine.    Pt is seeing pcp this week. Pt anticipates he will have A1c in the office. Pt did not have any questions for pcp or nurses ahead of upcoming visit.    Pt has picked up medication to treat enlarged prostate. Pt has not started it yet and was instructed by specialist that the  medication could take a few weeks before he can notice any improvement.    Pt saw his back doctor yesterday and they were satisfied with his progress. Pt states that during back surgery a nerve was damaged and the pain he feels is likely related to nerves. Pt has a routine of stretching exercises along with exercises using resistance bands. Specialist was satisfied with progress and amount of exercise pt is getting. Pt has not yet started the initial dosing of gabapentin, but will start this week. Discussed with pt the likelihood of developing some fatigue while taking medication. Pt verbalized understanding.    Pt discussed nocturia with urology. Pt was advised to stop consuming fluids 2-3 hours before bed. Pt feels he can comply. Pt takes his water pill only as needed based on swelling and does not consistently take it. Pt was advised that nsaids should be taken at night, but cardiology felt that he should keep taking it during the day, pt will continue taking during the day.    Discussed with pt his plan to get increased activity. Pt has elliptical and treadmill at home. Pt has been slow getting into a routine as he recovers from back surgery. Pt states that he can not yet tolerate any activity on elliptical but is able to get some work in on treadmill. Pt tries to walk the neighborhood when weather is nice Pt has resistance bands that he uses for abductor exercises and upper body/shoulder exercises. Pt feels confident in his gait and balance. Pt is going to start using fitness center. Discussed with pt benefits of water exercises, pt was apprehensive because he felt that it is not strenuous enough and does not burn enough caloris.    Pt has not had any difficulty with his gout recently. Pt feels it is well controlled with medication and on the occasional episode he has been successful in getting relief drinking cherry juice.    Pt is still able to manage pain with 1/2 a nor co and muscle relaxer in the a.am and  then 1/2  a nor co and muscle relaxer in the pm  Goals/Action Plan:    Active goal from previous outreach: activity    Patient reported progress towards goals: pt has started to get more active following back surgery. Pt has membership to fitness center, exercise bands and equipment at home               - What: walking treadmill           - Where/When/How: at least 3 times weekly  Patient Reported Barriers and Concerns: an/a                   - Plan for overcoming barriers: none    Care Managers Interventions: continue to provide encouragement and education for healthy coping and dx    Future Appointments:   Future Appointments   Date Time Provider Department Center   1/29/2024  9:00 AM Andrew Peraza MD [facility] [place]   1/15/2025  1:15 PM Isidoro Meneses MD [id number] [place] [facility]         Next Care Manager Follow Up Date: one month    Reason For Follow Up: review progress and or barriers towards patient's goals.     Time Spent This Encounter Total: 39 min medical record review, telephone communication, care plan updates where needed, education, goals, and action plan recreation/update. Provided acknowledgment and validation to patient's concerns.   Monthly Minute Total including today: 39  Physical assessment, complete health history, and need for CCM established by Andrew Peraza MD.

## 2024-01-29 ENCOUNTER — OFFICE VISIT (OUTPATIENT)
Dept: FAMILY MEDICINE CLINIC | Facility: CLINIC | Age: 74
End: 2024-01-29
Payer: MEDICARE

## 2024-01-29 VITALS
OXYGEN SATURATION: 96 % | RESPIRATION RATE: 16 BRPM | HEIGHT: 75 IN | DIASTOLIC BLOOD PRESSURE: 71 MMHG | HEART RATE: 50 BPM | WEIGHT: 250 LBS | SYSTOLIC BLOOD PRESSURE: 131 MMHG | BODY MASS INDEX: 31.08 KG/M2

## 2024-01-29 DIAGNOSIS — I10 HYPERTENSION, BENIGN: ICD-10-CM

## 2024-01-29 DIAGNOSIS — D49.6 NEOPLASM OF BRAIN CAUSING MASS EFFECT ON ADJACENT STRUCTURES (HCC): ICD-10-CM

## 2024-01-29 DIAGNOSIS — I25.10 CORONARY ARTERY DISEASE INVOLVING NATIVE CORONARY ARTERY OF NATIVE HEART WITHOUT ANGINA PECTORIS: ICD-10-CM

## 2024-01-29 DIAGNOSIS — E78.00 PURE HYPERCHOLESTEROLEMIA: ICD-10-CM

## 2024-01-29 DIAGNOSIS — Z95.1 HX OF CABG: ICD-10-CM

## 2024-01-29 DIAGNOSIS — I47.29 NSVT (NONSUSTAINED VENTRICULAR TACHYCARDIA) (HCC): ICD-10-CM

## 2024-01-29 DIAGNOSIS — R80.9 TYPE 2 DIABETES MELLITUS WITH MICROALBUMINURIA, WITHOUT LONG-TERM CURRENT USE OF INSULIN  (HCC): ICD-10-CM

## 2024-01-29 DIAGNOSIS — G47.33 OSA (OBSTRUCTIVE SLEEP APNEA): ICD-10-CM

## 2024-01-29 DIAGNOSIS — I65.21 STENOSIS OF RIGHT CAROTID ARTERY: ICD-10-CM

## 2024-01-29 DIAGNOSIS — E11.29 TYPE 2 DIABETES MELLITUS WITH MICROALBUMINURIA, WITHOUT LONG-TERM CURRENT USE OF INSULIN  (HCC): ICD-10-CM

## 2024-01-29 DIAGNOSIS — Z00.00 ENCOUNTER FOR ANNUAL HEALTH EXAMINATION: Primary | ICD-10-CM

## 2024-01-29 DIAGNOSIS — I70.0 ABDOMINAL AORTIC ATHEROSCLEROSIS (HCC): ICD-10-CM

## 2024-01-29 DIAGNOSIS — M10.9 GOUTY ARTHROPATHY: ICD-10-CM

## 2024-01-29 PROCEDURE — 1125F AMNT PAIN NOTED PAIN PRSNT: CPT | Performed by: FAMILY MEDICINE

## 2024-01-29 PROCEDURE — G0439 PPPS, SUBSEQ VISIT: HCPCS | Performed by: FAMILY MEDICINE

## 2024-01-29 RX ORDER — GABAPENTIN 300 MG/1
300 CAPSULE ORAL NIGHTLY
COMMUNITY
Start: 2024-01-22 | End: 2024-03-27

## 2024-01-29 NOTE — PROGRESS NOTES
Subjective:   Mt Obregon is a 73 year old male who presents for a Medicare Subsequent Annual Wellness visit (Pt already had Initial Annual Wellness) and scheduled follow up of multiple significant but stable problems.   Preventative Screening  Colonoscopy - 12/2022.  Repeat 3 yrs.   Prostate - 2.53 in Nov.   Immunizations - flu UTD.  COVID UTD.  PNA UTD x 2.      Heart - CAD, Carotid stenosis, HTN, HLD.  Lipids at goal on recent testing.  Sees Dr. Yao for heart rhythm. PVC, NSVT.  Also Dr. Nunez who was concerned about heart rate being too low.    BP at home average 131/71.      DM2 - controlled in Nov. 6.6.      Brain - neoplasm on brain.  S/p surgery 5/2020.  Seen again on imaging 11/2023 with neuro team.     Gout - on allopurinol.      BONIFACIO - off CPAP again.  Would wake up on the machine too many times due to leaks.  .      Back - s/p surgery, but during the procedure they nicked the femoral nerve, and this is currently the biggest issue.  Gets a shooting burning pain down the R leg along the nerve path.  Still wearing the brace.  Planning to start rehab in a few weeks.      History/Other:   Fall Risk Assessment:   He has been screened for Falls and is High Risk. Fall Prevention information provided to patient in After Visit Summary.    Do you feel unsteady when standing or walking?: No  Do you worry about falling?: No  Have you fallen in the past year?: Yes  How many times have you fallen?: 1  Were you injured?: No     Cognitive Assessment:   He had a completely normal cognitive assessment - see flowsheet entries     Functional Ability/Status:   Mt Obregon has some abnormal functions as listed below:  He has Vision problems based on screening of functional status.       Depression Screening (PHQ-2/PHQ-9): PHQ-9 TOTAL SCORE: 3  , done 1/29/2024   Little interest or pleasure in doing things: 1    Trouble falling or staying asleep, or sleeping too much: 1     Feeling tired or having little  energy: 1    If you checked off any problems, how difficult have these problems made it for you to do your work, take care of things at home, or get along with other people?: Not difficult at all            Advanced Directives:   He does have a Living Will but we do NOT have it on file in Epic.    He does have a POA but we do NOT have it on file in Epic.    Discussed Advance Care Planning with patient (and family/surrogate if present). Standard forms made available to patient in After Visit Summary.      Patient Active Problem List   Diagnosis    Esophageal reflux    Pure hypercholesterolemia    Hypertension, benign    Gouty arthropathy    Allergic rhinitis    Coronary artery disease involving native coronary artery of native heart without angina pectoris    Rotator cuff syndrome, left    Spondylosis of cervical region without myelopathy or radiculopathy    Erectile dysfunction    Piriformis syndrome of both sides    Osteoarthritis of both hips, unspecified osteoarthritis type    Carotid artery stenosis    Hx of CABG    Type 2 diabetes mellitus with microalbuminuria  (HCC)    Vitamin D deficiency    Dysphagia    Hypokalemia    Hyperglycemia    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    Current Medications:  Outpatient Medications Marked as Taking for the 1/29/24 encounter (Office Visit) with Andrew Peraza MD   Medication Sig    gabapentin 300 MG Oral Cap Take 1 capsule (300 mg total) by mouth nightly.    allopurinol 300 MG Oral Tab Take 1 tablet (300 mg total) by mouth daily.    Potassium Citrate ER (UROCIT-K 15) 15 MEQ (1620 MG)  Oral Tab CR Take 1 tablet by mouth daily.    finasteride 5 MG Oral Tab Take 1 tablet (5 mg total) by mouth daily.    tiZANidine 2 MG Oral Tab Take 1-2 tablets (2-4 mg total) by mouth every 6 (six) hours as needed.    HYDROcodone-acetaminophen  MG Oral Tab Take 1 tablet by mouth every 4 (four) hours as needed for Pain.    calcitonin, salmon, 200 UNIT/ACT Nasal Solution SPRAY 1 SPRAY IN ALTERNATING NOSTRILS DAILY    Ascorbic Acid (VITAMIN C) 1000 MG Oral Tab Take 1 tablet (1,000 mg total) by mouth 2 (two) times daily with meals.    metoprolol succinate ER 25 MG Oral Tablet 24 Hr Take 1 tablet (25 mg total) by mouth daily.    pantoprazole 40 MG Oral Tab EC Take 1 tablet (40 mg total) by mouth daily.    CANNABIDIOL OR Take by mouth.    GLIPIZIDE 10 MG Oral Tab TAKE 1 TABLET BEFORE       BREAKFAST    Meloxicam 7.5 MG Oral Tab Take 1 tablet (7.5 mg total) by mouth daily.    furosemide 20 MG Oral Tab Take 1 tablet (20 mg total) by mouth 2 (two) times daily.    amLODIPine 5 MG Oral Tab Take 1 tablet (5 mg total) by mouth nightly. TAKE AT BEDTIME    losartan 50 MG Oral Tab Take 1 tablet (50 mg total) by mouth 2 (two) times daily.    Sildenafil Citrate 100 MG Oral Tab Take 1 tablet (100 mg total) by mouth daily as needed for Erectile Dysfunction. Take ~ one hour prior to sexual activity on an empty stomach    ketorolac 0.5 % Ophthalmic Solution     ofloxacin 0.3 % Ophthalmic Solution     prednisoLONE 1 % Ophthalmic Suspension     fenofibrate 48 MG Oral Tab     latanoprost 0.005 % Ophthalmic Solution     metoprolol succinate  MG Oral Tablet 24 Hr Take 1 tablet (100 mg total) by mouth daily.    rosuvastatin 40 MG Oral Tab     AZELASTINE HCL 0.1 % Nasal Solution USE 2 SPRAYS IN EACH       NOSTRIL TWICE DAILY    Multiple Vitamins-Minerals (MULTIVITAMIN ADULT OR) Take 1 tablet by mouth daily.    acetaminophen 325 MG Oral Tab Take 2 tablets (650 mg total) by mouth every 6 (six) hours as needed.    aspirin 325 MG Oral  Tab Take 1 tablet (325 mg total) by mouth daily.    Triamcinolone Acetonide (NASACORT) 55 MCG/ACT Nasal Aerosol 1 spray by Nasal route daily.       Medical History:  He  has a past medical history of Abdominal pain, Back problem, Calculus of kidney, Coronary atherosclerosis, Esophageal reflux, Essential hypertension, Fatigue, Gout, Hearing loss, Heart palpitations, High blood pressure, High cholesterol, Night sweats, Sleep apnea, Sleep disturbance, Stress, Visual impairment, and Wears glasses.  Surgical History:  He  has a past surgical history that includes orbit surgery proc unlisted (); colonoscopy (2012); femur/knee surg unlisted (1987); sinus surgery   (); cabg, artery-vein, four (2010); spine surgery procedure unlisted; other surgical history (N/A, 2015); back surgery; colonoscopy; sigmoidoscopy,diagnostic; and cabg.   Family History:  His family history includes Alcohol and Other Disorders Associated in an other family member; Breast Cancer in his mother and another family member; Cancer in his father and paternal grandfather; Heart Attack in an other family member; Heart Disease in his father and paternal grandmother; Lung Disorder in his maternal grandmother.  Social History:  He  reports that he has been smoking cigars and cigarettes. He has a 34 pack-year smoking history. He has never used smokeless tobacco. He reports current alcohol use of about 12.0 standard drinks of alcohol per week. He reports that he does not use drugs.    Tobacco:  He currently smokes tobacco.  Social History    Tobacco Use      Smoking status: Light Smoker        Packs/day: 2.00        Years: 17.00        Additional pack years: 0.00        Total pack years: 34.00        Types: Cigars, Cigarettes        Last attempt to quit: 1988        Years since quittin.1      Smokeless tobacco: Never      Tobacco comments: 2 cigar/month     Tobacco Cessation Documentation (Smoking and Smokeless included):  I  had an in depth therapy session with Mt Obregon about his tobacco use risks and options using the [employer]PSTF's Five A's approach:    Ask: Mt is using tobacco products.  Assess: We asked about/assessed behavioral health risk and factors affecting choice of behavior change goals/methods.  Specifically I asked about readiness to quit tobacco.  Advise: We gave clear, specific, and personalized behavior change advice, including information about personal health harms and benefits. Specifically, he was told that Quitting tobacco is one of the best things he can do for his health. I strongly encouraged him to quit.      Agree: We collaboratively selected appropriate treatment goals and methods based on the patient’s interest in and willingness to change the behavior.   Assist: We used behavior change techniques (self-help and/or counseling), to aid Mt in achieving agreed-upon goals by acquiring the skills, confidence, and social/environmental supports for behavior change, supplemented with adjunctive medical treatments when appropriate. Additionally, national quitting tobacco aides were discussed.   Arrange: Follow up at next visit- see specific follow up below.    Time Counseled: <1 minutes       CAGE Alcohol Screen:   He has been screened for alcohol abuse and his score is not 0:  Cut: Have you ever felt you should Cut down on your drinking?: Yes  Annoyed: Have people Annoyed you by criticizing your drinking?: No  Guilty: Have you ever felt bad or Guilty about your drinking?: Yes  Eye Opener: Have you ever had a drink first thing in the morning to steady your nerves or to get rid of a hangover (Eye opener)?: No  Total Score: 2      Patient Care Team:  Andrew Peraza MD as PCP - General (Family Medicine)  Luis Nunez MD (CARDIOLOGY)  Aury Henderson MD as Surgeon (SURGERY, GENERAL)  Renetta Lea CMA as CCM Care Manager  Kailee Richardson MD (OPHTHALMOLOGY)  Lauro Diop PT as Physical  Therapist (Physical Therapy)    Review of Systems  Constitutional: negative  Eyes: negative  Ears, nose, mouth, throat, and face: negative  Respiratory: negative  Cardiovascular: negative  Gastrointestinal: negative  Genitourinary: negative  Neurological: negative      Objective:   Physical Exam  General Appearance:  Alert, cooperative, no distress, appears stated age   Head:  Normocephalic, without obvious abnormality, atraumatic   Eyes:  PERRL, conjunctiva/corneas clear, EOM's intact   Neck: Supple, symmetrical, trachea midline, no adenopathy   Back:   Wearing lumbar brace   Lungs:   Clear to auscultation bilaterally, respirations unlabored   Chest Wall:  Wearing brace   Heart:  Regular rate and rhythm, S1, S2 normal, no murmur, rub or gallop   Abdomen:   Soft, non-tender, bowel sounds active all four quadrants,  no masses, no organomegaly   Extremities: Extremities normal, atraumatic, no cyanosis or edema   Pulses: 2+ and symmetric   Skin: Skin color, texture, turgor normal, no rashes or lesions   Neurologic: Normal      /71   Pulse 50   Resp 16   Ht 6' 3\" (1.905 m)   Wt 250 lb (113.4 kg)   SpO2 96%   BMI 31.25 kg/m²  Estimated body mass index is 31.25 kg/m² as calculated from the following:    Height as of this encounter: 6' 3\" (1.905 m).    Weight as of this encounter: 250 lb (113.4 kg).    Medicare Hearing Assessment:   Hearing Screening    Time taken: 1/29/2024  9:09 AM  Screening Method: Whisper Test  Whisper Test Result: Pass               Visual Acuity:   Right Eye Visual Acuity: Corrected Right Eye Chart Acuity: 20/20   Left Eye Visual Acuity: Corrected Left Eye Chart Acuity: 20/100   Both Eyes Visual Acuity: Corrected Both Eyes Chart Acuity: 20/20   Able To Tolerate Visual Acuity: Yes        Assessment & Plan:     Mt Brunnerjennifer was seen in the office today:  had concerns including Physical (MAW).    1. Encounter for annual health examination  Overall well  Main issue now is the back  surgery, and pending recovery, PT  Other medical conditions controlled  Will check labs in 6 mo    2. Coronary artery disease involving native coronary artery of native heart without angina pectoris  3. Hx of CABG  4. Hypertension, benign  5. NSVT (nonsustained ventricular tachycardia) (HCC)  6. Pure hypercholesterolemia  7. Stenosis of right carotid artery  8. Abdominal aortic atherosclerosis (HCC)  Following with cardio team  RRR today  Denies any chest, heart symptoms.   Continue to see specialists    9. Type 2 diabetes mellitus with microalbuminuria, without long-term current use of insulin  (HCC)  Controlled on labs prior to surgery  No changes.     10. BONIFACIO not on CPAP  Was unable to get good night sleep with machine  No current use.     11. 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     12. Gouty arthropathy  On prevention meds.  Watch diet.             The patient indicates understanding of these issues and agrees to the plan.  Reinforced healthy diet, lifestyle, and exercise.      Return in 6 months (on 7/29/2024).     Andrew Peraza MD, 1/29/2024     Supplementary Documentation:   General Health:  In the past six months, have you lost more than 10 pounds without trying?: 2 - No  Has your appetite been poor?: No  Type of Diet: Balanced  How does the patient maintain a good energy level?: Stretching  How would you describe your daily physical activity?: Light  How would you describe your current health state?: Good  How do you maintain positive mental well-being?: Visiting Family  On a scale of 0 to 10, with 0 being no pain and 10 being severe pain, what is your pain level?: 3 - (Mild)  In the past six months, have you experienced urine leakage?: 0-No  At any time do you feel concerned for the safety/well-being of yourself and/or your children, in your home or elsewhere?: No  Have  you had any immunizations at another office such as Influenza, Hepatitis B, Tetanus, or Pneumococcal?: Yes        Mt Obregon's SCREENING SCHEDULE   Tests on this list are recommended by your physician but may not be covered, or covered at this frequency, by your insurer.   Please check with your insurance carrier before scheduling to verify coverage.   PREVENTATIVE SERVICES FREQUENCY &  COVERAGE DETAILS LAST COMPLETION DATE   Diabetes Screening    Fasting Blood Sugar / Glucose    One screening every 12 months if never tested or if previously tested but not diagnosed with pre-diabetes   One screening every 6 months if diagnosed with pre-diabetes Lab Results   Component Value Date     (H) 11/06/2023        Cardiovascular Disease Screening    Lipid Panel  Cholesterol  Lipoprotein (HDL)  Triglycerides Covered every 5 years for all Medicare beneficiaries without apparent signs or symptoms of cardiovascular disease Lab Results   Component Value Date    CHOLEST 140 11/06/2023    HDL 41 11/06/2023    LDL 61 11/06/2023    LDLD 0 03/30/2022    TRIG 233 (H) 11/06/2023         Electrocardiogram (EKG)   Covered if needed at Welcome to Medicare, and non-screening if indicated for medical reasons 12/18/2015      Ultrasound Screening for Abdominal Aortic Aneurysm (AAA) Covered once in a lifetime for one of the following risk factors    Men who are 65-75 years old and have ever smoked    Anyone with a family history -     Colorectal Cancer Screening  Covered for ages 50-85; only need ONE of the following:    Colonoscopy   Covered every 10 years    Covered every 2 years if patient is at high risk or previous colonoscopy was abnormal 12/08/2022    Health Maintenance   Topic Date Due    Colorectal Cancer Screening  12/08/2025       Flexible Sigmoidoscopy   Covered every 4 years -    Fecal Occult Blood Test Covered annually -   Prostate Cancer Screening    Prostate-Specific Antigen (PSA) Annually Lab Results   Component  Value Date    PSA 2.53 11/06/2023     Health Maintenance   Topic Date Due    PSA  11/06/2025      Immunizations    Influenza Covered once per flu season  Please get every year 11/01/2023  No recommendations at this time    Pneumococcal Each vaccine (Tltvskz25 & Efhkrjiar22) covered once after 65 Prevnar 13: 11/06/2018    [id number]: 06/10/2015     No recommendations at this time    Hepatitis B One screening covered for patients with certain risk factors   -  No recommendations at this time    Tetanus Toxoid Not covered by Medicare Part B unless medically necessary (cut with metal); may be covered with your pharmacy prescription benefits -    Tetanus, Diptheria and Pertusis TD and TDaP Not covered by Medicare Part B -  No recommendations at this time    Zoster Not covered by Medicare Part B; may be covered with your pharmacy  prescription benefits 10/11/2012  Zoster Vaccines(2 of 3) due on 12/06/2012     Diabetes      Hemoglobin A1C Annually; if last result is elevated, may be asked to retest more frequently.    Medicare covers every 3 months Lab Results   Component Value Date     (H) 11/06/2023    A1C 6.6 (H) 11/06/2023       No recommendations at this time    Creat/alb ratio Annually Lab Results   Component Value Date    MICROALBCREA 136.6 (H) 11/06/2023    MALBCRECALC 6.0 10/05/2012       LDL Annually Lab Results   Component Value Date    LDL 61 11/06/2023       Dilated Eye Exam Annually Last Diabetic Eye Exam:  No data recorded  No data recorded       Annual Monitoring of Persistent Medications (ACE/ARB, digoxin diuretics, anticonvulsants)    Potassium Annually Lab Results   Component Value Date    K 4.0 11/06/2023         Creatinine   Annually Lab Results   Component Value Date    CREATSERUM 1.23 11/06/2023         BUN Annually Lab Results   Component Value Date    BUN 16 11/06/2023       Drug Serum Conc Annually No results found for: \"DIGOXIN\", \"DIG\", \"VALP\"

## 2024-01-29 NOTE — PATIENT INSTRUCTIONS
Mt Obregon's SCREENING SCHEDULE   Tests on this list are recommended by your physician but may not be covered, or covered at this frequency, by your insurer.   Please check with your insurance carrier before scheduling to verify coverage.   PREVENTATIVE SERVICES FREQUENCY &  COVERAGE DETAILS LAST COMPLETION DATE   Diabetes Screening    Fasting Blood Sugar / Glucose    One screening every 12 months if never tested or if previously tested but not diagnosed with pre-diabetes   One screening every 6 months if diagnosed with pre-diabetes Lab Results   Component Value Date     (H) 11/06/2023        Cardiovascular Disease Screening    Lipid Panel  Cholesterol  Lipoprotein (HDL)  Triglycerides Covered every 5 years for all Medicare beneficiaries without apparent signs or symptoms of cardiovascular disease Lab Results   Component Value Date    CHOLEST 140 11/06/2023    HDL 41 11/06/2023    LDL 61 11/06/2023    LDLD 0 03/30/2022    TRIG 233 (H) 11/06/2023         Electrocardiogram (EKG)   Covered if needed at Welcome to Medicare, and non-screening if indicated for medical reasons 12/18/2015      Ultrasound Screening for Abdominal Aortic Aneurysm (AAA) Covered once in a lifetime for one of the following risk factors   • Men who are 65-75 years old and have ever smoked   • Anyone with a family history -     Colorectal Cancer Screening  Covered for ages 50-85; only need ONE of the following:    Colonoscopy   Covered every 10 years    Covered every 2 years if patient is at high risk or previous colonoscopy was abnormal 12/08/2022    Health Maintenance   Topic Date Due   • Colorectal Cancer Screening  12/08/2025       Flexible Sigmoidoscopy   Covered every 4 years -    Fecal Occult Blood Test Covered annually -   Prostate Cancer Screening    Prostate-Specific Antigen (PSA) Annually Lab Results   Component Value Date    PSA 2.53 11/06/2023     Health Maintenance   Topic Date Due   • PSA  11/06/2025      Immunizations     Influenza Covered once per flu season  Please get every year 11/01/2023  No recommendations at this time    Pneumococcal Each vaccine (Gdcyoyl89 & [id number]) covered once after 65 Prevnar 13: 11/06/2018    [id number]: 06/10/2015     No recommendations at this time    Hepatitis B One screening covered for patients with certain risk factors   -  No recommendations at this time    Tetanus Toxoid Not covered by Medicare Part B unless medically necessary (cut with metal); may be covered with your pharmacy prescription benefits -    Tetanus, Diptheria and Pertusis TD and TDaP Not covered by Medicare Part B -  No recommendations at this time    Zoster Not covered by Medicare Part B; may be covered with your pharmacy  prescription benefits 10/11/2012  Zoster Vaccines(2 of 3) due on 12/06/2012     Diabetes      Hemoglobin A1C Annually; if last result is elevated, may be asked to retest more frequently.    Medicare covers every 3 months Lab Results   Component Value Date     (H) 11/06/2023    A1C 6.6 (H) 11/06/2023       No recommendations at this time    Creat/alb ratio Annually Lab Results   Component Value Date    MICROALBCREA 136.6 (H) 11/06/2023    MALBCRECALC 6.0 10/05/2012       LDL Annually Lab Results   Component Value Date    LDL 61 11/06/2023       Dilated Eye Exam Annually [unfilled]     Annual Monitoring of Persistent Medications (ACE/ARB, digoxin diuretics, anticonvulsants)    Potassium Annually Lab Results   Component Value Date    K 4.0 11/06/2023         Creatinine   Annually Lab Results   Component Value Date    CREATSERUM 1.23 11/06/2023         BUN Annually Lab Results   Component Value Date    BUN 16 11/06/2023       Drug Serum Conc Annually No results found for: \"DIGOXIN\", \"DIG\", \"VALP\"

## 2024-01-31 ENCOUNTER — TELEPHONE (OUTPATIENT)
Dept: CARDIOLOGY | Age: 74
End: 2024-01-31

## 2024-02-16 RX ORDER — LOSARTAN POTASSIUM 50 MG/1
TABLET ORAL
Qty: 180 TABLET | Refills: 3 | Status: SHIPPED | OUTPATIENT
Start: 2024-02-16

## 2024-02-16 RX ORDER — AMLODIPINE BESYLATE 5 MG/1
TABLET ORAL
Qty: 180 TABLET | Refills: 3 | Status: SHIPPED | OUTPATIENT
Start: 2024-02-16

## 2024-02-22 ENCOUNTER — PATIENT OUTREACH (OUTPATIENT)
Dept: CASE MANAGEMENT | Age: 74
End: 2024-02-22

## 2024-02-22 DIAGNOSIS — I10 HYPERTENSION, BENIGN: ICD-10-CM

## 2024-02-22 DIAGNOSIS — E11.29 TYPE 2 DIABETES MELLITUS WITH MICROALBUMINURIA, WITHOUT LONG-TERM CURRENT USE OF INSULIN (HCC): ICD-10-CM

## 2024-02-22 DIAGNOSIS — E78.00 PURE HYPERCHOLESTEROLEMIA: ICD-10-CM

## 2024-02-22 DIAGNOSIS — E55.9 VITAMIN D DEFICIENCY: ICD-10-CM

## 2024-02-22 DIAGNOSIS — R80.9 TYPE 2 DIABETES MELLITUS WITH MICROALBUMINURIA, WITHOUT LONG-TERM CURRENT USE OF INSULIN (HCC): ICD-10-CM

## 2024-02-22 DIAGNOSIS — I25.10 CORONARY ARTERY DISEASE INVOLVING NATIVE CORONARY ARTERY OF NATIVE HEART WITHOUT ANGINA PECTORIS: ICD-10-CM

## 2024-02-22 DIAGNOSIS — G47.33 OSA ON CPAP: ICD-10-CM

## 2024-02-22 DIAGNOSIS — M16.0 OSTEOARTHRITIS OF BOTH HIPS, UNSPECIFIED OSTEOARTHRITIS TYPE: Primary | ICD-10-CM

## 2024-02-22 DIAGNOSIS — K21.9 GASTROESOPHAGEAL REFLUX DISEASE, UNSPECIFIED WHETHER ESOPHAGITIS PRESENT: ICD-10-CM

## 2024-02-23 ENCOUNTER — TELEPHONE (OUTPATIENT)
Dept: FAMILY MEDICINE CLINIC | Facility: CLINIC | Age: 74
End: 2024-02-23

## 2024-02-23 NOTE — TELEPHONE ENCOUNTER
Pt never completed labs prior to [facility].    Do you want him to get those now, or closer to his f/u in July.    Please advise    Thank You    Routed to Dr Peraza

## 2024-02-23 NOTE — PROGRESS NOTES
Spoke to Mt for CCM.      Updates to patient care team/comments: UTD  Patient reported changes in medications: UTD  Med Adherence  Comment: Pt taking medications as prescribed     Health Maintenance:   Health Maintenance   Topic Date Due    Lung Cancer Screening  Never done    Zoster Vaccines (2 of 3) 12/06/2012    Diabetes Care Dilated Eye Exam  01/25/2022    Tobacco Cessation Counseling 1 Year  01/30/2024    Diabetes Care A1C  05/06/2024    Diabetes Care Foot Exam  07/28/2024    Diabetes Care: GFR  11/06/2024    Diabetes Care: Microalb/Creat Ratio  11/06/2024    Annual Physical  01/29/2025    PSA  11/06/2025    Colorectal Cancer Screening  12/08/2025    Influenza Vaccine  Completed    Annual Depression Screening  Completed    Fall Risk Screening (Annual)  Completed    Pneumococcal Vaccine: 65+ Years  Completed    COVID-19 Vaccine  Completed       Patient updates/concerns:    Spoke to pt for monthly Outreach   Pt is no longer being asked to track blood pressure. Pt was diligently taking it 3 times a day for more than 10 days. Following review specialist was satisfied and did not continue request or change medications.    Pt has been seen by pcp for annual visit. Pt was asked to follow up in six months. Pt did not have labs completed prior to visit and requested ccm assistance in determining if he should have them done now or if he should wait until July. Ccm will f/u with pcp instruction.   Pt has started seeing duly PT and estimates he has six more sessions in this round. Pt has very limited allocation from insurance policy and wants to insure he gets the most comprehensive set of exercises possible. Pt is stretching now and he wants to start muscle building exercises but he would like all of his exercises to be able to be done at home and not rely on machines that he would only use periodically. Pt feels this gives him the best chance to develop a sustainable routine.   Pt still has hydrocodone if he feels  his back soreness developers into a pain. Right now pt has avoided taking muscle relaxer or hydrocodone in favor of ibuprofen.   Pt has started to golf again. He can tolerate all the movement but has been sore in knees hips and lower back. Pt feels that this will improve the more he plays. Pt states that the soreness never developers into pain.   Pt continues with allopurinol and states that PCP will no longer manage. Pt recent rx came from dr meneses.    Pt will be travelling to [place] for the month of march and states he will not require ccm f/u until he returns. Pt has reviewed medications and has enough. Pt new rx is [facility]  Goals/Action Plan:    Active goal from previous outreach: exercise    Patient reported progress towards goals: pt has become more active and has started a PT program to develop and at home strength routine. Pt has started to golf as well and will make occasional visits to health club.                - What: walking            - Where/When/How: pt has treadmill he was trying to use frequently  Patient Reported Barriers and Concerns: n/a                   - Plan for overcoming barriers: none    Care Managers Interventions: continue to provide encouragement and education for healthy coping and dx    Future Appointments:   Future Appointments   Date Time Provider Department Center   7/29/2024 10:00 AM Andrew Peraza MD [facility]   1/15/2025  1:15 PM Isidoro Meneses MD [id number]         Next Care Manager Follow Up Date: one month    Reason For Follow Up: review progress and or barriers towards patient's goals.     Time Spent This Encounter Total: 34 min medical record review, telephone communication, care plan updates where needed, education, goals, and action plan recreation/update. Provided acknowledgment and validation to patient's concerns.   Monthly Minute Total including today: 34  Physical assessment, complete health history, and need for CCM established by Andrew  MD Stu.

## 2024-02-24 ENCOUNTER — EXTERNAL LAB (OUTPATIENT)
Dept: OTHER | Age: 74
End: 2024-02-24

## 2024-02-24 LAB
25(OH)D3+25(OH)D2 SERPL-MCNC: 59 NG/ML (ref 30–100)
ALBUMIN SERPL-MCNC: 4.1 G/DL (ref 3.6–5.1)
ALBUMIN/GLOB SERPL: 1.5 (CALC) (ref 1–2.5)
ALP SERPL-CCNC: 64 U/L (ref 35–144)
ALT SERPL-CCNC: 17 U/L (ref 9–46)
AST SERPL-CCNC: 21 U/L (ref 10–35)
BILIRUB SERPL-MCNC: 0.5 MG/DL (ref 0.2–1.2)
BUN SERPL-MCNC: 22 MG/DL (ref 7–25)
BUN/CREAT SERPL: ABNORMAL (CALC) (ref 6–22)
CALCIUM SERPL-MCNC: 9.7 MG/DL (ref 8.6–10.3)
CHLORIDE SERPL-SCNC: 104 MMOL/L (ref 98–110)
CHOLEST SERPL-MCNC: 151 MG/DL
CHOLEST/HDLC SERPL: 3.2 (CALC)
CO2 SERPL-SCNC: 29 MMOL/L (ref 20–32)
CREAT SERPL-MCNC: 1.1 MG/DL (ref 0.7–1.28)
CREAT UR-MCNC: 132 MG/DL (ref 20–320)
GFR SERPLBLD SCHWARTZ-ARVRAT: 71 ML/MIN/1.73M2
GLOBULIN SER-MCNC: 2.8 G/DL (CALC) (ref 1.9–3.7)
GLUCOSE SERPL-MCNC: 127 MG/DL (ref 65–99)
HBA1C MFR BLD: 6.1 % OF TOTAL HGB
HDLC SERPL-MCNC: 47 MG/DL
LDLC SERPL CALC-MCNC: 76 MG/DL (CALC)
LENGTH OF FAST TIME PATIENT: YES H
LENGTH OF FAST TIME PATIENT: YES H
MICROALBUMIN UR-MCNC: 13.5 MG/DL
MICROALBUMIN/CREAT UR: 102 MCG/MG CREAT
NONHDLC SERPL-MCNC: 104 MG/DL (CALC)
POTASSIUM SERPL-SCNC: 4.3 MMOL/L (ref 3.5–5.3)
PROT SERPL-MCNC: 6.9 G/DL (ref 6.1–8.1)
PSA SERPL-MCNC: 1.5 NG/ML
SODIUM SERPL-SCNC: 141 MMOL/L (ref 135–146)
TRIGL SERPL-MCNC: 188 MG/DL
URATE SERPL-MCNC: 3.7 MG/DL (ref 4–8)

## 2024-02-26 LAB
ALBUMIN/GLOBULIN RATIO: 1.5 (CALC) (ref 1–2.5)
ALBUMIN: 4.1 G/DL (ref 3.6–5.1)
ALKALINE PHOSPHATASE: 64 U/L (ref 35–144)
ALT: 17 U/L (ref 9–46)
AST: 21 U/L (ref 10–35)
BILIRUBIN, TOTAL: 0.5 MG/DL (ref 0.2–1.2)
BUN: 22 MG/DL (ref 7–25)
CALCIUM: 9.7 MG/DL (ref 8.6–10.3)
CARBON DIOXIDE: 29 MMOL/L (ref 20–32)
CHLORIDE: 104 MMOL/L (ref 98–110)
CHOL/HDLC RATIO: 3.2 (CALC)
CHOLESTEROL, TOTAL: 151 MG/DL
CREATININE, RANDOM URINE: 132 MG/DL (ref 20–320)
CREATININE: 1.1 MG/DL (ref 0.7–1.28)
EGFR: 71 ML/MIN/1.73M2
GLOBULIN: 2.8 G/DL (CALC) (ref 1.9–3.7)
GLUCOSE: 127 MG/DL (ref 65–99)
HDL CHOLESTEROL: 47 MG/DL
HEMOGLOBIN A1C: 6.1 % OF TOTAL HGB
LDL-CHOLESTEROL: 76 MG/DL (CALC)
MICROALBUMIN/CREATININE RATIO, RANDOM URINE: 102 MCG/MG CREAT
MICROALBUMIN: 13.5 MG/DL
NON-HDL CHOLESTEROL: 104 MG/DL (CALC)
POTASSIUM: 4.3 MMOL/L (ref 3.5–5.3)
PROTEIN, TOTAL: 6.9 G/DL (ref 6.1–8.1)
SODIUM: 141 MMOL/L (ref 135–146)
TOTAL PSA: 1.5 NG/ML
TRIGLYCERIDES: 188 MG/DL
URIC ACID: 3.7 MG/DL (ref 4–8)
VITAMIN D, 25-OH, TOTAL: 59 NG/ML (ref 30–100)

## 2024-02-28 ENCOUNTER — APPOINTMENT (OUTPATIENT)
Dept: CARDIOLOGY | Age: 74
End: 2024-02-28

## 2024-03-27 ENCOUNTER — PATIENT OUTREACH (OUTPATIENT)
Dept: CASE MANAGEMENT | Age: 74
End: 2024-03-27

## 2024-03-27 NOTE — PROGRESS NOTES
Pt requested ccm postpone march call pt on vacation and will not require outreach until he returns. CCM will f/u early april

## 2024-04-02 ENCOUNTER — APPOINTMENT (OUTPATIENT)
Dept: CARDIOLOGY | Age: 74
End: 2024-04-02

## 2024-04-02 ENCOUNTER — PATIENT OUTREACH (OUTPATIENT)
Dept: CASE MANAGEMENT | Age: 74
End: 2024-04-02

## 2024-04-02 DIAGNOSIS — G47.33 OSA ON CPAP: ICD-10-CM

## 2024-04-02 DIAGNOSIS — I10 HYPERTENSION, BENIGN: ICD-10-CM

## 2024-04-02 DIAGNOSIS — K21.9 GASTROESOPHAGEAL REFLUX DISEASE, UNSPECIFIED WHETHER ESOPHAGITIS PRESENT: Primary | ICD-10-CM

## 2024-04-02 DIAGNOSIS — E11.29 TYPE 2 DIABETES MELLITUS WITH MICROALBUMINURIA (HCC): ICD-10-CM

## 2024-04-02 DIAGNOSIS — R80.9 TYPE 2 DIABETES MELLITUS WITH MICROALBUMINURIA (HCC): ICD-10-CM

## 2024-04-02 DIAGNOSIS — I70.0 ABDOMINAL AORTIC ATHEROSCLEROSIS (HCC): ICD-10-CM

## 2024-04-02 DIAGNOSIS — R13.10 DYSPHAGIA, UNSPECIFIED TYPE: ICD-10-CM

## 2024-04-02 DIAGNOSIS — E78.00 PURE HYPERCHOLESTEROLEMIA: ICD-10-CM

## 2024-04-02 DIAGNOSIS — I25.10 CORONARY ARTERY DISEASE INVOLVING NATIVE CORONARY ARTERY OF NATIVE HEART WITHOUT ANGINA PECTORIS: ICD-10-CM

## 2024-04-02 DIAGNOSIS — M16.0 OSTEOARTHRITIS OF BOTH HIPS, UNSPECIFIED OSTEOARTHRITIS TYPE: ICD-10-CM

## 2024-04-02 DIAGNOSIS — E55.9 VITAMIN D DEFICIENCY: ICD-10-CM

## 2024-04-02 NOTE — PROGRESS NOTES
Spoke to Mt for CCM.      Updates to patient care team/comments: UTD  Patient reported changes in medications: utd  Med Adherence  Comment: pt taking medications as prescribed     Health Maintenance:   Health Maintenance   Topic Date Due    Diabetes Care: Microalb/Creat Ratio  Never done    Lung Cancer Screening  Never done    Zoster Vaccines (2 of 3) 12/06/2012    Diabetes Care Dilated Eye Exam  01/25/2022    Tobacco Cessation Counseling 1 Year  01/30/2024    Diabetes Care Foot Exam  07/28/2024    Diabetes Care A1C  08/24/2024    Annual Physical  01/29/2025    Diabetes Care: GFR  02/24/2025    Colorectal Cancer Screening  12/08/2025    PSA  02/24/2026    Influenza Vaccine  Completed    Annual Depression Screening  Completed    Fall Risk Screening (Annual)  Completed    Pneumococcal Vaccine: 65+ Years  Completed    COVID-19 Vaccine  Completed       Patient updates/concerns:    Pt has returned from vacation in [place]. Pt and wife are considering relocation.  Pt concerned about affordability along with replacing his healthcare team. Pt has had three surgeries back brain and heart and is not certain he wants to transfer care of complicated health history to new doctors. Pt is only in the investigative stage right now and would prefer to find a way to stay here for the doctors and live in [place] during cold weather months.  Pt is not on any time schedule to make decision.    Pt spent a lot of time engaging in physical activity in [place] including riding bikes, playing golf and daily dog walking. Pt states that he was able to tolerate all activity and although he was a little sore the day after he never had to rely on norco or muscle relaxer. Pt states he doesn't think he even took any otc, that he recovered nicely after all of his activity.   Pt is starting up with physical therapy again. He has been faithfully completing his at home stretching every other day. He has an elliptical and bike at home that he will  rotate into his routine.    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    Pt states that [facility] for [facility]razole and he is working to resolve this. Pt did not require any assistance from ccm and states that he has relied on some otc antacids for reflux and he has tolerated this.    Pt had not new questions for pcp or nurses.   Goals/Action Plan:    Active goal from previous outreach: staying healthy    Patient reported progress towards goals: pt continues to see providers as needed and specialists as instructed and takes all meds as prescribed               - What: exercise           - Where/When/How: pt has started to play golf, rides bike, walks the dog and tries to do pt exercises every other day  Patient Reported Barriers and Concerns: n/a                   - Plan for overcoming barriers: none    Care Managers Interventions: continue to provide encouragement and education for healthy coping and dx    Future Appointments:   Future Appointments   Date Time Provider Department Center   7/29/2024 10:00 AM Andrew Peraza MD [facility]   1/15/2025  1:15 PM Isidoro Meneses MD [id number]         Next Care Manager Follow Up Date: one month    Reason For Follow Up: review progress and or barriers towards patient's goals.     Time Spent This Encounter Total: 24 min medical record review, telephone communication, care plan updates where needed, education, goals, and action plan recreation/update. Provided acknowledgment and validation to patient's concerns.   Monthly Minute Total including today: 24  Physical assessment, complete health history, and need for CCM established by Andrew Peraza MD.

## 2024-04-15 RX ORDER — METOPROLOL SUCCINATE 100 MG/1
100 TABLET, EXTENDED RELEASE ORAL DAILY
Qty: 90 TABLET | Refills: 0 | Status: SHIPPED | OUTPATIENT
Start: 2024-04-15

## 2024-04-16 ENCOUNTER — APPOINTMENT (OUTPATIENT)
Dept: CARDIOLOGY | Age: 74
End: 2024-04-16

## 2024-04-16 VITALS
BODY MASS INDEX: 31.17 KG/M2 | DIASTOLIC BLOOD PRESSURE: 56 MMHG | HEART RATE: 61 BPM | WEIGHT: 256 LBS | SYSTOLIC BLOOD PRESSURE: 111 MMHG | HEIGHT: 76 IN

## 2024-04-16 DIAGNOSIS — I49.3 PVC (PREMATURE VENTRICULAR CONTRACTION): Primary | ICD-10-CM

## 2024-04-16 RX ORDER — FINASTERIDE 5 MG/1
1 TABLET, FILM COATED ORAL DAILY
COMMUNITY
Start: 2024-01-15

## 2024-04-16 RX ORDER — GABAPENTIN 300 MG/1
300 CAPSULE ORAL 3 TIMES DAILY
COMMUNITY
Start: 2024-03-27

## 2024-04-16 SDOH — HEALTH STABILITY: PHYSICAL HEALTH: ON AVERAGE, HOW MANY MINUTES DO YOU ENGAGE IN EXERCISE AT THIS LEVEL?: 0 MIN

## 2024-04-16 SDOH — HEALTH STABILITY: PHYSICAL HEALTH: ON AVERAGE, HOW MANY DAYS PER WEEK DO YOU ENGAGE IN MODERATE TO STRENUOUS EXERCISE (LIKE A BRISK WALK)?: 0 DAYS

## 2024-04-16 ASSESSMENT — PATIENT HEALTH QUESTIONNAIRE - PHQ9
1. LITTLE INTEREST OR PLEASURE IN DOING THINGS: NOT AT ALL
SUM OF ALL RESPONSES TO PHQ9 QUESTIONS 1 AND 2: 0
2. FEELING DOWN, DEPRESSED OR HOPELESS: NOT AT ALL
SUM OF ALL RESPONSES TO PHQ9 QUESTIONS 1 AND 2: 0
CLINICAL INTERPRETATION OF PHQ2 SCORE: NO FURTHER SCREENING NEEDED

## 2024-04-17 LAB
ATRIAL RATE (BPM): 49
QRS-INTERVAL (MSEC): 94
QT-INTERVAL (MSEC): 466
QTC: 429
R AXIS (DEGREES): 39
REPORT TEXT: NORMAL
T AXIS (DEGREES): -25
VENTRICULAR RATE EKG/MIN (BPM): 51

## 2024-05-01 ENCOUNTER — EXTERNAL LAB (OUTPATIENT)
Dept: HEALTH INFORMATION MANAGEMENT | Facility: OTHER | Age: 74
End: 2024-05-01

## 2024-05-01 LAB
25(OH)D3+25(OH)D2 SERPL-MCNC: 57 NG/ML (ref 30–100)
ALBUMIN SERPL-MCNC: 4.2 G/DL (ref 3.6–5.1)
ALBUMIN/GLOB SERPL: 1.7 (CALC) (ref 1–2.5)
ALP SERPL-CCNC: 56 U/L (ref 35–144)
ALT SERPL-CCNC: 18 U/L (ref 9–46)
AST SERPL-CCNC: 23 U/L (ref 10–35)
BASOPHILS # BLD: 73 CELLS/UL (ref 0–200)
BASOPHILS NFR BLD: 1.1 %
BILIRUB SERPL-MCNC: 0.6 MG/DL (ref 0.2–1.2)
BUN SERPL-MCNC: 18 MG/DL (ref 7–25)
BUN/CREAT SERPL: ABNORMAL (CALC) (ref 6–22)
CALCIUM SERPL-MCNC: 9.3 MG/DL (ref 8.6–10.3)
CHLORIDE SERPL-SCNC: 104 MMOL/L (ref 98–110)
CHOLEST SERPL-MCNC: 150 MG/DL
CHOLEST/HDLC SERPL: 2.8 (CALC)
CO2 SERPL-SCNC: 28 MMOL/L (ref 20–32)
CREAT SERPL-MCNC: 1.19 MG/DL (ref 0.7–1.28)
EOSINOPHIL # BLD: 224 CELLS/UL (ref 15–500)
EOSINOPHIL NFR BLD: 3.4 %
ERYTHROCYTE [DISTWIDTH] IN BLOOD: 13.5 % (ref 11–15)
GFR SERPLBLD SCHWARTZ-ARVRAT: 64 ML/MIN/1.73M2
GLOBULIN SER-MCNC: 2.5 G/DL (CALC) (ref 1.9–3.7)
GLUCOSE SERPL-MCNC: 124 MG/DL (ref 65–99)
HBA1C MFR BLD: 6.2 % OF TOTAL HGB
HCT VFR BLD CALC: 44 % (ref 38.5–50)
HDLC SERPL-MCNC: 54 MG/DL
HGB BLD-MCNC: 14.2 G/DL (ref 13.2–17.1)
LDLC SERPL CALC-MCNC: 71 MG/DL (CALC)
LENGTH OF FAST TIME PATIENT: YES H
LENGTH OF FAST TIME PATIENT: YES H
LYMPHOCYTES # BLD: 1511 CELLS/UL (ref 850–3900)
LYMPHOCYTES NFR BLD: 22.9 %
MCH RBC QN AUTO: 28.3 PG (ref 27–33)
MCHC RBC AUTO-ENTMCNC: 32.3 G/DL (ref 32–36)
MCV RBC AUTO: 87.8 FL (ref 80–100)
MONOCYTES # BLD: 594 CELLS/UL (ref 200–950)
MONOCYTES NFR BLD: 9 %
NEUTROPHILS # BLD: 4198 CELLS/UL (ref 1500–7800)
NEUTROPHILS NFR BLD: 63.6 %
NONHDLC SERPL-MCNC: 96 MG/DL (CALC)
PLATELET # BLD: 161 THOUSAND/UL (ref 140–400)
PMV BLD AUTO: 13.3 FL (ref 7.5–12.5)
POTASSIUM SERPL-SCNC: 3.8 MMOL/L (ref 3.5–5.3)
PROT SERPL-MCNC: 6.7 G/DL (ref 6.1–8.1)
RBC # BLD: 5.01 MILLION/UL (ref 4.2–5.8)
SODIUM SERPL-SCNC: 141 MMOL/L (ref 135–146)
TRIGL SERPL-MCNC: 169 MG/DL
WBC # BLD: 6.6 THOUSAND/UL (ref 3.8–10.8)

## 2024-05-21 ENCOUNTER — APPOINTMENT (OUTPATIENT)
Dept: CARDIOLOGY | Age: 74
End: 2024-05-21
Attending: INTERNAL MEDICINE

## 2024-05-21 DIAGNOSIS — I49.3 PVC (PREMATURE VENTRICULAR CONTRACTION): ICD-10-CM

## 2024-05-21 LAB
AORTIC VALVE AREA (AVA): 1.16
ASCENDING AORTA (AAD): 5
AV PEAK GRADIENT (AVPG): 12
AV PEAK VELOCITY (AVPV): 1.73
AV STENOSIS SEVERITY TEXT: NORMAL
AVI LVOT PEAK GRADIENT (LVOTMG): 1.3
E WAVE DECELARATION TIME (MDT): 20.35
LEFT INTERNAL DIMENSION IN SYSTOLE (LVSD): 1.1
LEFT VENTRICULAR INTERNAL DIMENSION IN DIASTOLE (LVDD): 5
LEFT VENTRICULAR POSTERIOR WALL IN END DIASTOLE (LVPW): 6.4
LV EF: NORMAL %
LVOT VTI (LVOTVTI): 1.38
MV E TISSUE VEL LAT (MELV): 1
MV E TISSUE VEL MED (MESV): 7.17
MV E WAVE VEL/E TISSUE VEL MED(MSR): 5.7
MV PEAK A VELOCITY (MVPAV): 218
MV PEAK E VELOCITY (MVPEV): 1.17
RV END SYSTOLIC LONGITUDINAL STRAIN FREE WALL (RVGS): 2.2
TRICUSPID VALVE ANNULAR PEAK VELOCITY (TVAPV): 31
TRICUSPID VALVE PEAK REGURGITATION VELOCITY (TRPV): 3.5

## 2024-05-21 PROCEDURE — 93306 TTE W/DOPPLER COMPLETE: CPT | Performed by: INTERNAL MEDICINE

## 2024-05-21 PROCEDURE — 76376 3D RENDER W/INTRP POSTPROCES: CPT | Performed by: INTERNAL MEDICINE

## 2024-05-31 ENCOUNTER — PATIENT OUTREACH (OUTPATIENT)
Dept: CASE MANAGEMENT | Age: 74
End: 2024-05-31

## 2024-06-03 RX ORDER — ROSUVASTATIN CALCIUM 40 MG/1
40 TABLET, COATED ORAL DAILY
Qty: 90 TABLET | Refills: 1 | Status: SHIPPED | OUTPATIENT
Start: 2024-06-03 | End: 2024-06-04 | Stop reason: SDUPTHER

## 2024-06-04 ENCOUNTER — TELEPHONE (OUTPATIENT)
Dept: CARDIOLOGY | Age: 74
End: 2024-06-04

## 2024-06-04 ENCOUNTER — EXTERNAL RECORD (OUTPATIENT)
Dept: HEALTH INFORMATION MANAGEMENT | Facility: OTHER | Age: 74
End: 2024-06-04

## 2024-06-04 DIAGNOSIS — I25.10 CORONARY ARTERY DISEASE INVOLVING NATIVE CORONARY ARTERY OF NATIVE HEART WITHOUT ANGINA PECTORIS: Primary | ICD-10-CM

## 2024-06-04 DIAGNOSIS — E78.2 HYPERLIPIDEMIA, MIXED: ICD-10-CM

## 2024-06-04 DIAGNOSIS — I65.23 BILATERAL CAROTID ARTERY STENOSIS: ICD-10-CM

## 2024-06-04 RX ORDER — ROSUVASTATIN CALCIUM 40 MG/1
40 TABLET, COATED ORAL DAILY
Qty: 90 TABLET | Refills: 3 | Status: SHIPPED | OUTPATIENT
Start: 2024-06-04

## 2024-06-05 ENCOUNTER — PATIENT OUTREACH (OUTPATIENT)
Dept: CASE MANAGEMENT | Age: 74
End: 2024-06-05

## 2024-06-05 DIAGNOSIS — M16.0 OSTEOARTHRITIS OF BOTH HIPS, UNSPECIFIED OSTEOARTHRITIS TYPE: ICD-10-CM

## 2024-06-05 DIAGNOSIS — E78.00 PURE HYPERCHOLESTEROLEMIA: ICD-10-CM

## 2024-06-05 DIAGNOSIS — I10 HYPERTENSION, BENIGN: ICD-10-CM

## 2024-06-05 DIAGNOSIS — G47.33 OSA ON CPAP: ICD-10-CM

## 2024-06-05 DIAGNOSIS — E11.29 TYPE 2 DIABETES MELLITUS WITH MICROALBUMINURIA (HCC): ICD-10-CM

## 2024-06-05 DIAGNOSIS — R13.10 DYSPHAGIA, UNSPECIFIED TYPE: ICD-10-CM

## 2024-06-05 DIAGNOSIS — K21.9 GASTROESOPHAGEAL REFLUX DISEASE, UNSPECIFIED WHETHER ESOPHAGITIS PRESENT: ICD-10-CM

## 2024-06-05 DIAGNOSIS — R73.9 HYPERGLYCEMIA: Primary | ICD-10-CM

## 2024-06-05 DIAGNOSIS — R80.9 TYPE 2 DIABETES MELLITUS WITH MICROALBUMINURIA (HCC): ICD-10-CM

## 2024-06-05 DIAGNOSIS — E55.9 VITAMIN D DEFICIENCY: ICD-10-CM

## 2024-06-05 RX ORDER — GABAPENTIN 300 MG/1
300 CAPSULE ORAL 3 TIMES DAILY
COMMUNITY
Start: 2024-03-27

## 2024-06-05 NOTE — PROGRESS NOTES
Spoke to Mt for CCM.      Updates to patient care team/comments: UTD  Patient reported changes in medications: UTD  Med Adherence  Comment: pt taking medications as prescribed     Health Maintenance:   Health Maintenance   Topic Date Due    Lung Cancer Screening  Never done    Zoster Vaccines (2 of 3) 12/06/2012    Diabetes Care Dilated Eye Exam  01/25/2022    COVID-19 Vaccine (6 - 2023-24 season) 03/01/2024    Diabetes Care Foot Exam  07/28/2024    Diabetes Care A1C  08/24/2024    Annual Physical  01/29/2025    Diabetes Care: GFR  02/24/2025    Diabetes Care: Microalb/Creat Ratio  02/26/2025    Colorectal Cancer Screening  12/08/2025    PSA  02/24/2026    Influenza Vaccine  Completed    Annual Depression Screening  Completed    Fall Risk Screening (Annual)  Completed    Tobacco Cessation Counseling  Completed    Pneumococcal Vaccine: 65+ Years  Completed       Patient updates/concerns:    Spoke to pt for monthly outreach   Pt continues to weigh himself every day. Pt states that it is staying stable. Discussed with pt the importance of not leaving too much time between meals and encouraged pt to find a healthy snack with a small amount of protein mid day.    Pt dentist is concerned that amlodipine rx is causing abnormal gum growth. Pt states that cardiologist office will be discussing this with dentist.    Reviewed with pt his prescriptions and preferred pharmacies. Pt [facility] to [facility] for [facility] glipizide and pantoprazole. [facility] and the rest go to local [facility]. Pt states that he followed up with appropriate prescribers to ensure accuracy and has not had any difficulty with managing three different pharmacies.   Pt is still dealing with lower back stiffness around his tailbone. Pt will perform stretches in the morning along with some resistance bands. He states that along with daily activity this is enough to stretch the area out and he is not limited during the day.  Pt will take ibuprofen in the morning and at night.   Pt has only been taking gabapentin 2 times daily. Pt states that when he took it three times daily as prescribed that he developed confusion. He feels the medication has helped nerve pain slightly.   Pt is now only taking 100 metoprolol. Removed 25 from his chart.. pt states that cardiology has been satisfied with previous results and is no longer requesting he track his blood pressure. Pt has been able to perform tasks like mowing the lawn without taking any breaks.    Pt requested ccm f/u with pcp prior to lab orders ahead of next appointment to insure that vit d vit c levels are tested. Pt has been taking supplements to address lower levels in previous lab result.   Pt has had echo and reviewed results with cardiology. Pt states that specialist was encouraged that results were improved over last year  Goals/Action Plan:    Active goal from previous outreach:   Staying healthy   Patient reported progress towards goals: pt continues to see providers as needed and takes medications as prescribed               - What: exercise           - Where/When/How: pt has been incorporating resistance band to his daily stretching routine  Patient Reported Barriers and Concerns: n/a                   - Plan for overcoming barriers: none    Care Managers Interventions: continue to provide encouragement and education for healthy coping and dx    Future Appointments:   Future Appointments   Date Time Provider Department Center   7/29/2024 10:00 AM Andrew Peraza MD [facility]   1/15/2025  1:15 PM Isidoro Meneses MD [id number]         Next Care Manager Follow Up Date: one month    Reason For Follow Up: review progress and or barriers towards patient's goals.     Time Spent This Encounter Total: 24 min medical record review, telephone communication, care plan updates where needed, education, goals, and action plan recreation/update. Provided acknowledgment and  validation to patient's concerns.   Monthly Minute Total including today: 24  Physical assessment, complete health history, and need for CCM established by Andrew Peraza MD.

## 2024-06-06 ENCOUNTER — TELEPHONE (OUTPATIENT)
Age: 74
End: 2024-06-06

## 2024-06-06 RX ORDER — HYDROCHLOROTHIAZIDE 25 MG/1
25 TABLET ORAL EVERY MORNING
Qty: 90 TABLET | Refills: 1 | Status: SHIPPED | OUTPATIENT
Start: 2024-06-06

## 2024-06-06 RX ORDER — HYDROCHLOROTHIAZIDE 25 MG/1
25 TABLET ORAL EVERY MORNING
COMMUNITY
End: 2024-06-06 | Stop reason: SDUPTHER

## 2024-06-17 DIAGNOSIS — E11.9 TYPE 2 DIABETES MELLITUS WITHOUT COMPLICATION, WITHOUT LONG-TERM CURRENT USE OF INSULIN (HCC): ICD-10-CM

## 2024-06-19 ENCOUNTER — APPOINTMENT (OUTPATIENT)
Dept: CARDIOLOGY | Age: 74
End: 2024-06-19

## 2024-06-19 VITALS
WEIGHT: 252 LBS | SYSTOLIC BLOOD PRESSURE: 120 MMHG | BODY MASS INDEX: 30.67 KG/M2 | OXYGEN SATURATION: 97 % | DIASTOLIC BLOOD PRESSURE: 60 MMHG | HEART RATE: 41 BPM

## 2024-06-19 DIAGNOSIS — N18.30 BENIGN HYPERTENSION WITH CKD (CHRONIC KIDNEY DISEASE) STAGE III  (CMD): ICD-10-CM

## 2024-06-19 DIAGNOSIS — E78.2 HYPERLIPIDEMIA, MIXED: ICD-10-CM

## 2024-06-19 DIAGNOSIS — Z95.1 HX OF CABG: Primary | ICD-10-CM

## 2024-06-19 DIAGNOSIS — I12.9 BENIGN HYPERTENSION WITH CKD (CHRONIC KIDNEY DISEASE) STAGE III  (CMD): ICD-10-CM

## 2024-06-19 DIAGNOSIS — I10 HYPERTENSION, BENIGN: ICD-10-CM

## 2024-06-19 SDOH — HEALTH STABILITY: PHYSICAL HEALTH: ON AVERAGE, HOW MANY DAYS PER WEEK DO YOU ENGAGE IN MODERATE TO STRENUOUS EXERCISE (LIKE A BRISK WALK)?: 1 DAY

## 2024-06-19 ASSESSMENT — PATIENT HEALTH QUESTIONNAIRE - PHQ9
2. FEELING DOWN, DEPRESSED OR HOPELESS: SEVERAL DAYS
SUM OF ALL RESPONSES TO PHQ9 QUESTIONS 1 AND 2: 2
1. LITTLE INTEREST OR PLEASURE IN DOING THINGS: SEVERAL DAYS
SUM OF ALL RESPONSES TO PHQ9 QUESTIONS 1 AND 2: 2
CLINICAL INTERPRETATION OF PHQ2 SCORE: NO FURTHER SCREENING NEEDED

## 2024-06-20 RX ORDER — GLIPIZIDE 10 MG/1
TABLET ORAL
Qty: 90 TABLET | Refills: 0 | Status: SHIPPED | OUTPATIENT
Start: 2024-06-20

## 2024-06-20 NOTE — TELEPHONE ENCOUNTER
Requested Prescriptions     Pending Prescriptions Disp Refills    GLIPIZIDE 10 MG Oral Tab [Pharmacy Med Name: GLIPIZIDE TABS 10MG] 90 tablet 3     Sig: TAKE 1 TABLET BEFORE       BREAKFAST     LOV 1/29/2024     Patient was asked to follow-up in: 6 months    Appointment scheduled: 7/29/2024 Andrew Peraza MD     Medication refilled per protocol.

## 2024-07-16 RX ORDER — METOPROLOL SUCCINATE 100 MG/1
100 TABLET, EXTENDED RELEASE ORAL DAILY
Qty: 90 TABLET | Refills: 3 | Status: SHIPPED | OUTPATIENT
Start: 2024-07-16

## 2024-07-29 ENCOUNTER — OFFICE VISIT (OUTPATIENT)
Dept: FAMILY MEDICINE CLINIC | Facility: CLINIC | Age: 74
End: 2024-07-29
Payer: MEDICARE

## 2024-07-29 VITALS
HEIGHT: 75 IN | SYSTOLIC BLOOD PRESSURE: 100 MMHG | BODY MASS INDEX: 32.33 KG/M2 | HEART RATE: 53 BPM | RESPIRATION RATE: 16 BRPM | WEIGHT: 260 LBS | OXYGEN SATURATION: 96 % | DIASTOLIC BLOOD PRESSURE: 60 MMHG

## 2024-07-29 DIAGNOSIS — E11.29 TYPE 2 DIABETES MELLITUS WITH DIABETIC MICROALBUMINURIA, WITHOUT LONG-TERM CURRENT USE OF INSULIN (HCC): Primary | ICD-10-CM

## 2024-07-29 DIAGNOSIS — I73.9 PERIPHERAL ARTERIAL DISEASE (HCC): ICD-10-CM

## 2024-07-29 DIAGNOSIS — I25.10 CORONARY ARTERY DISEASE INVOLVING NATIVE CORONARY ARTERY OF NATIVE HEART WITHOUT ANGINA PECTORIS: ICD-10-CM

## 2024-07-29 DIAGNOSIS — F17.290 CIGAR SMOKER: ICD-10-CM

## 2024-07-29 DIAGNOSIS — R80.9 TYPE 2 DIABETES MELLITUS WITH DIABETIC MICROALBUMINURIA, WITHOUT LONG-TERM CURRENT USE OF INSULIN (HCC): Primary | ICD-10-CM

## 2024-07-29 LAB — HEMOGLOBIN A1C: 6 % (ref 4.3–5.6)

## 2024-07-29 PROCEDURE — 99214 OFFICE O/P EST MOD 30 MIN: CPT | Performed by: FAMILY MEDICINE

## 2024-07-29 PROCEDURE — 83036 HEMOGLOBIN GLYCOSYLATED A1C: CPT | Performed by: FAMILY MEDICINE

## 2024-07-29 RX ORDER — HYDROCHLOROTHIAZIDE 25 MG/1
25 TABLET ORAL EVERY MORNING
COMMUNITY
Start: 2024-06-06

## 2024-07-29 NOTE — PROGRESS NOTES
Chief Complaint   Patient presents with    Diabetes     Patient here for diabetes follow up      HPI:   Mt [place] is a 74 year old male who presents for a diabetic visit.    Typical blood sugar levels are not checked  Current diabetic medications are: glipizide 10mg.  Denies any symptoms of low sugar.    Diet: fair.   Exercise: 2-3 times a week to the gym for an hour.   Last Eye exam: last 2021.  Last Foot exam: done previously.     Heart - NSVT, CAD, HTN, HLD.  New PVD - US duplex showed 50% stenosis in the femoral artery, complete occlusion on the  PT artery.  On track to quit smoking.  Just needs a little will power to get over this.  One cigar a day.    On Crestor 40mg.  Also on ASA 325mg.     Back - several years of issues.  Did get back surgery a few yrs ago.  Despite the procedure, the pains persisted.  Did the US listed above and found the stenosis.   On gabapentin.  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      Relevant Labs:   Chemistry Labs:   HEMOGLOBIN A1C (%)   Date Value   12/16/2020 6.1 (A)   05/08/2019 6.5 (A)   11/06/2018 6.5 (A)     HEMOGLOBIN A1c (% of total Hgb)   Date Value   02/24/2024 6.1 (H)     HgbA1C (%)   Date Value   11/06/2023 6.6 (H)   06/15/2023 6.2   06/22/2022 7.1 (H)   10/05/2021 6.6   03/05/2021 6.4      Lab Results   Component Value Date/Time    LDL 76 02/24/2024 09:57 AM    HDL 47 02/24/2024 09:57 AM    AST 21 02/24/2024 09:57 AM    ALT 17 02/24/2024 09:57 AM      Lab Results   Component Value Date/Time    CREATSERUM 1.10 02/24/2024 09:57 AM        Urine Studies: +but on losartan for treatment.   Micro Albumen/Creatinine:    Lab Results   Component Value Date    MICROALBCREA 136.6 (H) 11/06/2023    MICROALBCREA 146.8 (H) 01/31/2023    MICROALBCREA 85.8 (H) 06/22/2022    MALBCREACALC 102 (H) 02/24/2024       Social History     Tobacco Use    Smoking status: Light Smoker     Current packs/day: 0.00     Average packs/day: 2.0 packs/day for 17.0 years (34.0 ttl pk-yrs)     Types:  Cigars, Cigarettes     Start date: 1971     Last attempt to quit: 1988     Years since quittin.6    Smokeless tobacco: Never    Tobacco comments:     2 cigar/month   Substance Use Topics    Alcohol use: Yes     Alcohol/week: 12.0 standard drinks of alcohol     Types: 2 Glasses of wine, 2 Cans of beer, 8 Shots of liquor per week     Comment: 2 drinks of scotch daily       REVIEW OF SYSTEMS:     Constitutional: negative  Eyes: negative  Ears, nose, mouth, throat, and face: negative  Respiratory: negative  Cardiovascular: negative  Gastrointestinal: negative  Genitourinary: negative  Neurological: negative    EXAM:     /60   Pulse 53   Resp 16   Ht 6' 3\" (1.905 m)   Wt 260 lb (117.9 kg)   SpO2 96%   BMI 32.50 kg/m²   Wt Readings from Last 6 Encounters:   24 260 lb (117.9 kg)   24 250 lb (113.4 kg)   23 260 lb 8 oz (118.2 kg)   23 259 lb 6 oz (117.7 kg)   07/10/23 258 lb (117 kg)   23 258 lb (117 kg)       General: alert, well appearing, and in no distress  HEENT: oropharynx clear, pupils equal and reactive, extraocular eye movements intact  CV: regular rate and rhythm, no murmurs  Resp: clear to auscultation, no wheezes, rales or rhonchi, symmetric air entry  Abdomen: soft, nontender, nondistended, no masses or organomegaly  Neuro: alert, oriented, normal speech, no focal findings or movement disorder noted  Ext/Foot: normal pedal pulses.  Toes and leg with no hair.  Cold toes.  Cap refill slightly delayed.     ASSESSMENT AND PLAN:     Mt KALIE Sabas was seen in the office today:  had concerns including Diabetes (Patient here for diabetes follow up).    1. Type 2 diabetes mellitus with diabetic microalbuminuria, without long-term current use of insulin (HCC)  A1C remains very well controlled  Stable meds.  Good diet.  No changes.   - HEMOGLOBIN A1C    2. Peripheral arterial disease (HCC)  Noted on ABIs with the ortho team  Plan to see vascular surgery to see if  additional investigations needed.  Already on high dose statin, and ASA 325mg.   - Vascular Surgery - In Network    3. Coronary artery disease involving native coronary artery of native heart without angina pectoris  Denies chest pain  Regular visits with heart team.     4. Cigar smoker  Encouraged him to quit.  May be impacting his vascular status.  Need to smoke less often, with goal of quitting.         Andrew Peraza M.D.   [facility]  07/29/24

## 2024-07-30 ENCOUNTER — PATIENT OUTREACH (OUTPATIENT)
Dept: CASE MANAGEMENT | Age: 74
End: 2024-07-30

## 2024-07-30 DIAGNOSIS — R73.9 HYPERGLYCEMIA: ICD-10-CM

## 2024-07-30 DIAGNOSIS — G47.33 OSA ON CPAP: ICD-10-CM

## 2024-07-30 DIAGNOSIS — R80.9 TYPE 2 DIABETES MELLITUS WITH MICROALBUMINURIA (HCC): ICD-10-CM

## 2024-07-30 DIAGNOSIS — E55.9 VITAMIN D DEFICIENCY: ICD-10-CM

## 2024-07-30 DIAGNOSIS — I65.21 STENOSIS OF RIGHT CAROTID ARTERY: ICD-10-CM

## 2024-07-30 DIAGNOSIS — E11.29 TYPE 2 DIABETES MELLITUS WITH MICROALBUMINURIA (HCC): ICD-10-CM

## 2024-07-30 DIAGNOSIS — K21.9 GASTROESOPHAGEAL REFLUX DISEASE, UNSPECIFIED WHETHER ESOPHAGITIS PRESENT: Primary | ICD-10-CM

## 2024-07-30 DIAGNOSIS — I10 HYPERTENSION, BENIGN: ICD-10-CM

## 2024-07-30 DIAGNOSIS — R13.10 DYSPHAGIA, UNSPECIFIED TYPE: ICD-10-CM

## 2024-07-30 DIAGNOSIS — E78.00 PURE HYPERCHOLESTEROLEMIA: ICD-10-CM

## 2024-07-30 NOTE — PROGRESS NOTES
Spoke to Mt for Chronic Care Management.      Updates to patient care team/comments: UTD  Patient reported changes in medications: UTD  Med Adherence  Comment: pt taking medications as prescribed     Health Maintenance:   Health Maintenance   Topic Date Due    Lung Cancer Screening  Never done    Zoster Vaccines (2 of 3) 12/06/2012    Diabetes Care Dilated Eye Exam  01/25/2022    COVID-19 Vaccine (6 - 2023-24 season) 03/01/2024    Diabetes Care Foot Exam  07/28/2024    Influenza Vaccine (1) 10/01/2024    Diabetes Care A1C  01/29/2025    Annual Physical  01/29/2025    Diabetes Care: GFR  02/24/2025    Diabetes Care: Microalb/Creat Ratio  02/26/2025    Colorectal Cancer Screening  12/08/2025    PSA  02/24/2026    Annual Depression Screening  Completed    Fall Risk Screening (Annual)  Completed    Tobacco Cessation Counseling  Completed    Pneumococcal Vaccine: 65+ Years  Completed       Patient updates/concerns:    Spoke to pt for monthly outreach.   Pt has made appointments to see vascular surgeon at the end of August. Pt reviewed results of testing with pcp and has no additional questions.    Pt has started to visit the health club 2-3 times a week and participates in a heart healthy exercise class that involves a variety of exercises and about 20-30 minutes of cardio. Pt will have his blood pressure taken before and after appointments and he is noticing a downward trend in the results. Yesterday pt was 110/65.    Pt is also trying to eliminate his cigar smoking habit. Pt discussed success he had quitting smoking cigarettes as reason to believe he can accomplish this goal.   Pt does not think that traditional cigarette cessation tools like nicorette will be effective. He believes that with will power and effective distractions he can lessen the cravings.  As is he only smokes 1 cigar a day, but thinks if he can start to focus on home improvement projects to take the place of that time , he may increase his  chances for success.    Reviewed medications with pt who confirmed no longer taking amlodipine because of dental side effects.  Pt only takes one eyedrop. Older eye drop rx were removed.    Pt A1c has dropped to 6.0  Goals/Action Plan:    Active goal from previous outreach: staying healthy    Patient reported progress towards goals: pt continues to take meds as prescribed and sees providers as needed               - What: exercise           - Where/When/How: pt has joined heart healthy class at gym and attends 2-3 times a week  Patient Reported Barriers and Concerns: n/a                   - Plan for overcoming barriers: none    Care Managers Interventions: continue to provide encouragement and education for healthy coping and dx    Future Appointments:   Future Appointments   Date Time Provider Department Center   8/29/2024 10:45 AM Najjar, Samer F, MD [id number]   1/6/2025 11:30 AM Andrew Peraza MD [facility]   1/15/2025  1:15 PM sIidoro Meneses MD [id number]         Next Care Manager Follow Up Date: one month    Reason For Follow Up: review progress and or barriers towards patient's goals.     Time Spent This Encounter Total: 24 min medical record review, telephone communication, care plan updates where needed, education, goals, and action plan recreation/update. Provided acknowledgment and validation to patient's concerns.   Monthly Minute Total including today: 24  Physical assessment, complete health history, and need for CCM established by Andrew Peraza MD.

## 2024-07-31 PROCEDURE — 99490 CHRNC CARE MGMT STAFF 1ST 20: CPT

## 2024-08-13 RX ORDER — FENOFIBRATE 48 MG/1
TABLET, COATED ORAL
Qty: 90 TABLET | Refills: 0 | Status: SHIPPED | OUTPATIENT
Start: 2024-08-13

## 2024-08-22 ENCOUNTER — OFFICE VISIT (OUTPATIENT)
Facility: CLINIC | Age: 74
End: 2024-08-22
Payer: MEDICARE

## 2024-08-22 VITALS — BODY MASS INDEX: 32.33 KG/M2 | RESPIRATION RATE: 20 BRPM | WEIGHT: 260 LBS | HEIGHT: 75 IN

## 2024-08-22 DIAGNOSIS — R09.89 CAROTID BRUIT, UNSPECIFIED LATERALITY: Primary | ICD-10-CM

## 2024-08-22 DIAGNOSIS — I73.9 PAD (PERIPHERAL ARTERY DISEASE) (HCC): ICD-10-CM

## 2024-08-25 NOTE — PROGRESS NOTES
Samer F. Najjar, MD  Vascular Surgery  [facility]      VASCULAR SURGERY   CLINIC CONSULT NOTE        Name: Mt Obregon   :   1950  [id number]     REFERRING PHYSICIAN:  Andrew Peraza  PRIMARY CARE PHYSICIAN:  Andrew Peraza MD    HISTORY OF PRESENT ILLNESS:   Patient is a 74 year old diabetic male who has been referred regarding assessment for peripheral arterial disease.  The patient has a long history of vascular disease.  He underwent a CABG 14 years ago and about 2 years ago he noticed tightness in the hip area when walking.  He basically feels that he has pins-and-needles in the right great toe.  He was seen by orthopedic surgery who basically found no hip related issues and underwent physical therapy with minimal improvement.  He saw his chiropractic physician who felt that he does have spine disease and therefore he saw his spine surgeon, Dr. Romo, who placed a spacer between L3 and L4 on .  His symptoms persisted and therefore he underwent a lower extremity ultrasound at [facility] that revealed    - Right: Triphasic ankle waveforms non compressible vessels/calcified    vessels render the pressure portion of the exam unreliable. The absolute    great toe pressure is 82 mmHg with a TBI of 0.52. Duplex imaging    demonstrate heavily calcified vessel through out the lower extremity with    triphasic flow, a less than 50% stenosis of the ostial superficial femoral    artery and an occlusion of proximal posterior tibial artery with    collateral distally.    - Left: Triphasic ankle waveforms non compressible vessels/calcified vessels    render the pressure portion of the exam unreliable. The absolute great toe    pressure is 68 mmHg with a TBI of 0.43. Duplex imaging demonstrate heavily    calcified vessel through out the lower extremity with triphasic flow, a    greater than 50% stenosis of the ostial superficial femoral artery and an    occlusion of the posterior  tibial artery.    The patient states that he goes to the gym 3 days a week and walk about half a mile before he develops hip and calf pain.  He is on gabapentin.  The patient continues to smoke but is working on smoking cessation.  He did undergo a carotid duplex back in 2021 at [facility] that showed moderate right carotid artery stenosis and mild on the left.  He has not had a repeat testing since then.    PAST MEDICAL HISTORY:    Past Medical History:    Abdominal pain    Back problem    Calculus of kidney    Coronary atherosclerosis    S/P QUADRUPLE CABG 2010    Esophageal reflux    Essential hypertension    Fatigue    Gout    Hearing loss    Heart palpitations    High blood pressure    High cholesterol    Night sweats    Sleep apnea    Sleep disturbance    Stress    Visual impairment    GLASSES    Wears glasses       PAST SURGICAL HISTORY:   Past Surgical History:   Procedure Laterality Date    Back surgery      Cabg      Cabg, artery-vein, four  07/2010    QUADRUPLE    Colonoscopy  02/2012    Colonoscopy      Femur/knee surg unlisted  1987 1998    Orbit surgery proc unlisted  1968    \"Closed treatment of orbital fracture (non-'blowout')\"    Other surgical history N/A 12/21/2015    Procedure: EXCISION OF LESION/LIPOMA;  Surgeon: Aury Henderson MD;  Location: [facility] MAIN OR    Sigmoidoscopy,diagnostic      Sinus surgery    2003    Spine surgery procedure unlisted      LOWER BACK SURGERY X2-MICRODISCECTOMY AND LAMINECTOMY        MEDICATIONS:     Current Outpatient Medications:     hydroCHLOROthiazide 25 MG Oral Tab, Take 1 tablet (25 mg total) by mouth every morning., Disp: , Rfl:     glipiZIDE 10 MG Oral Tab, TAKE 1 TABLET BEFORE       BREAKFAST, Disp: 90 tablet, Rfl: 0    gabapentin 300 MG Oral Cap, Take 1 capsule (300 mg total) by mouth 3 (three) times daily., Disp: , Rfl:     allopurinol 300 MG Oral Tab, Take 1 tablet (300 mg total) by mouth daily., Disp: 90 tablet, Rfl: 3    Potassium Citrate ER (UROCIT-K 15) 15  MEQ (1620 MG) Oral Tab CR, Take 1 tablet by mouth daily., Disp: 90 tablet, Rfl: 5    finasteride 5 MG Oral Tab, Take 1 tablet (5 mg total) by mouth daily., Disp: 90 tablet, Rfl: 6    Ascorbic Acid (VITAMIN C) 1000 MG Oral Tab, Take 1 tablet (1,000 mg total) by mouth 2 (two) times daily with meals., Disp: , Rfl:     pantoprazole 40 MG Oral Tab EC, Take 1 tablet (40 mg total) by mouth daily., Disp: 90 tablet, Rfl: 3    CANNABIDIOL OR, Take by mouth., Disp: , Rfl:     furosemide 20 MG Oral Tab, Take 1 tablet (20 mg total) by mouth 2 (two) times daily., Disp: , Rfl:     losartan 50 MG Oral Tab, Take 1 tablet (50 mg total) by mouth 2 (two) times daily., Disp: , Rfl:     Sildenafil Citrate 100 MG Oral Tab, Take 1 tablet (100 mg total) by mouth daily as needed for Erectile Dysfunction. Take ~ one hour prior to sexual activity on an empty stomach, Disp: 30 tablet, Rfl: 5    ketorolac 0.5 % Ophthalmic Solution, , Disp: , Rfl:     ofloxacin 0.3 % Ophthalmic Solution, , Disp: , Rfl:     prednisoLONE 1 % Ophthalmic Suspension, , Disp: , Rfl:     fenofibrate 48 MG Oral Tab, , Disp: , Rfl:     latanoprost 0.005 % Ophthalmic Solution, , Disp: , Rfl:     metoprolol succinate  MG Oral Tablet 24 Hr, Take 1 tablet (100 mg total) by mouth daily., Disp: , Rfl:     rosuvastatin 40 MG Oral Tab, , Disp: , Rfl:     Multiple Vitamins-Minerals (MULTIVITAMIN ADULT OR), Take 1 tablet by mouth daily., Disp: , Rfl:     aspirin 325 MG Oral Tab, Take 1 tablet (325 mg total) by mouth daily., Disp: , Rfl:     ALLERGIES:    He is allergic to atorvastatin, carvedilol, pravastatin, and seasonal.    SOCIAL HISTORY:    Patient  reports that he has been smoking cigars and cigarettes. He started smoking about 53 years ago. He has a 34 pack-year smoking history. He has never used smokeless tobacco. He reports current alcohol use of about 12.0 standard drinks of alcohol per week. He reports that he does not use drugs.    FAMILY HISTORY:    Patient's  family history includes Alcohol and Other Disorders Associated in an other family member; Breast Cancer in his mother and another family member; Cancer in his father and paternal grandfather; Heart Attack in an other family member; Heart Disease in his father and paternal grandmother; Lung Disorder in his maternal grandmother.    ROS:     A 12 point review of systems with pertinent positives and negatives listed in the HPI.    EXAM:    Resp 20   Ht 6' 3\" (1.905 m)   Wt 260 lb (117.9 kg)   BMI 32.50 kg/m²   GENERAL: alert and orientated X 3, well developed, well nourished, in no apparent distress  PSYCH: normal mood and affect  HEENT: ears and throat are clear  NECK: supple, no lymphadenopathy, thyroid wnl  CAROTID: right mild bruit   RESPIRATORY: no rales, rhonchi, or wheezes B  CARDIO: RRR without murmur, no murmur, no gallop   ABDOMEN: soft, non-tender with no palpable aneurysm or masses  BACK: normal, no tenderness  SKIN: no rashes, warm and dry  EXTREMITIES: no tenderness  NEURO: no sensory or motor deficits  VASCULAR:      Femoral Popliteal DP PT Peroneal   Right 1+       palpable, weak non-palpable    Left 1+       palpable, weak palpable, weak        LABS:   Lab Results   Component Value Date     (H) 11/06/2023    A1C 6.0 (A) 07/29/2024      Lab Results   Component Value Date     (H) 02/24/2024    BUN 22 02/24/2024    CREATSERUM 1.10 02/24/2024    BUNCREA SEE NOTE: 02/24/2024    ANIONGAP 7 11/06/2023    GFRAA 58 (L) 12/15/2021    GFRNAA 63 03/30/2022    CA 9.7 02/24/2024     02/24/2024    K 4.3 02/24/2024     02/24/2024    CO2 29 02/24/2024    OSMOCALC 294 11/06/2023      Lab Results   Component Value Date    WBC 5.8 11/06/2023    RBC 5.21 11/06/2023    HGB 15.2 11/06/2023    HCT 46.5 11/06/2023    MCV 89.3 11/06/2023    MCH 29.2 11/06/2023    MCHC 32.7 11/06/2023    RDW 12.6 11/06/2023    .0 11/06/2023        Lab Results   Component Value Date    HGB 15.2 11/06/2023    HGB  16.6 03/30/2022    HGB 15.6 10/05/2021    HGB 15.2 03/05/2021    HGB 14.7 11/11/2020    HGB 15.5 05/02/2020    CREATSERUM 1.10 02/24/2024    CREATSERUM 1.23 11/06/2023    CREATSERUM 1.16 03/30/2022    CREATSERUM 1.11 10/05/2021    CREATSERUM 0.96 03/05/2021    CREATSERUM 1.13 11/11/2020       IMAGING:       ASSESSMENT  Diagnoses and all orders for this visit:    Carotid bruit, unspecified laterality  -     US CAROTID DUPLEX (CPT=93880); Future    PAD (peripheral artery disease) ([facility])       I had a prolonged discussion with the patient where I explained to him that his symptoms appear to be neurogenic with some possibility of having moderate iliac disease that becomes symptomatic with prolonged walking.  However,  I would not recommend further testing as of now before he works on smoking cessation and daily walking.  I did explain to him that the area of stenosis within his femoral artery is insufficient to be causing his symptoms as he does have palpable pulses.  He does have evidence of peripheral arterial disease with femoral and moderate stenosis of his right carotid artery that will require yearly follow-up to prevent progression to severe stenosis where his risk of stroke can increase.  He already is on antiplatelet and statin therapy which both help to decrease his overall cardiovascular event risk.     PLAN:  Carotid duplex    The patient indicated an understanding of these issues and agreed to the plan and all questions were answered during the clinic visit.      Thank you for allowing me to participate in your patient's care.   Please do not hesitate to contact me with any questions.    Sincerely,  Samer F. Najjar, MD    Please note: Dragon speech recognition software was used to prepare this note. If a word or phrase is confusing, it is likely do to a failure of recognition.   Please contact me with any questions or clarifications.

## 2024-08-26 ENCOUNTER — TELEPHONE (OUTPATIENT)
Dept: FAMILY MEDICINE CLINIC | Facility: CLINIC | Age: 74
End: 2024-08-26

## 2024-08-26 DIAGNOSIS — M79.671 RIGHT FOOT PAIN: Primary | ICD-10-CM

## 2024-08-27 DIAGNOSIS — K21.9 GASTROESOPHAGEAL REFLUX DISEASE: ICD-10-CM

## 2024-08-27 DIAGNOSIS — N20.0 RECURRENT KIDNEY STONES: ICD-10-CM

## 2024-08-28 RX ORDER — ALLOPURINOL 300 MG/1
300 TABLET ORAL DAILY
Qty: 90 TABLET | Refills: 3 | OUTPATIENT
Start: 2024-08-28

## 2024-08-28 RX ORDER — PANTOPRAZOLE SODIUM 40 MG/1
40 TABLET, DELAYED RELEASE ORAL DAILY
Qty: 90 TABLET | Refills: 3 | Status: SHIPPED | OUTPATIENT
Start: 2024-08-28

## 2024-08-28 NOTE — TELEPHONE ENCOUNTER
Requested Prescriptions     Pending Prescriptions Disp Refills    ALLOPURINOL 300 MG Oral Tab [Pharmacy Med Name: ALLOPURINOL TABS 300MG] 90 tablet 3     Sig: TAKE 1 TABLET DAILY    PANTOPRAZOLE 40 MG Oral Tab EC [Pharmacy Med Name: PANTOPRAZOLE SODIUM DR TABS 40MG] 90 tablet 3     Sig: TAKE 1 TABLET DAILY     LOV 7/29/2024     Patient was asked to follow-up in: Follow-up not documented in note    Appointment scheduled: 1/6/2025 Andrew Peraza MD     Medication refilled per protocol.

## 2024-08-29 ENCOUNTER — PATIENT OUTREACH (OUTPATIENT)
Dept: CASE MANAGEMENT | Age: 74
End: 2024-08-29

## 2024-08-29 DIAGNOSIS — E55.9 VITAMIN D DEFICIENCY: ICD-10-CM

## 2024-08-29 DIAGNOSIS — I10 HYPERTENSION, BENIGN: ICD-10-CM

## 2024-08-29 DIAGNOSIS — I25.10 CORONARY ARTERY DISEASE INVOLVING NATIVE CORONARY ARTERY OF NATIVE HEART WITHOUT ANGINA PECTORIS: ICD-10-CM

## 2024-08-29 DIAGNOSIS — E11.29 TYPE 2 DIABETES MELLITUS WITH MICROALBUMINURIA (HCC): ICD-10-CM

## 2024-08-29 DIAGNOSIS — K21.9 GASTROESOPHAGEAL REFLUX DISEASE, UNSPECIFIED WHETHER ESOPHAGITIS PRESENT: Primary | ICD-10-CM

## 2024-08-29 DIAGNOSIS — M16.0 OSTEOARTHRITIS OF BOTH HIPS, UNSPECIFIED OSTEOARTHRITIS TYPE: ICD-10-CM

## 2024-08-29 DIAGNOSIS — E78.00 PURE HYPERCHOLESTEROLEMIA: ICD-10-CM

## 2024-08-29 DIAGNOSIS — R73.9 HYPERGLYCEMIA: ICD-10-CM

## 2024-08-29 DIAGNOSIS — G47.33 OSA ON CPAP: ICD-10-CM

## 2024-08-29 DIAGNOSIS — R80.9 TYPE 2 DIABETES MELLITUS WITH MICROALBUMINURIA (HCC): ICD-10-CM

## 2024-08-29 NOTE — PROGRESS NOTES
Spoke to Mt for Chronic Care Management.      Updates to patient care team/comments: UTd  Patient reported changes in medications: UTD  Med Adherence  Comment: pt taking medications as prescribed     Health Maintenance:   Health Maintenance   Topic Date Due    Lung Cancer Screening  Never done    Zoster Vaccines (2 of 3) 12/06/2012    Diabetes Care Dilated Eye Exam  01/25/2022    COVID-19 Vaccine (6 - 2023-24 season) 03/01/2024    Diabetes Care Foot Exam  07/28/2024    Influenza Vaccine (1) 10/01/2024    Diabetes Care A1C  01/29/2025    Annual Physical  01/29/2025    Diabetes Care: GFR  02/24/2025    Diabetes Care: Microalb/Creat Ratio  02/26/2025    Colorectal Cancer Screening  12/08/2025    PSA  02/24/2026    Annual Depression Screening  Completed    Fall Risk Screening (Annual)  Completed    Tobacco Cessation Counseling  Completed    Pneumococcal Vaccine: 65+ Years  Completed       Patient updates/concerns:    Spoke to pt for monthly outreach   Pt will be cancelling scheduled appointment to dr kirkland. Pt was able to find sooner appointment with duly podiatrist rakesh mckinney. He was dx with a jorge alberto garcias and has been wearing a boot since Monday. Pt will be wearing it for two weeks and will then f/u with podiatry.    Pt will be taking a two week break from health club while bunyon heals and he is in boot.    Pt feels that change to his blood pressure medication has been very effective. Pt gets it taken at [employer] 2 times a session and pt states he is seeing much improved numbers around 110/60.   Pt continues to take 2 gabapentin every day. Pt is prescribed 3 but states that when he takes more than two he feels increasingly tired and gets a dissassociative mental state that is not comfortable. Pt states that his nerve pain is manageable using only 2 a day.    Pt has visited with vascular surgeon and will be completing ultrasound. Pt states there was discussion about potentially having stent placed.   Pt  is working on cigar smoking cessation. His goal is to get to one a week. Currently he is smoking one every other day. Pt discussed his awareness of what triggers his cravings.   Pt prescriptions are current   Pt did not have any new questions or concerns for pcp or nurses  Goals/Action Plan:    Active goal from previous outreach: staying healthy    Patient reported progress towards goals: pt continues to see providers as needed and takes meds as prescribed. Pt also follows up with specialists as needed               - What: exercise           - Where/When/How: pt is taking a two week break as foot heals. Pt will return to health club following that  Patient Reported Barriers and Concerns: n/a                   - Plan for overcoming barriers: none    Care Managers Interventions: continue to provide encouragement and education for healthy coping and dx    Future Appointments:   Future Appointments   Date Time Provider Department Center   9/25/2024  2:30 PM Osvaldo Tran DPM [id number]   10/3/2024  1:00 PM [facility] VASCULAR SURGERY LAB [facility]   10/3/2024  2:00 PM Najjar, Samer F, MD [facility]   1/6/2025 11:30 AM Andrew Peraza MD [facility] 3 [facility]   1/15/2025  1:15 PM Isidoro Meneses MD [id number] [facility] 4         Next Care Manager Follow Up Date: one month    Reason For Follow Up: review progress and or barriers towards patient's goals.     Time Spent This Encounter Total: 27 min medical record review, telephone communication, care plan updates where needed, education, goals, and action plan recreation/update. Provided acknowledgment and validation to patient's concerns.   Monthly Minute Total including today: 27  Physical assessment, complete health history, and need for CCM established by Andrew Peraza MD.

## 2024-09-01 DIAGNOSIS — E11.9 TYPE 2 DIABETES MELLITUS WITHOUT COMPLICATION, WITHOUT LONG-TERM CURRENT USE OF INSULIN (HCC): ICD-10-CM

## 2024-09-04 RX ORDER — GLIPIZIDE 10 MG/1
TABLET ORAL
Qty: 90 TABLET | Refills: 0 | Status: SHIPPED | OUTPATIENT
Start: 2024-09-04

## 2024-09-04 NOTE — TELEPHONE ENCOUNTER
Requested Prescriptions     Pending Prescriptions Disp Refills    GLIPIZIDE 10 MG Oral Tab [Pharmacy Med Name: GLIPIZIDE TABS 10MG] 90 tablet 3     Sig: TAKE 1 TABLET BEFORE       BREAKFAST     LOV 7/29/2024     Patient was asked to follow-up in: Follow-up not documented in note    Appointment scheduled: 1/6/2025 Andrew Peraza MD     Medication refilled per protocol.

## 2024-09-30 ENCOUNTER — PATIENT OUTREACH (OUTPATIENT)
Dept: FAMILY MEDICINE CLINIC | Facility: CLINIC | Age: 74
End: 2024-09-30

## 2024-09-30 DIAGNOSIS — E78.00 PURE HYPERCHOLESTEROLEMIA: ICD-10-CM

## 2024-09-30 DIAGNOSIS — R73.9 HYPERGLYCEMIA: ICD-10-CM

## 2024-09-30 DIAGNOSIS — E55.9 VITAMIN D DEFICIENCY: ICD-10-CM

## 2024-09-30 DIAGNOSIS — M16.0 OSTEOARTHRITIS OF BOTH HIPS, UNSPECIFIED OSTEOARTHRITIS TYPE: ICD-10-CM

## 2024-09-30 DIAGNOSIS — R80.9 TYPE 2 DIABETES MELLITUS WITH MICROALBUMINURIA (HCC): Primary | ICD-10-CM

## 2024-09-30 DIAGNOSIS — I10 HYPERTENSION, BENIGN: ICD-10-CM

## 2024-09-30 DIAGNOSIS — K21.9 GASTROESOPHAGEAL REFLUX DISEASE, UNSPECIFIED WHETHER ESOPHAGITIS PRESENT: ICD-10-CM

## 2024-09-30 DIAGNOSIS — E11.29 TYPE 2 DIABETES MELLITUS WITH MICROALBUMINURIA (HCC): Primary | ICD-10-CM

## 2024-09-30 NOTE — PROGRESS NOTES
Spoke to Mt for Chronic Care Management.      Updates to patient care team/comments: UTD  Patient reported changes in medications: UTD  Med Adherence  Comment: Pt taking medications as prescribed     Health Maintenance:   Health Maintenance   Topic Date Due    Lung Cancer Screening  Never done    Zoster Vaccines (2 of 3) 12/06/2012    Diabetes Care Dilated Eye Exam  01/25/2022    Diabetes Care Foot Exam  07/28/2024    COVID-19 Vaccine (6 - 2023-24 season) 09/01/2024    Influenza Vaccine (1) 10/01/2024    Diabetes Care A1C  01/29/2025    Annual Physical  01/29/2025    Diabetes Care: GFR  02/24/2025    Diabetes Care: Microalb/Creat Ratio  02/26/2025    Colorectal Cancer Screening  12/08/2025    PSA  02/24/2026    Annual Depression Screening  Completed    Fall Risk Screening (Annual)  Completed    Tobacco Cessation Counseling  Completed    Pneumococcal Vaccine: 65+ Years  Completed       Patient updates/concerns:    Spoke to pt for monthly outreach   Pt bunyon has healed completely. He is no longer wearing the boot or treating it in any way.    Pt has returned to a more regular routine at the health club. He participates in the heart heatlhy class. The routines are structured similarly to interval work out training where pt will alternate cardio and strength training using consistently timed intervals.   Pt states he has no difficulty tolerating the demands of the class. His instructor takes the pt pulse and blood pressure at the beginning and end of each session. Pt states today bp was about 115/80. Pt pulse remains between 55-60.    Pt has decided to take gabapentin only as needed. Pt states he began to develop some brain fog and confusion while on medication and sometimes even had cognitive disassociation. Pt has not had any difficulty with more pronounced nerve pain since holding medication. Pt is not limited in any way due to any pain. Pt will be meeting with specialist in January and will discuss then. Pt  requested medication remain on chart ccm marked that pt is not taking medication.    Pt feels he is doing well with trying to quit cigars. He has quit smoking cigarettes in the past and can draw from that to provide motivation to continue this difficult task. Pt has not had any issues with eating more or increased irritability.     Pt will be getting both flu shot and Covid shot at local [employer]    Pt did not have any new questions or concerns for pcp or nurses.  Goals/Action Plan:  s  Active goal from previous outreach:   Staying healthy   Patient reported progress towards goals: pt continues to see providers as needed and takes meds as prescribed               - What: exercise           - Where/When/How: pt is visiting heart healthy class during the week  Patient Reported Barriers and Concerns: n/a                   - Plan for overcoming barriers: none    Care Managers Interventions: continue to provide encouragement and education for healthy coping and dx    Future Appointments:   Future Appointments   Date Time Provider Department Center   10/3/2024  1:00 PM [facility] VASCULAR SURGERY LAB [facility]   10/3/2024  2:00 PM Najjar, Samer F, MD [facility]   1/6/2025 11:30 AM Andrew Peraza MD EMG 3 [facility]   1/15/2025  1:15 PM Isidoro Meneses MD [id number] [facility] Nap 4         Next Care Manager Follow Up Date: one month    Reason For Follow Up: review progress and or barriers towards patient's goals.     Time Spent This Encounter Total: 20 min medical record review, telephone communication, care plan updates where needed, education, goals, and action plan recreation/update. Provided acknowledgment and validation to patient's concerns.   Monthly Minute Total including today: 20  Physical assessment, complete health history, and need for CCM established by Andrew Peraza MD.

## 2024-10-03 ENCOUNTER — NURSE ONLY (OUTPATIENT)
Facility: CLINIC | Age: 74
End: 2024-10-03
Payer: MEDICARE

## 2024-10-03 ENCOUNTER — OFFICE VISIT (OUTPATIENT)
Facility: CLINIC | Age: 74
End: 2024-10-03
Payer: MEDICARE

## 2024-10-03 DIAGNOSIS — I77.9 CAROTID ARTERY DISEASE, UNSPECIFIED LATERALITY, UNSPECIFIED TYPE (HCC): Primary | ICD-10-CM

## 2024-10-03 DIAGNOSIS — R09.89 CAROTID BRUIT, UNSPECIFIED LATERALITY: ICD-10-CM

## 2024-10-06 NOTE — PROGRESS NOTES
Samer F. Najjar, MD  Vascular Surgery  [facility]       VASCULAR SURGERY OFFICE NOTE        Name: Mt Obregon   : 1950  [id number]     REASON FOR VISIT:   Patient is a 74 year old male who is here to discuss the results of his carotid duplex which showed mild stenosis bilaterally.  The patient was seen previously for possibility of claudication but he states that he is able to go to the gym 3 times a week and the legs are better with frequent exercise.  I did mention to him that I thought his symptoms were mostly neurogenic.    PAST MEDICAL HISTORY:     Past Medical History:    Abdominal pain    Back problem    Calculus of kidney    Coronary atherosclerosis    S/P QUADRUPLE CABG     Esophageal reflux    Essential hypertension    Fatigue    Gout    Hearing loss    Heart palpitations    High blood pressure    High cholesterol    Night sweats    Sleep apnea    Sleep disturbance    Stress    Visual impairment    GLASSES    Wears glasses       PAST SURGICAL HISTORY:     Past Surgical History:   Procedure Laterality Date    Back surgery      Cabg      Cabg, artery-vein, four  2010    QUADRUPLE    Colonoscopy  2012    Colonoscopy      Femur/knee surg unlisted  1987    Orbit surgery proc unlisted      \"Closed treatment of orbital fracture (non-'blowout')\"    Other surgical history N/A 2015    Procedure: EXCISION OF LESION/LIPOMA;  Surgeon: Aury Henderson MD;  Location:  MAIN OR    Sigmoidoscopy,diagnostic      Sinus surgery        Spine surgery procedure unlisted      LOWER BACK SURGERY X2-MICRODISCECTOMY AND LAMINECTOMY        MEDICATIONS:     Current Outpatient Medications:     metoprolol succinate  MG Oral Tablet 24 Hr, Take 1 tablet (100 mg total) by mouth daily., Disp: , Rfl:     Multiple Vitamins-Minerals (MULTIVITAMIN ADULT OR), Take 1 tablet by mouth daily., Disp: , Rfl:     aspirin 325 MG Oral Tab, Take 1 tablet (325 mg total) by mouth daily.,  Disp: , Rfl:     glipiZIDE 10 MG Oral Tab, TAKE 1 TABLET BEFORE       BREAKFAST, Disp: 90 tablet, Rfl: 0    PANTOPRAZOLE 40 MG Oral Tab EC, TAKE 1 TABLET DAILY, Disp: 90 tablet, Rfl: 3    hydroCHLOROthiazide 25 MG Oral Tab, Take 1 tablet (25 mg total) by mouth every morning., Disp: , Rfl:     gabapentin 300 MG Oral Cap, Take 1 capsule (300 mg total) by mouth 3 (three) times daily., Disp: , Rfl:     allopurinol 300 MG Oral Tab, Take 1 tablet (300 mg total) by mouth daily., Disp: 90 tablet, Rfl: 3    Potassium Citrate ER (UROCIT-K 15) 15 MEQ (1620 MG) Oral Tab CR, Take 1 tablet by mouth daily., Disp: 90 tablet, Rfl: 5    finasteride 5 MG Oral Tab, Take 1 tablet (5 mg total) by mouth daily., Disp: 90 tablet, Rfl: 6    Ascorbic Acid (VITAMIN C) 1000 MG Oral Tab, Take 1 tablet (1,000 mg total) by mouth 2 (two) times daily with meals., Disp: , Rfl:     CANNABIDIOL OR, Take by mouth., Disp: , Rfl:     furosemide 20 MG Oral Tab, Take 1 tablet (20 mg total) by mouth 2 (two) times daily., Disp: , Rfl:     losartan 50 MG Oral Tab, Take 1 tablet (50 mg total) by mouth 2 (two) times daily., Disp: , Rfl:     Sildenafil Citrate 100 MG Oral Tab, Take 1 tablet (100 mg total) by mouth daily as needed for Erectile Dysfunction. Take ~ one hour prior to sexual activity on an empty stomach, Disp: 30 tablet, Rfl: 5    ketorolac 0.5 % Ophthalmic Solution, , Disp: , Rfl:     ofloxacin 0.3 % Ophthalmic Solution, , Disp: , Rfl:     prednisoLONE 1 % Ophthalmic Suspension, , Disp: , Rfl:     fenofibrate 48 MG Oral Tab, , Disp: , Rfl:     latanoprost 0.005 % Ophthalmic Solution, , Disp: , Rfl:     rosuvastatin 40 MG Oral Tab, , Disp: , Rfl:     LABSS:     Lab Results   Component Value Date     (H) 11/06/2023    A1C 6.0 (A) 07/29/2024      Lab Results   Component Value Date     (H) 02/24/2024    BUN 22 02/24/2024    CREATSERUM 1.10 02/24/2024    BUNCREA SEE NOTE: 02/24/2024    ANIONGAP 7 11/06/2023    GFRAA 58 (L) 12/15/2021     GFRNAA 63 03/30/2022    CA 9.7 02/24/2024     02/24/2024    K 4.3 02/24/2024     02/24/2024    CO2 29 02/24/2024    OSMOCALC 294 11/06/2023      Lab Results   Component Value Date    WBC 5.8 11/06/2023    RBC 5.21 11/06/2023    HGB 15.2 11/06/2023    HCT 46.5 11/06/2023    MCV 89.3 11/06/2023    MCH 29.2 11/06/2023    MCHC 32.7 11/06/2023    RDW 12.6 11/06/2023    .0 11/06/2023        Lab Results   Component Value Date    HGB 15.2 11/06/2023    HGB 16.6 03/30/2022    HGB 15.6 10/05/2021    HGB 15.2 03/05/2021    HGB 14.7 11/11/2020    HGB 15.5 05/02/2020    CREATSERUM 1.10 02/24/2024    CREATSERUM 1.23 11/06/2023    CREATSERUM 1.16 03/30/2022    CREATSERUM 1.11 10/05/2021    CREATSERUM 0.96 03/05/2021    CREATSERUM 1.13 11/11/2020       EXAM:   There were no vitals taken for this visit.  Not examined today        ASSESSMENT    Diagnoses and all orders for this visit:    Carotid artery disease, unspecified laterality, unspecified type (HCC)    I reassured the patient that no intervention is necessary for his mild carotid artery disease except with monitoring with a carotid duplex every other year.  He should continue with his exercise routine and cardiovascular risk factor reduction with antiplatelet and statin therapy.  We will obtain a repeat carotid duplex in October 2026      PLAN:   As above          Samer F. Najjar MD  Division of Vascular Surgery

## 2024-10-18 ENCOUNTER — EXTERNAL LAB (OUTPATIENT)
Dept: HEALTH INFORMATION MANAGEMENT | Facility: OTHER | Age: 74
End: 2024-10-18

## 2024-10-18 LAB
25(OH)D3+25(OH)D2 SERPL-MCNC: 86 NG/ML (ref 30–100)
BASOPHILS # BLD: 77 CELLS/UL (ref 0–200)
BASOPHILS NFR BLD: 1.1 %
CHOLEST SERPL-MCNC: 166 MG/DL
CHOLEST/HDLC SERPL: 3.5 (CALC)
EOSINOPHIL # BLD: 252 CELLS/UL (ref 15–500)
EOSINOPHIL NFR BLD: 3.6 %
ERYTHROCYTE [DISTWIDTH] IN BLOOD: 14.9 % (ref 11–15)
HBA1C MFR BLD: 6.5 % OF TOTAL HGB
HCT VFR BLD CALC: 51.7 % (ref 38.5–50)
HDLC SERPL-MCNC: 48 MG/DL
HGB BLD-MCNC: 16.2 G/DL (ref 13.2–17.1)
LAB RESULT: NORMAL
LDLC SERPL CALC-MCNC: 88 MG/DL (CALC)
LENGTH OF FAST TIME PATIENT: YES H
LYMPHOCYTES # BLD: 1981 CELLS/UL (ref 850–3900)
LYMPHOCYTES NFR BLD: 28.3 %
MCH RBC QN AUTO: 28.6 PG (ref 27–33)
MCHC RBC AUTO-ENTMCNC: 31.3 G/DL (ref 32–36)
MCV RBC AUTO: 91.2 FL (ref 80–100)
MONOCYTES # BLD: 455 CELLS/UL (ref 200–950)
MONOCYTES NFR BLD: 6.5 %
NEUTROPHILS # BLD: 4235 CELLS/UL (ref 1500–7800)
NEUTROPHILS NFR BLD: 60.5 %
NONHDLC SERPL-MCNC: 118 MG/DL (CALC)
PLATELET # BLD: 176 THOUSAND/UL (ref 140–400)
PMV BLD AUTO: 13.2 FL (ref 7.5–12.5)
RBC # BLD: 5.67 MILLION/UL (ref 4.2–5.8)
TRIGL SERPL-MCNC: 210 MG/DL
WBC # BLD: 7 THOUSAND/UL (ref 3.8–10.8)

## 2024-10-30 ENCOUNTER — PATIENT OUTREACH (OUTPATIENT)
Dept: CASE MANAGEMENT | Age: 74
End: 2024-10-30

## 2024-10-30 DIAGNOSIS — R80.9 TYPE 2 DIABETES MELLITUS WITH MICROALBUMINURIA (HCC): ICD-10-CM

## 2024-10-30 DIAGNOSIS — M16.0 OSTEOARTHRITIS OF BOTH HIPS, UNSPECIFIED OSTEOARTHRITIS TYPE: ICD-10-CM

## 2024-10-30 DIAGNOSIS — E78.00 PURE HYPERCHOLESTEROLEMIA: ICD-10-CM

## 2024-10-30 DIAGNOSIS — K21.9 GASTROESOPHAGEAL REFLUX DISEASE, UNSPECIFIED WHETHER ESOPHAGITIS PRESENT: ICD-10-CM

## 2024-10-30 DIAGNOSIS — I65.21 STENOSIS OF RIGHT CAROTID ARTERY: ICD-10-CM

## 2024-10-30 DIAGNOSIS — E11.29 TYPE 2 DIABETES MELLITUS WITH MICROALBUMINURIA (HCC): ICD-10-CM

## 2024-10-30 DIAGNOSIS — G47.33 OSA ON CPAP: ICD-10-CM

## 2024-10-30 DIAGNOSIS — R73.9 HYPERGLYCEMIA: Primary | ICD-10-CM

## 2024-10-30 DIAGNOSIS — I10 HYPERTENSION, BENIGN: ICD-10-CM

## 2024-10-30 DIAGNOSIS — E55.9 VITAMIN D DEFICIENCY: ICD-10-CM

## 2024-10-30 NOTE — PROGRESS NOTES
Spoke to [facility] for Chronic Care Management.      Updates to patient care team/comments: UTD  Patient reported changes in medications: UTD  Med Adherence  Comment: pt taking medications as prescribed     Health Maintenance:   Health Maintenance   Topic Date Due    Lung Cancer Screening  Never done    Zoster Vaccines (2 of 3) 12/06/2012    Diabetes Care Dilated Eye Exam  01/25/2022    Diabetes Care Foot Exam  07/28/2024    COVID-19 Vaccine (6 - 2023-24 season) 09/01/2024    Influenza Vaccine (1) 10/01/2024    Annual Physical  01/29/2025    Diabetes Care A1C  01/29/2025    Diabetes Care: GFR  02/24/2025    Diabetes Care: Microalb/Creat Ratio  02/26/2025    Colorectal Cancer Screening  12/08/2025    PSA  02/24/2026    Annual Depression Screening  Completed    Fall Risk Screening (Annual)  Completed    Tobacco Cessation Counseling  Completed    Pneumococcal Vaccine: 65+ Years  Completed       Patient updates/concerns:    Spoke to pt for monthly outreach   Pt is following up with dr najjar next week to clarify his care plan and review any dx that the specialist has determined appropriate. Pt has been managing his mild coronary artery disease with cardiology and was referred to dr najjar  for assessment following a foot exam to determine if he was having symptoms of peripheral artery disease.  Pt was reassured that statin therapy and his ability to exercise 3 times a week indicated that his symptoms were neurogenic according to specialist note, but he was surprised because he was under the assumption that his legs would be scanned and not his carotid. Pt was then denied a substantial discount to his insurance because [employer] discovered a dx for pad came from dr najjar office. Pt has not lost any part of his initial coverage, but was not able to confirm if he can reapply for the part G savings.   Pt will discuss with specialist how this dx can be updated without indication of PAD if possible.    Pt continues to take  gabapentin as needed instead of 3 times daily. Pt stopped for several weeks without difficulty. He did keep experiencing varsha tingling in his right toes and started taking one in the morning and this has been effective in managing issue.    Pt continues with heart healthy exercise class that uses interval training. He spends 10 minutes on tradmill at the beginning and end of the session. He has his bp taken and pulse taken 2 times a class. Pt bp has been within normal limits around 112/60 and pulse ranges from 60-65.    Pt feels that his cigar taper has been successful and he plans to smoke his last cigar before he leaves on vacation..   Pt will be seeing cardiology in the next month and then sees pcp in January.    Pt did not have any new questions or concerns for pcp or clinical staff.   Goals/Action Plan:    Active goal from previous outreach:   Staying healthy  Patient reported progress towards goals: pt continues to see provider as needed and specialist as needed. Pt takes meds as prescribed               - What: exercise           - Where/When/How: pt attends heart healthy exercise class 3 times a week performing interval training. Pt has bp and pulse taken twice each session.  Patient Reported Barriers and Concerns: n/a                   - Plan for overcoming barriers: none    Care Managers Interventions: continue to provide encouragement and education for healthy coping and dx    Future Appointments:   Future Appointments   Date Time Provider Department Center   11/5/2024  3:30 PM Najjar, Samer F, MD [facility]   1/6/2025 11:30 AM Andrew Peraza MD [facility] 3 [facility]   1/15/2025  1:15 PM Isidoro Meneses MD [id number] [facility] 4         Next Care Manager Follow Up Date: one month    Reason For Follow Up: review progress and or barriers towards patient's goals.     Time Spent This Encounter Total: 25 min medical record review, telephone communication, care plan updates where needed, education, goals, and  action plan recreation/update. Provided acknowledgment and validation to patient's concerns.   Monthly Minute Total including today: 25  Physical assessment, complete health history, and need for CCM established by Andrew Peraza MD.    Friendly reminder - 2025 Benefits Open Enrollment is here!   We encourage you to review your current Medicare coverage and explore your options during this period. We have developed a Medicare Information Page on our website to serve as a resource for guidance and answers to any questions you might have.   You can access it by visiting, [email or web address]

## 2024-10-31 ENCOUNTER — OFFICE VISIT (OUTPATIENT)
Facility: CLINIC | Age: 74
End: 2024-10-31
Payer: MEDICARE

## 2024-10-31 DIAGNOSIS — M16.0 OSTEOARTHRITIS OF BOTH HIPS, UNSPECIFIED OSTEOARTHRITIS TYPE: Primary | ICD-10-CM

## 2024-10-31 PROCEDURE — 99490 CHRNC CARE MGMT STAFF 1ST 20: CPT

## 2024-10-31 NOTE — PROGRESS NOTES
Samer F. Najjar, MD  Vascular Surgery  [facility]       VASCULAR SURGERY OFFICE NOTE        Name: Mt Obregon   : 1950  [id number]     REASON FOR VISIT:   Patient is a 74 year old male who is here to discuss whether he should undergo testing for his lower extremity peripheral arterial disease or not.  The patient was recently denied insurance [employer] because of the diagnosis of peripheral arterial disease even though he is minimally symptomatic.  He specifically stated that when he inserts his legs the pain is along the femoral joint and slightly above but not at the buttocks area or in the calf.    PAST MEDICAL HISTORY:     Past Medical History:    Abdominal pain    Back problem    Calculus of kidney    Coronary atherosclerosis    S/P QUADRUPLE CABG     Esophageal reflux    Essential hypertension    Fatigue    Gout    Hearing loss    Heart palpitations    High blood pressure    High cholesterol    Night sweats    Sleep apnea    Sleep disturbance    Stress    Visual impairment    GLASSES    Wears glasses       PAST SURGICAL HISTORY:     Past Surgical History:   Procedure Laterality Date    Back surgery      Cabg      Cabg, artery-vein, four  2010    QUADRUPLE    Colonoscopy  2012    Colonoscopy      Femur/knee surg unlisted  1987    Orbit surgery proc unlisted      \"Closed treatment of orbital fracture (non-'blowout')\"    Other surgical history N/A 2015    Procedure: EXCISION OF LESION/LIPOMA;  Surgeon: Aury Henderson MD;  Location:  MAIN OR    Sigmoidoscopy,diagnostic      Sinus surgery        Spine surgery procedure unlisted      LOWER BACK SURGERY X2-MICRODISCECTOMY AND LAMINECTOMY        MEDICATIONS:     Current Outpatient Medications:     glipiZIDE 10 MG Oral Tab, TAKE 1 TABLET BEFORE       BREAKFAST, Disp: 90 tablet, Rfl: 0    PANTOPRAZOLE 40 MG Oral Tab EC, TAKE 1 TABLET DAILY, Disp: 90 tablet, Rfl: 3    hydroCHLOROthiazide 25 MG Oral Tab, Take  1 tablet (25 mg total) by mouth every morning., Disp: , Rfl:     gabapentin 300 MG Oral Cap, Take 1 capsule (300 mg total) by mouth 3 (three) times daily., Disp: , Rfl:     allopurinol 300 MG Oral Tab, Take 1 tablet (300 mg total) by mouth daily., Disp: 90 tablet, Rfl: 3    Potassium Citrate ER (UROCIT-K 15) 15 MEQ (1620 MG) Oral Tab CR, Take 1 tablet by mouth daily., Disp: 90 tablet, Rfl: 5    finasteride 5 MG Oral Tab, Take 1 tablet (5 mg total) by mouth daily., Disp: 90 tablet, Rfl: 6    Ascorbic Acid (VITAMIN C) 1000 MG Oral Tab, Take 1 tablet (1,000 mg total) by mouth 2 (two) times daily with meals., Disp: , Rfl:     CANNABIDIOL OR, Take by mouth., Disp: , Rfl:     furosemide 20 MG Oral Tab, Take 1 tablet (20 mg total) by mouth 2 (two) times daily., Disp: , Rfl:     losartan 50 MG Oral Tab, Take 1 tablet (50 mg total) by mouth 2 (two) times daily., Disp: , Rfl:     Sildenafil Citrate 100 MG Oral Tab, Take 1 tablet (100 mg total) by mouth daily as needed for Erectile Dysfunction. Take ~ one hour prior to sexual activity on an empty stomach, Disp: 30 tablet, Rfl: 5    ketorolac 0.5 % Ophthalmic Solution, , Disp: , Rfl:     ofloxacin 0.3 % Ophthalmic Solution, , Disp: , Rfl:     prednisoLONE 1 % Ophthalmic Suspension, , Disp: , Rfl:     fenofibrate 48 MG Oral Tab, , Disp: , Rfl:     latanoprost 0.005 % Ophthalmic Solution, , Disp: , Rfl:     metoprolol succinate  MG Oral Tablet 24 Hr, Take 1 tablet (100 mg total) by mouth daily., Disp: , Rfl:     rosuvastatin 40 MG Oral Tab, , Disp: , Rfl:     Multiple Vitamins-Minerals (MULTIVITAMIN ADULT OR), Take 1 tablet by mouth daily., Disp: , Rfl:     aspirin 325 MG Oral Tab, Take 1 tablet (325 mg total) by mouth daily., Disp: , Rfl:     LABSS:     Lab Results   Component Value Date     (H) 11/06/2023    A1C 6.0 (A) 07/29/2024      Lab Results   Component Value Date     (H) 02/24/2024    BUN 22 02/24/2024    CREATSERUM 1.10 02/24/2024    BUNCREA SEE  NOTE: 02/24/2024    ANIONGAP 7 11/06/2023    GFRAA 58 (L) 12/15/2021    GFRNAA 63 03/30/2022    CA 9.7 02/24/2024     02/24/2024    K 4.3 02/24/2024     02/24/2024    CO2 29 02/24/2024    OSMOCALC 294 11/06/2023      Lab Results   Component Value Date    WBC 5.8 11/06/2023    RBC 5.21 11/06/2023    HGB 15.2 11/06/2023    HCT 46.5 11/06/2023    MCV 89.3 11/06/2023    MCH 29.2 11/06/2023    MCHC 32.7 11/06/2023    RDW 12.6 11/06/2023    .0 11/06/2023        Lab Results   Component Value Date    HGB 15.2 11/06/2023    HGB 16.6 03/30/2022    HGB 15.6 10/05/2021    HGB 15.2 03/05/2021    HGB 14.7 11/11/2020    HGB 15.5 05/02/2020    CREATSERUM 1.10 02/24/2024    CREATSERUM 1.23 11/06/2023    CREATSERUM 1.16 03/30/2022    CREATSERUM 1.11 10/05/2021    CREATSERUM 0.96 03/05/2021    CREATSERUM 1.13 11/11/2020       EXAM:   There were no vitals taken for this visit.    Not examined today     ASSESSMENT    Diagnoses and all orders for this visit:    Osteoarthritis of both hips, unspecified osteoarthritis type      I explained to the patient that I believe that the source of his pain is osteoarthritis of the hip and it is very unlikely that this is due to iliac artery disease but it is possible.  If we were to test it  ,then I would recommend an exercise stress test to better evaluate it.  I think for now the patient is going to try to walk on a daily basis and he indicated that he will stop smoking.  As for his carotid disease, we discussed that we would check it every other year given that it was mild.    PLAN:       As above      Samer F. Najjar MD  Division of Vascular Surgery

## 2024-11-19 RX ORDER — FENOFIBRATE 48 MG/1
TABLET, COATED ORAL
Qty: 90 TABLET | Refills: 0 | Status: SHIPPED | OUTPATIENT
Start: 2024-11-19

## 2024-11-25 ENCOUNTER — TELEPHONE (OUTPATIENT)
Dept: FAMILY MEDICINE CLINIC | Facility: CLINIC | Age: 74
End: 2024-11-25

## 2024-11-25 RX ORDER — HYDROCHLOROTHIAZIDE 25 MG/1
25 TABLET ORAL EVERY MORNING
Qty: 90 TABLET | Refills: 1 | Status: SHIPPED | OUTPATIENT
Start: 2024-11-25

## 2024-11-25 NOTE — TELEPHONE ENCOUNTER
Received Pre op paperwork for patient having surgery on 12/12/24 with [facility] DR. Torres.  Left eye cataract extraction w/ intraocular lens implants    Orders needed: H&P    Called patient to schedule but he thinks he may be cancelling the surgery so he will get back to us.  Paperwork in back of pre op tickler awaiting response from patient.

## 2024-11-26 NOTE — TELEPHONE ENCOUNTER
Patient scheduled an appointment on 12/3/2024 with Dr. Peraza for his Pre op for surgery on 12/12/24 with [facility] DR. Torres. Left eye cataract extraction w/ intraocular lens implants     H&P     Fax over [phone]    Form placed in triage in front of printer

## 2024-12-02 ENCOUNTER — TELEPHONE (OUTPATIENT)
Dept: FAMILY MEDICINE CLINIC | Facility: CLINIC | Age: 74
End: 2024-12-02

## 2024-12-02 DIAGNOSIS — N20.0 RECURRENT KIDNEY STONES: ICD-10-CM

## 2024-12-02 DIAGNOSIS — E11.9 TYPE 2 DIABETES MELLITUS WITHOUT COMPLICATION, WITHOUT LONG-TERM CURRENT USE OF INSULIN (HCC): ICD-10-CM

## 2024-12-02 RX ORDER — ALLOPURINOL 300 MG/1
300 TABLET ORAL DAILY
Qty: 90 TABLET | Refills: 0 | Status: SHIPPED | OUTPATIENT
Start: 2024-12-02

## 2024-12-02 NOTE — TELEPHONE ENCOUNTER
Pt is calling for a refill on his   allopurinol 300 MG Oral Tab     [facility] #[id number], [place] - [address] AT [facility] [address], [phone], [phone] [[id number]]

## 2024-12-03 ENCOUNTER — OFFICE VISIT (OUTPATIENT)
Dept: FAMILY MEDICINE CLINIC | Facility: CLINIC | Age: 74
End: 2024-12-03
Payer: MEDICARE

## 2024-12-03 VITALS
WEIGHT: 262 LBS | HEIGHT: 75 IN | HEART RATE: 77 BPM | SYSTOLIC BLOOD PRESSURE: 100 MMHG | DIASTOLIC BLOOD PRESSURE: 70 MMHG | OXYGEN SATURATION: 93 % | BODY MASS INDEX: 32.58 KG/M2 | RESPIRATION RATE: 16 BRPM

## 2024-12-03 DIAGNOSIS — I25.10 CORONARY ARTERY DISEASE INVOLVING NATIVE CORONARY ARTERY OF NATIVE HEART WITHOUT ANGINA PECTORIS: ICD-10-CM

## 2024-12-03 DIAGNOSIS — G47.33 OSA ON CPAP: ICD-10-CM

## 2024-12-03 DIAGNOSIS — Z95.1 HX OF CABG: ICD-10-CM

## 2024-12-03 DIAGNOSIS — I70.0 ABDOMINAL AORTIC ATHEROSCLEROSIS (HCC): ICD-10-CM

## 2024-12-03 DIAGNOSIS — N20.0 RECURRENT KIDNEY STONES: ICD-10-CM

## 2024-12-03 DIAGNOSIS — I73.9 PERIPHERAL ARTERIAL DISEASE (HCC): ICD-10-CM

## 2024-12-03 DIAGNOSIS — R80.9 TYPE 2 DIABETES MELLITUS WITH MICROALBUMINURIA (HCC): ICD-10-CM

## 2024-12-03 DIAGNOSIS — E11.29 TYPE 2 DIABETES MELLITUS WITH MICROALBUMINURIA (HCC): ICD-10-CM

## 2024-12-03 DIAGNOSIS — Z01.818 PREOP EXAMINATION: Primary | ICD-10-CM

## 2024-12-03 DIAGNOSIS — H26.9 CATARACT OF LEFT EYE, UNSPECIFIED CATARACT TYPE: ICD-10-CM

## 2024-12-03 DIAGNOSIS — I47.29 NSVT (NONSUSTAINED VENTRICULAR TACHYCARDIA) (HCC): ICD-10-CM

## 2024-12-03 DIAGNOSIS — I10 HYPERTENSION, BENIGN: ICD-10-CM

## 2024-12-03 DIAGNOSIS — E78.00 PURE HYPERCHOLESTEROLEMIA: ICD-10-CM

## 2024-12-03 DIAGNOSIS — I65.21 STENOSIS OF RIGHT CAROTID ARTERY: ICD-10-CM

## 2024-12-03 PROCEDURE — 99214 OFFICE O/P EST MOD 30 MIN: CPT | Performed by: FAMILY MEDICINE

## 2024-12-03 PROCEDURE — 99499 UNLISTED E&M SERVICE: CPT | Performed by: FAMILY MEDICINE

## 2024-12-03 RX ORDER — ALLOPURINOL 300 MG/1
300 TABLET ORAL DAILY
Qty: 90 TABLET | Refills: 3 | Status: SHIPPED | OUTPATIENT
Start: 2024-12-03

## 2024-12-03 RX ORDER — GLIPIZIDE 10 MG/1
TABLET ORAL
Qty: 90 TABLET | Refills: 3 | Status: SHIPPED | OUTPATIENT
Start: 2024-12-03

## 2024-12-03 RX ORDER — PREDNISOLN SP/MOXIFLOX/BROMFEN 1 %-0.5 %
DROPS OPHTHALMIC (EYE)
COMMUNITY
Start: 2024-11-25

## 2024-12-03 NOTE — TELEPHONE ENCOUNTER
Requested Prescriptions     Pending Prescriptions Disp Refills    allopurinol 300 MG Oral Tab 90 tablet 3     Sig: Take 1 tablet (300 mg total) by mouth daily.     LOV 7/29/2024     Patient was asked to follow-up in: 6 months    Appointment scheduled: 12/3/2024 Andrew Peraza MD     Medication refilled per protocol.

## 2024-12-03 NOTE — TELEPHONE ENCOUNTER
Requested Prescriptions     Pending Prescriptions Disp Refills    GLIPIZIDE 10 MG Oral Tab [Pharmacy Med Name: GLIPIZIDE TABS 10MG] 90 tablet 3     Sig: TAKE 1 TABLET BEFORE       BREAKFAST     LOV 7/29/2024     Patient was asked to follow-up in: Follow-up not documented in note    Appointment scheduled: 12/3/2024 Andrew Peraza MD     Medication refilled per protocol.

## 2024-12-03 NOTE — PROGRESS NOTES
Chief Complaint   Patient presents with    Pre-Op Exam     Patient here for pre op   12/12/24  Young Torres MD  LEFT CATARACT EXTRACTION WITH LENS INSERTION     HPI:   Mt Obregon is a 74 year old male who is being evaluated for pre-surgical clearance at the request of Dr. Young Torres.   Surgery: L cataract extraction with lens insertion  Surgeon: Melissa  Date of Surgery: 12/12/2024  Previous Anesthesia: typically tolerates well.      PMH notable for DM2, CAD with h/o CABG, HTN, NSVT, BONIFACIO  Heart - h/o CAD s/p CABG, HTN, HLD, NSVT.  Sees Dr. Nunez and Dr. Yao.  Denies any heart symptoms.  Stable medications., may increase metoprolol to help prevent PVCs.  BP controlled today.  Watching him from a vascular point.  Still able to walk 1/2 mile, so no surgical indicated now. Stenosis in legs.   BONIFACIO - newly on CPAP.  Getting acclimated.  Managed by the pulm team.   DM - A1C remains controlled at 6.0.  Only taking glipizide.   HTN - taking lasix, amlodipine, losartan, metoprolol.    Smoking - current smoker.  Discussed perioperative surgical risks.  Still smoking but cutting back 50-60%.    Back - s/p lumbar fusion.  Back is stable.  Getting more active.  Has gabepentin for PRN use.      Patient Active Problem List   Diagnosis    Esophageal reflux    Pure hypercholesterolemia    Hypertension, benign    Gouty arthropathy    Allergic rhinitis    Coronary artery disease involving native coronary artery of native heart without angina pectoris    Rotator cuff syndrome, left    Spondylosis of cervical region without myelopathy or radiculopathy    Erectile dysfunction    Piriformis syndrome of both sides    Osteoarthritis of both hips, unspecified osteoarthritis type    Carotid artery stenosis    Hx of CABG    Type 2 diabetes mellitus with microalbuminuria (HCC)    Vitamin D deficiency    Dysphagia    Hypokalemia    Hyperglycemia    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)    NSVT (nonsustained  ventricular tachycardia) (HCC)    Abdominal aortic atherosclerosis (HCC)    Age-related nuclear cataract of right eye    Open angle with borderline findings and high glaucoma risk in both eyes    Nuclear sclerotic cataract of left eye    BONIFACIO on CPAP       Medications Ordered Prior to Encounter[1]    Allergies:  Allergies[2]    Past Surgical History:   Procedure Laterality Date    Back surgery      Cabg      Cabg, artery-vein, four  2010    QUADRUPLE    Colonoscopy  2012    Colonoscopy      Femur/knee surg unlisted  1987    Orbit surgery proc unlisted      \"Closed treatment of orbital fracture (non-'blowout')\"    Other surgical history N/A 2015    Procedure: EXCISION OF LESION/LIPOMA;  Surgeon: Aury Henderson MD;  Location:  MAIN OR    Sigmoidoscopy,diagnostic      Sinus surgery        Spine surgery procedure unlisted      LOWER BACK SURGERY X2-MICRODISCECTOMY AND LAMINECTOMY       Family History   Problem Relation Age of Onset    Heart Disease Father     Cancer Father     Breast Cancer Mother     Lung Disorder Maternal Grandmother     Heart Disease Paternal Grandmother         CAD    Cancer Paternal Grandfather     Breast Cancer Other     Alcohol and Other Disorders Associated Other     Heart Attack Other        Social History     Tobacco Use    Smoking status: Light Smoker     Current packs/day: 0.00     Average packs/day: 2.0 packs/day for 17.0 years (34.0 ttl pk-yrs)     Types: Cigars, Cigarettes     Start date: 1971     Last attempt to quit: 1988     Years since quittin.9    Smokeless tobacco: Never    Tobacco comments:     2 cigar/month   Substance Use Topics    Alcohol use: Yes     Alcohol/week: 12.0 standard drinks of alcohol     Types: 2 Glasses of wine, 2 Cans of beer, 8 Shots of liquor per week     Comment: 2 drinks of scotch daily        REVIEW OF SYSTEMS:   Constitutional: negative  Eyes: negative  Ears, nose, mouth, throat, and face: negative  Respiratory:  negative  Cardiovascular: negative  Gastrointestinal: negative  Genitourinary: negative  Neurological: negative    EXAM:   /70   Pulse 77   Resp 16   Ht 6' 3\" (1.905 m)   Wt 262 lb (118.8 kg)   SpO2 93%   BMI 32.75 kg/m²      General appearance - alert, well appearing, and in no distress  Eyes - PERRLA  Chest - clear to auscultation, no wheezes, rales or rhonchi, symmetric air entry  Heart - normal rate, regular rhythm, normal S1, S2, no murmurs, rubs, clicks or gallops  Abdomen - soft, nontender, nondistended, no masses or organomegaly  Neurological - alert, oriented, normal speech, no focal findings or movement disorder noted  Extremities - Bilateral barefoot skin diabetic exam is normal, visualized feet and the appearance is normal.  Bilateral monofilament/sensation of both feet is normal.  Pulsation pedal pulse exam of both lower legs/feet is normal as well.     ASSESSMENT AND PLAN:     Mt Obregon was seen in the office today:  had concerns including Pre-Op Exam (Patient here for pre op /12/12/24  Young Torres MD/LEFT CATARACT EXTRACTION WITH LENS INSERTION).    1. Preop examination  Overall he is a good candidate for the planned procedure  All chronic medical conditions are controlled  Given the procedure, no need to hold or adjust any medications perioperatively  He is cleared for the procedure    2. Cataract of left eye, unspecified cataract type  Planning surgical correction    3. Coronary artery disease involving native coronary artery of native heart without angina pectoris  4. Hx of CABG  5. Stenosis of right carotid artery  6. Abdominal aortic atherosclerosis (HCC)  7. Peripheral arterial disease (HCC)  Known heart disease.  No active symptoms  Managed by cardiology team  Heart has been stable  Peripheral arterial disease diagnosed by the vascular surgeon.  Some stenosis in the arteries going to the legs, but not symptomatic to the point that warrants surgery  Ongoing surveillance of the  carotid stenosis    8. Hypertension, benign  Blood pressure controlled  Stable medicines    9. Pure hypercholesterolemia  Taking rosuvastatin  Managed by cardiology team.  This is stable    10. Type 2 diabetes mellitus with microalbuminuria (HCC)  A1c remains well-controlled  On glipizide only.  Continue healthy diet, picked up his exercise as well    11. NSVT (nonsustained ventricular tachycardia) ([facility])  Regular rate and rhythm today  On metoprolol, which has cut down his palpitations    12. BONIFACIO on CPAP  Uses CPAP nightly    13. Recurrent kidney stones  Taking allopurinol for prevention.  Medicine refilled  - allopurinol 300 MG Oral Tab; Take 1 tablet (300 mg total) by mouth daily.  Dispense: 90 tablet; Refill: 3      Will forward preop clearance to Dr. Torres and his surgical team      Andrew Peraza M.D.   EM[facility] 3  12/03/24             [1]   Current Outpatient Medications on File Prior to Visit   Medication Sig Dispense Refill    Prednisolon-Moxiflox-Bromfenac 1-0.5-0.075 % Ophthalmic Solution as directed Ophthalmic for 30 days      allopurinol 300 MG Oral Tab Take 1 tablet (300 mg total) by mouth daily. 90 tablet 0    glipiZIDE 10 MG Oral Tab TAKE 1 TABLET BEFORE       BREAKFAST 90 tablet 0    PANTOPRAZOLE 40 MG Oral Tab EC TAKE 1 TABLET DAILY 90 tablet 3    hydroCHLOROthiazide 25 MG Oral Tab Take 1 tablet (25 mg total) by mouth every morning.      gabapentin 300 MG Oral Cap Take 1 capsule (300 mg total) by mouth 3 (three) times daily.      Potassium Citrate ER (UROCIT-K 15) 15 MEQ (1620 MG) Oral Tab CR Take 1 tablet by mouth daily. 90 tablet 5    finasteride 5 MG Oral Tab Take 1 tablet (5 mg total) by mouth daily. 90 tablet 6    Ascorbic Acid (VITAMIN C) 1000 MG Oral Tab Take 1 tablet (1,000 mg total) by mouth 2 (two) times daily with meals.      CANNABIDIOL OR Take by mouth.      losartan 50 MG Oral Tab Take 1 tablet (50 mg total) by mouth 2 (two) times daily.      Sildenafil Citrate 100 MG Oral  Tab Take 1 tablet (100 mg total) by mouth daily as needed for Erectile Dysfunction. Take ~ one hour prior to sexual activity on an empty stomach 30 tablet 5    ketorolac 0.5 % Ophthalmic Solution       ofloxacin 0.3 % Ophthalmic Solution       prednisoLONE 1 % Ophthalmic Suspension       fenofibrate 48 MG Oral Tab       latanoprost 0.005 % Ophthalmic Solution       metoprolol succinate  MG Oral Tablet 24 Hr Take 1 tablet (100 mg total) by mouth daily.      rosuvastatin 40 MG Oral Tab       Multiple Vitamins-Minerals (MULTIVITAMIN ADULT OR) Take 1 tablet by mouth daily.      aspirin 325 MG Oral Tab Take 1 tablet (325 mg total) by mouth daily.       No current facility-administered medications on file prior to visit.   [2]   Allergies  Allergen Reactions    Atorvastatin MYALGIA     Oral    Carvedilol OTHER (SEE COMMENTS)     Oral, mental status changes    Pravastatin OTHER (SEE COMMENTS)     Oral    Seasonal OTHER (SEE COMMENTS)     delores

## 2024-12-13 ENCOUNTER — PATIENT OUTREACH (OUTPATIENT)
Dept: CASE MANAGEMENT | Age: 74
End: 2024-12-13

## 2024-12-13 DIAGNOSIS — E11.29 TYPE 2 DIABETES MELLITUS WITH MICROALBUMINURIA (HCC): ICD-10-CM

## 2024-12-13 DIAGNOSIS — G47.33 OSA ON CPAP: ICD-10-CM

## 2024-12-13 DIAGNOSIS — M16.0 OSTEOARTHRITIS OF BOTH HIPS, UNSPECIFIED OSTEOARTHRITIS TYPE: ICD-10-CM

## 2024-12-13 DIAGNOSIS — I70.0 ABDOMINAL AORTIC ATHEROSCLEROSIS (HCC): ICD-10-CM

## 2024-12-13 DIAGNOSIS — R80.9 TYPE 2 DIABETES MELLITUS WITH MICROALBUMINURIA (HCC): ICD-10-CM

## 2024-12-13 DIAGNOSIS — K21.9 GASTROESOPHAGEAL REFLUX DISEASE, UNSPECIFIED WHETHER ESOPHAGITIS PRESENT: ICD-10-CM

## 2024-12-13 DIAGNOSIS — E55.9 VITAMIN D DEFICIENCY: Primary | ICD-10-CM

## 2024-12-13 DIAGNOSIS — E78.00 PURE HYPERCHOLESTEROLEMIA: ICD-10-CM

## 2024-12-13 DIAGNOSIS — I10 HYPERTENSION, BENIGN: ICD-10-CM

## 2024-12-13 DIAGNOSIS — R73.9 HYPERGLYCEMIA: ICD-10-CM

## 2024-12-13 NOTE — PROGRESS NOTES
Spoke to [facility].      Updates to patient care team/comments: UTD  Patient reported changes in medications: UTD  Med Adherence  Comment: pt taking medications as prescribed     Health Maintenance:   Health Maintenance   Topic Date Due    Lung Cancer Screening  Never done    Zoster Vaccines (2 of 3) 12/06/2012    Diabetes Care Dilated Eye Exam  01/25/2022    COVID-19 Vaccine (6 - 2024-25 season) 09/01/2024    Influenza Vaccine (1) 10/01/2024    Annual Physical  01/29/2025    Diabetes Care A1C  01/29/2025    Diabetes Care: GFR  02/24/2025    Diabetes Care: Microalb/Creat Ratio  02/26/2025    Diabetes Care Foot Exam  12/03/2025    Colorectal Cancer Screening  12/08/2025    PSA  02/24/2026    Annual Depression Screening  Completed    Fall Risk Screening (Annual)  Completed    Tobacco Cessation Counseling  Completed    Pneumococcal Vaccine: 65+ Years  Completed       Patient updates/concerns:    Spoke to pt for monthly outreach   Pt has recovered nicely from cataract surgery. Followed up this morning with eye doc and he was pleased with results.   Pt f/u again with dr najjar to discuss if anything will be done with his legs. Pt states that specialist prefers a more conservative approach and feels that based on pt physical capabilities that any intervention is not necessary at this time and he has recommended pt continue exercise and continue his effort at quitting cigars and follow up with dr najjar if condition starts to decline or pt feels new symptoms.    Pt is still taking gabapentin only as needed. He states it helps when he does take it and tries to wait until the neuropathy becomes bothersome or keeps him up at night.    Pt has visit with cardiology on 12/19   Pt has been dealing with some discomfort in his hips if he stands to long or has been walking for a while. Pt states he can typically walk 1/2 mile to a mile before discomfort sets in. He states that the pain will actually subside  when he walks it off. Pt is still able to participate in his heart healthy exercise class 2-3 times a week. He also walks the dog a few times a day.   Pt takes blood pressure at exercise class several times a week and has been consistent 110-120/60-70.   Pt has mawv next month, ccm will send pcp te to determine if labs were necessary. Pt had labs from specialist earlier.   Reviewed rx. Pt needs no help with refills and he has resolved issue with allopurinol and has enough rx for a while.    Pt had no new questions or concerns for pcp or specialist.   Goals/Action Plan:    Active goal from previous outreach:   Staying healthy  Patient reported progress towards goals:pt continues to see providers and specialist as needed and takes meds as prescribed                - What: exercise           - Where/When/How: pt is participating in heart healthy exercise class 2-3 times weekly  Patient Reported Barriers and Concerns: n/a                   - Plan for overcoming barriers: none    Care Managers Interventions: TE pcp labs mawv, continue to provide encouragement and education for healthy coping and dx    Future Appointments:   Future Appointments   Date Time Provider Department Center   1/6/2025 11:30 AM Andrew Peraza MD [facility] [place]   1/15/2025  1:15 PM Isidoro Meneses MD [id number]         Next Care Manager Follow Up Date: one month    Reason For Follow Up: review progress and or barriers towards patient's goals.     Time Spent This Encounter Total: 26 min medical record review, telephone communication, care plan updates where needed, education, goals, and action plan recreation/update. Provided acknowledgment and validation to patient's concerns.   Monthly Minute Total including today: 26  Physical assessment, complete health history, and need for CCM established by Andrew Peraza MD.

## 2024-12-14 ENCOUNTER — TELEPHONE (OUTPATIENT)
Dept: FAMILY MEDICINE CLINIC | Facility: CLINIC | Age: 74
End: 2024-12-14

## 2024-12-14 DIAGNOSIS — E55.9 VITAMIN D DEFICIENCY: ICD-10-CM

## 2024-12-14 DIAGNOSIS — R73.9 HYPERGLYCEMIA: ICD-10-CM

## 2024-12-14 DIAGNOSIS — E11.29 TYPE 2 DIABETES MELLITUS WITH MICROALBUMINURIA (HCC): Primary | ICD-10-CM

## 2024-12-14 DIAGNOSIS — R35.1 BPH ASSOCIATED WITH NOCTURIA: ICD-10-CM

## 2024-12-14 DIAGNOSIS — R80.9 TYPE 2 DIABETES MELLITUS WITH MICROALBUMINURIA (HCC): Primary | ICD-10-CM

## 2024-12-14 DIAGNOSIS — N40.1 BPH ASSOCIATED WITH NOCTURIA: ICD-10-CM

## 2024-12-14 DIAGNOSIS — M10.9 GOUTY ARTHROPATHY: ICD-10-CM

## 2024-12-14 DIAGNOSIS — E78.00 PURE HYPERCHOLESTEROLEMIA: ICD-10-CM

## 2024-12-14 NOTE — TELEPHONE ENCOUNTER
Please enter lab orders for the patient's upcoming physical appointment.     Physical scheduled:   Your appointments       Date & Time Appointment Department ([place])    Jan 06, 2025 11:30 AM CST Medicare Annual Well Visit with Andrew Peraza MD [facility] ([facility])              [facility]  [address]  [facility] [address]  [phone]           Preferred lab: [facility]     The patient has been notified to complete fasting labs prior to their physical appointment.

## 2024-12-19 ENCOUNTER — APPOINTMENT (OUTPATIENT)
Dept: CARDIOLOGY | Age: 74
End: 2024-12-19

## 2024-12-19 VITALS
HEIGHT: 76 IN | BODY MASS INDEX: 31.29 KG/M2 | DIASTOLIC BLOOD PRESSURE: 60 MMHG | SYSTOLIC BLOOD PRESSURE: 147 MMHG | WEIGHT: 257 LBS | HEART RATE: 47 BPM

## 2024-12-19 DIAGNOSIS — E78.2 HYPERLIPIDEMIA, MIXED: Primary | ICD-10-CM

## 2024-12-19 DIAGNOSIS — E55.9 VITAMIN D DEFICIENCY: ICD-10-CM

## 2024-12-19 DIAGNOSIS — I10 HYPERTENSION, BENIGN: ICD-10-CM

## 2024-12-19 DIAGNOSIS — Z95.1 HX OF CABG: ICD-10-CM

## 2024-12-19 DIAGNOSIS — I65.23 BILATERAL CAROTID ARTERY STENOSIS: ICD-10-CM

## 2024-12-24 LAB
ALBUMIN/GLOBULIN RATIO: 1.6 (CALC) (ref 1–2.5)
ALBUMIN: 4.4 G/DL (ref 3.6–5.1)
ALKALINE PHOSPHATASE: 44 U/L (ref 35–144)
ALT: 22 U/L (ref 9–46)
AST: 28 U/L (ref 10–35)
BILIRUBIN, TOTAL: 0.6 MG/DL (ref 0.2–1.2)
BUN: 22 MG/DL (ref 7–25)
CALCIUM: 10.1 MG/DL (ref 8.6–10.3)
CARBON DIOXIDE: 30 MMOL/L (ref 20–32)
CHLORIDE: 100 MMOL/L (ref 98–110)
CHOL/HDLC RATIO: 3.3 (CALC)
CHOLESTEROL, TOTAL: 167 MG/DL
CREATININE, RANDOM URINE: 289 MG/DL (ref 20–320)
CREATININE: 1.17 MG/DL (ref 0.7–1.28)
EGFR: 65 ML/MIN/1.73M2
GLOBULIN: 2.8 G/DL (CALC) (ref 1.9–3.7)
GLUCOSE: 131 MG/DL (ref 65–99)
HDL CHOLESTEROL: 50 MG/DL
HEMOGLOBIN A1C: 6.7 % OF TOTAL HGB
LDL-CHOLESTEROL: 87 MG/DL (CALC)
MICROALBUMIN/CREATININE RATIO, RANDOM URINE: 285 MG/G CREAT
MICROALBUMIN: 82.4 MG/DL
NON-HDL CHOLESTEROL: 117 MG/DL (CALC)
POTASSIUM: 3.7 MMOL/L (ref 3.5–5.3)
PROTEIN, TOTAL: 7.2 G/DL (ref 6.1–8.1)
SODIUM: 143 MMOL/L (ref 135–146)
TOTAL PSA: 0.9 NG/ML
TRIGLYCERIDES: 204 MG/DL
URIC ACID: 3.6 MG/DL (ref 4–8)
VITAMIN D, 25-OH, TOTAL: 60 NG/ML (ref 30–100)

## 2024-12-27 RX ORDER — FENOFIBRATE 145 MG/1
145 TABLET, COATED ORAL DAILY
Qty: 90 TABLET | Refills: 1 | Status: SHIPPED | OUTPATIENT
Start: 2024-12-27

## 2024-12-27 RX ORDER — FENOFIBRATE 145 MG/1
145 TABLET, COATED ORAL DAILY
COMMUNITY
End: 2024-12-27 | Stop reason: SDUPTHER

## 2025-01-06 ENCOUNTER — TELEPHONE (OUTPATIENT)
Dept: CARDIOLOGY | Age: 75
End: 2025-01-06

## 2025-01-06 ENCOUNTER — OFFICE VISIT (OUTPATIENT)
Dept: FAMILY MEDICINE CLINIC | Facility: CLINIC | Age: 75
End: 2025-01-06
Payer: MEDICARE

## 2025-01-06 VITALS
RESPIRATION RATE: 16 BRPM | SYSTOLIC BLOOD PRESSURE: 130 MMHG | HEART RATE: 60 BPM | DIASTOLIC BLOOD PRESSURE: 80 MMHG | BODY MASS INDEX: 32.64 KG/M2 | OXYGEN SATURATION: 97 % | HEIGHT: 75 IN | WEIGHT: 262.5 LBS

## 2025-01-06 DIAGNOSIS — I65.21 STENOSIS OF RIGHT CAROTID ARTERY: ICD-10-CM

## 2025-01-06 DIAGNOSIS — E78.00 PURE HYPERCHOLESTEROLEMIA: ICD-10-CM

## 2025-01-06 DIAGNOSIS — M10.9 GOUTY ARTHROPATHY: ICD-10-CM

## 2025-01-06 DIAGNOSIS — I73.9 PERIPHERAL ARTERIAL DISEASE (HCC): ICD-10-CM

## 2025-01-06 DIAGNOSIS — I25.10 CORONARY ARTERY DISEASE INVOLVING NATIVE CORONARY ARTERY OF NATIVE HEART WITHOUT ANGINA PECTORIS: ICD-10-CM

## 2025-01-06 DIAGNOSIS — E11.29 TYPE 2 DIABETES MELLITUS WITH MICROALBUMINURIA (HCC): Primary | ICD-10-CM

## 2025-01-06 DIAGNOSIS — Z00.00 ENCOUNTER FOR ANNUAL HEALTH EXAMINATION: ICD-10-CM

## 2025-01-06 DIAGNOSIS — I70.0 ABDOMINAL AORTIC ATHEROSCLEROSIS (HCC): ICD-10-CM

## 2025-01-06 DIAGNOSIS — Z95.1 HX OF CABG: ICD-10-CM

## 2025-01-06 DIAGNOSIS — I10 HYPERTENSION, BENIGN: ICD-10-CM

## 2025-01-06 DIAGNOSIS — R80.9 TYPE 2 DIABETES MELLITUS WITH MICROALBUMINURIA (HCC): Primary | ICD-10-CM

## 2025-01-06 DIAGNOSIS — K21.9 GASTROESOPHAGEAL REFLUX DISEASE, UNSPECIFIED WHETHER ESOPHAGITIS PRESENT: ICD-10-CM

## 2025-01-06 DIAGNOSIS — I47.29 NSVT (NONSUSTAINED VENTRICULAR TACHYCARDIA) (HCC): ICD-10-CM

## 2025-01-06 PROBLEM — R73.9 HYPERGLYCEMIA: Status: RESOLVED | Noted: 2020-05-01 | Resolved: 2025-01-06

## 2025-01-06 RX ORDER — FENOFIBRATE 145 MG/1
145 TABLET, COATED ORAL DAILY
COMMUNITY
Start: 2025-01-02

## 2025-01-06 NOTE — PROGRESS NOTES
Subjective:   Mt Obregon is a 74 year old male who presents for a Subsequent Annual Wellness visit (Pt already had Initial Annual Wellness) and scheduled follow up of multiple significant but stable problems.   Preventative Screening  Colonoscopy - 12/2022.  Repeat 3yrs.  Prostate - 0.9  Immunizations - shingles UTD x 2.  PNA UTD x 2.  Flu UTD.  COVID UTD.     Heart - CAD, h/o CABG, PAD, HTN, HLD, NSVT.  Seeing cardio regularly.  On ASA, fenofibrate (increased), HCTZ, losartan, metoprolol, Crestor.  Imaging of LE showed non obstructive arterial disease.  Advised to increase exercise, decrease smoking.  He has been increasing his exercise.  Managed by Dr. Nunez, and also Dr. Storm.      DM - on glipizide.  Sugars A1C 6.7 in end Dec.  Up a little from the testing earlier in the year.      Pulm - BONIFACIO.   On CPAP.      Gout - taking allopurinol 300mg preventatively.      Ortho - s/p back surgery.  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  Had gabapentin for PRN use, and this helps when needed.     GERD - on pantoprazole.  Clearing throat.  Drinks every night.     History/Other:   Fall Risk Assessment:   He has been screened for Falls and is low risk.      Cognitive Assessment:   He had a completely normal cognitive assessment - see flowsheet entries     Functional Ability/Status:   Mt Obregon has some abnormal functions as listed below:  He has Hearing problems based on screening of functional status.      Depression Screening (PHQ):  PHQ-2 SCORE: 0  , done 1/6/2025   Trouble falling or staying asleep, or sleeping too much: 1     Feeling tired or having little energy: 1    If you checked off any problems, how difficult have these problems made it for you to do your work, take care of things at home, or get along with other people?: Not difficult at all              Advanced Directives:   He does have a Living Will but we do NOT have it on file in  Epic.    He does have a POA but we do NOT have it on file in Epic.    Discussed Advance Care Planning with patient (and family/surrogate if present). Standard forms made available to patient in After Visit Summary.      Patient Active Problem List   Diagnosis    Esophageal reflux    Pure hypercholesterolemia    Hypertension, benign    Gouty arthropathy    Allergic rhinitis    Coronary artery disease involving native coronary artery of native heart without angina pectoris    Rotator cuff syndrome, left    Spondylosis of cervical region without myelopathy or radiculopathy    Erectile dysfunction    Piriformis syndrome of both sides    Osteoarthritis of both hips, unspecified osteoarthritis type    Carotid artery stenosis    Hx of CABG    Type 2 diabetes mellitus with microalbuminuria (HCC)    Vitamin D deficiency    Dysphagia    Hypokalemia    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    Current Medications:  Outpatient Medications Marked as Taking for the 1/6/25 encounter (Office Visit) with Andrew Peraza MD   Medication Sig    fenofibrate 145 MG Oral Tab Take 1 tablet (145 mg total) by mouth daily.    GLIPIZIDE 10 MG Oral Tab TAKE 1 TABLET BEFORE       BREAKFAST    Prednisolon-Moxiflox-Bromfenac 1-0.5-0.075 % Ophthalmic Solution as directed Ophthalmic for 30 days    allopurinol 300 MG Oral Tab Take 1 tablet (300 mg total) by mouth daily.    PANTOPRAZOLE 40 MG Oral Tab EC TAKE 1 TABLET DAILY    hydroCHLOROthiazide 25 MG Oral Tab Take 1 tablet (25 mg total) by mouth every morning.    gabapentin 300 MG  Oral Cap Take 1 capsule (300 mg total) by mouth 3 (three) times daily.    Potassium Citrate ER (UROCIT-K 15) 15 MEQ (1620 MG) Oral Tab CR Take 1 tablet by mouth daily.    finasteride 5 MG Oral Tab Take 1 tablet (5 mg total) by mouth daily.    Ascorbic Acid (VITAMIN C) 1000 MG Oral Tab Take 1 tablet (1,000 mg total) by mouth 2 (two) times daily with meals.    losartan 50 MG Oral Tab Take 1 tablet (50 mg total) by mouth 2 (two) times daily.    Sildenafil Citrate 100 MG Oral Tab Take 1 tablet (100 mg total) by mouth daily as needed for Erectile Dysfunction. Take ~ one hour prior to sexual activity on an empty stomach    ketorolac 0.5 % Ophthalmic Solution     ofloxacin 0.3 % Ophthalmic Solution     prednisoLONE 1 % Ophthalmic Suspension     latanoprost 0.005 % Ophthalmic Solution     metoprolol succinate  MG Oral Tablet 24 Hr Take 1 tablet (100 mg total) by mouth daily.    rosuvastatin 40 MG Oral Tab     Multiple Vitamins-Minerals (MULTIVITAMIN ADULT OR) Take 1 tablet by mouth daily.    aspirin 325 MG Oral Tab Take 1 tablet (325 mg total) by mouth daily.       Medical History:  He  has a past medical history of Abdominal pain, Back problem, Calculus of kidney, Coronary atherosclerosis, Esophageal reflux, Essential hypertension, Fatigue, Gout, Hearing loss, Heart palpitations, High blood pressure, High cholesterol, Night sweats, Sleep apnea, Sleep disturbance, Stress, Visual impairment, and Wears glasses.  Surgical History:  He  has a past surgical history that includes orbit surgery proc unlisted (1968); colonoscopy (02/2012); femur/knee surg unlisted (1987 1998); sinus surgery   (2003); cabg, artery-vein, four (07/2010); spine surgery procedure unlisted; other surgical history (N/A, 12/21/2015); back surgery; colonoscopy; sigmoidoscopy,diagnostic; and cabg.   Family History:  His family history includes Alcohol and Other Disorders Associated in an other family member; Breast Cancer in his mother and another  family member; Cancer in his father and paternal grandfather; Heart Attack in an other family member; Heart Disease in his father and paternal grandmother; Lung Disorder in his maternal grandmother.  Social History:  He  reports that he has been smoking cigars and cigarettes. He started smoking about 54 years ago. He has a 34 pack-year smoking history. He has never used smokeless tobacco. He reports current alcohol use of about 12.0 standard drinks of alcohol per week. He reports that he does not use drugs.    Tobacco:  Social History     Tobacco Use   Smoking Status Light Smoker    Current packs/day: 0.00    Average packs/day: 2.0 packs/day for 17.0 years (34.0 ttl pk-yrs)    Types: Cigars, Cigarettes    Start date: 1971    Last attempt to quit: 1988    Years since quittin.0   Smokeless Tobacco Never   Tobacco Comments    2 cigar/month     E-Cigarettes/Vaping       Questions Responses    E-Cigarette Use Never User           Tobacco cessation counseling for <3 minutes.      CAGE Alcohol Screen:   CAGE screening score of 0 on 2025, showing low risk of alcohol abuse.      Patient Care Team:  Andrew Peraza MD as PCP - General (Family Medicine)  Luis Nunez MD (CARDIOLOGY)  Aury Henderson MD as Surgeon (SURGERY, GENERAL)  Renetta Lea CMA as CCM Care Manager  Kailee Richardson MD (OPHTHALMOLOGY)  Lauro Diop PT as Physical Therapist (Physical Therapy)  Najjar, Samer F, MD (SURGERY, VASCULAR)    Review of Systems  Constitutional: negative  Eyes: negative  Ears, nose, mouth, throat, and face: negative  Respiratory: negative  Cardiovascular: negative  Gastrointestinal: negative  Genitourinary: negative  Neurological: negative      Objective:   Physical Exam  General Appearance:  Alert, cooperative, no distress, appears stated age   Head:  Normocephalic, without obvious abnormality, atraumatic   Eyes:  PERRL, conjunctiva/corneas clear, EOM's intact   Neck: Supple, symmetrical,  trachea midline, no adenopathy   Back:   Symmetric, no curvature, ROM normal, no CVA tenderness   Lungs:   Clear to auscultation bilaterally, respirations unlabored   Chest Wall:  No tenderness or deformity   Heart:  Regular rate and rhythm, some premature beats heard.    Abdomen:   Soft, non-tender, bowel sounds active all four quadrants,  no masses, no organomegaly   Extremities: Extremities normal, atraumatic, no cyanosis or edema   Pulses: 2+ and symmetric   Skin: Skin color, texture, turgor normal, no rashes or lesions   Neurologic: Normal      /80   Pulse 60   Resp 16   Ht 6' 3\" (1.905 m)   Wt 262 lb 8 oz (119.1 kg)   SpO2 97%   BMI 32.81 kg/m²  Estimated body mass index is 32.81 kg/m² as calculated from the following:    Height as of this encounter: 6' 3\" (1.905 m).    Weight as of this encounter: 262 lb 8 oz (119.1 kg).    Medicare Hearing Assessment:   Hearing Screening    Time taken: 1/6/2025 11:42 AM  Screening Method: Whisper Test  Whisper Test Result: Pass         Visual Acuity:   Right Eye Visual Acuity: Corrected Right Eye Chart Acuity: 20/30   Left Eye Visual Acuity: Corrected Left Eye Chart Acuity: 20/20   Both Eyes Visual Acuity: Corrected Both Eyes Chart Acuity: 20/20   Able To Tolerate Visual Acuity: Yes        Assessment & Plan:     Mt KALIE Sabas was seen in the office today:  had concerns including Health Maintenance (Reviewed Preventative/Wellness form with patient. ).    1. Encounter for annual health examination  Overall doing fairly well  Several medical issues popped up last year, but with lifestyle changes he is able to overcome them so far  Colon screening up-to-date  PSA reassuring on recent blood work    2. Type 2 diabetes mellitus with microalbuminuria (HCC)  Diabetes remains controlled, though increased some from the last check  He is working on food choices, exercise.  Will continue to monitor    3. Coronary artery disease involving native coronary artery of native  heart without angina pectoris  4. Hx of CABG  5. Peripheral arterial disease (HCC)  6. Hypertension, benign  7. Pure hypercholesterolemia  8. Stenosis of right carotid artery  9. Abdominal aortic atherosclerosis (HCC)  10. NSVT (nonsustained ventricular tachycardia) (HCC)  Known cardiovascular issues  Seeing both the cardiology team and the vascular surgery team  Recommendations are similar.  Need to cut out the smoking, cut back on alcohol, increase the exercise.  He is working on fulfilling all of these  Feels like there has been some improvement.  Blood pressure control today  Will continue to monitor    11. Gouty arthropathy  Taking allopurinol 300 mg  Initially started for kidney stone prevention, but now used for this and gout prevention    12. Gastroesophageal reflux disease, unspecified whether esophagitis present  GERD is stable.  Less alcohol should help additionally  He continues on pantoprazole            The patient indicates understanding of these issues and agrees to the plan.  Reinforced healthy diet, lifestyle, and exercise.      No follow-ups on file.     Andrew Peraza MD, 1/6/2025     Supplementary Documentation:   General Health:  In the past six months, have you lost more than 10 pounds without trying?: 2 - No  Has your appetite been poor?: No  Type of Diet: Balanced  How does the patient maintain a good energy level?: Daily Walks;Stretching  How would you describe your daily physical activity?: Light  How would you describe your current health state?: Good  How do you maintain positive mental well-being?: Social Interaction;Visiting Family  On a scale of 0 to 10, with 0 being no pain and 10 being severe pain, what is your pain level?: 1 - (Mild)  In the past six months, have you experienced urine leakage?: 0-No  At any time do you feel concerned for the safety/well-being of yourself and/or your children, in your home or elsewhere?: No  Have you had any immunizations at another office such as  Influenza, Hepatitis B, Tetanus, or Pneumococcal?: Yes    Health Maintenance   Topic Date Due    Lung Cancer Screening  Never done    Diabetes Care Dilated Eye Exam  01/25/2022    Diabetes Care: Foot Exam (Annual)  01/01/2025    Tobacco Cessation Counseling  01/01/2025    Diabetes Care: Microalb/Creat Ratio (Annual)  01/01/2025    Annual Physical  01/29/2025    Diabetes Care A1C  06/23/2025    Colorectal Cancer Screening  12/08/2025    Diabetes Care: GFR  12/23/2025    PSA  12/23/2026    Influenza Vaccine  Completed    Annual Depression Screening  Completed    Fall Risk Screening (Annual)  Completed    Pneumococcal Vaccine: 65+ Years  Completed    Zoster Vaccines  Completed    COVID-19 Vaccine  Completed

## 2025-01-14 NOTE — PROGRESS NOTES
HPI:     [place] is a 74 year old male with a PMH of meningioma, HTN, HL, DM, BONIFACIO (CPAP), CAD (s/p CABG x 2010), GERD, gout.  Following for:  1. BPH/LUTS  - proscar 1/15/24  2. H/o kidney stones  - one URS in 2000, no other stones  - on 300 allopurinol  - 15 urocit ([place]), was 10 urocit since ~ 2000 following 24 h urine with Dr CRAWFORD but stopped d/t cost. Was also on chlorthalidone for extreme hypercalciuria but stopped a few y ago per Cards recommendation due to hypokalemia.  3. H/o hypogonadism  4. ED  - 100 mg viagra prn ([facility])  - on 5, 20 mg cialis; 100 mg viagra in the past  5. Occasional mild jock itch  - resolves with OTC powder    PCP - Stu  Prior Urologist - Zahida (2017)  Card - Mayra    LOV 1/15/24    He is originally from [facility]. Had back surgery recently - spacer and still dealing with back pain. He wanted to talk about back pain for 5-10 min today.  He is taking 15 urocit, allopurinol and now proscar. No flank pain, hematuria, dysuria. Urinary symptoms are better with proscar.    Snoring: none  Exercise: none 2/2 back issues  Fluids prior to bedtime: yes  Occupation: retired    AUA SS is 9/35 with 3 n, f; 1 w, u, I. Was 12/35 with 5 f; 4 n; 3 w. Mostly happy with LUTS.  Incontinence: mild PVD  Penoscrotal LOV: mild redness to b/l inguinal folds. Pt uses jock itch powder for this which works well. Declines lotrisone.  ISAI: > 40 g prostate, no nodules or tenderness    UA is negative and PVR is zero - was zero    Drinks ~ 20 oz water, 20 scotch, 12 wine, 12 oz coffee, 12 juice with medium yellow urine. I encouraged the pt drink at least 40-60 oz water per day or enough to keep urine clear to pale yellow and try to cut back on dietary irritants and fluids prior to bedtime to help with nocturia.    T 241 5/3/16     PSA 0.9 12/23/24 - was 2.53 in 2023 prior to proscar.    Renal US 1/18/23: 1.9 LLP likely hyperdense cyst (noted on CT in 2018). No other abnormalities   CT SP 10/23/18: no  obvious stones in either kidney, 1.5 cm proteinaceous left renal cyst    Poor potency without meds and wants to try cialis again. Had poor potency with viagra. Reported partial erections with viagra and cialis in the past but was expensive prior to coupon. Not interested in trimix.    For nocturia, I'd recommend the patient drink plenty of fluids first thing in the morning and avoid drinking anything at least 2-3 hours prior to bedtime. If the patient takes diuretic I would recommend they take them first thing in the morning. If the patient takes NSAIDs I would consider they switch to taking prior to bedtime. I'd also encourage regular physical activity (for at least 30 minutes at least three times per week) as this has been shown to help with nocturia. Finally we discussed that setting a regular bedtime can help regulate nocturnal levels of melatonin and ADH, which can help with sleep and reduce nocturia.    He will try to increase water intake and avoid fluids prior to bedtime to help with nocturia. Will perhaps cut back on EtOH to help with OAB symptoms. Continue 15 urocit ([place]), allopurinol and he declines repeat 24 h urine for h/o stones. Trial 20 mg cialis prn.  Continue proscar for BPH/LUTS. F/u in 1 y.    Prior note:  Reports 1 y h/o LUTS. Most bothered by nocturia but thinks he gets up due to CPAP irritating him and drinking scotch.  Prior BPH/OAB meds: none    UTI hx: prostatitis ~ 50 y ago  Gross hematuria: none  Tobacco hx: 18 pack years, quit ~ 1980  Fam h/o  malignancy: none.    HISTORY:  Past Medical History:    Abdominal pain    Back problem    Calculus of kidney    Coronary atherosclerosis    S/P QUADRUPLE CABG 2010    Esophageal reflux    Essential hypertension    Fatigue    Gout    Hearing loss    Heart palpitations    High blood pressure    High cholesterol    Night sweats    Sleep apnea    Sleep disturbance    Stress    Visual impairment    GLASSES    Wears glasses      Past Surgical History:    Procedure Laterality Date    Back surgery      Cabg      Cabg, artery-vein, four  2010    QUADRUPLE    Colonoscopy  2012    Colonoscopy      Femur/knee surg unlisted  1987    Orbit surgery proc unlisted      \"Closed treatment of orbital fracture (non-'blowout')\"    Other surgical history N/A 2015    Procedure: EXCISION OF LESION/LIPOMA;  Surgeon: Aury Henderson MD;  Location: [facility] MAIN OR    Sigmoidoscopy,diagnostic      Sinus surgery        Spine surgery procedure unlisted      LOWER BACK SURGERY X2-MICRODISCECTOMY AND LAMINECTOMY      Family History   Problem Relation Age of Onset    Heart Disease Father     Cancer Father     Breast Cancer Mother     Lung Disorder Maternal Grandmother     Heart Disease Paternal Grandmother         CAD    Cancer Paternal Grandfather     Breast Cancer Other     Alcohol and Other Disorders Associated Other     Heart Attack Other       Social History:   Social History     Socioeconomic History    Marital status:    Tobacco Use    Smoking status: Former     Current packs/day: 0.00     Average packs/day: 2.0 packs/day for 17.0 years (34.0 ttl pk-yrs)     Types: Cigars, Cigarettes     Start date: 1971     Quit date: 2024     Years since quittin.0    Smokeless tobacco: Never    Tobacco comments:     2 cigar/month   Vaping Use    Vaping status: Never Used   Substance and Sexual Activity    Alcohol use: Yes     Alcohol/week: 12.0 standard drinks of alcohol     Types: 2 Glasses of wine, 2 Cans of beer, 8 Shots of liquor per week     Comment: 2 drinks of scotch daily    Drug use: No   Other Topics Concern     Service No    Caffeine Concern Yes     Comment: 1-2 coffee daily    Exercise Yes     Comment: PT 2x week, doing at home daily    Seat Belt Yes     Social Drivers of Health     Financial Resource Strain: Low Risk  (2023)    Received from [facility], [facility]    Financial Resource Strain     In the past  year, have you or any family members you live with been unable to get any of the following when it was really needed? Check all that apply.: None   Food Insecurity: Not At Risk (11/28/2023)    Received from [email or web address], [facility]    Food Insecurity     RETIRE How often in the past 12 months would you say you are worried or stressed about having enough money to buy nutritious meals? : Never   Transportation Needs: No Transportation Needs (11/28/2023)    [facility] from [facility], [facility]    PRAPARE - Transportation     In the past 12 months, has lack of transportation kept you from medical appointments or from getting medications?: No     In the past 12 months, has lack of transportation kept you from meetings, work, or from getting things needed for daily living?: No   Physical Activity: High Risk (6/19/2024)    Received from [facility]    Exercise Vital Sign     On average, how many days per week do you engage in moderate to strenuous exercise (like a brisk walk)?: 1 day     On average, how many minutes do you engage in exercise at this level?: 0 min   Stress: No Stress Concern Present (2/24/2023)    Stress     Feeling of Stress : No   Social Connections: Low Risk  (11/28/2023)    Received from [facility], [facility]    Social Connections     How often do you see or talk to people that you care about and feel close to? (For example: talking to friends on the phone, visiting friends or family, going to Jainism or club meetings): 5 or more times a week   Housing Stability: Low Risk  (2/24/2023)    Housing Stability     Housing Instability: No        Medications (Active prior to today's visit):  Current Outpatient Medications   Medication Sig Dispense Refill    Potassium Citrate ER (UROCIT-K 15) 15 MEQ (1620 MG) Oral Tab CR Take 1 tablet by mouth daily. 90 tablet 5    finasteride 5 MG  Oral Tab Take 1 tablet (5 mg total) by mouth daily. 90 tablet 6    Tadalafil (CIALIS) 20 MG Oral Tab Take 1 tablet (20 mg total) by mouth daily as needed for Erectile Dysfunction. 30 tablet 5    fenofibrate 145 MG Oral Tab Take 1 tablet (145 mg total) by mouth daily.      GLIPIZIDE 10 MG Oral Tab TAKE 1 TABLET BEFORE       BREAKFAST 90 tablet 3    Prednisolon-Moxiflox-Bromfenac 1-0.5-0.075 % Ophthalmic Solution as directed Ophthalmic for 30 days      allopurinol 300 MG Oral Tab Take 1 tablet (300 mg total) by mouth daily. 90 tablet 3    PANTOPRAZOLE 40 MG Oral Tab EC TAKE 1 TABLET DAILY 90 tablet 3    hydroCHLOROthiazide 25 MG Oral Tab Take 1 tablet (25 mg total) by mouth every morning.      gabapentin 300 MG Oral Cap Take 1 capsule (300 mg total) by mouth 3 (three) times daily.      Ascorbic Acid (VITAMIN C) 1000 MG Oral Tab Take 1 tablet (1,000 mg total) by mouth 2 (two) times daily with meals.      CANNABIDIOL OR Take by mouth. (Patient not taking: Reported on 1/6/2025)      losartan 50 MG Oral Tab Take 1 tablet (50 mg total) by mouth 2 (two) times daily.      Sildenafil Citrate 100 MG Oral Tab Take 1 tablet (100 mg total) by mouth daily as needed for Erectile Dysfunction. Take ~ one hour prior to sexual activity on an empty stomach 30 tablet 5    ketorolac 0.5 % Ophthalmic Solution       ofloxacin 0.3 % Ophthalmic Solution       prednisoLONE 1 % Ophthalmic Suspension       latanoprost 0.005 % Ophthalmic Solution       metoprolol succinate  MG Oral Tablet 24 Hr Take 1 tablet (100 mg total) by mouth daily.      rosuvastatin 40 MG Oral Tab       Multiple Vitamins-Minerals (MULTIVITAMIN ADULT OR) Take 1 tablet by mouth daily.      aspirin 325 MG Oral Tab Take 1 tablet (325 mg total) by mouth daily.         Allergies:  Allergies   Allergen Reactions    Atorvastatin MYALGIA     Oral    Carvedilol OTHER (SEE COMMENTS)     Oral, mental status changes    Pravastatin OTHER (SEE COMMENTS)     Oral    Seasonal OTHER  (SEE COMMENTS)     rinoitis          ROS:     A comprehensive 10 point review of systems was completed.  Pertinent positives and negatives noted in the the HPI.    PHYSICAL EXAM:     GENERAL APPEARANCE: well, developed, well nourished, in no acute distress  NEUROLOGIC: nonfocal, alert and oriented  HEAD: normocephalic, atraumatic  EYES: sclera non-icteric  EARS: hearing intact  ORAL CAVITY: mucosa moist  NECK/THYROID: no obvious goiter or masses  LUNGS: nonlabored breathing  ABDOMEN: soft, no obvious masses or tenderness  SKIN: no obvious rashes    : as noted above    ASSESSMENT/PLAN:   Diagnoses and all orders for this visit:    BPH with obstruction/lower urinary tract symptoms  -     URINALYSIS, AUTO, W/O SCOPE  -     finasteride 5 MG Oral Tab; Take 1 tablet (5 mg total) by mouth daily.    Recurrent kidney stones  -     URINALYSIS, AUTO, W/O SCOPE    Recurrent UTI    Erectile dysfunction, unspecified erectile dysfunction type  -     Tadalafil (CIALIS) 20 MG Oral Tab; Take 1 tablet (20 mg total) by mouth daily as needed for Erectile Dysfunction.    Hypocitraturia  -     Potassium Citrate ER (UROCIT-K 15) 15 MEQ (1620 MG) Oral Tab CR; Take 1 tablet by mouth daily.    Nocturia    - as noted above.    Thanks again for this consult.    Isidoro Meneses MD, FACS  Urologist  [facility]  Office: [phone]

## 2025-01-21 ENCOUNTER — OFFICE VISIT (OUTPATIENT)
Dept: SURGERY | Facility: CLINIC | Age: 75
End: 2025-01-21
Payer: MEDICARE

## 2025-01-21 DIAGNOSIS — N40.1 BPH WITH OBSTRUCTION/LOWER URINARY TRACT SYMPTOMS: Primary | ICD-10-CM

## 2025-01-21 DIAGNOSIS — N39.0 RECURRENT UTI: ICD-10-CM

## 2025-01-21 DIAGNOSIS — N13.8 BPH WITH OBSTRUCTION/LOWER URINARY TRACT SYMPTOMS: Primary | ICD-10-CM

## 2025-01-21 DIAGNOSIS — R82.991 HYPOCITRATURIA: ICD-10-CM

## 2025-01-21 DIAGNOSIS — N52.9 ERECTILE DYSFUNCTION, UNSPECIFIED ERECTILE DYSFUNCTION TYPE: ICD-10-CM

## 2025-01-21 DIAGNOSIS — N20.0 RECURRENT KIDNEY STONES: ICD-10-CM

## 2025-01-21 DIAGNOSIS — R35.1 NOCTURIA: ICD-10-CM

## 2025-01-21 LAB
APPEARANCE: CLEAR
BILIRUBIN: NEGATIVE
GLUCOSE (URINE DIPSTICK): NEGATIVE MG/DL
KETONES (URINE DIPSTICK): NEGATIVE MG/DL
LEUKOCYTES: NEGATIVE
MULTISTIX LOT#: ABNORMAL NUMERIC
NITRITE, URINE: NEGATIVE
OCCULT BLOOD: NEGATIVE
PH, URINE: 5.5 (ref 4.5–8)
PROTEIN (URINE DIPSTICK): 100 MG/DL
SPECIFIC GRAVITY: >=1.03 (ref 1–1.03)
URINE-COLOR: YELLOW
UROBILINOGEN,SEMI-QN: 1 MG/DL (ref 0–1.9)

## 2025-01-21 PROCEDURE — 99214 OFFICE O/P EST MOD 30 MIN: CPT | Performed by: UROLOGY

## 2025-01-21 PROCEDURE — 81003 URINALYSIS AUTO W/O SCOPE: CPT | Performed by: UROLOGY

## 2025-01-21 PROCEDURE — G2211 COMPLEX E/M VISIT ADD ON: HCPCS | Performed by: UROLOGY

## 2025-01-21 RX ORDER — FINASTERIDE 5 MG/1
5 TABLET, FILM COATED ORAL DAILY
Qty: 90 TABLET | Refills: 6 | Status: SHIPPED | OUTPATIENT
Start: 2025-01-21

## 2025-01-21 RX ORDER — TADALAFIL 20 MG/1
20 TABLET ORAL
Qty: 30 TABLET | Refills: 5 | Status: SHIPPED | OUTPATIENT
Start: 2025-01-21

## 2025-01-21 RX ORDER — POTASSIUM CITRATE 15 MEQ/1
1 TABLET, EXTENDED RELEASE ORAL DAILY
Qty: 90 TABLET | Refills: 5 | Status: SHIPPED | OUTPATIENT
Start: 2025-01-21

## 2025-01-21 NOTE — PATIENT INSTRUCTIONS
1. Increase water intake to 40-60 ounces (2 liters) per day or enough to keep urine clear to pale yellow.  2. Cut back on dietary irritants such as coffee, tea, soda, juice.

## 2025-01-23 ENCOUNTER — HOSPITAL ENCOUNTER (OUTPATIENT)
Dept: MRI IMAGING | Facility: HOSPITAL | Age: 75
Discharge: HOME | End: 2025-01-23
Attending: CLINICAL NURSE SPECIALIST
Payer: MEDICARE

## 2025-01-23 DIAGNOSIS — D42.0 TERATOMA OF UNCERTAIN BEHAVIOR OF CEREBRAL MENINGES (HCC): ICD-10-CM

## 2025-01-23 PROCEDURE — A9575 INJ GADOTERATE MEGLUMI 0.1ML: HCPCS | Performed by: RADIOLOGY

## 2025-01-23 PROCEDURE — 70553 MRI BRAIN STEM W/O & W/DYE: CPT | Performed by: CLINICAL NURSE SPECIALIST

## 2025-01-23 RX ORDER — GADOTERATE MEGLUMINE 376.9 MG/ML
30 INJECTION INTRAVENOUS
Status: COMPLETED | OUTPATIENT
Start: 2025-01-23 | End: 2025-01-23

## 2025-01-23 RX ADMIN — GADOTERATE MEGLUMINE 30 ML: 376.9 INJECTION INTRAVENOUS at 07:54:00

## 2025-01-31 ENCOUNTER — PATIENT OUTREACH (OUTPATIENT)
Dept: CASE MANAGEMENT | Age: 75
End: 2025-01-31

## 2025-01-31 DIAGNOSIS — E55.9 VITAMIN D DEFICIENCY: ICD-10-CM

## 2025-01-31 DIAGNOSIS — E11.29 TYPE 2 DIABETES MELLITUS WITH MICROALBUMINURIA (HCC): ICD-10-CM

## 2025-01-31 DIAGNOSIS — E78.00 PURE HYPERCHOLESTEROLEMIA: ICD-10-CM

## 2025-01-31 DIAGNOSIS — I25.10 CORONARY ARTERY DISEASE INVOLVING NATIVE CORONARY ARTERY OF NATIVE HEART WITHOUT ANGINA PECTORIS: Primary | ICD-10-CM

## 2025-01-31 DIAGNOSIS — M16.0 OSTEOARTHRITIS OF BOTH HIPS, UNSPECIFIED OSTEOARTHRITIS TYPE: ICD-10-CM

## 2025-01-31 DIAGNOSIS — I10 HYPERTENSION, BENIGN: ICD-10-CM

## 2025-01-31 DIAGNOSIS — K21.9 GASTROESOPHAGEAL REFLUX DISEASE, UNSPECIFIED WHETHER ESOPHAGITIS PRESENT: ICD-10-CM

## 2025-01-31 DIAGNOSIS — R80.9 TYPE 2 DIABETES MELLITUS WITH MICROALBUMINURIA (HCC): ICD-10-CM

## 2025-01-31 PROCEDURE — 99490 CHRNC CARE MGMT STAFF 1ST 20: CPT | Performed by: FAMILY MEDICINE

## 2025-01-31 NOTE — PROGRESS NOTES
Spoke to [facility] for Chronic Care Management.      Updates to patient care team/comments: UTD  Patient reported changes in medications: UTD  Med Adherence  Comment: pt taking medications as prescribed     Health Maintenance:   Health Maintenance   Topic Date Due    Lung Cancer Screening  Never done    Diabetes Care Dilated Eye Exam  01/25/2022    Diabetes Care: Foot Exam (Annual)  01/01/2025    Diabetes Care: Microalb/Creat Ratio (Annual)  01/01/2025    Diabetes Care A1C  06/23/2025    Colorectal Cancer Screening  12/08/2025    Diabetes Care: GFR  12/23/2025    Annual Physical  01/06/2026    PSA  12/23/2026    Influenza Vaccine  Completed    Annual Depression Screening  Completed    Fall Risk Screening (Annual)  Completed    Pneumococcal Vaccine: 50+ Years  Completed    Zoster Vaccines  Completed    COVID-19 Vaccine  Completed    Meningococcal B Vaccine  Aged Out       Patient updates/concerns:    Spoke to pt for monthly outreach   Pt is getting ready to travel to [place] for six weeks. He will be leaving in February. He has already started to review his medications and thinks he will have enough to last while he is out. He will follow up with ccm if he needs assistance with refills. He does not have a regular provider in the [place] area but has family in the area if he needs recommendations.    Pt had MRI completed. He has not yet received the disc with the imaging in the mail. He will need this for his follow up at dr finley office.   That appt is not until June and he will f/u with records department before he leaves for vacation.  The findings indicated his condition was stable and the plan has been to only have MRI once a year going forward if results are unchanged.    Pt has seen urologist.  He is trying to drink more water and is consistently getting around 30 oz. He has been instructed to try for 40-60 oz every day. He has been drinking more water in between meals and having water with meals.   Pt has not  had much improvement with his overnight urinary habits and stops drinking any thing around an hour before bed. Pt thinks this can be improved and will try and start eating dinner at an earlier time.    [facility] and according to their assessment believe pt  discomfort is a result of tendonitis. The spine doctor recommended pt lose weight and exercise more for this to improve.    Pt has been to see cardiology. He has not yet started new fenofibrate as he is trying to finish old prescription first. Pt has not yet compared the effects of cialis to previous rx of viagra.   Pt staes that cardiology has recommended pt get one hour on the treadmill at a time. Pt discussed his conditioning and how this is a goal that will take a little time to achieve. Pt has been given a target of 3mph and 120 beats per minute for his heart. Pt has to increase the incline to get his rate up that high because of the medication which lengthens his recovery time. Pt is working on finding the balance of rate and incline such that he can tolerate longer walks.   Pt still participates in his heart healthy class and has an elliptical at home.   Pt cataract surgery was successful he is using rx eyedrops and has f/u scheduled in 6 months   Pt has been tapering his cigars and has a couple on the weekends   Pt has seen pcp and does not have any additional questions or concerns for pcp.    Pt does not require outreach call again until April.   Goals/Action Plan:    Active goal from previous outreach:   Staying healthy   Patient reported progress towards goals: pt continues to see providers as needed and takes medications as prescribed               - What: exercise           - Where/When/How: pt attends heart healthy class and has been given a goal from cardiology to try an use treadmill 3mph/120 bpm/ 60min   pt is working to condition himself to reach this goal\    Patient Reported Barriers and Concerns: n/a                   -  Plan for overcoming barriers: none    Care Managers Interventions:     Future Appointments:   Future Appointments   Date Time Provider Department Center   7/22/2025 12:00 PM Andrew Peraza MD [facility] [place]   1/27/2026 11:00 AM Isidoro Meneses MD [id number]         Next Care Manager Follow Up Date: continue to provide encouragement and education for healthy coping and dx    Reason For Follow Up: review progress and or barriers towards patient's goals.     Time Spent This Encounter Total:  min medical record review, telephone communication, care plan updates where needed, education, goals, and action plan recreation/update. Provided acknowledgment and validation to patient's concerns.   Monthly Minute Total including today: 30  Physical assessment, complete health history, and need for CCM established by Andrew Peraza MD.

## 2025-02-13 RX ORDER — LOSARTAN POTASSIUM 50 MG/1
TABLET ORAL
Qty: 180 TABLET | Refills: 1 | Status: SHIPPED | OUTPATIENT
Start: 2025-02-13

## 2025-04-03 ENCOUNTER — PATIENT OUTREACH (OUTPATIENT)
Dept: CASE MANAGEMENT | Age: 75
End: 2025-04-03

## 2025-04-03 DIAGNOSIS — M16.0 OSTEOARTHRITIS OF BOTH HIPS, UNSPECIFIED OSTEOARTHRITIS TYPE: ICD-10-CM

## 2025-04-03 DIAGNOSIS — I73.9 PERIPHERAL ARTERIAL DISEASE: ICD-10-CM

## 2025-04-03 DIAGNOSIS — R80.9 TYPE 2 DIABETES MELLITUS WITH MICROALBUMINURIA (HCC): Primary | ICD-10-CM

## 2025-04-03 DIAGNOSIS — E78.00 PURE HYPERCHOLESTEROLEMIA: ICD-10-CM

## 2025-04-03 DIAGNOSIS — I25.10 CORONARY ARTERY DISEASE INVOLVING NATIVE CORONARY ARTERY OF NATIVE HEART WITHOUT ANGINA PECTORIS: ICD-10-CM

## 2025-04-03 DIAGNOSIS — G47.33 OSA ON CPAP: ICD-10-CM

## 2025-04-03 DIAGNOSIS — K21.9 GASTROESOPHAGEAL REFLUX DISEASE, UNSPECIFIED WHETHER ESOPHAGITIS PRESENT: ICD-10-CM

## 2025-04-03 DIAGNOSIS — E55.9 VITAMIN D DEFICIENCY: ICD-10-CM

## 2025-04-03 DIAGNOSIS — E11.29 TYPE 2 DIABETES MELLITUS WITH MICROALBUMINURIA (HCC): Primary | ICD-10-CM

## 2025-04-04 ENCOUNTER — TELEPHONE (OUTPATIENT)
Dept: FAMILY MEDICINE CLINIC | Facility: CLINIC | Age: 75
End: 2025-04-04

## 2025-04-04 DIAGNOSIS — M25.569 KNEE PAIN, UNSPECIFIED CHRONICITY, UNSPECIFIED LATERALITY: Primary | ICD-10-CM

## 2025-04-04 NOTE — TELEPHONE ENCOUNTER
Pt has hx dx of cartilage loss in left knee of over 75% and estimates being close to bone on bone now.  He gets some minimal relief wearing knee brace during activity, but this can be short lived.    Pt has upcoming vacation that will involve a lot of activity and he would like to discuss Cortizone injections or comparable treatment with ortho or pain doc to prevent negatively affecting his trip.    Can you provide appropriate referral/recommendation for pt?    Please advise    Thank you    Routed to Dr Peraza

## 2025-04-04 NOTE — TELEPHONE ENCOUNTER
Dr Roberson would be the utmost expert on this.   If he is not available in a timely manner, Dr Canseco, or Sincer Salas might have more appointment times.

## 2025-04-04 NOTE — PROGRESS NOTES
Spoke to [facility] for Chronic Care Management.    Updates to patient care team/comments: UTD  Patient reported changes in medications: utd  Med Adherence  Comment: pt takes medications as prescribed     Health Maintenance:   Health Maintenance   Topic Date Due    Lung Cancer Screening  Never done    Diabetes Care Dilated Eye Exam  01/25/2022    Diabetes Care: Foot Exam (Annual)  01/01/2025    Diabetes Care: Microalb/Creat Ratio (Annual)  01/01/2025    COVID-19 Vaccine (7 - 2024-25 season) 04/09/2025    Diabetes Care A1C  06/23/2025    Colorectal Cancer Screening  12/08/2025    Diabetes Care: GFR  12/23/2025    Annual Physical  01/06/2026    PSA  12/23/2026    Influenza Vaccine  Completed    Annual Depression Screening  Completed    Fall Risk Screening (Annual)  Completed    Pneumococcal Vaccine: 50+ Years  Completed    Zoster Vaccines  Completed    Meningococcal B Vaccine  Aged Out       Patient updates/concerns:    Spoke to pt for monthly outreach   Pt has returned from visit to [place]. Pt has been contemplating a move to the area. Pt did not have to visit any uc/ic/er during his stay. He did suffer a fall chasing a coyote. Pt scraped his elbows and knees up, had some bruising but otherwise stayed free from any structural damage, lasting pain or infections. Pt was able to treat with cleaning and bandaids.    Pt requested assistance in coordinating a response from pcp regarding recommendation or referral to an ortho or pain doc. Pt wants to discuss injections like cortizone as option for managing the discomfort in his left knee. Pt was dx with more than 75 percent cartilage loss years ago and estimates that he is probably bone on bone if not close.  He can wear a knee brace for some minimal relief but he cannot count on that long term or for extended periods of time.  [facility] sent te to pcp with pt question.   Pt was able to get mri result disc and does not need any further assistance.    Pt continues with heart healthy  class.  His blood pressure has remained stable and within normal limits. He estimates 2-3 sets of 10-15 minutes on treadmill every session. Pt states that expectation from specialist was to get to 2 hours on treadmill. Pt endurance is not appropriate yet for that time but he has been able to tolerate increasing grade 3percent.    Pt prescriptions were current and up to date.      Goals/Action Plan:    Active goal from previous outreach: staying healthy    Patient reported progress towards goals: pt continues to see providers as needed and takes medications as prescribed               - What: exercise           - Where/When/How: pt continues with heart healthy class and walks the treadmill.   Patient Reported Barriers and Concerns: n/a                   - Plan for overcoming barriers: none    Care Managers Interventions: te to pcp with pt question about referral, continue to provide encouragement and education for heathy coping and dx    Future Appointments:   Future Appointments   Date Time Provider Department Center   7/22/2025 12:00 PM Andrew Peraza MD [facility]   1/27/2026 11:00 AM Isidoro Meneses MD [id number]         Next Care Manager Follow Up Date: one month    Reason For Follow Up: review progress and or barriers towards patient's goals.     Time Spent This Encounter Total: 25 min medical record review, telephone communication, care plan updates where needed, education, goals, and action plan recreation/update. Provided acknowledgment and validation to patient's concerns.   Monthly Minute Total including today: 25  Physical assessment, complete health history, and need for CCM established by Andrew Peraza MD.

## 2025-04-07 ENCOUNTER — TELEPHONE (OUTPATIENT)
Facility: CLINIC | Age: 75
End: 2025-04-07

## 2025-04-07 DIAGNOSIS — M25.562 LEFT KNEE PAIN, UNSPECIFIED CHRONICITY: Primary | ICD-10-CM

## 2025-04-07 NOTE — TELEPHONE ENCOUNTER
Patient scheduled for left knee pain. Please advise if imaging is needed.   Future Appointments   Date Time Provider Department Center   4/10/2025 11:00 AM Sky Canseco MD [facility] [address]   7/22/2025 12:00 PM Andrew Peraza MD [facility] [place]   1/27/2026 11:00 AM Isidoro Mneeses MD [id number]

## 2025-04-10 ENCOUNTER — OFFICE VISIT (OUTPATIENT)
Facility: CLINIC | Age: 75
End: 2025-04-10
Payer: MEDICARE

## 2025-04-10 ENCOUNTER — HOSPITAL ENCOUNTER (OUTPATIENT)
Dept: GENERAL RADIOLOGY | Age: 75
Discharge: HOME | End: 2025-04-10
Attending: STUDENT IN AN ORGANIZED HEALTH CARE EDUCATION/TRAINING PROGRAM
Payer: MEDICARE

## 2025-04-10 VITALS — HEIGHT: 75 IN | BODY MASS INDEX: 31.95 KG/M2 | WEIGHT: 257 LBS

## 2025-04-10 DIAGNOSIS — M17.12 PRIMARY OSTEOARTHRITIS OF LEFT KNEE: Primary | ICD-10-CM

## 2025-04-10 DIAGNOSIS — M25.562 LEFT KNEE PAIN, UNSPECIFIED CHRONICITY: ICD-10-CM

## 2025-04-10 PROCEDURE — 20610 DRAIN/INJ JOINT/BURSA W/O US: CPT | Performed by: STUDENT IN AN ORGANIZED HEALTH CARE EDUCATION/TRAINING PROGRAM

## 2025-04-10 PROCEDURE — 73564 X-RAY EXAM KNEE 4 OR MORE: CPT | Performed by: STUDENT IN AN ORGANIZED HEALTH CARE EDUCATION/TRAINING PROGRAM

## 2025-04-10 PROCEDURE — 99213 OFFICE O/P EST LOW 20 MIN: CPT | Performed by: STUDENT IN AN ORGANIZED HEALTH CARE EDUCATION/TRAINING PROGRAM

## 2025-04-10 RX ORDER — TRIAMCINOLONE ACETONIDE 40 MG/ML
40 INJECTION, SUSPENSION INTRA-ARTICULAR; INTRAMUSCULAR ONCE
Status: COMPLETED | OUTPATIENT
Start: 2025-04-10 | End: 2025-04-10

## 2025-04-10 RX ADMIN — TRIAMCINOLONE ACETONIDE 40 MG: 40 INJECTION, SUSPENSION INTRA-ARTICULAR; INTRAMUSCULAR at 11:20:00

## 2025-04-10 NOTE — PROGRESS NOTES
Orthopaedic Surgery  [address] [address] [address]   [phone]      Chief Complaint:  Left Knee Pain    History of Present Illness  The patient is a 74 year old with who presents with left knee pain.    He experiences pain on the inside (medial aspect) of his knee, described as a 'three, four or five' on a pain scale. The pain is primarily noted during walking but can also occur when sitting in certain positions. It is not debilitating but is not improving.    Approximately 30 years ago, he underwent a knee arthroscopy and was diagnosed with chondromalacia. The knee pain occasionally 'reminds' him of the past injury, but it has become more noticeable recently, especially with increased walking activities.    He has a river cruise planned in [place] at the end of the month, which will involve a lot of walking. During a recent vacation in [place], he walked every second or third day and found some relief with a knee brace.    He mentions a recent back surgery a year ago and a diagnosis of claudication, which was determined after a scan showed less than 50% blockage. He also has a history of tendonitis (unspecified where) and has been advised to increase stretching and walking.    He has a history of type 2 diabetes, with an A1c around 6.4 to 6.5 recently.       PMH/PSH/Family History/Social History/Meds/Allergies:   Past Medical History[1]     Past Surgical History[2]     Family History[3]     Social History     Socioeconomic History    Marital status:      Spouse name: Not on file    Number of children: Not on file    Years of education: Not on file    Highest education level: Not on file   Occupational History    Not on file   Tobacco Use    Smoking status: Former     Current packs/day: 0.00     Average packs/day: 2.0 packs/day for 17.0 years (34.0 ttl pk-yrs)     Types: Cigars, Cigarettes     Start date: 1971     Quit date: 2024     Years since quittin.2    Smokeless  tobacco: Never    Tobacco comments:     2 cigar/month   Vaping Use    Vaping status: Never Used   Substance and Sexual Activity    Alcohol use: Yes     Alcohol/week: 12.0 standard drinks of alcohol     Types: 2 Glasses of wine, 2 Cans of beer, 8 Shots of liquor per week     Comment: 2 drinks of scotch daily    Drug use: No    Sexual activity: Not on file   Other Topics Concern     Service No    Blood Transfusions Not Asked    Caffeine Concern Yes     Comment: 1-2 coffee daily    Occupational Exposure Not Asked    Hobby Hazards Not Asked    Sleep Concern Not Asked    Stress Concern Not Asked    Weight Concern Not Asked    Special Diet Not Asked    Back Care Not Asked    Exercise Yes     Comment: PT 2x week, doing at home daily    Bike Helmet Not Asked    Seat Belt Yes    Self-Exams Not Asked   Social History Narrative    Not on file     Social Drivers of Health     Food Insecurity: Not At Risk (11/28/2023)    Received from [facility]    Food Insecurity     RETIRE How often in the past 12 months would you say you are worried or stressed about having enough money to buy nutritious meals? : Never   Transportation Needs: No Transportation Needs (11/28/2023)    Received from [facility]    PRAPARE - Transportation     In the past 12 months, has lack of transportation kept you from medical appointments or from getting medications?: No     In the past 12 months, has lack of transportation kept you from meetings, work, or from getting things needed for daily living?: No   Stress: No Stress Concern Present (2/24/2023)    Stress     Feeling of Stress : No   Housing Stability: Low Risk  (2/24/2023)    Housing Stability     Housing Instability: No     Housing Instability Emergency: Not on file        Current Outpatient Medications   Medication Instructions    allopurinol (ZYLOPRIM) 300 mg, Oral, Daily    aspirin 325 mg, Daily    CANNABIDIOL OR Take by mouth.    fenofibrate (TRICOR) 145 mg, Daily     finasteride (PROSCAR) 5 mg, Oral, Daily    gabapentin (NEURONTIN) 300 mg, 3 times daily    GLIPIZIDE 10 MG Oral Tab TAKE 1 TABLET BEFORE       BREAKFAST    hydroCHLOROthiazide 25 mg, Every morning    ketorolac 0.5 % Ophthalmic Solution     latanoprost 0.005 % Ophthalmic Solution No dose, route, or frequency recorded.    losartan (COZAAR) 50 mg, 2 times daily    metoprolol succinate ER (TOPROL XL) 100 mg, Daily    Multiple Vitamins-Minerals (MULTIVITAMIN ADULT OR) 1 tablet, Daily    ofloxacin 0.3 % Ophthalmic Solution     pantoprazole (PROTONIX) 40 mg, Oral, Daily    Potassium Citrate ER (UROCIT-K 15) 15 MEQ (1620 MG) Oral Tab CR 1 tablet, Oral, Daily    Prednisolon-Moxiflox-Bromfenac 1-0.5-0.075 % Ophthalmic Solution as directed Ophthalmic for 30 days    prednisoLONE 1 % Ophthalmic Suspension     rosuvastatin 40 MG Oral Tab No dose, route, or frequency recorded.    Sildenafil Citrate (VIAGRA) 100 mg, Oral, Daily as needed, Take ~ one hour prior to sexual activity on an empty stomach    Tadalafil (CIALIS) 20 mg, Oral, Daily as needed    vitamin C 1,000 mg, 2 times daily with meals        Allergies[4]       Physical Exam:   Vitals:    04/10/25 1050   Weight: 257 lb (116.6 kg)   Height: 6' 3\" (1.905 m)     Estimated body mass index is 32.12 kg/m² as calculated from the following:    Height as of this encounter: 6' 3\" (1.905 m).    Weight as of this encounter: 257 lb (116.6 kg).    Constitutional: No acute distress, well nourished.  Eyes: Anicteric sclera, pink conjunctiva  Ears, Nose, Mouth and Throat: Normocephalic, atraumatic, moist mucous membranes  Cardiovascular: No pitting edema or varicosities in the lower extremities. Maneuvers demonstrate a negative Gin's sign. No palpable cords in calf noted.   Respiratory: No respiratory distress, normal respiratory rhythm and effort   Neurological:  Oriented to person, place, and time.  Psychological:  Appropriate mood and affect.    Comprehensive Left Knee Exam:       Inspection: No erythema, ecchymoses, or wounds. No rash. Well healed arthroscopic incisions. No effusion. No quad atrophy  Alignment: neutral  ROM: 0 - 120 degrees, flexion contracture: 0 degrees, quad lag: no  Stability: A/P stress: stable, firm endpoint, M/L stress: stable, firm endpoint  Pain or crepitus with ROM?: No. No pain with patellar grind  Tenderness to palpation at: medial joint line; Non-tender at: lateral joint line, patella  Strength: Intact 5/5 strength SLR and TA/GS/FHL/EHL  Sensation: Grossly intact to light touch over SPN/DPN/Saph/Sural/Tibial nerve distributions  Vasc: Warm perfused extremity        Imaging:   Weightbearing XRs of the left knee were obtained with AP, PA Flex, sunrise, and lateral views    They show moderate (approaching severe) degenerative changes of the knee with joint space narrowing involving the medial compartment with subchondral sclerosis. No fracture or dislocation seen    I personally reviewed and interpreted the radiographs.      Assessment:     ICD-10-CM    1. Primary osteoarthritis of left knee  M17.12              Plan:   At today's visit I discussed with the patient their diagnosis and the factors considered to form this diagnosis. Their history and physical exam and radiographic findings are consistent with arthritis of the knee. As we discussed their diagnosis, information was provided regarding the underlying pathology and the natural course of this progressive condition.    During our discussion, we detailed various treatment options available. We counseled the patient on treatment options. Major joint arthritis is usually a chronic condition that can be effectively managed with conservative modalities. Once these conservative measures fail to provide reasonable therapeutic benefit, surgical procedures may be indicated.    Conservative measures include activity modification, home exercise programs, physical therapy, oral anti-inflammatories and acetaminophen,  braces, assistive devices, and intraarticular injections.     I have recommended continued conservative treatment with Tylenol / NSAIDs, combination of rest, ice, or elevation, and corticosteroid injections. Over the counter dosing was discussed.     He would like to proceed with CSI today prior to his trip to [place].    Procedure Note - Left Knee CSI    Risks and benefits of knee injection discussed with the patient, with risks including but not limited to pain and swelling at the injection site and/or within the knee joint, infection, elevation in blood pressure and/or glucose levels, facial flushing.  After informed consent, the patient's left knee was marked, locally anesthetized with skin refrigerant, prepped with topical antiseptic, and injected with a mixture of 1mL 40mg/mL Kenalog, 2mL 1% lidocaine and 2mL 0.5% marcaine through the inferolateral portal. Hemostasis was achieved and a band-aid was applied.  The patient tolerated the procedure well.    I have asked that he keep track of the extent and duration of relief.    He will follow up as needed for return of symptoms.      Thank you very much for allowing me to participate in the care of this patient. If you have any questions, please do not hesitate to contact me.      Sky Canseco MD  Adult Hip and Knee Reconstruction    Department of Orthopaedic Surgery  [facility]     [address] [place] [address]  [address] [place] [address]     t: [phone]  f: [phone]       [email or web address]    The following individual(s) verbally consented to be recorded using ambient AI listening technology and understand that they can each withdraw their consent to this listening technology at any point by asking the clinician to turn off or pause the recording:    Patient name: Mt Obregon    Carmen tool was used for dictation purposes only and the patient was not recorded at any point during the visit.           [1]   Past  Medical History:   Abdominal pain    Back problem    Calculus of kidney    Coronary atherosclerosis    S/P QUADRUPLE CABG 2010    Esophageal reflux    Essential hypertension    Fatigue    Gout    Hearing loss    Heart palpitations    High blood pressure    High cholesterol    Night sweats    Sleep apnea    Sleep disturbance    Stress    Visual impairment    GLASSES    Wears glasses   [2]   Past Surgical History:  Procedure Laterality Date    Back surgery      Cabg      Cabg, artery-vein, four  07/2010    QUADRUPLE    Colonoscopy  02/2012    Colonoscopy      Femur/knee surg unlisted  1987 1998    Orbit surgery proc unlisted  1968    \"Closed treatment of orbital fracture (non-'blowout')\"    Other surgical history N/A 12/21/2015    Procedure: EXCISION OF LESION/LIPOMA;  Surgeon: Aury Henderson MD;  Location: [facility] MAIN OR    Sigmoidoscopy,diagnostic      Sinus surgery    2003    Spine surgery procedure unlisted      LOWER BACK SURGERY X2-MICRODISCECTOMY AND LAMINECTOMY   [3]   Family History  Problem Relation Age of Onset    Heart Disease Father     Cancer Father     Breast Cancer Mother     Lung Disorder Maternal Grandmother     Heart Disease Paternal Grandmother         CAD    Cancer Paternal Grandfather     Breast Cancer Other     Alcohol and Other Disorders Associated Other     Heart Attack Other    [4]   Allergies  Allergen Reactions    Atorvastatin MYALGIA     Oral    Carvedilol OTHER (SEE COMMENTS)     Oral, mental status changes    Pravastatin OTHER (SEE COMMENTS)     Oral    Seasonal OTHER (SEE COMMENTS)     delores

## 2025-04-16 ENCOUNTER — APPOINTMENT (OUTPATIENT)
Dept: CARDIOLOGY | Age: 75
End: 2025-04-16

## 2025-04-16 VITALS
SYSTOLIC BLOOD PRESSURE: 100 MMHG | WEIGHT: 265.32 LBS | DIASTOLIC BLOOD PRESSURE: 50 MMHG | BODY MASS INDEX: 32.3 KG/M2 | OXYGEN SATURATION: 96 % | HEART RATE: 52 BPM

## 2025-04-16 DIAGNOSIS — I49.3 PVC (PREMATURE VENTRICULAR CONTRACTION): ICD-10-CM

## 2025-04-16 DIAGNOSIS — I65.23 BILATERAL CAROTID ARTERY STENOSIS: Primary | ICD-10-CM

## 2025-04-16 LAB
ATRIAL RATE (BPM): 68
P AXIS (DEGREES): 79
PR-INTERVAL (MSEC): 270
QRS-INTERVAL (MSEC): 88
QT-INTERVAL (MSEC): 464
QTC: 493
R AXIS (DEGREES): 85
REPORT TEXT: NORMAL
T AXIS (DEGREES): 11
VENTRICULAR RATE EKG/MIN (BPM): 68

## 2025-05-06 DIAGNOSIS — I65.23 BILATERAL CAROTID ARTERY STENOSIS: ICD-10-CM

## 2025-05-06 DIAGNOSIS — I25.10 CORONARY ARTERY DISEASE INVOLVING NATIVE CORONARY ARTERY OF NATIVE HEART WITHOUT ANGINA PECTORIS: ICD-10-CM

## 2025-05-06 DIAGNOSIS — E78.2 HYPERLIPIDEMIA, MIXED: ICD-10-CM

## 2025-05-06 RX ORDER — HYDROCHLOROTHIAZIDE 25 MG/1
25 TABLET ORAL EVERY MORNING
Qty: 90 TABLET | Refills: 1 | Status: SHIPPED | OUTPATIENT
Start: 2025-05-06 | End: 2025-05-07 | Stop reason: SDUPTHER

## 2025-05-06 RX ORDER — ROSUVASTATIN CALCIUM 40 MG/1
40 TABLET, COATED ORAL DAILY
Qty: 90 TABLET | Refills: 3 | Status: SHIPPED | OUTPATIENT
Start: 2025-05-06 | End: 2025-05-07 | Stop reason: SDUPTHER

## 2025-05-07 ENCOUNTER — PATIENT OUTREACH (OUTPATIENT)
Dept: CASE MANAGEMENT | Age: 75
End: 2025-05-07

## 2025-05-07 DIAGNOSIS — E78.00 PURE HYPERCHOLESTEROLEMIA: ICD-10-CM

## 2025-05-07 DIAGNOSIS — E55.9 VITAMIN D DEFICIENCY: ICD-10-CM

## 2025-05-07 DIAGNOSIS — I25.10 CORONARY ARTERY DISEASE INVOLVING NATIVE CORONARY ARTERY OF NATIVE HEART WITHOUT ANGINA PECTORIS: ICD-10-CM

## 2025-05-07 DIAGNOSIS — I65.23 BILATERAL CAROTID ARTERY STENOSIS: ICD-10-CM

## 2025-05-07 DIAGNOSIS — G47.33 OSA ON CPAP: ICD-10-CM

## 2025-05-07 DIAGNOSIS — N20.0 RECURRENT KIDNEY STONES: ICD-10-CM

## 2025-05-07 DIAGNOSIS — E11.29 TYPE 2 DIABETES MELLITUS WITH MICROALBUMINURIA (HCC): Primary | ICD-10-CM

## 2025-05-07 DIAGNOSIS — K21.9 GASTROESOPHAGEAL REFLUX DISEASE, UNSPECIFIED WHETHER ESOPHAGITIS PRESENT: ICD-10-CM

## 2025-05-07 DIAGNOSIS — E78.2 HYPERLIPIDEMIA, MIXED: ICD-10-CM

## 2025-05-07 DIAGNOSIS — R80.9 TYPE 2 DIABETES MELLITUS WITH MICROALBUMINURIA (HCC): Primary | ICD-10-CM

## 2025-05-07 DIAGNOSIS — I10 HYPERTENSION, BENIGN: ICD-10-CM

## 2025-05-07 RX ORDER — ALLOPURINOL 300 MG/1
300 TABLET ORAL DAILY
Qty: 90 TABLET | Refills: 3 | OUTPATIENT
Start: 2025-05-07

## 2025-05-07 NOTE — PROGRESS NOTES
Spoke to Mt for Chronic Care Management.      Updates to patient care team/comments: UTD  Patient reported changes in medications: UTD  Med Adherence  Comment: pt taking medications as prescribe     Health Maintenance:   Health Maintenance   Topic Date Due    Lung Cancer Screening  Never done    Diabetes Care Dilated Eye Exam  01/25/2022    Diabetes Care: Microalb/Creat Ratio (Annual)  01/01/2025    COVID-19 Vaccine (7 - 2024-25 season) 04/09/2025    Diabetes Care A1C  06/23/2025    Diabetes Care Foot Exam  12/03/2025    Colorectal Cancer Screening  12/08/2025    Diabetes Care: GFR  12/23/2025    Annual Physical  01/06/2026    PSA  12/23/2026    Influenza Vaccine  Completed    Annual Depression Screening  Completed    Fall Risk Screening (Annual)  Completed    Pneumococcal Vaccine: 50+ Years  Completed    Zoster Vaccines  Completed    Meningococcal B Vaccine  Aged Out       Patient updates/concerns:    Spoke to pt for monthly outreach   Pt has seen ortho to discuss ongoing treatment for knee pain. Pt recently took a  vacation in which he averaged some 15k steps a day and feels his keen tolerated well. He used a knee brace every day and felt a few episodes of discomfort. He says it did not prevent him from any activity and he would manage with some extra rest and occasional advil.  He states that he will manage minor hip pain similarly. Pt has decided that ongoing cortizone shots will likely not  be a consideration as he has not had success in the past. If he decides to start it again he will first discuss with pcp. Pt states that he anticipates some day having to undergoes surgery as that is the other option after cortizone.    Pt started to experience some cramping in his calf with all the walking he did.  He states he is not consistent in hydration and has discussed with specialist in the past that his goal should be 60 oz a day.      Pt has seen cardiology. No changes have been made to medications and  specialist was satisfied with pt condition and results of in office EKG. Pt will return in 6 months.     Pt is targeting a taper down approach to cigar smoking cessation. Pt successfully went through 12 day vacation without one and is confident he can follow through.    Pt has been getting his exercise with a lot of home improvement projects recently and will be returning to good [facility] heart healthy exercise class. Pt tries to visit 2-3 times a week.  During the class he gets o2 blood pressure and pulse taken.  Pt states that results are consistently within normal levels.    Reviewed rx with pt. He only takes gabapentin a few times a week. He states that he takes it proactively when he feels neuropathy in the toes.    Discussed with pt his thought on the likelihood of returning to cpap use. Pt is not confident that there is any new mask or device that would allow him to have restful sleep. Pt states that he has tried several time with different masks and ultimately his sleep is worse when trying to use it.  Pt states he still experiences some afternoon fatigue but it is never overwhelming and with a little rest he manages to have enough energy for the latter half of the day.    Pt did not have any new questions or concerns for pcp or clinical staff.   Goals/Action Plan:    Active goal from previous outreach: staying healthy     Patient reported progress towards goals: pt continues to see providers as needed and takes medications as prescribed               - What: exercise           - Where/When/How: pt continues with heart healthy class and walks treadmill.   Patient Reported Barriers and Concerns: n/a                   - Plan for overcoming barriers: none    Care Managers Interventions: continue to provide encouragement and education for healthy coping and dx    Future Appointments:   Future Appointments   Date Time Provider Department Center   7/22/2025 12:00 PM Andrew Peraza MD [facility]   1/27/2026  11:00 AM Isidoro Meneses MD [id number]         Next Care Manager Follow Up Date: one month    Reason For Follow Up: review progress and or barriers towards patient's goals.     Time Spent This Encounter Total: 31 min medical record review, telephone communication, care plan updates where needed, education, goals, and action plan recreation/update. Provided acknowledgment and validation to patient's concerns.   Monthly Minute Total including today: 31  Physical assessment, complete health history, and need for CCM established by Andrew Peraza MD.

## 2025-05-07 NOTE — TELEPHONE ENCOUNTER
LOV: 01/21/25  Upcoming appt scheduled for 01/27/26  LF: 12/03/24, 90 tablets with 3 refills   Refill request too soon  Refill request denied per office protocol

## 2025-05-08 ENCOUNTER — TELEPHONE (OUTPATIENT)
Dept: SURGERY | Facility: CLINIC | Age: 75
End: 2025-05-08

## 2025-05-08 NOTE — TELEPHONE ENCOUNTER
Per patient there was a refill request for allopurinol 300mg and patient states his pcp Dr. Peraza will take care of this prescription. Thank you

## 2025-05-09 RX ORDER — ROSUVASTATIN CALCIUM 40 MG/1
40 TABLET, COATED ORAL DAILY
Qty: 90 TABLET | Refills: 3 | Status: SHIPPED | OUTPATIENT
Start: 2025-05-09

## 2025-05-09 RX ORDER — HYDROCHLOROTHIAZIDE 25 MG/1
25 TABLET ORAL EVERY MORNING
Qty: 90 TABLET | Refills: 1 | Status: SHIPPED | OUTPATIENT
Start: 2025-05-09

## 2025-05-12 ENCOUNTER — OFFICE VISIT (OUTPATIENT)
Dept: FAMILY MEDICINE CLINIC | Facility: CLINIC | Age: 75
End: 2025-05-12
Payer: MEDICARE

## 2025-05-12 VITALS
HEIGHT: 75 IN | WEIGHT: 264 LBS | DIASTOLIC BLOOD PRESSURE: 62 MMHG | OXYGEN SATURATION: 99 % | BODY MASS INDEX: 32.83 KG/M2 | TEMPERATURE: 98 F | RESPIRATION RATE: 18 BRPM | HEART RATE: 65 BPM | SYSTOLIC BLOOD PRESSURE: 120 MMHG

## 2025-05-12 DIAGNOSIS — R05.1 ACUTE COUGH: Primary | ICD-10-CM

## 2025-05-12 DIAGNOSIS — R09.82 POST-NASAL DRIP: ICD-10-CM

## 2025-05-12 PROCEDURE — 99213 OFFICE O/P EST LOW 20 MIN: CPT

## 2025-05-12 RX ORDER — BENZONATATE 200 MG/1
200 CAPSULE ORAL 3 TIMES DAILY PRN
Qty: 21 CAPSULE | Refills: 0 | Status: SHIPPED | OUTPATIENT
Start: 2025-05-12 | End: 2025-05-19

## 2025-05-12 NOTE — PROGRESS NOTES
Subjective:   Patient ID: Mt Obregon is a 75 year old male.    Patient presents to clinic with 1 week of cough, drainage and sore throat. Taking mucinex with honey every 4 hours. Denies known exposures, fever or shortness of breath. Cough is occasionally productive. States that cough is worse at night. History of seasonal allergies but does not take anything.    Cough  This is a new problem. The current episode started in the past 7 days. The problem has been unchanged. Associated symptoms include postnasal drip and a sore throat. Pertinent negatives include no fever.       History/Other:   Review of Systems   Constitutional:  Negative for fatigue and fever.   HENT:  Positive for postnasal drip and sore throat.    Respiratory:  Positive for cough.    All other systems reviewed and are negative.    Current Medications[1]  Allergies:Allergies[2]    Objective:   Physical Exam  Vitals reviewed.   Constitutional:       General: He is not in acute distress.     Appearance: Normal appearance. He is not ill-appearing or toxic-appearing.   HENT:      Head: Normocephalic and atraumatic.      Right Ear: Tympanic membrane, ear canal and external ear normal.      Left Ear: Tympanic membrane, ear canal and external ear normal.      Nose: Nose normal.      Mouth/Throat:      Mouth: Mucous membranes are moist.      Pharynx: Oropharynx is clear. Postnasal drip present. No posterior oropharyngeal erythema.   Cardiovascular:      Rate and Rhythm: Normal rate and regular rhythm.      Pulses: Normal pulses.      Heart sounds: Normal heart sounds.   Pulmonary:      Effort: Pulmonary effort is normal. No respiratory distress.      Breath sounds: Normal breath sounds. No wheezing, rhonchi or rales.   Musculoskeletal:         General: Normal range of motion.      Cervical back: Normal range of motion and neck supple.   Lymphadenopathy:      Cervical: No cervical adenopathy.   Skin:     General: Skin is warm and dry.      Capillary  Refill: Capillary refill takes less than 2 seconds.   Neurological:      General: No focal deficit present.      Mental Status: He is alert and oriented to person, place, and time.   Psychiatric:         Mood and Affect: Mood normal.         Behavior: Behavior normal.         Assessment & Plan:   1. Acute cough    2. Post-nasal drip      Discussion about supportive treatment including over the counter medications, hydration and rest. Follow up with PCP if symptoms continue.      Meds This Visit:  Requested Prescriptions     Signed Prescriptions Disp Refills    benzonatate 200 MG Oral Cap 21 capsule 0     Sig: Take 1 capsule (200 mg total) by mouth 3 (three) times daily as needed for cough.       Imaging & Referrals:  None         [1]   Current Outpatient Medications   Medication Sig Dispense Refill    benzonatate 200 MG Oral Cap Take 1 capsule (200 mg total) by mouth 3 (three) times daily as needed for cough. 21 capsule 0    Potassium Citrate ER (UROCIT-K 15) 15 MEQ (1620 MG) Oral Tab CR Take 1 tablet by mouth daily. 90 tablet 5    finasteride 5 MG Oral Tab Take 1 tablet (5 mg total) by mouth daily. 90 tablet 6    Tadalafil (CIALIS) 20 MG Oral Tab Take 1 tablet (20 mg total) by mouth daily as needed for Erectile Dysfunction. 30 tablet 5    fenofibrate 145 MG Oral Tab Take 1 tablet (145 mg total) by mouth in the morning.      GLIPIZIDE 10 MG Oral Tab TAKE 1 TABLET BEFORE       BREAKFAST 90 tablet 3    Prednisolon-Moxiflox-Bromfenac 1-0.5-0.075 % Ophthalmic Solution as directed Ophthalmic for 30 days (Patient not taking: No sig reported)      allopurinol 300 MG Oral Tab Take 1 tablet (300 mg total) by mouth daily. 90 tablet 3    PANTOPRAZOLE 40 MG Oral Tab EC TAKE 1 TABLET DAILY 90 tablet 3    hydroCHLOROthiazide 25 MG Oral Tab Take 1 tablet (25 mg total) by mouth every morning.      gabapentin 300 MG Oral Cap Take 1 capsule (300 mg total) by mouth in the morning and 1 capsule (300 mg total) in the evening and 1  capsule (300 mg total) before bedtime.      Ascorbic Acid (VITAMIN C) 1000 MG Oral Tab Take 1 tablet (1,000 mg total) by mouth in the morning and 1 tablet (1,000 mg total) in the evening. Take with meals.      CANNABIDIOL OR Take by mouth. (Patient not taking: Reported on 4/10/2025)      losartan 50 MG Oral Tab Take 1 tablet (50 mg total) by mouth in the morning and 1 tablet (50 mg total) before bedtime.      Sildenafil Citrate 100 MG Oral Tab Take 1 tablet (100 mg total) by mouth daily as needed for Erectile Dysfunction. Take ~ one hour prior to sexual activity on an empty stomach 30 tablet 5    ketorolac 0.5 % Ophthalmic Solution       ofloxacin 0.3 % Ophthalmic Solution       prednisoLONE 1 % Ophthalmic Suspension  (Patient not taking: No sig reported)      latanoprost 0.005 % Ophthalmic Solution       metoprolol succinate  MG Oral Tablet 24 Hr Take 1 tablet (100 mg total) by mouth daily.      rosuvastatin 40 MG Oral Tab       Multiple Vitamins-Minerals (MULTIVITAMIN ADULT OR) Take 1 tablet by mouth in the morning.      aspirin 325 MG Oral Tab Take 1 tablet (325 mg total) by mouth in the morning.     [2]   Allergies  Allergen Reactions    Atorvastatin MYALGIA     Oral    Carvedilol OTHER (SEE COMMENTS)     Oral, mental status changes    Pravastatin OTHER (SEE COMMENTS)     Oral    Seasonal OTHER (SEE COMMENTS)     delores

## 2025-05-21 ENCOUNTER — PATIENT OUTREACH (OUTPATIENT)
Dept: CASE MANAGEMENT | Age: 75
End: 2025-05-21

## 2025-06-04 ENCOUNTER — APPOINTMENT (OUTPATIENT)
Dept: CT IMAGING | Age: 75
End: 2025-06-04
Attending: EMERGENCY MEDICINE

## 2025-06-04 ENCOUNTER — HOSPITAL ENCOUNTER (OUTPATIENT)
Age: 75
Setting detail: OBSERVATION
Discharge: HOME | End: 2025-06-05
Attending: EMERGENCY MEDICINE | Admitting: INTERNAL MEDICINE

## 2025-06-04 DIAGNOSIS — R29.90 STROKE-LIKE SYMPTOMS: Primary | ICD-10-CM

## 2025-06-04 LAB
ANION GAP SERPL CALC-SCNC: 10 MMOL/L (ref 7–19)
APTT PPP: 27 SEC (ref 22–32)
BASOPHILS # BLD: 0.1 K/MCL (ref 0–0.3)
BASOPHILS NFR BLD: 1 %
BUN SERPL-MCNC: 24 MG/DL (ref 6–20)
BUN/CREAT SERPL: 21 (ref 7–25)
CALCIUM SERPL-MCNC: 9.6 MG/DL (ref 8.4–10.2)
CHLORIDE SERPL-SCNC: 105 MMOL/L (ref 97–110)
CO2 SERPL-SCNC: 26 MMOL/L (ref 21–32)
CREAT SERPL-MCNC: 1.12 MG/DL (ref 0.67–1.17)
DEPRECATED RDW RBC: 46 FL (ref 39–50)
EGFRCR SERPLBLD CKD-EPI 2021: 69 ML/MIN/{1.73_M2}
EOSINOPHIL # BLD: 0.1 K/MCL (ref 0–0.5)
EOSINOPHIL NFR BLD: 2 %
ERYTHROCYTE [DISTWIDTH] IN BLOOD: 13.8 % (ref 11–15)
FASTING DURATION TIME PATIENT: ABNORMAL H
GLUCOSE BLDC GLUCOMTR-MCNC: 137 MG/DL (ref 70–99)
GLUCOSE SERPL-MCNC: 155 MG/DL (ref 70–99)
HCT VFR BLD CALC: 45.4 % (ref 39–51)
HGB BLD-MCNC: 15.3 G/DL (ref 13–17)
IMM GRANULOCYTES # BLD AUTO: 0 K/MCL (ref 0–0.2)
IMM GRANULOCYTES # BLD: 0 %
INR PPP: 1.1
LYMPHOCYTES # BLD: 1.9 K/MCL (ref 1–4)
LYMPHOCYTES NFR BLD: 26 %
MCH RBC QN AUTO: 30.5 PG (ref 26–34)
MCHC RBC AUTO-ENTMCNC: 33.7 G/DL (ref 32–36.5)
MCV RBC AUTO: 90.6 FL (ref 78–100)
MONOCYTES # BLD: 0.6 K/MCL (ref 0.3–0.9)
MONOCYTES NFR BLD: 9 %
NEUTROPHILS # BLD: 4.5 K/MCL (ref 1.8–7.7)
NEUTROPHILS NFR BLD: 62 %
NRBC BLD MANUAL-RTO: 0 /100 WBC
PLATELET # BLD AUTO: 162 K/MCL (ref 140–450)
POTASSIUM SERPL-SCNC: 3.1 MMOL/L (ref 3.4–5.1)
PROTHROMBIN TIME: 11.4 SEC (ref 9.7–11.8)
RBC # BLD: 5.01 MIL/MCL (ref 4.5–5.9)
SODIUM SERPL-SCNC: 138 MMOL/L (ref 135–145)
TROPONIN I SERPL DL<=0.01 NG/ML-MCNC: 26 NG/L
WBC # BLD: 7.2 K/MCL (ref 4.2–11)

## 2025-06-04 PROCEDURE — 93005 ELECTROCARDIOGRAM TRACING: CPT | Performed by: EMERGENCY MEDICINE

## 2025-06-04 PROCEDURE — 70496 CT ANGIOGRAPHY HEAD: CPT

## 2025-06-04 PROCEDURE — 82962 GLUCOSE BLOOD TEST: CPT

## 2025-06-04 PROCEDURE — 99285 EMERGENCY DEPT VISIT HI MDM: CPT | Performed by: EMERGENCY MEDICINE

## 2025-06-04 PROCEDURE — 93010 ELECTROCARDIOGRAM REPORT: CPT | Performed by: INTERNAL MEDICINE

## 2025-06-04 PROCEDURE — 85610 PROTHROMBIN TIME: CPT | Performed by: EMERGENCY MEDICINE

## 2025-06-04 PROCEDURE — 10002805 HB CONTRAST AGENT: Performed by: EMERGENCY MEDICINE

## 2025-06-04 PROCEDURE — 85025 COMPLETE CBC W/AUTO DIFF WBC: CPT | Performed by: EMERGENCY MEDICINE

## 2025-06-04 PROCEDURE — 85730 THROMBOPLASTIN TIME PARTIAL: CPT | Performed by: EMERGENCY MEDICINE

## 2025-06-04 PROCEDURE — 84484 ASSAY OF TROPONIN QUANT: CPT | Performed by: EMERGENCY MEDICINE

## 2025-06-04 PROCEDURE — 80048 BASIC METABOLIC PNL TOTAL CA: CPT | Performed by: EMERGENCY MEDICINE

## 2025-06-04 PROCEDURE — G0378 HOSPITAL OBSERVATION PER HR: HCPCS

## 2025-06-04 RX ADMIN — IOHEXOL 78 ML: 350 INJECTION, SOLUTION INTRAVENOUS at 22:38

## 2025-06-04 ASSESSMENT — PAIN SCALES - GENERAL: PAINLEVEL_OUTOF10: 0

## 2025-06-04 ASSESSMENT — MOVEMENT AND STRENGTH ASSESSMENTS
HEAD_NECK: FULL RANGE OF MOTION
FACE_JAW: LIMITED MOVEMENT/PARESIS
ALL_EXTREMITIES: EQUAL STRENGTH/TONE/MOVEMENT

## 2025-06-05 ENCOUNTER — APPOINTMENT (OUTPATIENT)
Dept: CARDIOLOGY | Age: 75
End: 2025-06-05
Attending: INTERNAL MEDICINE

## 2025-06-05 ENCOUNTER — APPOINTMENT (OUTPATIENT)
Dept: CARDIOLOGY | Age: 75
End: 2025-06-05
Attending: HOSPITALIST

## 2025-06-05 ENCOUNTER — APPOINTMENT (OUTPATIENT)
Dept: MRI IMAGING | Age: 75
End: 2025-06-05
Attending: PSYCHIATRY & NEUROLOGY

## 2025-06-05 VITALS
TEMPERATURE: 98.3 F | HEIGHT: 76 IN | HEART RATE: 59 BPM | WEIGHT: 257.94 LBS | BODY MASS INDEX: 31.41 KG/M2 | SYSTOLIC BLOOD PRESSURE: 144 MMHG | OXYGEN SATURATION: 95 % | RESPIRATION RATE: 18 BRPM | DIASTOLIC BLOOD PRESSURE: 70 MMHG

## 2025-06-05 LAB
ALBUMIN SERPL-MCNC: 3.1 G/DL (ref 3.4–5)
ALBUMIN/GLOB SERPL: 0.9 {RATIO} (ref 1–2.4)
ALP SERPL-CCNC: 36 UNITS/L (ref 45–117)
ALT SERPL-CCNC: 22 UNITS/L
ANION GAP SERPL CALC-SCNC: 4 MMOL/L (ref 7–19)
AORTIC VALVE AREA (AVA): 0.61
ASCENDING AORTA (AAD): 3
AST SERPL-CCNC: 21 UNITS/L
ATRIAL RATE (BPM): 48
AV STENOSIS SEVERITY TEXT: NORMAL
BILIRUB SERPL-MCNC: 0.5 MG/DL (ref 0.2–1)
BUN SERPL-MCNC: 24 MG/DL (ref 6–20)
BUN/CREAT SERPL: 22 (ref 7–25)
CALCIUM SERPL-MCNC: 9.4 MG/DL (ref 8.4–10.2)
CHLORIDE SERPL-SCNC: 105 MMOL/L (ref 97–110)
CHOLEST SERPL-MCNC: 144 MG/DL
CHOLEST/HDLC SERPL: 3.4 {RATIO}
CO2 SERPL-SCNC: 32 MMOL/L (ref 21–32)
CREAT SERPL-MCNC: 1.07 MG/DL (ref 0.67–1.17)
DEPRECATED RDW RBC: 46.3 FL (ref 39–50)
E WAVE DECELARATION TIME (MDT): 16.66
EGFRCR SERPLBLD CKD-EPI 2021: 72 ML/MIN/{1.73_M2}
ERYTHROCYTE [DISTWIDTH] IN BLOOD: 13.8 % (ref 11–15)
ETHANOL SERPL-MCNC: NORMAL MG/DL
FASTING DURATION TIME PATIENT: ABNORMAL H
GLOBULIN SER-MCNC: 3.5 G/DL (ref 2–4)
GLUCOSE BLDC GLUCOMTR-MCNC: 124 MG/DL (ref 70–99)
GLUCOSE BLDC GLUCOMTR-MCNC: 146 MG/DL (ref 70–99)
GLUCOSE BLDC GLUCOMTR-MCNC: 148 MG/DL (ref 70–99)
GLUCOSE BLDC GLUCOMTR-MCNC: 166 MG/DL (ref 70–99)
GLUCOSE SERPL-MCNC: 117 MG/DL (ref 70–99)
HCT VFR BLD CALC: 45.2 % (ref 39–51)
HDLC SERPL-MCNC: 42 MG/DL
HGB BLD-MCNC: 14.9 G/DL (ref 13–17)
INTERVENTRICULAR SEPTUM IN END DIASTOLE (IVSD): 1.89
LDLC SERPL CALC-MCNC: 53 MG/DL
LEFT VENTRICULAR INTERNAL DIMENSION IN DIASTOLE (LVDD): 4.7
LEFT VENTRICULAR POSTERIOR WALL IN END DIASTOLE (LVPW): 6.7
LV EF 2D ECHO EST: NORMAL %
LVOT 2D (LVOTD): 19
LVOT VTI (LVOTVTI): 0.77
MAGNESIUM SERPL-MCNC: 1.8 MG/DL (ref 1.7–2.4)
MCH RBC QN AUTO: 30.2 PG (ref 26–34)
MCHC RBC AUTO-ENTMCNC: 33 G/DL (ref 32–36.5)
MCV RBC AUTO: 91.5 FL (ref 78–100)
MV E TISSUE VEL MED (MESV): 4.73
MV E WAVE VEL/E TISSUE VEL MED(MSR): 3.68
MV PEAK A VELOCITY (MVPAV): 312
MV PEAK E VELOCITY (MVPEV): 1.06
NONHDLC SERPL-MCNC: 102 MG/DL
NRBC BLD MANUAL-RTO: 0 /100 WBC
P AXIS (DEGREES): 38
PLATELET # BLD AUTO: 151 K/MCL (ref 140–450)
POTASSIUM SERPL-SCNC: 3.8 MMOL/L (ref 3.4–5.1)
PR-INTERVAL (MSEC): 298
PROT SERPL-MCNC: 6.6 G/DL (ref 6.4–8.2)
QRS-INTERVAL (MSEC): 96
QT-INTERVAL (MSEC): 472
QTC: 422
R AXIS (DEGREES): 8
RBC # BLD: 4.94 MIL/MCL (ref 4.5–5.9)
REPORT TEXT: NORMAL
RV END SYSTOLIC LONGITUDINAL STRAIN FREE WALL (RVGS): 2.2
SODIUM SERPL-SCNC: 137 MMOL/L (ref 135–145)
T AXIS (DEGREES): 132
TRICUSPID VALVE PEAK REGURGITATION VELOCITY (TRPV): 3.7
TRIGL SERPL-MCNC: 247 MG/DL
TV ESTIMATED RIGHT ARTERIAL PRESSURE (RAP): 9.02
VENTRICULAR RATE EKG/MIN (BPM): 48
WBC # BLD: 7.6 K/MCL (ref 4.2–11)

## 2025-06-05 PROCEDURE — 97530 THERAPEUTIC ACTIVITIES: CPT

## 2025-06-05 PROCEDURE — 10002805 HB CONTRAST AGENT: Performed by: HOSPITALIST

## 2025-06-05 PROCEDURE — A9585 GADOBUTROL INJECTION: HCPCS | Performed by: PSYCHIATRY & NEUROLOGY

## 2025-06-05 PROCEDURE — 93306 TTE W/DOPPLER COMPLETE: CPT | Performed by: INTERNAL MEDICINE

## 2025-06-05 PROCEDURE — 36415 COLL VENOUS BLD VENIPUNCTURE: CPT | Performed by: INTERNAL MEDICINE

## 2025-06-05 PROCEDURE — G0378 HOSPITAL OBSERVATION PER HR: HCPCS

## 2025-06-05 PROCEDURE — 82962 GLUCOSE BLOOD TEST: CPT

## 2025-06-05 PROCEDURE — 10002805 HB CONTRAST AGENT: Performed by: PSYCHIATRY & NEUROLOGY

## 2025-06-05 PROCEDURE — 70553 MRI BRAIN STEM W/O & W/DYE: CPT

## 2025-06-05 PROCEDURE — 80061 LIPID PANEL: CPT | Performed by: PSYCHIATRY & NEUROLOGY

## 2025-06-05 PROCEDURE — 83735 ASSAY OF MAGNESIUM: CPT | Performed by: INTERNAL MEDICINE

## 2025-06-05 PROCEDURE — 80053 COMPREHEN METABOLIC PANEL: CPT | Performed by: INTERNAL MEDICINE

## 2025-06-05 PROCEDURE — 93306 TTE W/DOPPLER COMPLETE: CPT

## 2025-06-05 PROCEDURE — 10002803 HB RX 637: Performed by: HOSPITALIST

## 2025-06-05 PROCEDURE — 82077 ASSAY SPEC XCP UR&BREATH IA: CPT | Performed by: PSYCHIATRY & NEUROLOGY

## 2025-06-05 PROCEDURE — 97162 PT EVAL MOD COMPLEX 30 MIN: CPT

## 2025-06-05 PROCEDURE — 10004651 HB RX, NO CHARGE ITEM: Performed by: PSYCHIATRY & NEUROLOGY

## 2025-06-05 PROCEDURE — 85027 COMPLETE CBC AUTOMATED: CPT | Performed by: INTERNAL MEDICINE

## 2025-06-05 PROCEDURE — 10002803 HB RX 637: Performed by: PSYCHIATRY & NEUROLOGY

## 2025-06-05 PROCEDURE — 10004651 HB RX, NO CHARGE ITEM: Performed by: INTERNAL MEDICINE

## 2025-06-05 PROCEDURE — 10002803 HB RX 637: Performed by: INTERNAL MEDICINE

## 2025-06-05 RX ORDER — DEXTROSE MONOHYDRATE 25 G/50ML
25 INJECTION, SOLUTION INTRAVENOUS PRN
Status: DISCONTINUED | OUTPATIENT
Start: 2025-06-05 | End: 2025-06-05 | Stop reason: HOSPADM

## 2025-06-05 RX ORDER — ENOXAPARIN SODIUM 100 MG/ML
40 INJECTION SUBCUTANEOUS NIGHTLY
Status: DISCONTINUED | OUTPATIENT
Start: 2025-06-05 | End: 2025-06-05 | Stop reason: HOSPADM

## 2025-06-05 RX ORDER — LOSARTAN POTASSIUM 50 MG/1
50 TABLET ORAL EVERY 12 HOURS SCHEDULED
Status: DISCONTINUED | OUTPATIENT
Start: 2025-06-05 | End: 2025-06-05 | Stop reason: HOSPADM

## 2025-06-05 RX ORDER — ALLOPURINOL 300 MG/1
300 TABLET ORAL DAILY
Status: DISCONTINUED | OUTPATIENT
Start: 2025-06-05 | End: 2025-06-05 | Stop reason: HOSPADM

## 2025-06-05 RX ORDER — ASPIRIN 325 MG
325 TABLET ORAL DAILY
Status: DISCONTINUED | OUTPATIENT
Start: 2025-06-05 | End: 2025-06-05

## 2025-06-05 RX ORDER — METOPROLOL SUCCINATE 100 MG/1
100 TABLET, EXTENDED RELEASE ORAL DAILY
Status: DISCONTINUED | OUTPATIENT
Start: 2025-06-05 | End: 2025-06-05 | Stop reason: HOSPADM

## 2025-06-05 RX ORDER — FENOFIBRATE 54 MG/1
54 TABLET ORAL DAILY
Status: DISCONTINUED | OUTPATIENT
Start: 2025-06-05 | End: 2025-06-05 | Stop reason: HOSPADM

## 2025-06-05 RX ORDER — GADOBUTROL 604.72 MG/ML
10 INJECTION INTRAVENOUS ONCE
Status: COMPLETED | OUTPATIENT
Start: 2025-06-05 | End: 2025-06-05

## 2025-06-05 RX ORDER — POLYETHYLENE GLYCOL 3350 17 G/17G
17 POWDER, FOR SOLUTION ORAL DAILY PRN
Status: DISCONTINUED | OUTPATIENT
Start: 2025-06-05 | End: 2025-06-05 | Stop reason: HOSPADM

## 2025-06-05 RX ORDER — POTASSIUM CHLORIDE 1500 MG/1
40 TABLET, EXTENDED RELEASE ORAL ONCE
Status: COMPLETED | OUTPATIENT
Start: 2025-06-05 | End: 2025-06-05

## 2025-06-05 RX ORDER — 0.9 % SODIUM CHLORIDE 0.9 %
2 VIAL (ML) INJECTION EVERY 12 HOURS SCHEDULED
Status: DISCONTINUED | OUTPATIENT
Start: 2025-06-05 | End: 2025-06-05 | Stop reason: HOSPADM

## 2025-06-05 RX ORDER — NICOTINE POLACRILEX 4 MG
30 LOZENGE BUCCAL PRN
Status: DISCONTINUED | OUTPATIENT
Start: 2025-06-05 | End: 2025-06-05 | Stop reason: HOSPADM

## 2025-06-05 RX ORDER — CALCIUM CARBONATE 500 MG/1
500 TABLET, CHEWABLE ORAL EVERY 4 HOURS PRN
Status: DISCONTINUED | OUTPATIENT
Start: 2025-06-05 | End: 2025-06-05 | Stop reason: HOSPADM

## 2025-06-05 RX ORDER — CLOPIDOGREL BISULFATE 75 MG/1
75 TABLET ORAL DAILY
Qty: 90 TABLET | Refills: 3 | Status: SHIPPED | OUTPATIENT
Start: 2025-06-06

## 2025-06-05 RX ORDER — ONDANSETRON 4 MG/1
4 TABLET, ORALLY DISINTEGRATING ORAL EVERY 12 HOURS PRN
Status: DISCONTINUED | OUTPATIENT
Start: 2025-06-05 | End: 2025-06-05 | Stop reason: HOSPADM

## 2025-06-05 RX ORDER — GABAPENTIN 300 MG/1
300 CAPSULE ORAL 3 TIMES DAILY
Status: DISCONTINUED | OUTPATIENT
Start: 2025-06-05 | End: 2025-06-05 | Stop reason: HOSPADM

## 2025-06-05 RX ORDER — 0.9 % SODIUM CHLORIDE 0.9 %
10 VIAL (ML) INJECTION PRN
Status: DISCONTINUED | OUTPATIENT
Start: 2025-06-05 | End: 2025-06-05 | Stop reason: HOSPADM

## 2025-06-05 RX ORDER — POTASSIUM CITRATE 15 MEQ/1
15 TABLET, EXTENDED RELEASE ORAL DAILY
Status: DISCONTINUED | OUTPATIENT
Start: 2025-06-05 | End: 2025-06-05 | Stop reason: HOSPADM

## 2025-06-05 RX ORDER — CLOPIDOGREL BISULFATE 75 MG/1
300 TABLET ORAL ONCE
Status: COMPLETED | OUTPATIENT
Start: 2025-06-05 | End: 2025-06-05

## 2025-06-05 RX ORDER — ACETAMINOPHEN 325 MG/1
650 TABLET ORAL EVERY 4 HOURS PRN
Status: DISCONTINUED | OUTPATIENT
Start: 2025-06-05 | End: 2025-06-05 | Stop reason: HOSPADM

## 2025-06-05 RX ORDER — FINASTERIDE 5 MG/1
5 TABLET, FILM COATED ORAL DAILY
Status: DISCONTINUED | OUTPATIENT
Start: 2025-06-05 | End: 2025-06-05 | Stop reason: HOSPADM

## 2025-06-05 RX ORDER — ONDANSETRON 2 MG/ML
4 INJECTION INTRAMUSCULAR; INTRAVENOUS EVERY 6 HOURS PRN
Status: DISCONTINUED | OUTPATIENT
Start: 2025-06-05 | End: 2025-06-05 | Stop reason: HOSPADM

## 2025-06-05 RX ORDER — NICOTINE POLACRILEX 4 MG
15 LOZENGE BUCCAL PRN
Status: DISCONTINUED | OUTPATIENT
Start: 2025-06-05 | End: 2025-06-05 | Stop reason: HOSPADM

## 2025-06-05 RX ORDER — POTASSIUM CHLORIDE 1.5 G/1.58G
40 POWDER, FOR SOLUTION ORAL ONCE
Status: COMPLETED | OUTPATIENT
Start: 2025-06-05 | End: 2025-06-05

## 2025-06-05 RX ORDER — ROSUVASTATIN CALCIUM 20 MG/1
40 TABLET, COATED ORAL DAILY
Status: DISCONTINUED | OUTPATIENT
Start: 2025-06-05 | End: 2025-06-05 | Stop reason: HOSPADM

## 2025-06-05 RX ORDER — DEXTROSE MONOHYDRATE 25 G/50ML
12.5 INJECTION, SOLUTION INTRAVENOUS PRN
Status: DISCONTINUED | OUTPATIENT
Start: 2025-06-05 | End: 2025-06-05 | Stop reason: HOSPADM

## 2025-06-05 RX ORDER — ACETAMINOPHEN 650 MG/1
650 SUPPOSITORY RECTAL EVERY 4 HOURS PRN
Status: DISCONTINUED | OUTPATIENT
Start: 2025-06-05 | End: 2025-06-05 | Stop reason: HOSPADM

## 2025-06-05 RX ORDER — CLOPIDOGREL BISULFATE 75 MG/1
75 TABLET ORAL DAILY
Status: DISCONTINUED | OUTPATIENT
Start: 2025-06-06 | End: 2025-06-05 | Stop reason: HOSPADM

## 2025-06-05 RX ORDER — LATANOPROST 50 UG/ML
1 SOLUTION/ DROPS OPHTHALMIC NIGHTLY
Status: DISCONTINUED | OUTPATIENT
Start: 2025-06-05 | End: 2025-06-05 | Stop reason: HOSPADM

## 2025-06-05 RX ORDER — PANTOPRAZOLE SODIUM 40 MG/1
40 TABLET, DELAYED RELEASE ORAL DAILY
Status: DISCONTINUED | OUTPATIENT
Start: 2025-06-05 | End: 2025-06-05 | Stop reason: HOSPADM

## 2025-06-05 RX ADMIN — PANTOPRAZOLE SODIUM 40 MG: 40 TABLET, DELAYED RELEASE ORAL at 11:26

## 2025-06-05 RX ADMIN — ALLOPURINOL 300 MG: 300 TABLET ORAL at 11:25

## 2025-06-05 RX ADMIN — SODIUM CHLORIDE, PRESERVATIVE FREE 2 ML: 5 INJECTION INTRAVENOUS at 08:22

## 2025-06-05 RX ADMIN — LOSARTAN POTASSIUM 50 MG: 50 TABLET, FILM COATED ORAL at 11:26

## 2025-06-05 RX ADMIN — ROSUVASTATIN CALCIUM 40 MG: 20 TABLET, FILM COATED ORAL at 11:24

## 2025-06-05 RX ADMIN — SODIUM CHLORIDE, PRESERVATIVE FREE 2 ML: 5 INJECTION INTRAVENOUS at 01:00

## 2025-06-05 RX ADMIN — POTASSIUM CHLORIDE 40 MEQ: 20 TABLET, EXTENDED RELEASE ORAL at 08:22

## 2025-06-05 RX ADMIN — ASPIRIN 325 MG ORAL TABLET 325 MG: 325 PILL ORAL at 11:29

## 2025-06-05 RX ADMIN — GADOBUTROL 10 ML: 604.72 INJECTION INTRAVENOUS at 10:46

## 2025-06-05 RX ADMIN — POTASSIUM CITRATE 15 MEQ: 15 TABLET, EXTENDED RELEASE ORAL at 11:31

## 2025-06-05 RX ADMIN — POTASSIUM CHLORIDE 40 MEQ: 1.5 POWDER, FOR SOLUTION ORAL at 00:59

## 2025-06-05 RX ADMIN — FINASTERIDE 5 MG: 5 TABLET, FILM COATED ORAL at 11:24

## 2025-06-05 RX ADMIN — GABAPENTIN 300 MG: 300 CAPSULE ORAL at 14:42

## 2025-06-05 RX ADMIN — PERFLUTREN 2 ML: 6.52 INJECTION, SUSPENSION INTRAVENOUS at 10:18

## 2025-06-05 RX ADMIN — GABAPENTIN 300 MG: 300 CAPSULE ORAL at 11:25

## 2025-06-05 RX ADMIN — CLOPIDOGREL BISULFATE 300 MG: 75 TABLET, FILM COATED ORAL at 14:42

## 2025-06-05 RX ADMIN — METOPROLOL SUCCINATE 100 MG: 100 TABLET, EXTENDED RELEASE ORAL at 11:24

## 2025-06-05 RX ADMIN — FENOFIBRATE 54 MG: 54 TABLET ORAL at 11:25

## 2025-06-05 SDOH — ECONOMIC STABILITY: HOUSING INSECURITY: WHAT IS YOUR LIVING SITUATION TODAY?: I HAVE A STEADY PLACE TO LIVE

## 2025-06-05 SDOH — SOCIAL STABILITY: SOCIAL INSECURITY: HOW OFTEN DOES ANYONE, INCLUDING FAMILY AND FRIENDS, INSULT OR TALK DOWN TO YOU?: NEVER

## 2025-06-05 SDOH — HEALTH STABILITY: PHYSICAL HEALTH: DO YOU HAVE SERIOUS DIFFICULTY WALKING OR CLIMBING STAIRS?: NO

## 2025-06-05 SDOH — ECONOMIC STABILITY: FOOD INSECURITY: WITHIN THE PAST 12 MONTHS, THE FOOD YOU BOUGHT JUST DIDN'T LAST AND YOU DIDN'T HAVE MONEY TO GET MORE.: NEVER TRUE

## 2025-06-05 SDOH — SOCIAL STABILITY: SOCIAL INSECURITY: HOW OFTEN DOES ANYONE, INCLUDING FAMILY AND FRIENDS, PHYSICALLY HURT YOU?: NEVER

## 2025-06-05 SDOH — ECONOMIC STABILITY: HOUSING INSECURITY: WHAT IS YOUR LIVING SITUATION TODAY?: HOUSE

## 2025-06-05 SDOH — ECONOMIC STABILITY: INCOME INSECURITY: IN THE PAST 12 MONTHS, HAS THE ELECTRIC, GAS, OIL, OR WATER COMPANY THREATENED TO SHUT OFF SERVICE IN YOUR HOME?: NO

## 2025-06-05 SDOH — ECONOMIC STABILITY: TRANSPORTATION INSECURITY
IN THE PAST 12 MONTHS, HAS LACK OF RELIABLE TRANSPORTATION KEPT YOU FROM MEDICAL APPOINTMENTS, MEETINGS, WORK OR FROM GETTING THINGS NEEDED FOR DAILY LIVING?: NO

## 2025-06-05 SDOH — SOCIAL STABILITY: SOCIAL INSECURITY: HOW OFTEN DOES ANYONE, INCLUDING FAMILY AND FRIENDS, SCREAM OR CURSE AT YOU?: NEVER

## 2025-06-05 SDOH — ECONOMIC STABILITY: GENERAL

## 2025-06-05 SDOH — HEALTH STABILITY: PHYSICAL HEALTH: DO YOU HAVE DIFFICULTY DRESSING OR BATHING?: NO

## 2025-06-05 SDOH — ECONOMIC STABILITY: HOUSING INSECURITY: DO YOU HAVE PROBLEMS WITH ANY OF THE FOLLOWING?: NONE OF THE ABOVE

## 2025-06-05 SDOH — SOCIAL STABILITY: SOCIAL INSECURITY: HOW OFTEN DOES ANYONE, INCLUDING FAMILY AND FRIENDS, THREATEN YOU WITH HARM?: NEVER

## 2025-06-05 SDOH — SOCIAL STABILITY: SOCIAL NETWORK: SUPPORT SYSTEMS: FAMILY MEMBERS;SPOUSE

## 2025-06-05 SDOH — HEALTH STABILITY: GENERAL: BECAUSE OF A PHYSICAL, MENTAL, OR EMOTIONAL CONDITION, DO YOU HAVE DIFFICULTY DOING ERRANDS ALONE?: NO

## 2025-06-05 SDOH — ECONOMIC STABILITY: HOUSING INSECURITY: WHAT IS YOUR LIVING SITUATION TODAY?: SPOUSE

## 2025-06-05 ASSESSMENT — ORIENTATION MEMORY CONCENTRATION TEST (OMCT)
WHAT MONTH IS IT NOW: CORRECT
OMCT INTERPRETATION: 0-6: NO SIGNIFICANT IMPAIRMENT
OMCT SCORE: 2
WHAT TIME IS IT (NO WATCH OR CLOCK): CORRECT
SAY THE MONTHS IN REVERSE ORDER STARTING WITH LAST MONTH: 1 ERROR
COUNT BACKWARDS FROM 20 TO 1: CORRECT
WHAT YEAR IS IT NOW (MUST BE EXACT): CORRECT
REPEAT THE NAME AND ADDRESS I ASKED YOU TO REMEMBER: CORRECT

## 2025-06-05 ASSESSMENT — ACTIVITIES OF DAILY LIVING (ADL)
PRIOR_ADL_TOILETING: INDEPENDENT
ADL_SCORE: 12
EATING: INDEPENDENT
ADL_BEFORE_ADMISSION: INDEPENDENT
PRIOR_ADL: INDEPENDENT
GROOMING: INDEPENDENT
ADL_SHORT_OF_BREATH: NO
PRIOR_ADL_BATHING: INDEPENDENT
RECENT_DECLINE_ADL: NO

## 2025-06-05 ASSESSMENT — PATIENT HEALTH QUESTIONNAIRE - PHQ9
2. FEELING DOWN, DEPRESSED OR HOPELESS: NOT AT ALL
1. LITTLE INTEREST OR PLEASURE IN DOING THINGS: NOT AT ALL
IS PATIENT ABLE TO COMPLETE PHQ2 OR PHQ9: YES
CLINICAL INTERPRETATION OF PHQ2 SCORE: NO FURTHER SCREENING NEEDED
SUM OF ALL RESPONSES TO PHQ9 QUESTIONS 1 AND 2: 0
SUM OF ALL RESPONSES TO PHQ9 QUESTIONS 1 AND 2: 0

## 2025-06-05 ASSESSMENT — COGNITIVE AND FUNCTIONAL STATUS - GENERAL
BECAUSE OF A PHYSICAL, MENTAL, OR EMOTIONAL CONDITION, DO YOU HAVE DIFFICULTY DOING ERRANDS ALONE: NO
BECAUSE OF A PHYSICAL, MENTAL, OR EMOTIONAL CONDITION, DO YOU HAVE SERIOUS DIFFICULTY CONCENTRATING, REMEMBERING OR MAKING DECISIONS: NO
DO YOU HAVE SERIOUS DIFFICULTY WALKING OR CLIMBING STAIRS: NO
DO YOU HAVE DIFFICULTY DRESSING OR BATHING: NO
BASIC_MOBILITY_CONVERTED_SCORE: 50.88
BASIC_MOBILITY_RAW_SCORE: 23

## 2025-06-05 ASSESSMENT — COLUMBIA-SUICIDE SEVERITY RATING SCALE - C-SSRS
IS THE PATIENT ABLE TO COMPLETE C-SSRS: YES
2. HAVE YOU ACTUALLY HAD ANY THOUGHTS OF KILLING YOURSELF?: NO
1. WITHIN THE PAST MONTH, HAVE YOU WISHED YOU WERE DEAD OR WISHED YOU COULD GO TO SLEEP AND NOT WAKE UP?: NO
6. HAVE YOU EVER DONE ANYTHING, STARTED TO DO ANYTHING, OR PREPARED TO DO ANYTHING TO END YOUR LIFE?: NO

## 2025-06-05 ASSESSMENT — LIFESTYLE VARIABLES
AUDIT-C TOTAL SCORE: 6
HOW MANY STANDARD DRINKS CONTAINING ALCOHOL DO YOU HAVE ON A TYPICAL DAY: 3 OR 4
HOW OFTEN DO YOU HAVE A DRINK CONTAINING ALCOHOL: 4 OR MORE TIMES PER WEEK
HOW OFTEN DO YOU HAVE 6 OR MORE DRINKS ON ONE OCCASION: LESS THAN MONTHLY
ALCOHOL_USE_STATUS: UNHEALTHY DRINKING IDENTIFIED. AUDIT C: 3 OR MORE FOR WOMEN AND 4 OR MORE FOR MEN.

## 2025-06-05 ASSESSMENT — PAIN SCALES - GENERAL
PAINLEVEL_OUTOF10: 0

## 2025-06-06 ENCOUNTER — PATIENT OUTREACH (OUTPATIENT)
Dept: CASE MANAGEMENT | Age: 75
End: 2025-06-06

## 2025-06-06 RX ORDER — CLOPIDOGREL BISULFATE 75 MG/1
75 TABLET ORAL DAILY
COMMUNITY
Start: 2025-06-06

## 2025-06-06 NOTE — PROGRESS NOTES
Transitions of Care Navigation  Discharge Date from [facility]: 2025    Contact Date: 2025    Transitions of Care Assessment:  JAYLIN Initial Assessment    General:  Assessment completed with: Patient  Patient Subjective: The patient reported recovering from previous symptoms. The patient also complained of neuropathy in the feet bilaterally. The patient stated, \"I am feeling 90% back to normal.\" The patient reported speech has improved, but the patient did report ongoing bilateral foot neuropathy.  The patient denies any increased weakness, vision changes, confusion, sudden severe headache, or dizziness/loss of balance. The patient denies any muscle cramps, dizziness, irregular heart beat, chest pain or shortness of breath. The patient has not checked blood pressure or glucose since returning home.  Chief Complaint: TIA  Carotid artery stenosis  Meningioma  Diabetes mellitus type 2 with diabetic neuropathy  Gout  Hypertension  Coronary artery disease  Tobacco abuse  Glaucoma  Hypokalemia  Verify patient name and  with patient/ caregiver: Yes    Hospital Stay/Discharge:  Tell me what you understand of why you were in the hospital or emergency department: I had what I think was a mini stroke.  Prior to leaving the hospital were your Discharge Instructions reviewed with you?: Yes  Did you receive a copy of your written Discharge Instructions?: Yes  What questions do you have about your Discharge Instructions?: Not at this time.  Do you feel better or worse since you left the hospital or emergency department?: Better    Follow - Up Appointment:  Do you have a follow-up appointment?: No  Are there any barriers to getting to your follow-up appointment?: No    Home Health/DME:  Prior to leaving the hospital was Home Health (HH) arranged for you?: No     Prior to leaving the hospital or emergency department was Durable Medical Equipment (DME), medical supplies, or infusions arranged for you?: Yes (14  day holter monitor)  Have you received your DME/supplies/infusions?: Yes  Do you have questions about using your DME/supplies/infusions?: No     Medications/Diet:  Did any of your medications change, during or after your hospital stay or ED visit?: Yes  Do you have your new or updated medications?: No (The patient will  Plavix at the pharmacy today.)  Do you understand what your medications are for and possible side effects?: Yes  Are there any reasons that keep you from taking your medication as prescribed?: No  Any concerns about medication refills?: No    Were you given a different diet per your Discharge Instructions?: No     Questions/Concerns:  Do you have any questions or concerns that have not been discussed?: No     Nursing Interventions:   Patient symptoms were assessed, education was completed for signs/symptoms to monitor, and reviewed when to contact providers versus go to the emergency department/call 911.   Reviewed all discharge instructions with a focus on signs/symptoms of stroke and fall precautions.   All medications were reviewed and educated on the importance of taking the medications as directed.   Appointments were reviewed and stressed the importance of a close follow up with providers. A Transitions of Care/Transitional Care Management-hospital follow up appointment was scheduled.   Confirmed patient has DME (medical equipment) and understands proper use. (14 day Holter monitor)   The patient verbalized understanding and will contact the office with any further questions or concerns.       Medications:  Medication Reconciliation:  I am aware of an inpatient discharge within the last 30 days.  The discharge medication list has been reconciled with the patient's current medication list and reviewed by me. See medication list for additions of new medication, and changes to current doses of medications and discontinued medications.  Current Medications[1]     Nurse care manager updated  the patient's eye drops on the medication list.     Diagnosis specifics:  Do you feel you have adequate education regarding your diagnosis of stroke? No    [facility] Stroke Support Group: Beyond Stroke  Aim: To help stroke survivors and their families and caregivers adjust to changes in their lives after stroke. This group is for both stroke survivors and for those with a loved one who is a stroke survivor.  When: In-person meetings the 3rd Thursday of every month.  Where: [facility] (two separate meetings)  For more information: Email [email or web address] or call [phone], if no answer, please leave voicemail and our stroke navigator RN will return your call. The times and specific meeting room information will be given via email or over the phone.      Follow-up Appointments:  Your appointments       Date & Time Appointment Department ([place])    Jul 28, 2025 1:45 PM CDT Exam - Established with Andrew Peraza MD [facility] ([facility])        Jan 27, 2026 11:00 AM CST Follow Up Visit with Isidoro Meneses MD [facility] ([facility])              [place] [id number]  [facility] [address]  [phone] [facility]  [address]  [facility] [id number] [phone]            Transitional Care Clinic  Was TCC Ordered: No    Primary Care Provider (If no TCC appointment)  Does patient already have a PCP appointment scheduled? No  Nurse Care Manager Scheduled PCP office TCM/JAYLIN appointment with patient on 6/10/2025 with Dr. Peraza.      Specialist  Does the patient have any other follow-up appointment(s) need to be scheduled? No   -If yes: Nurse Care Manager reviewed upcoming specialist  appointments with patient: No   -Does the patient need assistance scheduling appointment(s): No    The patient reported plans to contact Dr. Najjar for scheduling a follow up appointment.     Book By Date: 6/12/2025          [1]   Current Outpatient Medications   Medication Sig Dispense Refill    Potassium Citrate ER (UROCIT-K 15) 15 MEQ (1620 MG) Oral Tab CR Take 1 tablet by mouth daily. 90 tablet 5    finasteride 5 MG Oral Tab Take 1 tablet (5 mg total) by mouth daily. 90 tablet 6    Tadalafil (CIALIS) 20 MG Oral Tab Take 1 tablet (20 mg total) by mouth daily as needed for Erectile Dysfunction. 30 tablet 5    fenofibrate 145 MG Oral Tab Take 1 tablet (145 mg total) by mouth in the morning.      GLIPIZIDE 10 MG Oral Tab TAKE 1 TABLET BEFORE       BREAKFAST 90 tablet 3    Prednisolon-Moxiflox-Bromfenac 1-0.5-0.075 % Ophthalmic Solution as directed Ophthalmic for 30 days (Patient not taking: No sig reported)      allopurinol 300 MG Oral Tab Take 1 tablet (300 mg total) by mouth daily. 90 tablet 3    PANTOPRAZOLE 40 MG Oral Tab EC TAKE 1 TABLET DAILY 90 tablet 3    hydroCHLOROthiazide 25 MG Oral Tab Take 1 tablet (25 mg total) by mouth every morning.      gabapentin 300 MG Oral Cap Take 1 capsule (300 mg total) by mouth in the morning and 1 capsule (300 mg total) in the evening and 1 capsule (300 mg total) before bedtime.      Ascorbic Acid (VITAMIN C) 1000 MG Oral Tab Take 1 tablet (1,000 mg total) by mouth in the morning and 1 tablet (1,000 mg total) in the evening. Take with meals.      CANNABIDIOL OR Take by mouth. (Patient not taking: Reported on 4/10/2025)      losartan 50 MG Oral Tab Take 1 tablet (50 mg total) by mouth in the morning and 1 tablet (50 mg total) before bedtime.      Sildenafil Citrate 100 MG Oral Tab Take 1 tablet (100 mg total) by mouth daily as needed for Erectile Dysfunction. Take ~ one hour prior to sexual activity on an empty stomach 30 tablet 5    ketorolac 0.5 % Ophthalmic Solution        ofloxacin 0.3 % Ophthalmic Solution       prednisoLONE 1 % Ophthalmic Suspension  (Patient not taking: No sig reported)      latanoprost 0.005 % Ophthalmic Solution       metoprolol succinate  MG Oral Tablet 24 Hr Take 1 tablet (100 mg total) by mouth daily.      rosuvastatin 40 MG Oral Tab       Multiple Vitamins-Minerals (MULTIVITAMIN ADULT OR) Take 1 tablet by mouth in the morning.      aspirin 325 MG Oral Tab Take 1 tablet (325 mg total) by mouth in the morning.

## 2025-06-07 ENCOUNTER — TELEPHONE (OUTPATIENT)
Dept: FAMILY MEDICINE CLINIC | Facility: CLINIC | Age: 75
End: 2025-06-07

## 2025-06-07 ENCOUNTER — TELEPHONE (OUTPATIENT)
Dept: CARE COORDINATION | Age: 75
End: 2025-06-07

## 2025-06-08 ENCOUNTER — CASE MANAGEMENT (OUTPATIENT)
Dept: CARE COORDINATION | Age: 75
End: 2025-06-08

## 2025-06-10 ENCOUNTER — OFFICE VISIT (OUTPATIENT)
Dept: FAMILY MEDICINE CLINIC | Facility: CLINIC | Age: 75
End: 2025-06-10
Payer: MEDICARE

## 2025-06-10 ENCOUNTER — TELEPHONE (OUTPATIENT)
Dept: CARDIOLOGY | Age: 75
End: 2025-06-10

## 2025-06-10 VITALS
DIASTOLIC BLOOD PRESSURE: 60 MMHG | HEIGHT: 75 IN | RESPIRATION RATE: 16 BRPM | BODY MASS INDEX: 32.45 KG/M2 | WEIGHT: 261 LBS | OXYGEN SATURATION: 98 % | SYSTOLIC BLOOD PRESSURE: 118 MMHG | HEART RATE: 44 BPM

## 2025-06-10 DIAGNOSIS — I10 HYPERTENSION, BENIGN: ICD-10-CM

## 2025-06-10 DIAGNOSIS — I65.29 SYMPTOMATIC CAROTID ARTERY STENOSIS, UNSPECIFIED LATERALITY: ICD-10-CM

## 2025-06-10 DIAGNOSIS — I74.9 TIA DUE TO EMBOLISM (HCC): Primary | ICD-10-CM

## 2025-06-10 DIAGNOSIS — G45.9 TIA DUE TO EMBOLISM (HCC): Primary | ICD-10-CM

## 2025-06-10 DIAGNOSIS — I25.10 CORONARY ARTERY DISEASE INVOLVING NATIVE CORONARY ARTERY OF NATIVE HEART WITHOUT ANGINA PECTORIS: ICD-10-CM

## 2025-06-10 DIAGNOSIS — F17.290 CIGAR SMOKER: ICD-10-CM

## 2025-06-10 PROCEDURE — 99214 OFFICE O/P EST MOD 30 MIN: CPT | Performed by: FAMILY MEDICINE

## 2025-06-10 NOTE — PROGRESS NOTES
Chief Complaint   Patient presents with    TCM (Transition Of Care Management)     Patient here for TCM hospital follow up     Subjective:   Mt Obregon is a 75 year old male who presents for hospital follow up.   He was discharged from [facility] to Home or Self Care  Admission Date: 7/11/23   Discharge Date: 7/11/23  Hospital Discharge Diagnosis: TIA    Interactive contact within 2 business days post discharge first initiated on Date: 6/6/2025    I accessed [facility] and/or Care Everywhere and personally reviewed the following for the recent hospitalization: provider notes, consults, summaries, labs and other test results and the pertinent findings are documented below.     HPI: Pt is a 76yo male with PMH DM, HTN, CAD, atherosclerosis, meningioma s/p surgery 5 yrs ago who presents for hospital follow up.  Presented with difficulty speaking, weakness.  Called TIA, then they seemed to change their mind and say it was not exactly a TIA.  Likely a blood clot that broke off, went to brain and caused issue, then resolved.  CT and MRI showed no residual brain damage or injury.    They recommended he call us for follow up and to see a vascular surgeon ( With [facility] - Dr. Meek).  There is concern about a blockage in the R carotid artery.    Planning to stop smoking cigars moving forward to help limit his risks    October 2024 US carotids were both under 50% stenosis.    Imaging from the hospitalization suggested R carotid now over 75%.      He has resumed the gabapentin at three times a day.    Stopped aspirin and is on Plavix.         - urine stream problems.  Seeing Dr. Meneses.  Taking finasteride.  Urine stream weak.  PSA 0.9 in  Dec 2024.    History/Other:   Current Medications:  Medication Reconciliation:  I am aware of an inpatient discharge within the last 30 days.  The discharge medication list has been reconciled with the patient's current medication list and reviewed by me. See medication list for  additions of new medication, and changes to current doses of medications and discontinued medications.  Active Meds, Sig Only[1]    Review of Systems:  GENERAL: weight stable, energy stable, no sweating  SKIN: denies any unusual skin lesions  EYES: denies blurred vision or double vision  HEENT: denies nasal congestion, sinus pain or ST  LUNGS: denies shortness of breath with exertion  CARDIOVASCULAR: denies chest pain on exertion or palpitations  GI: denies abdominal pain, denies heartburn, denies diarrhea  MUSCULOSKELETAL: denies pain, normal range of motion of extremities  NEURO: denies headaches, denies dizziness, denies weakness  PSYCHE: denies depression or anxiety  HEMATOLOGIC: denies hx of anemia, or bruising, denies bleeding  ENDOCRINE: denies thyroid history  ALL/ASTHMA: denies hx of allergy or asthma    Objective:   No LMP for male patient.  Estimated body mass index is 32.62 kg/m² as calculated from the following:    Height as of this encounter: 6' 3\" (1.905 m).    Weight as of this encounter: 261 lb (118.4 kg).   /60   Pulse (!) 44   Resp 16   Ht 6' 3\" (1.905 m)   Wt 261 lb (118.4 kg)   SpO2 98%   BMI 32.62 kg/m²    GENERAL: well developed, well nourished, in no apparent distress  SKIN: no rashes, no suspicious lesions  HEENT: atraumatic, normocephalic, ears and throat are clear  EYES: PERRLA, EOMI, conjunctiva are clear  NECK: supple, no adenopathy, no bruits  CHEST: no chest tenderness  LUNGS: clear to auscultation  CARDIO: RRR without murmur  GI: good BS's, no masses, HSM or tenderness  MUSCULOSKELETAL: back is not tender, FROM of the extremities  EXTREMITIES: no cyanosis, clubbing or edema  NEURO: Oriented times three, cranial nerves are intact, motor and sensory are grossly intact    CTA head and neck:    Impression:   1. CT of the head demonstrates no acute intracranial hemorrhage, mass effect, or hydrocephalus.  2. Age-appropriate involutional change.  3. Postsurgical changes of  right-sided craniotomy with underlying right frontotemporal encephalomalacia.  4. Additional hypodensities for which main differential consideration is chronic small vessel ischemic given the presence of intracranial  atherosclerosis. MRI could be obtained for further assessment if there is clinical suspicion for an acute ischemic component.  5. Enhancing soft tissue in the right middle cranial fossa appears  extra-axial and may be meningioma. This would be better assessed with MRI.  6. CTA of the head demonstrates no aneurysm or flow-limiting stenosis.  7. CTA of the neck demonstrates atherosclerotic disease at the carotid bifurcations most prominently at the right proximal internal carotid artery without aneurysm, dissection, or flow-limiting stenosis.  8. Please see above for additional observations.     MRI brain: 1. No acute intracranial abnormality.  2. Findings suspicious for residual/recurrent meningioma in the right  middle cranial fossa given patient's clinical history. Comparison with  prior available imaging is recommended to assess whether this is new,  stable, or significantly changed. Otherwise, follow-up as clinically  indicated.  3. Additional findings as described above.     Assessment & Plan:     Mt [place] was seen in the office today:  had concerns including TCM (Transition Of Care Management) (Patient here for [facility] hospital follow up).    1. TIA due to embolism (HCC)  2. Symptomatic carotid artery stenosis, unspecified laterality  TIA, likely from stenosis carotid artery.    Interestingly, new imaging showed increased problem, where 10/2024 imaging with less than 50%  Planning to see vascular surgery.    Now on Plavix, not ASA.    3. Cigar smoker  This event served as a wake up call.  He is motivated to quit the cigar smoking.   Encouraged him to do so.     4. Coronary artery disease involving native coronary artery of native heart without angina pectoris  5. Hypertension, benign  Known  risk factors, as well as DM  Continue meds, controlling this.               No follow-ups on file.       Andrew Peraza M.D.   [facility] 3  06/10/25         [1]   Outpatient Medications Marked as Taking for the 6/10/25 encounter (Office Visit) with Andrew Peraza MD   Medication Sig    clopidogrel 75 MG Oral Tab Take 1 tablet (75 mg total) by mouth daily.    cholecalciferol (VITAMIN D3) 125 MCG (5000 UT) Oral Cap Take 1 capsule (5,000 Units total) by mouth daily.    Potassium Citrate ER (UROCIT-K 15) 15 MEQ (1620 MG) Oral Tab CR Take 1 tablet by mouth daily.    finasteride 5 MG Oral Tab Take 1 tablet (5 mg total) by mouth daily.    fenofibrate 145 MG Oral Tab Take 1 tablet (145 mg total) by mouth in the morning.    GLIPIZIDE 10 MG Oral Tab TAKE 1 TABLET BEFORE       BREAKFAST    allopurinol 300 MG Oral Tab Take 1 tablet (300 mg total) by mouth daily.    PANTOPRAZOLE 40 MG Oral Tab EC TAKE 1 TABLET DAILY    hydroCHLOROthiazide 25 MG Oral Tab Take 1 tablet (25 mg total) by mouth every morning.    gabapentin 300 MG Oral Cap Take 1 capsule (300 mg total) by mouth in the morning and 1 capsule (300 mg total) in the evening and 1 capsule (300 mg total) before bedtime.    losartan 50 MG Oral Tab Take 1 tablet (50 mg total) by mouth in the morning and 1 tablet (50 mg total) before bedtime.    latanoprost 0.005 % Ophthalmic Solution Place 1 drop into the right eye nightly.    metoprolol succinate  MG Oral Tablet 24 Hr Take 1 tablet (100 mg total) by mouth in the morning.    rosuvastatin 40 MG Oral Tab Take 1 tablet (40 mg total) by mouth daily.    Multiple Vitamins-Minerals (MULTIVITAMIN ADULT OR) Take 1 tablet by mouth in the morning.    Tadalafil (CIALIS) 20 MG Oral Tab Take 1 tablet (20 mg total) by mouth daily as needed for Erectile Dysfunction.    Sildenafil Citrate 100 MG Oral Tab Take 1 tablet (100 mg total) by mouth daily as needed for Erectile Dysfunction. Take ~ one hour prior to sexual activity  on an empty stomach

## 2025-06-11 ENCOUNTER — OFFICE VISIT (OUTPATIENT)
Dept: CARDIOLOGY | Age: 75
End: 2025-06-11

## 2025-06-11 ENCOUNTER — OFFICE VISIT (OUTPATIENT)
Age: 75
End: 2025-06-11

## 2025-06-11 VITALS
BODY MASS INDEX: 31.79 KG/M2 | WEIGHT: 261.02 LBS | HEART RATE: 55 BPM | HEIGHT: 76 IN | DIASTOLIC BLOOD PRESSURE: 71 MMHG | OXYGEN SATURATION: 97 % | SYSTOLIC BLOOD PRESSURE: 124 MMHG

## 2025-06-11 DIAGNOSIS — Z95.1 HX OF CABG: Primary | ICD-10-CM

## 2025-06-11 DIAGNOSIS — I47.29 NSVT (NONSUSTAINED VENTRICULAR TACHYCARDIA)  (CMD): ICD-10-CM

## 2025-06-11 DIAGNOSIS — Z01.810 PRE-OPERATIVE CARDIOVASCULAR EXAMINATION: ICD-10-CM

## 2025-06-11 DIAGNOSIS — I65.21 STENOSIS OF RIGHT CAROTID ARTERY: Primary | ICD-10-CM

## 2025-06-11 DIAGNOSIS — I10 HYPERTENSION, BENIGN: ICD-10-CM

## 2025-06-11 PROCEDURE — 99215 OFFICE O/P EST HI 40 MIN: CPT | Performed by: INTERNAL MEDICINE

## 2025-06-11 PROCEDURE — 99204 OFFICE O/P NEW MOD 45 MIN: CPT | Performed by: SURGERY

## 2025-06-12 ENCOUNTER — TELEPHONE (OUTPATIENT)
Dept: CARDIOLOGY | Age: 75
End: 2025-06-12

## 2025-06-12 ASSESSMENT — ENCOUNTER SYMPTOMS
SORE THROAT: 0
DIZZINESS: 0
VOMITING: 0
NAUSEA: 0
WHEEZING: 0
TROUBLE SWALLOWING: 0
SHORTNESS OF BREATH: 0
SEIZURES: 0
ABDOMINAL PAIN: 0

## 2025-06-13 ENCOUNTER — CLINICAL DOCUMENTATION (OUTPATIENT)
Dept: CARDIOLOGY | Age: 75
End: 2025-06-13

## 2025-06-13 ENCOUNTER — ANCILLARY PROCEDURE (OUTPATIENT)
Dept: CARDIOLOGY | Age: 75
End: 2025-06-13
Attending: INTERNAL MEDICINE

## 2025-06-13 VITALS — HEIGHT: 76 IN | WEIGHT: 261 LBS | BODY MASS INDEX: 31.78 KG/M2

## 2025-06-13 DIAGNOSIS — I10 HYPERTENSION, BENIGN: ICD-10-CM

## 2025-06-13 DIAGNOSIS — Z95.1 HX OF CABG: ICD-10-CM

## 2025-06-13 DIAGNOSIS — I47.29 NSVT (NONSUSTAINED VENTRICULAR TACHYCARDIA)  (CMD): ICD-10-CM

## 2025-06-13 DIAGNOSIS — Z01.810 PRE-OPERATIVE CARDIOVASCULAR EXAMINATION: ICD-10-CM

## 2025-06-13 LAB
HEART RATE RESERVE PREDICTED: 40.69 BPM
LV EF 2D ECHO EST: 32 %
RESTING HR ACHIEVED: 54 BPM
STRESS BASELINE BP: NORMAL MMHG
STRESS PEAK HR: 86 BPM
STRESS PERCENT HR: 59 %
STRESS POST ESTIMATED WORKLOAD: 5.2 METS
STRESS POST EXERCISE DUR MIN: 4 MIN
STRESS POST EXERCISE DUR SEC: 12 SEC
STRESS POST PEAK BP: NORMAL MMHG
STRESS TARGET HR: 145 BPM

## 2025-06-13 PROCEDURE — A9502 TC99M TETROFOSMIN: HCPCS | Performed by: INTERNAL MEDICINE

## 2025-06-13 PROCEDURE — 93015 CV STRESS TEST SUPVJ I&R: CPT | Performed by: INTERNAL MEDICINE

## 2025-06-13 PROCEDURE — 78452 HT MUSCLE IMAGE SPECT MULT: CPT | Performed by: INTERNAL MEDICINE

## 2025-06-13 RX ORDER — REGADENOSON 0.08 MG/ML
0.4 INJECTION, SOLUTION INTRAVENOUS ONCE
Status: COMPLETED | OUTPATIENT
Start: 2025-06-13 | End: 2025-06-13

## 2025-06-13 RX ADMIN — REGADENOSON 0.4 MG: 0.08 INJECTION, SOLUTION INTRAVENOUS at 14:30

## 2025-06-13 ASSESSMENT — EXERCISE STRESS TEST
PEAK_RPP: 12040
PEAK_HR: 58
GRADE: 10
PEAK_HR: 75
PEAK_HR: 86
PEAK_RPP: 6048
PEAK_RPP: 12040
PEAK_HR: 58
STAGE_CATEGORIES: RECOVERY 0
STOPPAGE_REASON: GENERAL FATIGUE
PEAK_BP: 120/80
STAGE_CATEGORIES: 1
STAGE_CATEGORIES: RECOVERY 2
PEAK_BP: 110/60
PEAK_RPP: 6960
PEAK_BP: 120/80
STRESS_SYMPTOMS: CHEST PAIN
STAGE_CATEGORIES: RECOVERY 1
PEAK_BP: 112/70
MPH: 2.1
PEAK_RPP: 9000
PEAK_BP: 140/80
PEAK_HR: 54
MPH: 1.7
STAGE_CATEGORIES: RECOVERY 3
GRADE: 12
PEAK_HR: 59
PEAK_BP: 140/80
PEAK_BP: 120/60
STAGE_CATEGORIES: 2
STRESS_SYMPTOMS: CHEST PAIN
PEAK_HR: 86
PEAK_RPP: 6490
PEAK_RPP: 6960

## 2025-06-14 ENCOUNTER — TELEPHONE (OUTPATIENT)
Dept: CARE COORDINATION | Age: 75
End: 2025-06-14

## 2025-06-17 ENCOUNTER — EXTERNAL LAB (OUTPATIENT)
Dept: HEALTH INFORMATION MANAGEMENT | Facility: OTHER | Age: 75
End: 2025-06-17

## 2025-06-17 ENCOUNTER — RESULTS FOLLOW-UP (OUTPATIENT)
Dept: CARDIOLOGY | Age: 75
End: 2025-06-17

## 2025-06-17 ENCOUNTER — HOSPITAL ENCOUNTER (OUTPATIENT)
Age: 75
End: 2025-06-17
Attending: INTERNAL MEDICINE | Admitting: INTERNAL MEDICINE

## 2025-06-17 ENCOUNTER — PREP FOR CASE (OUTPATIENT)
Dept: CARDIOLOGY | Age: 75
End: 2025-06-17

## 2025-06-17 DIAGNOSIS — I12.9 BENIGN HYPERTENSION WITH CKD (CHRONIC KIDNEY DISEASE) STAGE III  (CMD): ICD-10-CM

## 2025-06-17 DIAGNOSIS — E78.00 PURE HYPERCHOLESTEROLEMIA: ICD-10-CM

## 2025-06-17 DIAGNOSIS — I65.23 BILATERAL CAROTID ARTERY STENOSIS: ICD-10-CM

## 2025-06-17 DIAGNOSIS — N18.30 BENIGN HYPERTENSION WITH CKD (CHRONIC KIDNEY DISEASE) STAGE III  (CMD): ICD-10-CM

## 2025-06-17 DIAGNOSIS — I10 HYPERTENSION, BENIGN: ICD-10-CM

## 2025-06-17 DIAGNOSIS — E11.9 TYPE 2 DIABETES MELLITUS WITHOUT COMPLICATION, WITHOUT LONG-TERM CURRENT USE OF INSULIN (CMD): ICD-10-CM

## 2025-06-17 DIAGNOSIS — Z95.1 HX OF CABG: ICD-10-CM

## 2025-06-17 DIAGNOSIS — Z01.818 PREOPERATIVE CLEARANCE: ICD-10-CM

## 2025-06-17 DIAGNOSIS — E78.2 HYPERLIPIDEMIA, MIXED: ICD-10-CM

## 2025-06-17 DIAGNOSIS — I25.10 CORONARY ARTERY DISEASE INVOLVING NATIVE CORONARY ARTERY OF NATIVE HEART WITHOUT ANGINA PECTORIS: ICD-10-CM

## 2025-06-17 DIAGNOSIS — I47.29 NSVT (NONSUSTAINED VENTRICULAR TACHYCARDIA)  (CMD): ICD-10-CM

## 2025-06-17 DIAGNOSIS — I25.10 CORONARY ARTERY DISEASE INVOLVING NATIVE CORONARY ARTERY OF NATIVE HEART WITHOUT ANGINA PECTORIS: Primary | ICD-10-CM

## 2025-06-17 LAB
25(OH)D3+25(OH)D2 SERPL-MCNC: 61 NG/ML (ref 30–100)
ALBUMIN SERPL-MCNC: 4.2 G/DL (ref 3.6–5.1)
ALBUMIN/GLOB SERPL: 1.6 (CALC) (ref 1–2.5)
ALP SERPL-CCNC: 34 U/L (ref 35–144)
ALT SERPL-CCNC: 21 U/L (ref 9–46)
AST SERPL-CCNC: 25 U/L (ref 10–35)
BASOPHILS # BLD: 84 CELLS/UL (ref 0–200)
BASOPHILS NFR BLD: 1.1 %
BILIRUB SERPL-MCNC: 0.6 MG/DL (ref 0.2–1.2)
BUN SERPL-MCNC: 17 MG/DL (ref 7–25)
BUN/CREAT SERPL: ABNORMAL (CALC) (ref 6–22)
CALCIUM SERPL-MCNC: 9.7 MG/DL (ref 8.6–10.3)
CHLORIDE SERPL-SCNC: 101 MMOL/L (ref 98–110)
CHOLEST SERPL-MCNC: 157 MG/DL
CHOLEST/HDLC SERPL: 3.6 (CALC)
CO2 SERPL-SCNC: 31 MMOL/L (ref 20–32)
CREAT SERPL-MCNC: 1.2 MG/DL (ref 0.7–1.28)
EOSINOPHIL # BLD: 220 CELLS/UL (ref 15–500)
EOSINOPHIL NFR BLD: 2.9 %
ERYTHROCYTE [DISTWIDTH] IN BLOOD: 13.6 % (ref 11–15)
GFR SERPLBLD SCHWARTZ-ARVRAT: 63 ML/MIN/1.73M2
GLOBULIN SER-MCNC: 2.7 G/DL (CALC) (ref 1.9–3.7)
GLUCOSE SERPL-MCNC: 123 MG/DL (ref 65–99)
HBA1C MFR BLD: 6.8 %
HCT VFR BLD CALC: 50.9 % (ref 38.5–50)
HDLC SERPL-MCNC: 44 MG/DL
HEART RATE RESERVE PREDICTED: 40.69 BPM
HGB BLD-MCNC: 16.1 G/DL (ref 13.2–17.1)
LDLC SERPL CALC-MCNC: 83 MG/DL (CALC)
LENGTH OF FAST TIME PATIENT: YES H
LENGTH OF FAST TIME PATIENT: YES H
LV EF 2D ECHO EST: 32 %
LYMPHOCYTES # BLD: 1702 CELLS/UL (ref 850–3900)
LYMPHOCYTES NFR BLD: 22.4 %
MCH RBC QN AUTO: 30.4 PG (ref 27–33)
MCHC RBC AUTO-ENTMCNC: 31.6 G/DL (ref 32–36)
MCV RBC AUTO: 96 FL (ref 80–100)
MONOCYTES # BLD: 562 CELLS/UL (ref 200–950)
MONOCYTES NFR BLD: 7.4 %
NEUTROPHILS # BLD: 5031 CELLS/UL (ref 1500–7800)
NEUTROPHILS NFR BLD: 66.2 %
NONHDLC SERPL-MCNC: 113 MG/DL (CALC)
PLATELET # BLD: 158 THOUSAND/UL (ref 140–400)
PMV BLD AUTO: 12.9 FL (ref 7.5–12.5)
POTASSIUM SERPL-SCNC: 4.1 MMOL/L (ref 3.5–5.3)
PROT SERPL-MCNC: 6.9 G/DL (ref 6.1–8.1)
RBC # BLD: 5.3 MILLION/UL (ref 4.2–5.8)
RESTING HR ACHIEVED: 54 BPM
SODIUM SERPL-SCNC: 142 MMOL/L (ref 135–146)
STRESS BASELINE BP: NORMAL MMHG
STRESS PEAK HR: 86 BPM
STRESS PERCENT HR: 59 %
STRESS POST ESTIMATED WORKLOAD: 5.2 METS
STRESS POST EXERCISE DUR MIN: 4 MIN
STRESS POST EXERCISE DUR SEC: 12 SEC
STRESS POST PEAK BP: NORMAL MMHG
STRESS TARGET HR: 145 BPM
TRIGL SERPL-MCNC: 200 MG/DL
WBC # BLD: 7.6 THOUSAND/UL (ref 3.8–10.8)

## 2025-06-17 RX ORDER — ASPIRIN 325 MG
325 TABLET ORAL ONCE
Status: CANCELLED | OUTPATIENT
Start: 2025-06-17 | End: 2025-06-17

## 2025-06-17 RX ORDER — 0.9 % SODIUM CHLORIDE 0.9 %
10 VIAL (ML) INJECTION PRN
Status: CANCELLED | OUTPATIENT
Start: 2025-06-17

## 2025-06-17 RX ORDER — SODIUM CHLORIDE 9 MG/ML
INJECTION, SOLUTION INTRAVENOUS CONTINUOUS
Status: CANCELLED | OUTPATIENT
Start: 2025-06-17 | End: 2025-06-17

## 2025-06-17 RX ORDER — 0.9 % SODIUM CHLORIDE 0.9 %
2 VIAL (ML) INJECTION EVERY 12 HOURS SCHEDULED
Status: CANCELLED | OUTPATIENT
Start: 2025-06-17

## 2025-06-17 RX ORDER — HYDRALAZINE HYDROCHLORIDE 20 MG/ML
10 INJECTION INTRAMUSCULAR; INTRAVENOUS EVERY 4 HOURS PRN
Status: CANCELLED | OUTPATIENT
Start: 2025-06-17 | End: 2025-06-18

## 2025-06-17 RX ORDER — CLONIDINE HYDROCHLORIDE 0.1 MG/1
0.1 TABLET ORAL EVERY 4 HOURS PRN
Status: CANCELLED | OUTPATIENT
Start: 2025-06-17 | End: 2025-06-18

## 2025-06-18 ENCOUNTER — PATIENT OUTREACH (OUTPATIENT)
Dept: FAMILY MEDICINE CLINIC | Facility: CLINIC | Age: 75
End: 2025-06-18

## 2025-06-18 ENCOUNTER — RESULTS FOLLOW-UP (OUTPATIENT)
Dept: CARDIOLOGY | Age: 75
End: 2025-06-18

## 2025-06-18 LAB
25(OH)D3+25(OH)D2 SERPL-MCNC: 61 NG/ML (ref 30–100)
ALBUMIN SERPL-MCNC: 4.2 G/DL (ref 3.6–5.1)
ALBUMIN/GLOB SERPL: 1.6 (CALC) (ref 1–2.5)
ALP SERPL-CCNC: 34 U/L (ref 35–144)
ALT SERPL-CCNC: 21 U/L (ref 9–46)
AST SERPL-CCNC: 25 U/L (ref 10–35)
BASOPHILS # BLD AUTO: 84 CELLS/UL (ref 0–200)
BASOPHILS NFR BLD AUTO: 1.1 %
BILIRUB SERPL-MCNC: 0.6 MG/DL (ref 0.2–1.2)
BUN SERPL-MCNC: 17 MG/DL (ref 7–25)
BUN/CREAT SERPL: ABNORMAL (CALC) (ref 6–22)
CALCIUM SERPL-MCNC: 9.7 MG/DL (ref 8.6–10.3)
CHLORIDE SERPL-SCNC: 101 MMOL/L (ref 98–110)
CHOLEST SERPL-MCNC: 157 MG/DL
CHOLEST/HDLC SERPL: 3.6 (CALC)
CO2 SERPL-SCNC: 31 MMOL/L (ref 20–32)
CREAT SERPL-MCNC: 1.2 MG/DL (ref 0.7–1.28)
EGFRCR SERPLBLD CKD-EPI 2021: 63 ML/MIN/1.73M2
EOSINOPHIL # BLD AUTO: 220 CELLS/UL (ref 15–500)
EOSINOPHIL NFR BLD AUTO: 2.9 %
ERYTHROCYTE [DISTWIDTH] IN BLOOD BY AUTOMATED COUNT: 13.6 % (ref 11–15)
GLOBULIN SER CALC-MCNC: 2.7 G/DL (CALC) (ref 1.9–3.7)
GLUCOSE SERPL-MCNC: 123 MG/DL (ref 65–99)
HBA1C MFR BLD: 6.8 %
HCT VFR BLD AUTO: 50.9 % (ref 38.5–50)
HDLC SERPL-MCNC: 44 MG/DL
HGB BLD-MCNC: 16.1 G/DL (ref 13.2–17.1)
LDLC SERPL CALC-MCNC: 83 MG/DL (CALC)
LYMPHOCYTES # BLD AUTO: 1702 CELLS/UL (ref 850–3900)
LYMPHOCYTES NFR BLD AUTO: 22.4 %
MCH RBC QN AUTO: 30.4 PG (ref 27–33)
MCHC RBC AUTO-ENTMCNC: 31.6 G/DL (ref 32–36)
MCV RBC AUTO: 96 FL (ref 80–100)
MONOCYTES # BLD AUTO: 562 CELLS/UL (ref 200–950)
MONOCYTES NFR BLD AUTO: 7.4 %
NEUTROPHILS # BLD AUTO: 5031 CELLS/UL (ref 1500–7800)
NEUTROPHILS NFR BLD AUTO: 66.2 %
NONHDLC SERPL-MCNC: 113 MG/DL (CALC)
PLATELET # BLD AUTO: 158 THOUSAND/UL (ref 140–400)
PMV BLD REES-ECKER: 12.9 FL (ref 7.5–12.5)
POTASSIUM SERPL-SCNC: 4.1 MMOL/L (ref 3.5–5.3)
PROT SERPL-MCNC: 6.9 G/DL (ref 6.1–8.1)
RBC # BLD AUTO: 5.3 MILLION/UL (ref 4.2–5.8)
SODIUM SERPL-SCNC: 142 MMOL/L (ref 135–146)
TRIGL SERPL-MCNC: 200 MG/DL
WBC # BLD AUTO: 7.6 THOUSAND/UL (ref 3.8–10.8)

## 2025-06-19 ENCOUNTER — TELEPHONE (OUTPATIENT)
Dept: CARDIOLOGY | Age: 75
End: 2025-06-19

## 2025-06-19 ENCOUNTER — E-ADVICE (OUTPATIENT)
Dept: CARDIOLOGY | Age: 75
End: 2025-06-19

## 2025-06-19 RX ORDER — EZETIMIBE 10 MG/1
10 TABLET ORAL DAILY
Qty: 90 TABLET | Refills: 1 | Status: SHIPPED | OUTPATIENT
Start: 2025-06-19

## 2025-06-19 RX ORDER — EZETIMIBE 10 MG/1
10 TABLET ORAL DAILY
COMMUNITY
End: 2025-06-19 | Stop reason: SDUPTHER

## 2025-06-21 ENCOUNTER — TELEPHONE (OUTPATIENT)
Dept: CARE COORDINATION | Age: 75
End: 2025-06-21

## 2025-06-22 ENCOUNTER — CASE MANAGEMENT (OUTPATIENT)
Dept: CARE COORDINATION | Age: 75
End: 2025-06-22

## 2025-06-23 ENCOUNTER — APPOINTMENT (OUTPATIENT)
Dept: CARDIOLOGY | Age: 75
End: 2025-06-23

## 2025-06-24 ENCOUNTER — TELEPHONE (OUTPATIENT)
Dept: CARDIOLOGY | Age: 75
End: 2025-06-24

## 2025-06-24 VITALS
HEIGHT: 76 IN | RESPIRATION RATE: 14 BRPM | BODY MASS INDEX: 31.52 KG/M2 | TEMPERATURE: 98.4 F | DIASTOLIC BLOOD PRESSURE: 55 MMHG | HEART RATE: 48 BPM | OXYGEN SATURATION: 94 % | SYSTOLIC BLOOD PRESSURE: 126 MMHG | WEIGHT: 258.82 LBS

## 2025-06-24 LAB — GLUCOSE BLDC GLUCOMTR-MCNC: 151 MG/DL (ref 70–99)

## 2025-06-24 PROCEDURE — C1894 INTRO/SHEATH, NON-LASER: HCPCS | Performed by: INTERNAL MEDICINE

## 2025-06-24 PROCEDURE — 10006027 HB SUPPLY 278: Performed by: INTERNAL MEDICINE

## 2025-06-24 PROCEDURE — 10002805 HB CONTRAST AGENT: Performed by: INTERNAL MEDICINE

## 2025-06-24 PROCEDURE — C1760 CLOSURE DEV, VASC: HCPCS | Performed by: INTERNAL MEDICINE

## 2025-06-24 PROCEDURE — 93459 L HRT ART/GRFT ANGIO: CPT | Performed by: INTERNAL MEDICINE

## 2025-06-24 PROCEDURE — 13000001 HB PHASE II RECOVERY EA 30 MINUTES: Performed by: INTERNAL MEDICINE

## 2025-06-24 PROCEDURE — 82962 GLUCOSE BLOOD TEST: CPT

## 2025-06-24 PROCEDURE — 10006023 HB SUPPLY 272: Performed by: INTERNAL MEDICINE

## 2025-06-24 PROCEDURE — 10002801 HB RX 250 W/O HCPCS: Performed by: INTERNAL MEDICINE

## 2025-06-24 PROCEDURE — C1887 CATHETER, GUIDING: HCPCS | Performed by: INTERNAL MEDICINE

## 2025-06-24 PROCEDURE — 99152 MOD SED SAME PHYS/QHP 5/>YRS: CPT | Performed by: INTERNAL MEDICINE

## 2025-06-24 PROCEDURE — 10002800 HB RX 250 W HCPCS: Performed by: INTERNAL MEDICINE

## 2025-06-24 PROCEDURE — C1769 GUIDE WIRE: HCPCS | Performed by: INTERNAL MEDICINE

## 2025-06-24 DEVICE — MYNXGRIP 6F/7F
Type: IMPLANTABLE DEVICE | Site: FEMORAL ARTERY | Status: FUNCTIONAL
Brand: MYNXGRIP

## 2025-06-24 RX ORDER — 0.9 % SODIUM CHLORIDE 0.9 %
10 VIAL (ML) INJECTION PRN
Status: DISCONTINUED | OUTPATIENT
Start: 2025-06-24 | End: 2025-06-24 | Stop reason: HOSPADM

## 2025-06-24 RX ORDER — HYDRALAZINE HYDROCHLORIDE 20 MG/ML
10 INJECTION INTRAMUSCULAR; INTRAVENOUS EVERY 4 HOURS PRN
Status: DISCONTINUED | OUTPATIENT
Start: 2025-06-24 | End: 2025-06-24 | Stop reason: HOSPADM

## 2025-06-24 RX ORDER — 0.9 % SODIUM CHLORIDE 0.9 %
2 VIAL (ML) INJECTION EVERY 12 HOURS SCHEDULED
Status: DISCONTINUED | OUTPATIENT
Start: 2025-06-24 | End: 2025-06-24 | Stop reason: HOSPADM

## 2025-06-24 RX ORDER — ASPIRIN 325 MG
325 TABLET ORAL ONCE
Status: DISCONTINUED | OUTPATIENT
Start: 2025-06-24 | End: 2025-06-24 | Stop reason: HOSPADM

## 2025-06-24 RX ORDER — SODIUM CHLORIDE 9 MG/ML
INJECTION, SOLUTION INTRAVENOUS CONTINUOUS
Status: DISCONTINUED | OUTPATIENT
Start: 2025-06-24 | End: 2025-06-24 | Stop reason: HOSPADM

## 2025-06-24 RX ORDER — CLONIDINE HYDROCHLORIDE 0.1 MG/1
0.1 TABLET ORAL EVERY 4 HOURS PRN
Status: DISCONTINUED | OUTPATIENT
Start: 2025-06-24 | End: 2025-06-24 | Stop reason: HOSPADM

## 2025-06-24 RX ORDER — MIDAZOLAM HYDROCHLORIDE 1 MG/ML
INJECTION, SOLUTION INTRAMUSCULAR; INTRAVENOUS PRN
Status: DISCONTINUED | OUTPATIENT
Start: 2025-06-24 | End: 2025-06-24 | Stop reason: HOSPADM

## 2025-06-24 RX ORDER — LIDOCAINE HYDROCHLORIDE 20 MG/ML
INJECTION, SOLUTION EPIDURAL; INFILTRATION; INTRACAUDAL; PERINEURAL PRN
Status: DISCONTINUED | OUTPATIENT
Start: 2025-06-24 | End: 2025-06-24 | Stop reason: HOSPADM

## 2025-06-24 ASSESSMENT — PAIN SCALES - GENERAL
PAINLEVEL_OUTOF10: 0

## 2025-06-25 ENCOUNTER — PREP FOR CASE (OUTPATIENT)
Dept: CARDIOLOGY | Age: 75
End: 2025-06-25

## 2025-06-25 DIAGNOSIS — R82.991 HYPOCITRATURIA: ICD-10-CM

## 2025-06-25 DIAGNOSIS — I65.23 BILATERAL CAROTID ARTERY STENOSIS: Primary | ICD-10-CM

## 2025-06-25 RX ORDER — HYDRALAZINE HYDROCHLORIDE 20 MG/ML
10 INJECTION INTRAMUSCULAR; INTRAVENOUS EVERY 4 HOURS PRN
Status: CANCELLED | OUTPATIENT
Start: 2025-06-25 | End: 2025-06-26

## 2025-06-25 RX ORDER — 0.9 % SODIUM CHLORIDE 0.9 %
10 VIAL (ML) INJECTION PRN
Status: CANCELLED | OUTPATIENT
Start: 2025-06-25

## 2025-06-25 RX ORDER — 0.9 % SODIUM CHLORIDE 0.9 %
2 VIAL (ML) INJECTION EVERY 12 HOURS SCHEDULED
Status: CANCELLED | OUTPATIENT
Start: 2025-06-25

## 2025-06-25 RX ORDER — SODIUM CHLORIDE 9 MG/ML
INJECTION, SOLUTION INTRAVENOUS CONTINUOUS
Status: CANCELLED | OUTPATIENT
Start: 2025-06-25

## 2025-06-25 RX ORDER — CLONIDINE HYDROCHLORIDE 0.1 MG/1
0.1 TABLET ORAL EVERY 4 HOURS PRN
Status: CANCELLED | OUTPATIENT
Start: 2025-06-25 | End: 2025-06-26

## 2025-06-25 RX ORDER — ACETAMINOPHEN 325 MG/1
650 TABLET ORAL EVERY 4 HOURS PRN
Status: CANCELLED | OUTPATIENT
Start: 2025-06-25

## 2025-06-25 RX ORDER — POTASSIUM CITRATE 15 MEQ/1
1 TABLET, EXTENDED RELEASE ORAL DAILY
Qty: 90 TABLET | Refills: 5 | Status: SHIPPED | OUTPATIENT
Start: 2025-06-25

## 2025-06-30 ENCOUNTER — TELEPHONE (OUTPATIENT)
Dept: FAMILY MEDICINE CLINIC | Facility: CLINIC | Age: 75
End: 2025-06-30

## 2025-06-30 RX ORDER — FENOFIBRATE 145 MG/1
145 TABLET, FILM COATED ORAL DAILY
Qty: 90 TABLET | Refills: 1 | Status: SHIPPED | OUTPATIENT
Start: 2025-06-30

## 2025-06-30 NOTE — TELEPHONE ENCOUNTER
Patient is calling with his Diabetic Eye Exam results.    Exam 6/9/25  Dr. Torres / [facility]  Eye Exam was normal

## 2025-07-01 ENCOUNTER — TELEPHONE (OUTPATIENT)
Dept: CARDIOLOGY | Age: 75
End: 2025-07-01

## 2025-07-01 PROBLEM — M48.062 SPINAL STENOSIS OF LUMBAR REGION WITH NEUROGENIC CLAUDICATION: Status: ACTIVE | Noted: 2024-04-08

## 2025-07-01 PROBLEM — R29.90 STROKE-LIKE SYMPTOMS: Status: ACTIVE | Noted: 2025-06-04

## 2025-07-02 RX ORDER — METOPROLOL SUCCINATE 100 MG/1
100 TABLET, EXTENDED RELEASE ORAL DAILY
Qty: 90 TABLET | Refills: 3 | Status: SHIPPED | OUTPATIENT
Start: 2025-07-02

## 2025-07-07 ENCOUNTER — APPOINTMENT (OUTPATIENT)
Dept: CARDIOLOGY | Age: 75
End: 2025-07-07
Attending: INTERNAL MEDICINE

## 2025-07-07 ENCOUNTER — OFFICE VISIT (OUTPATIENT)
Dept: ORTHOPEDICS CLINIC | Facility: CLINIC | Age: 75
End: 2025-07-07
Payer: MEDICARE

## 2025-07-07 VITALS
OXYGEN SATURATION: 97 % | BODY MASS INDEX: 32.34 KG/M2 | HEART RATE: 57 BPM | SYSTOLIC BLOOD PRESSURE: 133 MMHG | HEIGHT: 76 IN | DIASTOLIC BLOOD PRESSURE: 63 MMHG | WEIGHT: 265.54 LBS

## 2025-07-07 DIAGNOSIS — Z95.1 HX OF CABG: ICD-10-CM

## 2025-07-07 DIAGNOSIS — M17.12 PRIMARY OSTEOARTHRITIS OF LEFT KNEE: Primary | ICD-10-CM

## 2025-07-07 DIAGNOSIS — I10 HYPERTENSION, BENIGN: ICD-10-CM

## 2025-07-07 DIAGNOSIS — I65.23 BILATERAL CAROTID ARTERY STENOSIS: ICD-10-CM

## 2025-07-07 DIAGNOSIS — I25.10 CORONARY ARTERY DISEASE INVOLVING NATIVE CORONARY ARTERY OF NATIVE HEART WITHOUT ANGINA PECTORIS: ICD-10-CM

## 2025-07-07 DIAGNOSIS — Z09 HOSPITAL DISCHARGE FOLLOW-UP: Primary | ICD-10-CM

## 2025-07-07 DIAGNOSIS — E11.9 TYPE 2 DIABETES MELLITUS WITHOUT COMPLICATION, WITHOUT LONG-TERM CURRENT USE OF INSULIN (CMD): ICD-10-CM

## 2025-07-07 DIAGNOSIS — Z86.73 HISTORY OF TIA (TRANSIENT ISCHEMIC ATTACK): ICD-10-CM

## 2025-07-07 DIAGNOSIS — E78.2 HYPERLIPIDEMIA, MIXED: ICD-10-CM

## 2025-07-07 PROCEDURE — 99213 OFFICE O/P EST LOW 20 MIN: CPT | Performed by: REGISTERED NURSE

## 2025-07-07 RX ORDER — METHYLPREDNISOLONE ACETATE 40 MG/ML
40 INJECTION, SUSPENSION INTRA-ARTICULAR; INTRALESIONAL; INTRAMUSCULAR; SOFT TISSUE ONCE
Status: COMPLETED | OUTPATIENT
Start: 2025-07-07 | End: 2025-07-07

## 2025-07-07 RX ORDER — TRIAMCINOLONE ACETONIDE 40 MG/ML
40 INJECTION, SUSPENSION INTRA-ARTICULAR; INTRAMUSCULAR ONCE
Status: DISCONTINUED | OUTPATIENT
Start: 2025-07-07 | End: 2025-07-07

## 2025-07-07 RX ADMIN — METHYLPREDNISOLONE ACETATE 40 MG: 40 INJECTION, SUSPENSION INTRA-ARTICULAR; INTRALESIONAL; INTRAMUSCULAR; SOFT TISSUE at 13:30:00

## 2025-07-07 NOTE — PROGRESS NOTES
Orthopaedic Surgery  [address] [address] [address]   [phone]      Chief Complaint:  Left Knee Pain    Interval History: 7/7/25  Patient previously underwent left knee CSI in early April 2025  He reports this helped for some time and he recently came back from a trip to [place] but pain is ongoing with activity such as walking and the treadmill at home    He recently had a TIA and is scheduled to undergo a carotid artery revascularization procedure next week at [facility]      History of Present Illness  The patient is a 74 year old with who presents with left knee pain.    He experiences pain on the inside (medial aspect) of his knee, described as a 'three, four or five' on a pain scale. The pain is primarily noted during walking but can also occur when sitting in certain positions. It is not debilitating but is not improving.    Approximately 30 years ago, he underwent a knee arthroscopy and was diagnosed with chondromalacia. The knee pain occasionally 'reminds' him of the past injury, but it has become more noticeable recently, especially with increased walking activities.    He has a river cruise planned in [place] at the end of the month, which will involve a lot of walking. During a recent vacation in [place], he walked every second or third day and found some relief with a knee brace.    He mentions a recent back surgery a year ago and a diagnosis of claudication, which was determined after a scan showed less than 50% blockage. He also has a history of tendonitis (unspecified where) and has been advised to increase stretching and walking.    He has a history of type 2 diabetes, with an A1c around 6.4 to 6.5 recently.       PMH/PSH/Family History/Social History/Meds/Allergies:   Past Medical History[1]     Past Surgical History[2]     Family History[3]     Social History     Socioeconomic History    Marital status:      Spouse name: Not on file    Number of children: Not on file     Years of education: Not on file    Highest education level: Not on file   Occupational History    Not on file   Tobacco Use    Smoking status: Former     Current packs/day: 0.00     Average packs/day: 2.0 packs/day for 17.0 years (34.0 ttl pk-yrs)     Types: Cigars, Cigarettes     Start date: 1971     Quit date: 2024     Years since quittin.5    Smokeless tobacco: Never    Tobacco comments:     2 cigar/month   Vaping Use    Vaping status: Never Used   Substance and Sexual Activity    Alcohol use: Yes     Alcohol/week: 12.0 standard drinks of alcohol     Types: 2 Glasses of wine, 2 Cans of beer, 8 Shots of liquor per week     Comment: 2 drinks of scotch daily    Drug use: No    Sexual activity: Not on file   Other Topics Concern     Service No    Blood Transfusions Not Asked    Caffeine Concern Yes     Comment: 1-2 coffee daily    Occupational Exposure Not Asked    Hobby Hazards Not Asked    Sleep Concern Not Asked    Stress Concern Not Asked    Weight Concern Not Asked    Special Diet Not Asked    Back Care Not Asked    Exercise Yes     Comment: PT 2x week, doing at home daily    Bike Helmet Not Asked    Seat Belt Yes    Self-Exams Not Asked   Social History Narrative    Not on file     Social Drivers of Health     Food Insecurity: Low Risk  (2025)    Received from [facility]    Food Insecurity     Within the past 12 months, you worried that your food would run out before you got money to buy more.  : Never true     Within the past 12 months, the food you bought just didn't last and you didn't have money to get more. : Never true   Transportation Needs: Not At Risk (2025)    Received from [facility]    Transportation Needs     In the past 12 months, has lack of reliable transportation kept you from medical appointments, meetings, work or from getting things needed for daily living? : No   Stress: No Stress Concern Present (2023)    Stress     Feeling of  Stress : No   Housing Stability: Low Risk  (2/24/2023)    Housing Stability     Housing Instability: No     Housing Instability Emergency: Not on file        Current Outpatient Medications   Medication Instructions    allopurinol (ZYLOPRIM) 300 mg, Oral, Daily    aspirin 325 mg, Daily    CANNABIDIOL OR Take by mouth.    fenofibrate (TRICOR) 145 mg, Daily    finasteride (PROSCAR) 5 mg, Oral, Daily    gabapentin (NEURONTIN) 300 mg, 3 times daily    GLIPIZIDE 10 MG Oral Tab TAKE 1 TABLET BEFORE       BREAKFAST    hydroCHLOROthiazide 25 mg, Every morning    ketorolac 0.5 % Ophthalmic Solution     latanoprost 0.005 % Ophthalmic Solution No dose, route, or frequency recorded.    losartan (COZAAR) 50 mg, 2 times daily    metoprolol succinate ER (TOPROL XL) 100 mg, Daily    Multiple Vitamins-Minerals (MULTIVITAMIN ADULT OR) 1 tablet, Daily    ofloxacin 0.3 % Ophthalmic Solution     pantoprazole (PROTONIX) 40 mg, Oral, Daily    Potassium Citrate ER (UROCIT-K 15) 15 MEQ (1620 MG) Oral Tab CR 1 tablet, Oral, Daily    Prednisolon-Moxiflox-Bromfenac 1-0.5-0.075 % Ophthalmic Solution as directed Ophthalmic for 30 days    prednisoLONE 1 % Ophthalmic Suspension     rosuvastatin 40 MG Oral Tab No dose, route, or frequency recorded.    Sildenafil Citrate (VIAGRA) 100 mg, Oral, Daily as needed, Take ~ one hour prior to sexual activity on an empty stomach    Tadalafil (CIALIS) 20 mg, Oral, Daily as needed    vitamin C 1,000 mg, 2 times daily with meals        Allergies[4]       Physical Exam:   There were no vitals filed for this visit.    Estimated body mass index is 32.62 kg/m² as calculated from the following:    Height as of 6/10/25: 6' 3\" (1.905 m).    Weight as of 6/10/25: 261 lb (118.4 kg).    Constitutional: No acute distress, well nourished.  Eyes: Anicteric sclera, pink conjunctiva  Ears, Nose, Mouth and Throat: Normocephalic, atraumatic, moist mucous membranes  Cardiovascular: No pitting edema or varicosities in the lower  extremities. Maneuvers demonstrate a negative Gin's sign. No palpable cords in calf noted.   Respiratory: No respiratory distress, normal respiratory rhythm and effort   Neurological:  Oriented to person, place, and time.  Psychological:  Appropriate mood and affect.    Comprehensive Left Knee Exam:      Inspection: No erythema, ecchymoses, or wounds. No rash. Well healed arthroscopic incisions. No effusion. No quad atrophy  Alignment: neutral  ROM: 0 - 120 degrees, flexion contracture: 0 degrees, quad lag: no  Stability: A/P stress: stable, firm endpoint, M/L stress: stable, firm endpoint  Pain or crepitus with ROM?: No. No pain with patellar grind  Tenderness to palpation at: medial joint line; Non-tender at: lateral joint line, patella  Strength: Intact 5/5 strength SLR and TA/GS/FHL/EHL  Sensation: Grossly intact to light touch over SPN/DPN/Saph/Sural/Tibial nerve distributions  Vasc: Warm perfused extremity        Imaging:   Weightbearing XRs of the left knee were obtained with AP, PA Flex, sunrise, and lateral views    They show moderate (approaching severe) degenerative changes of the knee with joint space narrowing involving the medial compartment with subchondral sclerosis. No fracture or dislocation seen    I personally reviewed and interpreted the radiographs.      Assessment:     ICD-10-CM    1. Primary osteoarthritis of left knee  M17.12                Plan:   At today's visit I discussed with the patient their diagnosis and the factors considered to form this diagnosis. Their history and physical exam and radiographic findings are consistent with arthritis of the knee. As we discussed their diagnosis, information was provided regarding the underlying pathology and the natural course of this progressive condition.    During our discussion, we detailed various treatment options available. We counseled the patient on treatment options. Major joint arthritis is usually a chronic condition that can be  effectively managed with conservative modalities. Once these conservative measures fail to provide reasonable therapeutic benefit, surgical procedures may be indicated. In his case, I would recommend a partial medial knee arthroplasty as his arthritis is predominantly medial on both XR and history. We discussed that I would recommend holding off on any elective procedures until he is fully recovered from his upcoming revascularization procedure.    Conservative measures include activity modification, home exercise programs, physical therapy, oral anti-inflammatories and acetaminophen, braces, assistive devices, and intraarticular injections.     I have recommended continued conservative treatment with Tylenol / NSAIDs, combination of rest, ice, or elevation, and corticosteroid injections. Over the counter dosing was discussed.     He would like to proceed with CSI today prior to his upcoming surgery    Procedure Note - Left Knee CSI    Risks and benefits of knee injection discussed with the patient, with risks including but not limited to pain and swelling at the injection site and/or within the knee joint, infection, elevation in blood pressure and/or glucose levels, facial flushing.  After informed consent, the patient's left knee was marked, locally anesthetized with skin refrigerant, prepped with topical antiseptic, and injected with a mixture of 1mL 40mg/mL Kenalog, 2mL 1% lidocaine and 2mL 0.5% marcaine through the inferolateral portal. Hemostasis was achieved and a band-aid was applied.  The patient tolerated the procedure well.    I have asked that he keep track of the extent and duration of relief.    Follow up in 6 weeks if relief from injection is short lasting and he is interested in proceeding with knee replacement. He will discuss with his wife as well    Thank you very much for allowing me to participate in the care of this patient. If you have any questions, please do not hesitate to contact  me.      Sky Canseco MD  Adult Hip and Knee Reconstruction    Department of Orthopaedic Surgery  [facility]     [address] [place] [address]  [address] [place] [id number]     t: [phone]  f: [phone]       [facility]         [1]   Past Medical History:   Abdominal pain    Back problem    Calculus of kidney    Coronary atherosclerosis    S/P QUADRUPLE CABG 2010    Esophageal reflux    Essential hypertension    Fatigue    Gout    Hearing loss    Heart palpitations    High blood pressure    High cholesterol    Night sweats    Sleep apnea    Sleep disturbance    Stress    Visual impairment    GLASSES    Wears glasses   [2]   Past Surgical History:  Procedure Laterality Date    Back surgery      Cabg      Cabg, artery-vein, four  07/2010    QUADRUPLE    Colonoscopy  02/2012    Colonoscopy      Femur/knee surg unlisted  1987 1998    Orbit surgery proc unlisted  1968    \"Closed treatment of orbital fracture (non-'blowout')\"    Other surgical history N/A 12/21/2015    Procedure: EXCISION OF LESION/LIPOMA;  Surgeon: Aury Henderson MD;  Location: [facility] MAIN OR    Sigmoidoscopy,diagnostic      Sinus surgery    2003    Spine surgery procedure unlisted      LOWER BACK SURGERY X2-MICRODISCECTOMY AND LAMINECTOMY   [3]   Family History  Problem Relation Age of Onset    Heart Disease Father     Cancer Father     Breast Cancer Mother     Lung Disorder Maternal Grandmother     Heart Disease Paternal Grandmother         CAD    Cancer Paternal Grandfather     Breast Cancer Other     Alcohol and Other Disorders Associated Other     Heart Attack Other    [4]   Allergies  Allergen Reactions    Atorvastatin MYALGIA     Oral    Carvedilol OTHER (SEE COMMENTS)     Oral, mental status changes    Pravastatin OTHER (SEE COMMENTS)     Oral    Seasonal OTHER (SEE COMMENTS)     delores

## 2025-07-11 RX ORDER — ASPIRIN 81 MG/1
81 TABLET, CHEWABLE ORAL DAILY
COMMUNITY

## 2025-07-13 DIAGNOSIS — N40.1 BPH WITH OBSTRUCTION/LOWER URINARY TRACT SYMPTOMS: ICD-10-CM

## 2025-07-13 DIAGNOSIS — N13.8 BPH WITH OBSTRUCTION/LOWER URINARY TRACT SYMPTOMS: ICD-10-CM

## 2025-07-15 ENCOUNTER — ANESTHESIA (OUTPATIENT)
Dept: CARDIOLOGY | Age: 75
End: 2025-07-15

## 2025-07-15 ENCOUNTER — HOSPITAL ENCOUNTER (INPATIENT)
Age: 75
LOS: 2 days | Discharge: HOME | DRG: 034 | End: 2025-07-17
Attending: SURGERY | Admitting: SURGERY

## 2025-07-15 ENCOUNTER — SURGERY (OUTPATIENT)
Age: 75
End: 2025-07-15

## 2025-07-15 ENCOUNTER — ANESTHESIA EVENT (OUTPATIENT)
Dept: CARDIOLOGY | Age: 75
End: 2025-07-15

## 2025-07-15 DIAGNOSIS — Z95.1 HX OF CABG: ICD-10-CM

## 2025-07-15 DIAGNOSIS — I49.5 SSS (SICK SINUS SYNDROME)  (CMD): Primary | ICD-10-CM

## 2025-07-15 DIAGNOSIS — I49.3 FREQUENT PVCS: ICD-10-CM

## 2025-07-15 DIAGNOSIS — I65.21 CAROTID STENOSIS, RIGHT: ICD-10-CM

## 2025-07-15 DIAGNOSIS — I65.23 BILATERAL CAROTID ARTERY STENOSIS: ICD-10-CM

## 2025-07-15 DIAGNOSIS — I47.29 NSVT (NONSUSTAINED VENTRICULAR TACHYCARDIA)  (CMD): ICD-10-CM

## 2025-07-15 LAB
ABO + RH BLD: NORMAL
ACT BLD: 290 BASELINE/TARGET RANGES ARE SET BY CLINICIANS FOR EACH PATIENT/PROCEDURE
ANION GAP SERPL CALC-SCNC: 6 MMOL/L (ref 7–19)
BASOPHILS # BLD: 0.1 K/MCL (ref 0–0.3)
BASOPHILS NFR BLD: 1 %
BLD GP AB SCN SERPL QL GEL: NEGATIVE
BUN SERPL-MCNC: 14 MG/DL (ref 6–20)
BUN/CREAT SERPL: 13 (ref 7–25)
CALCIUM SERPL-MCNC: 9.2 MG/DL (ref 8.4–10.2)
CHLORIDE SERPL-SCNC: 103 MMOL/L (ref 97–110)
CO2 SERPL-SCNC: 33 MMOL/L (ref 21–32)
CREAT SERPL-MCNC: 1.09 MG/DL (ref 0.67–1.17)
DEPRECATED RDW RBC: 46.7 FL (ref 39–50)
EGFRCR SERPLBLD CKD-EPI 2021: 71 ML/MIN/{1.73_M2}
EOSINOPHIL # BLD: 0.2 K/MCL (ref 0–0.5)
EOSINOPHIL NFR BLD: 2 %
ERYTHROCYTE [DISTWIDTH] IN BLOOD: 13.4 % (ref 11–15)
FASTING DURATION TIME PATIENT: ABNORMAL H
GLUCOSE BLDC GLUCOMTR-MCNC: 111 MG/DL (ref 70–99)
GLUCOSE BLDC GLUCOMTR-MCNC: 114 MG/DL (ref 70–99)
GLUCOSE BLDC GLUCOMTR-MCNC: 134 MG/DL (ref 70–99)
GLUCOSE BLDC GLUCOMTR-MCNC: 55 MG/DL (ref 70–99)
GLUCOSE BLDC GLUCOMTR-MCNC: 63 MG/DL (ref 70–99)
GLUCOSE SERPL-MCNC: 128 MG/DL (ref 70–99)
HCT VFR BLD CALC: 49.1 % (ref 39–51)
HGB BLD-MCNC: 16.1 G/DL (ref 13–17)
IMM GRANULOCYTES # BLD AUTO: 0 K/MCL (ref 0–0.2)
IMM GRANULOCYTES # BLD: 0 %
LYMPHOCYTES # BLD: 1.8 K/MCL (ref 1–4)
LYMPHOCYTES NFR BLD: 23 %
MCH RBC QN AUTO: 30.9 PG (ref 26–34)
MCHC RBC AUTO-ENTMCNC: 32.8 G/DL (ref 32–36.5)
MCV RBC AUTO: 94.2 FL (ref 78–100)
MONOCYTES # BLD: 0.6 K/MCL (ref 0.3–0.9)
MONOCYTES NFR BLD: 8 %
NEUTROPHILS # BLD: 5.2 K/MCL (ref 1.8–7.7)
NEUTROPHILS NFR BLD: 66 %
NRBC BLD MANUAL-RTO: 0 /100 WBC
PLATELET # BLD AUTO: 164 K/MCL (ref 140–450)
POTASSIUM SERPL-SCNC: 3.5 MMOL/L (ref 3.4–5.1)
RBC # BLD: 5.21 MIL/MCL (ref 4.5–5.9)
SODIUM SERPL-SCNC: 138 MMOL/L (ref 135–145)
TYPE AND SCREEN EXPIRATION DATE: NORMAL
WBC # BLD: 7.9 K/MCL (ref 4.2–11)

## 2025-07-15 PROCEDURE — 10002803 HB RX 637: Performed by: SURGERY

## 2025-07-15 PROCEDURE — 10004651 HB RX, NO CHARGE ITEM: Performed by: SURGERY

## 2025-07-15 PROCEDURE — 36415 COLL VENOUS BLD VENIPUNCTURE: CPT | Performed by: SURGERY

## 2025-07-15 PROCEDURE — 10004452 HB PACU ADDL 30 MINUTES: Performed by: SURGERY

## 2025-07-15 PROCEDURE — 10002801 HB RX 250 W/O HCPCS: Performed by: SURGERY

## 2025-07-15 PROCEDURE — 86850 RBC ANTIBODY SCREEN: CPT | Performed by: SURGERY

## 2025-07-15 PROCEDURE — 10002800 HB RX 250 W HCPCS: Performed by: SURGERY

## 2025-07-15 PROCEDURE — 037H3DZ DILATION OF RIGHT COMMON CAROTID ARTERY WITH INTRALUMINAL DEVICE, PERCUTANEOUS APPROACH: ICD-10-PCS | Performed by: SURGERY

## 2025-07-15 PROCEDURE — 10002805 HB CONTRAST AGENT: Performed by: SURGERY

## 2025-07-15 PROCEDURE — 96372 THER/PROPH/DIAG INJ SC/IM: CPT | Performed by: SURGERY

## 2025-07-15 PROCEDURE — 80048 BASIC METABOLIC PNL TOTAL CA: CPT | Performed by: SURGERY

## 2025-07-15 PROCEDURE — 10002801 HB RX 250 W/O HCPCS: Performed by: ANESTHESIOLOGY

## 2025-07-15 PROCEDURE — 10002800 HB RX 250 W HCPCS: Performed by: ANESTHESIOLOGY

## 2025-07-15 PROCEDURE — 76937 US GUIDE VASCULAR ACCESS: CPT | Performed by: SURGERY

## 2025-07-15 PROCEDURE — C1769 GUIDE WIRE: HCPCS | Performed by: SURGERY

## 2025-07-15 PROCEDURE — C1760 CLOSURE DEV, VASC: HCPCS | Performed by: SURGERY

## 2025-07-15 PROCEDURE — 10002801 HB RX 250 W/O HCPCS

## 2025-07-15 PROCEDURE — C1876 STENT, NON-COA/NON-COV W/DEL: HCPCS | Performed by: SURGERY

## 2025-07-15 PROCEDURE — 93010 ELECTROCARDIOGRAM REPORT: CPT | Performed by: INTERNAL MEDICINE

## 2025-07-15 PROCEDURE — 85347 COAGULATION TIME ACTIVATED: CPT | Performed by: SURGERY

## 2025-07-15 PROCEDURE — 10002807 HB RX 258: Performed by: ANESTHESIOLOGY

## 2025-07-15 PROCEDURE — 99223 1ST HOSP IP/OBS HIGH 75: CPT | Performed by: HOSPITALIST

## 2025-07-15 PROCEDURE — 37215 TRANSCATH STENT CCA W/EPS: CPT | Performed by: SURGERY

## 2025-07-15 PROCEDURE — 13000004 HB  ANESTHESIA  GENERAL OUTSIDE OR: Performed by: SURGERY

## 2025-07-15 PROCEDURE — 10002800 HB RX 250 W HCPCS

## 2025-07-15 PROCEDURE — 85025 COMPLETE CBC W/AUTO DIFF WBC: CPT | Performed by: SURGERY

## 2025-07-15 PROCEDURE — 10002803 HB RX 637: Performed by: HOSPITALIST

## 2025-07-15 PROCEDURE — 10000008 HB ROOM CHARGE ICU OR CCU

## 2025-07-15 PROCEDURE — 93005 ELECTROCARDIOGRAM TRACING: CPT | Performed by: HOSPITALIST

## 2025-07-15 PROCEDURE — 10004451 HB PACU RECOVERY 1ST 30 MINUTES: Performed by: SURGERY

## 2025-07-15 PROCEDURE — C1725 CATH, TRANSLUMIN NON-LASER: HCPCS | Performed by: SURGERY

## 2025-07-15 PROCEDURE — 10006027 HB SUPPLY 278: Performed by: SURGERY

## 2025-07-15 PROCEDURE — C1894 INTRO/SHEATH, NON-LASER: HCPCS | Performed by: SURGERY

## 2025-07-15 PROCEDURE — 36223 PLACE CATH CAROTID/INOM ART: CPT | Performed by: SURGERY

## 2025-07-15 PROCEDURE — 10006023 HB SUPPLY 272: Performed by: SURGERY

## 2025-07-15 PROCEDURE — 82962 GLUCOSE BLOOD TEST: CPT

## 2025-07-15 DEVICE — 9 MM X 30 MM
Type: IMPLANTABLE DEVICE | Site: CAROTID ARTERY | Status: FUNCTIONAL
Brand: ENROUTE TRANSCAROTID STENT

## 2025-07-15 DEVICE — MYNX CONTROL™ VENOUS VASCULAR CLOSURE DEVICE 6F-12F
Type: IMPLANTABLE DEVICE | Site: FEMORAL VEIN | Status: FUNCTIONAL
Brand: MYNX CONTROL™ VENOUS VASCULAR CLOSURE DEVICE 6F-12F

## 2025-07-15 RX ORDER — HYDRALAZINE HYDROCHLORIDE 20 MG/ML
10 INJECTION INTRAMUSCULAR; INTRAVENOUS EVERY 4 HOURS PRN
Status: DISCONTINUED | OUTPATIENT
Start: 2025-07-15 | End: 2025-07-15 | Stop reason: HOSPADM

## 2025-07-15 RX ORDER — ONDANSETRON 2 MG/ML
4 INJECTION INTRAMUSCULAR; INTRAVENOUS 2 TIMES DAILY PRN
Status: DISCONTINUED | OUTPATIENT
Start: 2025-07-15 | End: 2025-07-15 | Stop reason: HOSPADM

## 2025-07-15 RX ORDER — SODIUM CHLORIDE 9 MG/ML
INJECTION, SOLUTION INTRAVENOUS CONTINUOUS
Status: DISCONTINUED | OUTPATIENT
Start: 2025-07-15 | End: 2025-07-17 | Stop reason: HOSPADM

## 2025-07-15 RX ORDER — CLOPIDOGREL BISULFATE 75 MG/1
75 TABLET ORAL DAILY
Status: DISCONTINUED | OUTPATIENT
Start: 2025-07-15 | End: 2025-07-17 | Stop reason: HOSPADM

## 2025-07-15 RX ORDER — SODIUM CHLORIDE 9 MG/ML
INJECTION, SOLUTION INTRAVENOUS CONTINUOUS PRN
Status: DISCONTINUED | OUTPATIENT
Start: 2025-07-15 | End: 2025-07-15

## 2025-07-15 RX ORDER — 0.9 % SODIUM CHLORIDE 0.9 %
2 VIAL (ML) INJECTION EVERY 12 HOURS SCHEDULED
Status: DISCONTINUED | OUTPATIENT
Start: 2025-07-15 | End: 2025-07-15 | Stop reason: HOSPADM

## 2025-07-15 RX ORDER — HYDROCODONE BITARTRATE AND ACETAMINOPHEN 5; 325 MG/1; MG/1
1 TABLET ORAL EVERY 4 HOURS PRN
Status: DISCONTINUED | OUTPATIENT
Start: 2025-07-15 | End: 2025-07-17 | Stop reason: HOSPADM

## 2025-07-15 RX ORDER — DROPERIDOL 2.5 MG/ML
0.62 INJECTION, SOLUTION INTRAMUSCULAR; INTRAVENOUS
Status: DISCONTINUED | OUTPATIENT
Start: 2025-07-15 | End: 2025-07-15 | Stop reason: HOSPADM

## 2025-07-15 RX ORDER — PROTAMINE SULFATE 10 MG/ML
INJECTION, SOLUTION INTRAVENOUS PRN
Status: DISCONTINUED | OUTPATIENT
Start: 2025-07-15 | End: 2025-07-15

## 2025-07-15 RX ORDER — LATANOPROST 50 UG/ML
1 SOLUTION/ DROPS OPHTHALMIC NIGHTLY
Status: DISCONTINUED | OUTPATIENT
Start: 2025-07-15 | End: 2025-07-17 | Stop reason: HOSPADM

## 2025-07-15 RX ORDER — NOREPINEPHRINE BITARTRATE 0.06 MG/ML
INJECTION, SOLUTION INTRAVENOUS
Status: COMPLETED
Start: 2025-07-15 | End: 2025-07-15

## 2025-07-15 RX ORDER — GLYCOPYRROLATE 0.2 MG/ML
INJECTION, SOLUTION INTRAMUSCULAR; INTRAVENOUS PRN
Status: DISCONTINUED | OUTPATIENT
Start: 2025-07-15 | End: 2025-07-15

## 2025-07-15 RX ORDER — DEXTROSE MONOHYDRATE 25 G/50ML
25 INJECTION, SOLUTION INTRAVENOUS PRN
Status: DISCONTINUED | OUTPATIENT
Start: 2025-07-15 | End: 2025-07-17 | Stop reason: HOSPADM

## 2025-07-15 RX ORDER — SODIUM CHLORIDE 9 MG/ML
INJECTION, SOLUTION INTRAVENOUS CONTINUOUS PRN
Status: DISCONTINUED | OUTPATIENT
Start: 2025-07-15 | End: 2025-07-17 | Stop reason: HOSPADM

## 2025-07-15 RX ORDER — ROSUVASTATIN CALCIUM 20 MG/1
40 TABLET, COATED ORAL DAILY
Status: DISCONTINUED | OUTPATIENT
Start: 2025-07-16 | End: 2025-07-17 | Stop reason: HOSPADM

## 2025-07-15 RX ORDER — NICOTINE POLACRILEX 4 MG
30 LOZENGE BUCCAL
Status: DISCONTINUED | OUTPATIENT
Start: 2025-07-15 | End: 2025-07-15 | Stop reason: HOSPADM

## 2025-07-15 RX ORDER — ASPIRIN 81 MG/1
81 TABLET ORAL DAILY
Status: DISCONTINUED | OUTPATIENT
Start: 2025-07-15 | End: 2025-07-17 | Stop reason: HOSPADM

## 2025-07-15 RX ORDER — FENOFIBRATE 134 MG/1
134 CAPSULE ORAL
Status: DISCONTINUED | OUTPATIENT
Start: 2025-07-16 | End: 2025-07-17 | Stop reason: HOSPADM

## 2025-07-15 RX ORDER — GABAPENTIN 300 MG/1
300 CAPSULE ORAL 3 TIMES DAILY PRN
Status: DISCONTINUED | OUTPATIENT
Start: 2025-07-15 | End: 2025-07-17 | Stop reason: HOSPADM

## 2025-07-15 RX ORDER — ROCURONIUM BROMIDE 10 MG/ML
INJECTION, SOLUTION INTRAVENOUS PRN
Status: DISCONTINUED | OUTPATIENT
Start: 2025-07-15 | End: 2025-07-15

## 2025-07-15 RX ORDER — 0.9 % SODIUM CHLORIDE 0.9 %
10 VIAL (ML) INJECTION PRN
Status: DISCONTINUED | OUTPATIENT
Start: 2025-07-15 | End: 2025-07-15 | Stop reason: HOSPADM

## 2025-07-15 RX ORDER — ALLOPURINOL 100 MG/1
300 TABLET ORAL DAILY
Status: DISCONTINUED | OUTPATIENT
Start: 2025-07-16 | End: 2025-07-17 | Stop reason: HOSPADM

## 2025-07-15 RX ORDER — SODIUM CHLORIDE 9 MG/ML
INJECTION, SOLUTION INTRAVENOUS CONTINUOUS
Status: DISCONTINUED | OUTPATIENT
Start: 2025-07-15 | End: 2025-07-15 | Stop reason: HOSPADM

## 2025-07-15 RX ORDER — HYDRALAZINE HYDROCHLORIDE 20 MG/ML
10 INJECTION INTRAMUSCULAR; INTRAVENOUS EVERY 4 HOURS PRN
Status: DISCONTINUED | OUTPATIENT
Start: 2025-07-15 | End: 2025-07-17 | Stop reason: HOSPADM

## 2025-07-15 RX ORDER — METOCLOPRAMIDE HYDROCHLORIDE 5 MG/ML
5 INJECTION INTRAMUSCULAR; INTRAVENOUS EVERY 6 HOURS PRN
Status: DISCONTINUED | OUTPATIENT
Start: 2025-07-15 | End: 2025-07-15 | Stop reason: HOSPADM

## 2025-07-15 RX ORDER — FINASTERIDE 5 MG/1
5 TABLET, FILM COATED ORAL DAILY
Qty: 90 TABLET | Refills: 6 | Status: SHIPPED | OUTPATIENT
Start: 2025-07-15

## 2025-07-15 RX ORDER — FINASTERIDE 5 MG/1
5 TABLET, FILM COATED ORAL DAILY
Status: DISCONTINUED | OUTPATIENT
Start: 2025-07-16 | End: 2025-07-17 | Stop reason: HOSPADM

## 2025-07-15 RX ORDER — PROCHLORPERAZINE EDISYLATE 5 MG/ML
5 INJECTION INTRAMUSCULAR; INTRAVENOUS EVERY 4 HOURS PRN
Status: DISCONTINUED | OUTPATIENT
Start: 2025-07-15 | End: 2025-07-15 | Stop reason: HOSPADM

## 2025-07-15 RX ORDER — PROPOFOL 10 MG/ML
INJECTION, EMULSION INTRAVENOUS PRN
Status: DISCONTINUED | OUTPATIENT
Start: 2025-07-15 | End: 2025-07-15

## 2025-07-15 RX ORDER — NOREPINEPHRINE BITARTRATE 0.06 MG/ML
0-10 INJECTION, SOLUTION INTRAVENOUS CONTINUOUS
Status: DISCONTINUED | OUTPATIENT
Start: 2025-07-15 | End: 2025-07-15

## 2025-07-15 RX ORDER — NOREPINEPHRINE BITARTRATE 0.06 MG/ML
0-10 INJECTION, SOLUTION INTRAVENOUS CONTINUOUS
Status: DISCONTINUED | OUTPATIENT
Start: 2025-07-15 | End: 2025-07-15 | Stop reason: HOSPADM

## 2025-07-15 RX ORDER — ACETAMINOPHEN 325 MG/1
650 TABLET ORAL EVERY 4 HOURS PRN
Status: DISCONTINUED | OUTPATIENT
Start: 2025-07-15 | End: 2025-07-15 | Stop reason: HOSPADM

## 2025-07-15 RX ORDER — HYDRALAZINE HYDROCHLORIDE 20 MG/ML
5 INJECTION INTRAMUSCULAR; INTRAVENOUS EVERY 10 MIN PRN
Status: DISCONTINUED | OUTPATIENT
Start: 2025-07-15 | End: 2025-07-15 | Stop reason: HOSPADM

## 2025-07-15 RX ORDER — 0.9 % SODIUM CHLORIDE 0.9 %
2 VIAL (ML) INJECTION EVERY 12 HOURS SCHEDULED
Status: DISCONTINUED | OUTPATIENT
Start: 2025-07-15 | End: 2025-07-17 | Stop reason: HOSPADM

## 2025-07-15 RX ORDER — NICOTINE POLACRILEX 4 MG
30 LOZENGE BUCCAL PRN
Status: DISCONTINUED | OUTPATIENT
Start: 2025-07-15 | End: 2025-07-17 | Stop reason: HOSPADM

## 2025-07-15 RX ORDER — EZETIMIBE 10 MG/1
10 TABLET ORAL DAILY
Status: DISCONTINUED | OUTPATIENT
Start: 2025-07-16 | End: 2025-07-17 | Stop reason: HOSPADM

## 2025-07-15 RX ORDER — NICOTINE POLACRILEX 4 MG
15 LOZENGE BUCCAL PRN
Status: DISCONTINUED | OUTPATIENT
Start: 2025-07-15 | End: 2025-07-17 | Stop reason: HOSPADM

## 2025-07-15 RX ORDER — PANTOPRAZOLE SODIUM 40 MG/1
40 TABLET, DELAYED RELEASE ORAL DAILY
Status: DISCONTINUED | OUTPATIENT
Start: 2025-07-16 | End: 2025-07-17 | Stop reason: HOSPADM

## 2025-07-15 RX ORDER — NOREPINEPHRINE BITARTRATE 0.06 MG/ML
0-10 INJECTION, SOLUTION INTRAVENOUS CONTINUOUS
Status: DISCONTINUED | OUTPATIENT
Start: 2025-07-15 | End: 2025-07-16 | Stop reason: HOSPADM

## 2025-07-15 RX ORDER — HEPARIN SODIUM 5000 [USP'U]/ML
5000 INJECTION, SOLUTION INTRAVENOUS; SUBCUTANEOUS EVERY 8 HOURS SCHEDULED
Status: DISCONTINUED | OUTPATIENT
Start: 2025-07-15 | End: 2025-07-17 | Stop reason: HOSPADM

## 2025-07-15 RX ORDER — DEXTROSE MONOHYDRATE 25 G/50ML
25 INJECTION, SOLUTION INTRAVENOUS PRN
Status: DISCONTINUED | OUTPATIENT
Start: 2025-07-15 | End: 2025-07-15 | Stop reason: HOSPADM

## 2025-07-15 RX ORDER — 0.9 % SODIUM CHLORIDE 0.9 %
10 VIAL (ML) INJECTION PRN
Status: DISCONTINUED | OUTPATIENT
Start: 2025-07-15 | End: 2025-07-17 | Stop reason: HOSPADM

## 2025-07-15 RX ORDER — CLONIDINE HYDROCHLORIDE 0.1 MG/1
0.1 TABLET ORAL EVERY 4 HOURS PRN
Status: DISCONTINUED | OUTPATIENT
Start: 2025-07-15 | End: 2025-07-15 | Stop reason: HOSPADM

## 2025-07-15 RX ORDER — DEXTROSE MONOHYDRATE 25 G/50ML
12.5 INJECTION, SOLUTION INTRAVENOUS PRN
Status: DISCONTINUED | OUTPATIENT
Start: 2025-07-15 | End: 2025-07-17 | Stop reason: HOSPADM

## 2025-07-15 RX ADMIN — HEPARIN SODIUM 5000 UNITS: 5000 INJECTION INTRAVENOUS; SUBCUTANEOUS at 14:09

## 2025-07-15 RX ADMIN — NOREPINEPHRINE BITARTRATE 1 MCG/MIN: 64 SOLUTION INTRAVENOUS at 12:39

## 2025-07-15 RX ADMIN — FENTANYL CITRATE 50 MCG: 50 INJECTION INTRAMUSCULAR; INTRAVENOUS at 12:49

## 2025-07-15 RX ADMIN — FENTANYL CITRATE 50 MCG: 50 INJECTION INTRAMUSCULAR; INTRAVENOUS at 12:39

## 2025-07-15 RX ADMIN — PROTAMINE SULFATE 50 MG: 10 INJECTION, SOLUTION INTRAVENOUS at 11:23

## 2025-07-15 RX ADMIN — NOREPINEPHRINE BITARTRATE 2 MCG/MIN: 64 SOLUTION INTRAVENOUS at 12:19

## 2025-07-15 RX ADMIN — GLYCOPYRROLATE 0.4 MG: 0.2 INJECTION INTRAMUSCULAR; INTRAVENOUS at 10:35

## 2025-07-15 RX ADMIN — LATANOPROST 1 DROP: 50 SOLUTION/ DROPS OPHTHALMIC at 20:37

## 2025-07-15 RX ADMIN — HYDROCODONE BITARTRATE AND ACETAMINOPHEN 1 TABLET: 5; 325 TABLET ORAL at 18:16

## 2025-07-15 RX ADMIN — IOHEXOL 20 ML: 350 INJECTION, SOLUTION INTRAVENOUS at 11:30

## 2025-07-15 RX ADMIN — CLOPIDOGREL BISULFATE 75 MG: 75 TABLET, FILM COATED ORAL at 14:08

## 2025-07-15 RX ADMIN — NOREPINEPHRINE BITARTRATE 1 MCG/MIN: 64 SOLUTION INTRAVENOUS at 12:46

## 2025-07-15 RX ADMIN — WATER 2000 MG: 1 INJECTION INTRAMUSCULAR; INTRAVENOUS; SUBCUTANEOUS at 10:36

## 2025-07-15 RX ADMIN — PROPOFOL 200 MG: 10 INJECTION, EMULSION INTRAVENOUS at 10:18

## 2025-07-15 RX ADMIN — HYDROCODONE BITARTRATE AND ACETAMINOPHEN 1 TABLET: 5; 325 TABLET ORAL at 22:25

## 2025-07-15 RX ADMIN — HEPARIN SODIUM 5000 UNITS: 5000 INJECTION INTRAVENOUS; SUBCUTANEOUS at 22:25

## 2025-07-15 RX ADMIN — Medication 20 MCG/MIN: at 10:37

## 2025-07-15 RX ADMIN — ASPIRIN 81 MG: 81 TABLET, COATED ORAL at 14:08

## 2025-07-15 RX ADMIN — HEPARIN SODIUM 10000 UNITS: 1000 INJECTION, SOLUTION INTRAVENOUS; SUBCUTANEOUS at 10:53

## 2025-07-15 RX ADMIN — ROCURONIUM BROMIDE 50 MG: 10 INJECTION INTRAVENOUS at 10:18

## 2025-07-15 RX ADMIN — HYDROCODONE BITARTRATE AND ACETAMINOPHEN 1 TABLET: 5; 325 TABLET ORAL at 14:09

## 2025-07-15 RX ADMIN — SUGAMMADEX 400 MG: 100 INJECTION, SOLUTION INTRAVENOUS at 11:31

## 2025-07-15 RX ADMIN — FENTANYL CITRATE 50 MCG: 50 INJECTION INTRAMUSCULAR; INTRAVENOUS at 11:32

## 2025-07-15 RX ADMIN — SODIUM CHLORIDE: 9 INJECTION, SOLUTION INTRAVENOUS at 10:11

## 2025-07-15 RX ADMIN — NOREPINEPHRINE BITARTRATE 2 MCG/MIN: 0.06 INJECTION, SOLUTION INTRAVENOUS at 12:19

## 2025-07-15 RX ADMIN — SODIUM CHLORIDE 500 ML: 9 INJECTION, SOLUTION INTRAVENOUS at 11:59

## 2025-07-15 RX ADMIN — FENTANYL CITRATE 50 MCG: 50 INJECTION INTRAMUSCULAR; INTRAVENOUS at 10:51

## 2025-07-15 SDOH — SOCIAL STABILITY: SOCIAL NETWORK: SUPPORT SYSTEMS: FAMILY MEMBERS;SPOUSE

## 2025-07-15 SDOH — ECONOMIC STABILITY: FOOD INSECURITY: WITHIN THE PAST 12 MONTHS, THE FOOD YOU BOUGHT JUST DIDN'T LAST AND YOU DIDN'T HAVE MONEY TO GET MORE.: NEVER TRUE

## 2025-07-15 SDOH — SOCIAL STABILITY: SOCIAL INSECURITY: HOW OFTEN DOES ANYONE, INCLUDING FAMILY AND FRIENDS, INSULT OR TALK DOWN TO YOU?: NEVER

## 2025-07-15 SDOH — ECONOMIC STABILITY: INCOME INSECURITY: IN THE PAST 12 MONTHS, HAS THE ELECTRIC, GAS, OIL, OR WATER COMPANY THREATENED TO SHUT OFF SERVICE IN YOUR HOME?: NO

## 2025-07-15 SDOH — ECONOMIC STABILITY: HOUSING INSECURITY: WHAT IS YOUR LIVING SITUATION TODAY?: SPOUSE

## 2025-07-15 SDOH — ECONOMIC STABILITY: HOUSING INSECURITY: DO YOU HAVE PROBLEMS WITH ANY OF THE FOLLOWING?: NONE OF THE ABOVE

## 2025-07-15 SDOH — HEALTH STABILITY: PHYSICAL HEALTH: DO YOU HAVE SERIOUS DIFFICULTY WALKING OR CLIMBING STAIRS?: NO

## 2025-07-15 SDOH — ECONOMIC STABILITY: HOUSING INSECURITY: WHAT IS YOUR LIVING SITUATION TODAY?: I HAVE A STEADY PLACE TO LIVE

## 2025-07-15 SDOH — SOCIAL STABILITY: SOCIAL INSECURITY: HOW OFTEN DOES ANYONE, INCLUDING FAMILY AND FRIENDS, SCREAM OR CURSE AT YOU?: NEVER

## 2025-07-15 SDOH — ECONOMIC STABILITY: GENERAL

## 2025-07-15 SDOH — HEALTH STABILITY: PHYSICAL HEALTH: DO YOU HAVE DIFFICULTY DRESSING OR BATHING?: NO

## 2025-07-15 SDOH — HEALTH STABILITY: GENERAL: BECAUSE OF A PHYSICAL, MENTAL, OR EMOTIONAL CONDITION, DO YOU HAVE DIFFICULTY DOING ERRANDS ALONE?: NO

## 2025-07-15 SDOH — ECONOMIC STABILITY: HOUSING INSECURITY: WHAT IS YOUR LIVING SITUATION TODAY?: HOUSE

## 2025-07-15 SDOH — SOCIAL STABILITY: SOCIAL INSECURITY: HOW OFTEN DOES ANYONE, INCLUDING FAMILY AND FRIENDS, PHYSICALLY HURT YOU?: NEVER

## 2025-07-15 SDOH — SOCIAL STABILITY: SOCIAL INSECURITY: HOW OFTEN DOES ANYONE, INCLUDING FAMILY AND FRIENDS, THREATEN YOU WITH HARM?: NEVER

## 2025-07-15 ASSESSMENT — PAIN SCALES - GENERAL
PAINLEVEL_OUTOF10: 6
PAINLEVEL_OUTOF10: 2
PAINLEVEL_OUTOF10: 4
PAINLEVEL_OUTOF10: 5
PAINLEVEL_OUTOF10: 0
PAINLEVEL_OUTOF10: 2
PAINLEVEL_OUTOF10: 4
PAINLEVEL_OUTOF10: 2
PAINLEVEL_OUTOF10: 2
PAINLEVEL_OUTOF10: 4
PAINLEVEL_OUTOF10: 3
PAINLEVEL_OUTOF10: 3
PAINLEVEL_OUTOF10: 6
PAINLEVEL_OUTOF10: 5
PAINLEVEL_OUTOF10: 8
PAINLEVEL_OUTOF10: 6
PAINLEVEL_OUTOF10: 6
PAINLEVEL_OUTOF10: 4

## 2025-07-15 ASSESSMENT — PATIENT HEALTH QUESTIONNAIRE - PHQ9
SUM OF ALL RESPONSES TO PHQ9 QUESTIONS 1 AND 2: 0
2. FEELING DOWN, DEPRESSED OR HOPELESS: NOT AT ALL
SUM OF ALL RESPONSES TO PHQ9 QUESTIONS 1 AND 2: 0
1. LITTLE INTEREST OR PLEASURE IN DOING THINGS: NOT AT ALL
IS PATIENT ABLE TO COMPLETE PHQ2 OR PHQ9: YES
CLINICAL INTERPRETATION OF PHQ2 SCORE: NO FURTHER SCREENING NEEDED

## 2025-07-15 ASSESSMENT — LIFESTYLE VARIABLES
HOW OFTEN DO YOU HAVE A DRINK CONTAINING ALCOHOL: 4 OR MORE TIMES PER WEEK
HOW MANY STANDARD DRINKS CONTAINING ALCOHOL DO YOU HAVE ON A TYPICAL DAY: 3 OR 4
AUDIT-C TOTAL SCORE: 8
HOW OFTEN DO YOU HAVE 6 OR MORE DRINKS ON ONE OCCASION: WEEKLY
ALCOHOL_USE_STATUS: UNHEALTHY DRINKING IDENTIFIED. AUDIT C: 3 OR MORE FOR WOMEN AND 4 OR MORE FOR MEN.

## 2025-07-15 ASSESSMENT — ACTIVITIES OF DAILY LIVING (ADL)
ADL_BEFORE_ADMISSION: INDEPENDENT
ADL_SHORT_OF_BREATH: NO
ADL_SCORE: 12
RECENT_DECLINE_ADL: NO

## 2025-07-15 ASSESSMENT — ORIENTATION MEMORY CONCENTRATION TEST (OMCT)
WHAT MONTH IS IT NOW: CORRECT
WHAT YEAR IS IT NOW (MUST BE EXACT): CORRECT
WHAT TIME IS IT (NO WATCH OR CLOCK): CORRECT
SAY THE MONTHS IN REVERSE ORDER STARTING WITH LAST MONTH: CORRECT
COUNT BACKWARDS FROM 20 TO 1: CORRECT
REPEAT THE NAME AND ADDRESS I ASKED YOU TO REMEMBER: CORRECT
OMCT INTERPRETATION: 0-6: NO SIGNIFICANT IMPAIRMENT
OMCT SCORE: 0

## 2025-07-16 ENCOUNTER — APPOINTMENT (OUTPATIENT)
Dept: CARDIOLOGY | Age: 75
DRG: 034 | End: 2025-07-16
Attending: NURSE PRACTITIONER

## 2025-07-16 ENCOUNTER — RESULTS FOLLOW-UP (OUTPATIENT)
Dept: CARDIOLOGY | Age: 75
End: 2025-07-16

## 2025-07-16 ENCOUNTER — APPOINTMENT (OUTPATIENT)
Dept: GENERAL RADIOLOGY | Age: 75
DRG: 034 | End: 2025-07-16
Attending: INTERNAL MEDICINE

## 2025-07-16 LAB
ANION GAP SERPL CALC-SCNC: 8 MMOL/L (ref 7–19)
ATRIAL RATE (BPM): 38
ATRIAL RATE (BPM): 41
ATRIAL RATE (BPM): 60
BASOPHILS # BLD: 0.1 K/MCL (ref 0–0.3)
BASOPHILS NFR BLD: 1 %
BUN SERPL-MCNC: 15 MG/DL (ref 6–20)
BUN/CREAT SERPL: 15 (ref 7–25)
CALCIUM SERPL-MCNC: 8.2 MG/DL (ref 8.4–10.2)
CHLORIDE SERPL-SCNC: 108 MMOL/L (ref 97–110)
CO2 SERPL-SCNC: 27 MMOL/L (ref 21–32)
CREAT SERPL-MCNC: 1.01 MG/DL (ref 0.67–1.17)
DEPRECATED RDW RBC: 47.5 FL (ref 39–50)
EGFRCR SERPLBLD CKD-EPI 2021: 78 ML/MIN/{1.73_M2}
EOSINOPHIL # BLD: 0.2 K/MCL (ref 0–0.5)
EOSINOPHIL NFR BLD: 3 %
ERYTHROCYTE [DISTWIDTH] IN BLOOD: 13.6 % (ref 11–15)
FASTING DURATION TIME PATIENT: ABNORMAL H
GLUCOSE BLDC GLUCOMTR-MCNC: 122 MG/DL (ref 70–99)
GLUCOSE BLDC GLUCOMTR-MCNC: 124 MG/DL (ref 70–99)
GLUCOSE BLDC GLUCOMTR-MCNC: 128 MG/DL (ref 70–99)
GLUCOSE BLDC GLUCOMTR-MCNC: 153 MG/DL (ref 70–99)
GLUCOSE SERPL-MCNC: 128 MG/DL (ref 70–99)
HCT VFR BLD CALC: 41 % (ref 39–51)
HGB BLD-MCNC: 13.6 G/DL (ref 13–17)
IMM GRANULOCYTES # BLD AUTO: 0 K/MCL (ref 0–0.2)
IMM GRANULOCYTES # BLD: 0 %
LYMPHOCYTES # BLD: 1.5 K/MCL (ref 1–4)
LYMPHOCYTES NFR BLD: 22 %
MAGNESIUM SERPL-MCNC: 1.8 MG/DL (ref 1.7–2.4)
MCH RBC QN AUTO: 31.6 PG (ref 26–34)
MCHC RBC AUTO-ENTMCNC: 33.2 G/DL (ref 32–36.5)
MCV RBC AUTO: 95.3 FL (ref 78–100)
MONOCYTES # BLD: 0.6 K/MCL (ref 0.3–0.9)
MONOCYTES NFR BLD: 8 %
NEUTROPHILS # BLD: 4.7 K/MCL (ref 1.8–7.7)
NEUTROPHILS NFR BLD: 66 %
NRBC BLD MANUAL-RTO: 0 /100 WBC
P AXIS (DEGREES): -19
P AXIS (DEGREES): 44
P AXIS (DEGREES): 45
PLATELET # BLD AUTO: 118 K/MCL (ref 140–450)
POTASSIUM SERPL-SCNC: 3.3 MMOL/L (ref 3.4–5.1)
POTASSIUM SERPL-SCNC: 3.8 MMOL/L (ref 3.4–5.1)
PR-INTERVAL (MSEC): 312
PR-INTERVAL (MSEC): 330
PR-INTERVAL (MSEC): 374
QRS-INTERVAL (MSEC): 100
QRS-INTERVAL (MSEC): 104
QRS-INTERVAL (MSEC): 84
QT-INTERVAL (MSEC): 456
QT-INTERVAL (MSEC): 544
QT-INTERVAL (MSEC): 566
QTC: 432
QTC: 456
QTC: 466
R AXIS (DEGREES): 10
R AXIS (DEGREES): 10
R AXIS (DEGREES): 32
RBC # BLD: 4.3 MIL/MCL (ref 4.5–5.9)
REPORT TEXT: NORMAL
SODIUM SERPL-SCNC: 140 MMOL/L (ref 135–145)
T AXIS (DEGREES): 125
T AXIS (DEGREES): 168
T AXIS (DEGREES): 38
VENTRICULAR RATE EKG/MIN (BPM): 38
VENTRICULAR RATE EKG/MIN (BPM): 41
VENTRICULAR RATE EKG/MIN (BPM): 60
WBC # BLD: 7 K/MCL (ref 4.2–11)

## 2025-07-16 PROCEDURE — 71045 X-RAY EXAM CHEST 1 VIEW: CPT

## 2025-07-16 PROCEDURE — 93010 ELECTROCARDIOGRAM REPORT: CPT | Performed by: INTERNAL MEDICINE

## 2025-07-16 PROCEDURE — 99152 MOD SED SAME PHYS/QHP 5/>YRS: CPT | Performed by: INTERNAL MEDICINE

## 2025-07-16 PROCEDURE — 10002800 HB RX 250 W HCPCS: Performed by: SURGERY

## 2025-07-16 PROCEDURE — C1898 LEAD, PMKR, OTHER THAN TRANS: HCPCS | Performed by: INTERNAL MEDICINE

## 2025-07-16 PROCEDURE — 96372 THER/PROPH/DIAG INJ SC/IM: CPT | Performed by: SURGERY

## 2025-07-16 PROCEDURE — 10002801 HB RX 250 W/O HCPCS: Performed by: INTERNAL MEDICINE

## 2025-07-16 PROCEDURE — 99223 1ST HOSP IP/OBS HIGH 75: CPT | Performed by: INTERNAL MEDICINE

## 2025-07-16 PROCEDURE — 13001086 HB INCENTIVE SPIROMETER W INSTRUCT

## 2025-07-16 PROCEDURE — 33208 INSRT HEART PM ATRIAL & VENT: CPT | Performed by: INTERNAL MEDICINE

## 2025-07-16 PROCEDURE — 0JH606Z INSERTION OF PACEMAKER, DUAL CHAMBER INTO CHEST SUBCUTANEOUS TISSUE AND FASCIA, OPEN APPROACH: ICD-10-PCS | Performed by: INTERNAL MEDICINE

## 2025-07-16 PROCEDURE — 10002805 HB CONTRAST AGENT: Performed by: SURGERY

## 2025-07-16 PROCEDURE — 10004180 HB COUNTER-TRANSPORT

## 2025-07-16 PROCEDURE — 10002800 HB RX 250 W HCPCS: Performed by: INTERNAL MEDICINE

## 2025-07-16 PROCEDURE — C1785 PMKR, DUAL, RATE-RESP: HCPCS | Performed by: INTERNAL MEDICINE

## 2025-07-16 PROCEDURE — 10002807 HB RX 258: Performed by: SURGERY

## 2025-07-16 PROCEDURE — 10006031 HB ROOM CHARGE TELEMETRY

## 2025-07-16 PROCEDURE — 02H63JZ INSERTION OF PACEMAKER LEAD INTO RIGHT ATRIUM, PERCUTANEOUS APPROACH: ICD-10-PCS | Performed by: INTERNAL MEDICINE

## 2025-07-16 PROCEDURE — A4216 STERILE WATER/SALINE, 10 ML: HCPCS | Performed by: SURGERY

## 2025-07-16 PROCEDURE — 10004651 HB RX, NO CHARGE ITEM: Performed by: SURGERY

## 2025-07-16 PROCEDURE — 10002803 HB RX 637: Performed by: SURGERY

## 2025-07-16 PROCEDURE — 93308 TTE F-UP OR LMTD: CPT

## 2025-07-16 PROCEDURE — 99233 SBSQ HOSP IP/OBS HIGH 50: CPT | Performed by: HOSPITALIST

## 2025-07-16 PROCEDURE — 83735 ASSAY OF MAGNESIUM: CPT | Performed by: HOSPITALIST

## 2025-07-16 PROCEDURE — 36415 COLL VENOUS BLD VENIPUNCTURE: CPT | Performed by: SURGERY

## 2025-07-16 PROCEDURE — 93005 ELECTROCARDIOGRAM TRACING: CPT | Performed by: INTERNAL MEDICINE

## 2025-07-16 PROCEDURE — 99024 POSTOP FOLLOW-UP VISIT: CPT | Performed by: SURGERY

## 2025-07-16 PROCEDURE — A4216 STERILE WATER/SALINE, 10 ML: HCPCS | Performed by: INTERNAL MEDICINE

## 2025-07-16 PROCEDURE — 80048 BASIC METABOLIC PNL TOTAL CA: CPT | Performed by: SURGERY

## 2025-07-16 PROCEDURE — 10004651 HB RX, NO CHARGE ITEM: Performed by: INTERNAL MEDICINE

## 2025-07-16 PROCEDURE — 93005 ELECTROCARDIOGRAM TRACING: CPT

## 2025-07-16 PROCEDURE — 10006023 HB SUPPLY 272: Performed by: INTERNAL MEDICINE

## 2025-07-16 PROCEDURE — 85025 COMPLETE CBC W/AUTO DIFF WBC: CPT | Performed by: SURGERY

## 2025-07-16 PROCEDURE — 84132 ASSAY OF SERUM POTASSIUM: CPT | Performed by: SURGERY

## 2025-07-16 PROCEDURE — 10002803 HB RX 637: Performed by: HOSPITALIST

## 2025-07-16 PROCEDURE — 10002016 HB COUNTER INCENTIVE SPIROMETRY

## 2025-07-16 PROCEDURE — 93308 TTE F-UP OR LMTD: CPT | Performed by: INTERNAL MEDICINE

## 2025-07-16 PROCEDURE — 10006025 HB SUPPLY 275: Performed by: INTERNAL MEDICINE

## 2025-07-16 PROCEDURE — 02HK3JZ INSERTION OF PACEMAKER LEAD INTO RIGHT VENTRICLE, PERCUTANEOUS APPROACH: ICD-10-PCS | Performed by: INTERNAL MEDICINE

## 2025-07-16 PROCEDURE — 99153 MOD SED SAME PHYS/QHP EA: CPT | Performed by: INTERNAL MEDICINE

## 2025-07-16 PROCEDURE — 10002803 HB RX 637: Performed by: INTERNAL MEDICINE

## 2025-07-16 DEVICE — IPG W1DR01 AZURE XT DR MRI USA
Type: IMPLANTABLE DEVICE | Site: CHEST | Status: FUNCTIONAL
Brand: AZURE™ XT DR MRI SURESCAN™

## 2025-07-16 DEVICE — LEAD 407658 CAPSUREFIX NOVUS US MRI
Type: IMPLANTABLE DEVICE | Site: VENTRICLE | Status: FUNCTIONAL
Brand: CAPSUREFIX NOVUS MRI™ SURESCAN™

## 2025-07-16 DEVICE — LEAD 407652 CAPSUREFIX NOVUS US MRI
Type: IMPLANTABLE DEVICE | Site: ATRIUM | Status: FUNCTIONAL
Brand: CAPSUREFIX NOVUS MRI™ SURESCAN™

## 2025-07-16 RX ORDER — 0.9 % SODIUM CHLORIDE 0.9 %
2 VIAL (ML) INJECTION EVERY 12 HOURS SCHEDULED
Status: DISCONTINUED | OUTPATIENT
Start: 2025-07-16 | End: 2025-07-17 | Stop reason: HOSPADM

## 2025-07-16 RX ORDER — POTASSIUM CHLORIDE 1500 MG/1
40 TABLET, EXTENDED RELEASE ORAL ONCE
Status: COMPLETED | OUTPATIENT
Start: 2025-07-16 | End: 2025-07-16

## 2025-07-16 RX ORDER — LIDOCAINE HYDROCHLORIDE 20 MG/ML
INJECTION, SOLUTION EPIDURAL; INFILTRATION; INTRACAUDAL; PERINEURAL PRN
Status: DISCONTINUED | OUTPATIENT
Start: 2025-07-16 | End: 2025-07-16 | Stop reason: HOSPADM

## 2025-07-16 RX ORDER — 0.9 % SODIUM CHLORIDE 0.9 %
10 VIAL (ML) INJECTION PRN
Status: DISCONTINUED | OUTPATIENT
Start: 2025-07-16 | End: 2025-07-17 | Stop reason: HOSPADM

## 2025-07-16 RX ORDER — MIDAZOLAM HYDROCHLORIDE 1 MG/ML
INJECTION, SOLUTION INTRAMUSCULAR; INTRAVENOUS PRN
Status: DISCONTINUED | OUTPATIENT
Start: 2025-07-16 | End: 2025-07-16 | Stop reason: HOSPADM

## 2025-07-16 RX ADMIN — POTASSIUM CHLORIDE 40 MEQ: 1500 TABLET, EXTENDED RELEASE ORAL at 09:04

## 2025-07-16 RX ADMIN — SODIUM CHLORIDE: 9 INJECTION, SOLUTION INTRAVENOUS at 03:09

## 2025-07-16 RX ADMIN — SODIUM CHLORIDE, PRESERVATIVE FREE 2 ML: 5 INJECTION INTRAVENOUS at 09:04

## 2025-07-16 RX ADMIN — EZETIMIBE 10 MG: 10 TABLET ORAL at 09:05

## 2025-07-16 RX ADMIN — CLOPIDOGREL BISULFATE 75 MG: 75 TABLET, FILM COATED ORAL at 09:04

## 2025-07-16 RX ADMIN — FENOFIBRATE 134 MG: 134 CAPSULE ORAL at 09:04

## 2025-07-16 RX ADMIN — POTASSIUM CHLORIDE 40 MEQ: 1500 TABLET, EXTENDED RELEASE ORAL at 19:52

## 2025-07-16 RX ADMIN — LATANOPROST 1 DROP: 50 SOLUTION/ DROPS OPHTHALMIC at 21:33

## 2025-07-16 RX ADMIN — HYDROCODONE BITARTRATE AND ACETAMINOPHEN 1 TABLET: 5; 325 TABLET ORAL at 09:24

## 2025-07-16 RX ADMIN — ROSUVASTATIN CALCIUM 40 MG: 20 TABLET, FILM COATED ORAL at 09:06

## 2025-07-16 RX ADMIN — PANTOPRAZOLE SODIUM 40 MG: 40 TABLET, DELAYED RELEASE ORAL at 09:05

## 2025-07-16 RX ADMIN — HYDROCODONE BITARTRATE AND ACETAMINOPHEN 1 TABLET: 5; 325 TABLET ORAL at 17:35

## 2025-07-16 RX ADMIN — PERFLUTREN 2 ML: 6.52 INJECTION, SUSPENSION INTRAVENOUS at 12:35

## 2025-07-16 RX ADMIN — ALLOPURINOL 300 MG: 100 TABLET ORAL at 09:05

## 2025-07-16 RX ADMIN — HYDROCODONE BITARTRATE AND ACETAMINOPHEN 1 TABLET: 5; 325 TABLET ORAL at 03:02

## 2025-07-16 RX ADMIN — CEFAZOLIN 2000 MG: 2 INJECTION, POWDER, FOR SOLUTION INTRAMUSCULAR; INTRAVENOUS at 21:33

## 2025-07-16 RX ADMIN — ASPIRIN 81 MG: 81 TABLET, COATED ORAL at 09:04

## 2025-07-16 RX ADMIN — FINASTERIDE 5 MG: 5 TABLET, FILM COATED ORAL at 09:04

## 2025-07-16 RX ADMIN — HEPARIN SODIUM 5000 UNITS: 5000 INJECTION INTRAVENOUS; SUBCUTANEOUS at 06:28

## 2025-07-16 RX ADMIN — HEPARIN SODIUM 5000 UNITS: 5000 INJECTION INTRAVENOUS; SUBCUTANEOUS at 21:33

## 2025-07-16 RX ADMIN — SODIUM CHLORIDE, PRESERVATIVE FREE 2 ML: 5 INJECTION INTRAVENOUS at 21:33

## 2025-07-16 RX ADMIN — HYDROCODONE BITARTRATE AND ACETAMINOPHEN 1 TABLET: 5; 325 TABLET ORAL at 21:33

## 2025-07-16 ASSESSMENT — PAIN SCALES - GENERAL
PAINLEVEL_OUTOF10: 1
PAINLEVEL_OUTOF10: 4
PAINLEVEL_OUTOF10: 0
PAINLEVEL_OUTOF10: 3
PAINLEVEL_OUTOF10: 2
PAINLEVEL_OUTOF10: 0
PAINLEVEL_OUTOF10: 6
PAINLEVEL_OUTOF10: 0
PAINLEVEL_OUTOF10: 5
PAINLEVEL_OUTOF10: 0
PAINLEVEL_OUTOF10: 1

## 2025-07-16 ASSESSMENT — PAIN SCALES - WONG BAKER
WONGBAKER_NUMERICALRESPONSE: 0
WONGBAKER_NUMERICALRESPONSE: 1
WONGBAKER_NUMERICALRESPONSE: 0

## 2025-07-16 ASSESSMENT — LIFESTYLE VARIABLES: SMOKING_HISTORY: NO

## 2025-07-17 VITALS
OXYGEN SATURATION: 97 % | RESPIRATION RATE: 20 BRPM | HEIGHT: 76 IN | HEART RATE: 60 BPM | WEIGHT: 264.33 LBS | SYSTOLIC BLOOD PRESSURE: 122 MMHG | TEMPERATURE: 97.7 F | DIASTOLIC BLOOD PRESSURE: 70 MMHG | BODY MASS INDEX: 32.19 KG/M2

## 2025-07-17 LAB
ANION GAP SERPL CALC-SCNC: 9 MMOL/L (ref 7–19)
BASOPHILS # BLD: 0 K/MCL (ref 0–0.3)
BASOPHILS NFR BLD: 0 %
BUN SERPL-MCNC: 14 MG/DL (ref 6–20)
BUN/CREAT SERPL: 16 (ref 7–25)
CALCIUM SERPL-MCNC: 8.8 MG/DL (ref 8.4–10.2)
CHLORIDE SERPL-SCNC: 107 MMOL/L (ref 97–110)
CO2 SERPL-SCNC: 27 MMOL/L (ref 21–32)
CREAT SERPL-MCNC: 0.85 MG/DL (ref 0.67–1.17)
DEPRECATED RDW RBC: 47.1 FL (ref 39–50)
EGFRCR SERPLBLD CKD-EPI 2021: >90 ML/MIN/{1.73_M2}
EOSINOPHIL # BLD: 0.1 K/MCL (ref 0–0.5)
EOSINOPHIL NFR BLD: 1 %
ERYTHROCYTE [DISTWIDTH] IN BLOOD: 13.4 % (ref 11–15)
FASTING DURATION TIME PATIENT: ABNORMAL H
GLUCOSE BLDC GLUCOMTR-MCNC: 186 MG/DL (ref 70–99)
GLUCOSE SERPL-MCNC: 131 MG/DL (ref 70–99)
HCT VFR BLD CALC: 42.4 % (ref 39–51)
HGB BLD-MCNC: 14 G/DL (ref 13–17)
IMM GRANULOCYTES # BLD AUTO: 0 K/MCL (ref 0–0.2)
IMM GRANULOCYTES # BLD: 0 %
LYMPHOCYTES # BLD: 1.3 K/MCL (ref 1–4)
LYMPHOCYTES NFR BLD: 17 %
MAGNESIUM SERPL-MCNC: 1.8 MG/DL (ref 1.7–2.4)
MCH RBC QN AUTO: 31.5 PG (ref 26–34)
MCHC RBC AUTO-ENTMCNC: 33 G/DL (ref 32–36.5)
MCV RBC AUTO: 95.3 FL (ref 78–100)
MONOCYTES # BLD: 0.6 K/MCL (ref 0.3–0.9)
MONOCYTES NFR BLD: 8 %
NEUTROPHILS # BLD: 5.4 K/MCL (ref 1.8–7.7)
NEUTROPHILS NFR BLD: 74 %
NRBC BLD MANUAL-RTO: 0 /100 WBC
PLATELET # BLD AUTO: 110 K/MCL (ref 140–450)
POTASSIUM SERPL-SCNC: 3.7 MMOL/L (ref 3.4–5.1)
RBC # BLD: 4.45 MIL/MCL (ref 4.5–5.9)
SODIUM SERPL-SCNC: 139 MMOL/L (ref 135–145)
WBC # BLD: 7.4 K/MCL (ref 4.2–11)

## 2025-07-17 PROCEDURE — 85025 COMPLETE CBC W/AUTO DIFF WBC: CPT | Performed by: HOSPITALIST

## 2025-07-17 PROCEDURE — 99024 POSTOP FOLLOW-UP VISIT: CPT | Performed by: SURGERY

## 2025-07-17 PROCEDURE — 10002803 HB RX 637: Performed by: HOSPITALIST

## 2025-07-17 PROCEDURE — 99233 SBSQ HOSP IP/OBS HIGH 50: CPT | Performed by: INTERNAL MEDICINE

## 2025-07-17 PROCEDURE — 10004651 HB RX, NO CHARGE ITEM: Performed by: SURGERY

## 2025-07-17 PROCEDURE — 36415 COLL VENOUS BLD VENIPUNCTURE: CPT | Performed by: HOSPITALIST

## 2025-07-17 PROCEDURE — 96372 THER/PROPH/DIAG INJ SC/IM: CPT | Performed by: SURGERY

## 2025-07-17 PROCEDURE — 10002800 HB RX 250 W HCPCS: Performed by: INTERNAL MEDICINE

## 2025-07-17 PROCEDURE — 80048 BASIC METABOLIC PNL TOTAL CA: CPT | Performed by: SURGERY

## 2025-07-17 PROCEDURE — 10002803 HB RX 637: Performed by: INTERNAL MEDICINE

## 2025-07-17 PROCEDURE — 83735 ASSAY OF MAGNESIUM: CPT | Performed by: HOSPITALIST

## 2025-07-17 PROCEDURE — 10002803 HB RX 637: Performed by: SURGERY

## 2025-07-17 PROCEDURE — 10002801 HB RX 250 W/O HCPCS: Performed by: INTERNAL MEDICINE

## 2025-07-17 PROCEDURE — 99239 HOSP IP/OBS DSCHRG MGMT >30: CPT | Performed by: HOSPITALIST

## 2025-07-17 PROCEDURE — 10002800 HB RX 250 W HCPCS: Performed by: SURGERY

## 2025-07-17 RX ORDER — HYDROCODONE BITARTRATE AND ACETAMINOPHEN 5; 325 MG/1; MG/1
1 TABLET ORAL EVERY 8 HOURS PRN
Qty: 12 TABLET | Refills: 0 | Status: SHIPPED | OUTPATIENT
Start: 2025-07-17

## 2025-07-17 RX ORDER — POTASSIUM CHLORIDE 1500 MG/1
40 TABLET, EXTENDED RELEASE ORAL ONCE
Status: COMPLETED | OUTPATIENT
Start: 2025-07-17 | End: 2025-07-17

## 2025-07-17 RX ORDER — ONDANSETRON 2 MG/ML
4 INJECTION INTRAMUSCULAR; INTRAVENOUS EVERY 6 HOURS PRN
Status: DISCONTINUED | OUTPATIENT
Start: 2025-07-17 | End: 2025-07-17 | Stop reason: HOSPADM

## 2025-07-17 RX ORDER — POLYETHYLENE GLYCOL 3350 17 G/17G
17 POWDER, FOR SOLUTION ORAL DAILY PRN
Qty: 238 G | Refills: 0 | Status: SHIPPED | OUTPATIENT
Start: 2025-07-17

## 2025-07-17 RX ORDER — METOPROLOL SUCCINATE 100 MG/1
100 TABLET, EXTENDED RELEASE ORAL DAILY
Status: DISCONTINUED | OUTPATIENT
Start: 2025-07-17 | End: 2025-07-17 | Stop reason: HOSPADM

## 2025-07-17 RX ORDER — LOSARTAN POTASSIUM 50 MG/1
50 TABLET ORAL 2 TIMES DAILY
Status: DISCONTINUED | OUTPATIENT
Start: 2025-07-17 | End: 2025-07-17 | Stop reason: HOSPADM

## 2025-07-17 RX ADMIN — HEPARIN SODIUM 5000 UNITS: 5000 INJECTION INTRAVENOUS; SUBCUTANEOUS at 06:26

## 2025-07-17 RX ADMIN — ROSUVASTATIN CALCIUM 40 MG: 20 TABLET, FILM COATED ORAL at 09:23

## 2025-07-17 RX ADMIN — PANTOPRAZOLE SODIUM 40 MG: 40 TABLET, DELAYED RELEASE ORAL at 09:23

## 2025-07-17 RX ADMIN — ASPIRIN 81 MG: 81 TABLET, COATED ORAL at 09:23

## 2025-07-17 RX ADMIN — LOSARTAN POTASSIUM 50 MG: 50 TABLET, FILM COATED ORAL at 09:23

## 2025-07-17 RX ADMIN — ALLOPURINOL 300 MG: 100 TABLET ORAL at 09:23

## 2025-07-17 RX ADMIN — CLOPIDOGREL BISULFATE 75 MG: 75 TABLET, FILM COATED ORAL at 09:23

## 2025-07-17 RX ADMIN — HYDRALAZINE HYDROCHLORIDE 10 MG: 20 INJECTION, SOLUTION INTRAMUSCULAR; INTRAVENOUS at 03:30

## 2025-07-17 RX ADMIN — EZETIMIBE 10 MG: 10 TABLET ORAL at 09:23

## 2025-07-17 RX ADMIN — CEFAZOLIN 2000 MG: 2 INJECTION, POWDER, FOR SOLUTION INTRAMUSCULAR; INTRAVENOUS at 06:26

## 2025-07-17 RX ADMIN — FENOFIBRATE 134 MG: 134 CAPSULE ORAL at 09:27

## 2025-07-17 RX ADMIN — FINASTERIDE 5 MG: 5 TABLET, FILM COATED ORAL at 09:23

## 2025-07-17 RX ADMIN — POTASSIUM CHLORIDE 40 MEQ: 1500 TABLET, EXTENDED RELEASE ORAL at 09:23

## 2025-07-17 RX ADMIN — HYDROCODONE BITARTRATE AND ACETAMINOPHEN 1 TABLET: 5; 325 TABLET ORAL at 04:04

## 2025-07-17 ASSESSMENT — PAIN SCALES - WONG BAKER: WONGBAKER_NUMERICALRESPONSE: 0

## 2025-07-17 ASSESSMENT — PAIN SCALES - GENERAL
PAINLEVEL_OUTOF10: 0
PAINLEVEL_OUTOF10: 0
PAINLEVEL_OUTOF10: 4

## 2025-07-17 ASSESSMENT — COGNITIVE AND FUNCTIONAL STATUS - GENERAL: DO YOU HAVE DIFFICULTY DRESSING OR BATHING: NO

## 2025-07-18 ENCOUNTER — TELEPHONE (OUTPATIENT)
Dept: FAMILY MEDICINE CLINIC | Facility: CLINIC | Age: 75
End: 2025-07-18

## 2025-07-18 ENCOUNTER — TELEPHONE (OUTPATIENT)
Dept: CARDIOLOGY | Age: 75
End: 2025-07-18

## 2025-07-18 ENCOUNTER — PATIENT OUTREACH (OUTPATIENT)
Age: 75
End: 2025-07-18

## 2025-07-18 RX ORDER — HYDROCODONE BITARTRATE AND ACETAMINOPHEN 5; 325 MG/1; MG/1
1 TABLET ORAL EVERY 8 HOURS PRN
COMMUNITY

## 2025-07-18 RX ORDER — ASPIRIN 81 MG/1
81 TABLET ORAL DAILY
COMMUNITY

## 2025-07-18 RX ORDER — POLYETHYLENE GLYCOL 3350 17 G/17G
17 POWDER, FOR SOLUTION ORAL DAILY PRN
COMMUNITY

## 2025-07-18 NOTE — TELEPHONE ENCOUNTER
Spoke with patient, patient stated he had a pacemaker insertion on 7/16/25 at [facility]. Patient states that he has sinus congestion and chest congestion. Denies fevers, productive cough or shortness of breath. Patient continues sudafed and mucinex. Patient scheduled for hospital follow up on 7/22/25. Patient advised that if  he should develop productive cough, shortness of breath, fever, or any other worsening symptoms to go to the nearest ER. Patient verbalized understanding.

## 2025-07-18 NOTE — PROGRESS NOTES
25 1114   JAYLIN Assessment   Assessment Type JAYLIN Initial   Assessment completed with Patient   Patient Subjective Spoke with patient for initial assessment Transitions of Care navigation call. Patient is feeling ok since hospital discharge.  Patient reports the following symptoms  chest congestion, head congestion and post nasal drip. He has spoke with RN from Dr. Champagne's office and was advised sudafed and muncinex as needed.  Patient reports right neck incision is free from redness, swelling or drainage. His left upper chest incision from pacemaker insertion is covered with a bandage and bandage is clean, dry and intact.  Patient reports no pain and has not taken any norco since he has been home.  Patient denies the following symptoms:  fever, chills, vision changes, headache, dizziness, chest pain, shortness of breath, abdominal pain, nausea or vomiting. Medications prescribed at discharged have been picked up, patient has started taking and has not experienced any side effects.  Remaining medications reviewed. Discussed with patient to follow low fat/cholesterol/sugar/carbohydrate diet. Patient has [facility]    Patient was instructed to go to emergency room or call 911 if he begins experiencing:  (x)  Chest pain     (x)  Confusion    (x)  Shortness of breath    (x)  Memory loss  (x)  Vision changes     (x)  Weakness of upper/lower extremities   (x)  Slurring of words    (x)  Incision open  Patient did not have any additional questions or concerns.   Chief Complaint Carotid stenosis, right   -Sick sinus syndrome status post successful Medtronic pacemaker implantation by Dr. Champagne on 2025.   JAYLIN Navigation Initial Assessment   Verify patient name and  with patient/ caregiver Yes   Tell me what you understand of why you were in the hospital or emergency department I had a procedure on my carotid artery   Prior to  leaving the hospital were your Discharge Instructions reviewed with you? Yes   Did you receive a copy of your written Discharge Instructions? Yes   What questions do you have about your Discharge Instructions? Patient did not have any additional questions   Do you feel better or worse since you left the hospital or emergency department? Better   Do you have a follow-up appointment? Yes   Date 07/28/25   Physician PCP   Are there any barriers to getting to your follow-up appointment? No   Prior to leaving the hospital was Home Health (HH) arranged for you? N/A   Are HH needs identified by staff during the assessment? No   Prior to leaving the hospital or emergency department was Durable Medical Equipment (DME), medical supplies, or infusions arranged for you? N/A   Are DME/medical supply/infusions needs identified by staff during this assessment? No   Did any of your medications change, during or after your hospital stay or ED visit? Yes   Do you have your new or updated medications? Yes   Do you understand what your medications are for and possible side effects? Yes   Are there any reasons that keep you from taking your medication as prescribed? No   Any concerns about medication refills? No   Were you given a different diet per your Discharge Instructions? Yes   Diet Type cardiac/diabetic   Reason CAD, diabetes   Are there any barriers to following that diet? No   Do you have any questions or concerns that have not been discussed? No

## 2025-07-18 NOTE — TELEPHONE ENCOUNTER
Upper congestion worst - since leaving hospital 7/16/25 . Patient has a pacemaker placed. Patient Is getting worst- taking Sudafed and Mucinex.    Please advise.

## 2025-07-21 ENCOUNTER — OFFICE VISIT (OUTPATIENT)
Dept: CARDIOLOGY | Age: 75
End: 2025-07-21

## 2025-07-21 VITALS
BODY MASS INDEX: 31.77 KG/M2 | HEIGHT: 76 IN | WEIGHT: 260.91 LBS | DIASTOLIC BLOOD PRESSURE: 85 MMHG | HEART RATE: 65 BPM | SYSTOLIC BLOOD PRESSURE: 134 MMHG | OXYGEN SATURATION: 97 %

## 2025-07-21 DIAGNOSIS — I25.10 CORONARY ARTERY DISEASE INVOLVING NATIVE CORONARY ARTERY OF NATIVE HEART WITHOUT ANGINA PECTORIS: ICD-10-CM

## 2025-07-21 DIAGNOSIS — Z95.1 HX OF CABG: ICD-10-CM

## 2025-07-21 DIAGNOSIS — Z95.2 S/P TAVR (TRANSCATHETER AORTIC VALVE REPLACEMENT): ICD-10-CM

## 2025-07-21 DIAGNOSIS — I10 HYPERTENSION, BENIGN: ICD-10-CM

## 2025-07-21 DIAGNOSIS — Z09 HOSPITAL DISCHARGE FOLLOW-UP: Primary | ICD-10-CM

## 2025-07-21 DIAGNOSIS — E78.2 HYPERLIPIDEMIA, MIXED: ICD-10-CM

## 2025-07-21 DIAGNOSIS — I65.23 BILATERAL CAROTID ARTERY STENOSIS: ICD-10-CM

## 2025-07-21 PROCEDURE — 99213 OFFICE O/P EST LOW 20 MIN: CPT | Performed by: REGISTERED NURSE

## 2025-07-21 SDOH — HEALTH STABILITY: PHYSICAL HEALTH: ON AVERAGE, HOW MANY MINUTES DO YOU ENGAGE IN EXERCISE AT THIS LEVEL?: 0 MIN

## 2025-07-21 SDOH — HEALTH STABILITY: PHYSICAL HEALTH: ON AVERAGE, HOW MANY DAYS PER WEEK DO YOU ENGAGE IN MODERATE TO STRENUOUS EXERCISE (LIKE A BRISK WALK)?: 0 DAYS

## 2025-07-21 ASSESSMENT — PATIENT HEALTH QUESTIONNAIRE - PHQ9
CLINICAL INTERPRETATION OF PHQ2 SCORE: NO FURTHER SCREENING NEEDED
1. LITTLE INTEREST OR PLEASURE IN DOING THINGS: NOT AT ALL
2. FEELING DOWN, DEPRESSED OR HOPELESS: NOT AT ALL
SUM OF ALL RESPONSES TO PHQ9 QUESTIONS 1 AND 2: 0
SUM OF ALL RESPONSES TO PHQ9 QUESTIONS 1 AND 2: 0

## 2025-07-22 ENCOUNTER — E-ADVICE (OUTPATIENT)
Dept: CARDIOLOGY | Age: 75
End: 2025-07-22

## 2025-07-28 ENCOUNTER — OFFICE VISIT (OUTPATIENT)
Dept: FAMILY MEDICINE CLINIC | Facility: CLINIC | Age: 75
End: 2025-07-28
Payer: MEDICARE

## 2025-07-28 VITALS
HEIGHT: 75 IN | BODY MASS INDEX: 32.08 KG/M2 | DIASTOLIC BLOOD PRESSURE: 80 MMHG | SYSTOLIC BLOOD PRESSURE: 130 MMHG | WEIGHT: 258 LBS | RESPIRATION RATE: 16 BRPM | OXYGEN SATURATION: 97 % | HEART RATE: 86 BPM

## 2025-07-28 DIAGNOSIS — I65.21 STENOSIS OF RIGHT CAROTID ARTERY: Primary | ICD-10-CM

## 2025-07-28 DIAGNOSIS — E11.29 TYPE 2 DIABETES MELLITUS WITH MICROALBUMINURIA (HCC): ICD-10-CM

## 2025-07-28 DIAGNOSIS — Z95.0 PACEMAKER: ICD-10-CM

## 2025-07-28 DIAGNOSIS — I25.10 CORONARY ARTERY DISEASE INVOLVING NATIVE CORONARY ARTERY OF NATIVE HEART WITHOUT ANGINA PECTORIS: ICD-10-CM

## 2025-07-28 DIAGNOSIS — I10 HYPERTENSION, BENIGN: ICD-10-CM

## 2025-07-28 DIAGNOSIS — I49.5 SSS (SICK SINUS SYNDROME) (HCC): ICD-10-CM

## 2025-07-28 DIAGNOSIS — R80.9 TYPE 2 DIABETES MELLITUS WITH MICROALBUMINURIA (HCC): ICD-10-CM

## 2025-07-28 PROBLEM — R29.90 STROKE-LIKE SYMPTOMS: Status: RESOLVED | Noted: 2025-06-04 | Resolved: 2025-07-28

## 2025-07-28 LAB
CREAT UR-SCNC: 197.6 MG/DL
HEMOGLOBIN A1C: 6.1 % (ref 4.3–5.6)
MICROALBUMIN UR-MCNC: 14.5 MG/DL
MICROALBUMIN/CREAT 24H UR-RTO: 73.4 UG/MG (ref ?–30)

## 2025-07-28 PROCEDURE — 82043 UR ALBUMIN QUANTITATIVE: CPT | Performed by: FAMILY MEDICINE

## 2025-07-28 PROCEDURE — 82570 ASSAY OF URINE CREATININE: CPT | Performed by: FAMILY MEDICINE

## 2025-07-28 NOTE — PROGRESS NOTES
Chief Complaint   Patient presents with    Diabetes     Patient here for diabetes follow up     Subjective:   Mt Obregon is a 75 year old male who presents for hospital follow up.   He was discharged from Inpatient hospital, [facility] to Home   Admit Date: 7/15/25  Discharge Date: 7/17/25  Hospital Discharge Diagnosis: carotid artery disease, SSS    Interactive contact within 2 business days post discharge first initiated on Date: 7/18/25    I accessed [facility] and/or Care Everywhere and personally reviewed the following for the recent hospitalization: provider notes, consults, summaries, labs and other test results and the pertinent findings are documented below.     HPI:    Hospitalization - at [facility] in [place].  Admitted 7/15, DC 7/17.  Patient outreach done 7/18/25.  Admitted for carotid stenosis and underwent transcarotid artery revascularization (TCAR), developed SSS and needed medtronic pacemaker placement with Dr. Champagne on 7/16/2025.  So far doing well.  Soreness over the sight of the implant, and limiting arm movements for a while.    Has f/u with vascular surgeon on Wednesday to check on the R carotid.    Also supposed to see the EP team with Dr. Yao and his team 6 days ago.  Has appt with Maximilian October 21st, and a device check in on August 5th.      He continues on the Zetia, fenofibrate, rosuvastatin.   Pain is controlled.  Has norco, but not needing it.           DM - last A1C controlled, 6.7.  today 6.1.    Typical blood sugar levels are controlled.  Lots of numbers from in the hospital.   Current diabetic medications are: glipizide.    Diet: fair  Exercise: limited with the knees.    Last Eye exam: 6/9/2025.   Last Foot exam: done today.      Knee - bad arthritis in the knees.  Injections.  Considering partial knee replacement.      History/Other:   Current Medications:  Medication Reconciliation:  I am aware of an inpatient discharge within the last 30 days.  The discharge  medication list has been reconciled with the patient's current medication list and reviewed by me. See medication list for additions of new medication, and changes to current doses of medications and discontinued medications.  Active Meds, Sig Only[1]    Review of Systems:  GENERAL: weight stable, energy stable, no sweating  SKIN: denies any unusual skin lesions  EYES: denies blurred vision or double vision  HEENT: denies nasal congestion, sinus pain or ST  LUNGS: denies shortness of breath with exertion  CARDIOVASCULAR: soreness at pacemaker site.   GI: denies abdominal pain, denies heartburn, denies diarrhea  MUSCULOSKELETAL: knee pains  NEURO: denies headaches, denies dizziness.  +neuropathy in feet.   PSYCHE: denies depression or anxiety  HEMATOLOGIC: denies hx of anemia, or bruising, denies bleeding  ENDOCRINE: denies thyroid history  ALL/ASTHMA: +allergies    Objective:   No LMP for male patient.  Estimated body mass index is 32.25 kg/m² as calculated from the following:    Height as of this encounter: 6' 3\" (1.905 m).    Weight as of this encounter: 258 lb (117 kg).   /80   Pulse 86   Resp 16   Ht 6' 3\" (1.905 m)   Wt 258 lb (117 kg)   SpO2 97%   BMI 32.25 kg/m²    GENERAL: well developed, well nourished, in no apparent distress.  Overweight.  SKIN: no rashes, no suspicious lesions.  Surgical wound R neck with swelling.  C/d/I.  Healing well.   HEENT: atraumatic, normocephalic, ears and throat are clear  EYES: PERRLA, EOMI, conjunctiva are clear  NECK: supple, no adenopathy, no bruits  CHEST: no chest tenderness  LUNGS: clear to auscultation  CARDIO: RRR without murmur  Chest wall - soreness over the pacemaker site.   GI: no masses, HSM or tenderness  MUSCULOSKELETAL: back is not tender, FROM of the extremities  EXTREMITIES: no cyanosis, clubbing or edema  NEURO: Oriented times three, cranial nerves are intact, motor and sensory are grossly intact    Notes and procedures and imaging reviewed from  hospitalization.  DC summary reviewed.     Assessment & Plan:     Mt Obregon was seen in the office today:  had concerns including Diabetes (Patient here for diabetes follow up).    1. Stenosis of right carotid artery  Status post TCAR of the right carotid artery.  Surgery was successful  He is healing well, as expected    2. SSS (sick sinus syndrome) (HCC)  3. Pacemaker  Postop bradycardia, persistent despite medications and pressors.  This was coupled with hypotension  Needed pacemaker placement.  This was done on the 17th.  Good outcomes.  So far pacemaker functioning as expected  Has follow-up for this in August, then again in October with the specialists    4. Coronary artery disease involving native coronary artery of native heart without angina pectoris  Known heart disease, currently medically managing.  He is on combination of rosuvastatin, fenofibrate, and Zetia.  Also on Plavix and aspirin.  Managed by cardiology team Dr. Luis Nunez    5. Hypertension, benign  Blood pressure controlled  Stable medicines    6. Type 2 diabetes mellitus with microalbuminuria (HCC)  Sugars controlled, actually a bit low with A1c down to 6.1  Was on glipizide previously.  Stop the medicine now  Will collect urine microalbumin  Goal right now is to avoid hypoglycemia.  May need to go back on glipizide or other similar medicine, but no indication right now  - POC Hemoglobin A1C  - Microalb/Creat Ratio, Random Urine        Andrew Peraza M.D.   [facility] 3  07/28/25        No follow-ups on file.          [1]   Outpatient Medications Marked as Taking for the 7/28/25 encounter (Office Visit) with Andrew Peraza MD   Medication Sig    aspirin 81 MG Oral Tab EC Take 1 tablet (81 mg total) by mouth daily.    HYDROcodone-acetaminophen 5-325 MG Oral Tab Take 1 tablet by mouth every 8 (eight) hours as needed for Pain.    polyethylene glycol, PEG 3350, 17 g Oral Powd Pack Take 17 g by mouth daily as needed  (constipation).    FINASTERIDE 5 MG Oral Tab TAKE 1 TABLET(5 MG) BY MOUTH DAILY    ezetimibe 10 MG Oral Tab Take 1 tablet (10 mg total) by mouth daily.    POTASSIUM CITRATE ER 15 MEQ (1620 MG) Oral Tab CR TAKE 1 TABLET BY MOUTH DAILY    clopidogrel 75 MG Oral Tab Take 1 tablet (75 mg total) by mouth daily.    cholecalciferol (VITAMIN D3) 125 MCG (5000 UT) Oral Cap Take 1 capsule (5,000 Units total) by mouth daily.    Tadalafil (CIALIS) 20 MG Oral Tab Take 1 tablet (20 mg total) by mouth daily as needed for Erectile Dysfunction.    fenofibrate 145 MG Oral Tab Take 1 tablet (145 mg total) by mouth in the morning.    allopurinol 300 MG Oral Tab Take 1 tablet (300 mg total) by mouth daily.    PANTOPRAZOLE 40 MG Oral Tab EC TAKE 1 TABLET DAILY    hydroCHLOROthiazide 25 MG Oral Tab Take 1 tablet (25 mg total) by mouth every morning.    gabapentin 300 MG Oral Cap Take 1 capsule (300 mg total) by mouth in the morning and 1 capsule (300 mg total) in the evening and 1 capsule (300 mg total) before bedtime.    Ascorbic Acid (VITAMIN C) 1000 MG Oral Tab Take 1 tablet (1,000 mg total) by mouth in the morning and 1 tablet (1,000 mg total) in the evening. Take with meals.    losartan 50 MG Oral Tab Take 1 tablet (50 mg total) by mouth in the morning and 1 tablet (50 mg total) before bedtime.    Sildenafil Citrate 100 MG Oral Tab Take 1 tablet (100 mg total) by mouth daily as needed for Erectile Dysfunction. Take ~ one hour prior to sexual activity on an empty stomach    latanoprost 0.005 % Ophthalmic Solution Place 1 drop into the right eye nightly.    metoprolol succinate  MG Oral Tablet 24 Hr Take 1 tablet (100 mg total) by mouth in the morning.    rosuvastatin 40 MG Oral Tab Take 1 tablet (40 mg total) by mouth daily.    Multiple Vitamins-Minerals (MULTIVITAMIN ADULT OR) Take 1 tablet by mouth in the morning.

## 2025-07-30 ENCOUNTER — OFFICE VISIT (OUTPATIENT)
Age: 75
End: 2025-07-30

## 2025-07-30 VITALS
HEART RATE: 76 BPM | HEIGHT: 76 IN | BODY MASS INDEX: 31.4 KG/M2 | OXYGEN SATURATION: 98 % | SYSTOLIC BLOOD PRESSURE: 143 MMHG | DIASTOLIC BLOOD PRESSURE: 78 MMHG | WEIGHT: 257.83 LBS

## 2025-07-30 DIAGNOSIS — I65.21 STENOSIS OF RIGHT CAROTID ARTERY: Primary | ICD-10-CM

## 2025-07-30 PROCEDURE — 99024 POSTOP FOLLOW-UP VISIT: CPT | Performed by: SURGERY

## 2025-08-04 ENCOUNTER — TELEPHONE (OUTPATIENT)
Dept: CARDIOLOGY | Age: 75
End: 2025-08-04

## 2025-08-04 ENCOUNTER — E-ADVICE (OUTPATIENT)
Dept: CARDIOLOGY | Age: 75
End: 2025-08-04

## 2025-08-05 ENCOUNTER — APPOINTMENT (OUTPATIENT)
Dept: CARDIOLOGY | Age: 75
End: 2025-08-05
Attending: INTERNAL MEDICINE

## 2025-08-05 VITALS — HEART RATE: 66 BPM | SYSTOLIC BLOOD PRESSURE: 124 MMHG | DIASTOLIC BLOOD PRESSURE: 70 MMHG

## 2025-08-05 DIAGNOSIS — I49.5 SSS (SICK SINUS SYNDROME)  (CMD): ICD-10-CM

## 2025-08-05 LAB
MDC_IDC_LEAD_CONNECTION_STATUS: NORMAL
MDC_IDC_LEAD_CONNECTION_STATUS: NORMAL
MDC_IDC_LEAD_IMPLANT_DT: NORMAL
MDC_IDC_LEAD_IMPLANT_DT: NORMAL
MDC_IDC_LEAD_LOCATION: NORMAL
MDC_IDC_LEAD_LOCATION: NORMAL
MDC_IDC_LEAD_LOCATION_DETAIL_1: NORMAL
MDC_IDC_LEAD_LOCATION_DETAIL_1: NORMAL
MDC_IDC_LEAD_MFG: NORMAL
MDC_IDC_LEAD_MFG: NORMAL
MDC_IDC_LEAD_MODEL: NORMAL
MDC_IDC_LEAD_MODEL: NORMAL
MDC_IDC_LEAD_POLARITY_TYPE: NORMAL
MDC_IDC_LEAD_POLARITY_TYPE: NORMAL
MDC_IDC_LEAD_SERIAL: NORMAL
MDC_IDC_LEAD_SERIAL: NORMAL
MDC_IDC_MSMT_BATTERY_DTM: NORMAL
MDC_IDC_MSMT_BATTERY_REMAINING_LONGEVITY: 143 MO
MDC_IDC_MSMT_BATTERY_RRT_TRIGGER: 2.62
MDC_IDC_MSMT_BATTERY_STATUS: NORMAL
MDC_IDC_MSMT_BATTERY_VOLTAGE: 3.2 V
MDC_IDC_MSMT_LEADCHNL_RA_IMPEDANCE_VALUE: 361 OHM
MDC_IDC_MSMT_LEADCHNL_RA_IMPEDANCE_VALUE: 513 OHM
MDC_IDC_MSMT_LEADCHNL_RA_PACING_THRESHOLD_AMPLITUDE: 0.88 V
MDC_IDC_MSMT_LEADCHNL_RA_PACING_THRESHOLD_AMPLITUDE: 1 V
MDC_IDC_MSMT_LEADCHNL_RA_PACING_THRESHOLD_PULSEWIDTH: 0.4 MS
MDC_IDC_MSMT_LEADCHNL_RA_PACING_THRESHOLD_PULSEWIDTH: 0.4 MS
MDC_IDC_MSMT_LEADCHNL_RA_SENSING_INTR_AMPL: 2.25 MV
MDC_IDC_MSMT_LEADCHNL_RA_SENSING_INTR_AMPL: 2.5 MV
MDC_IDC_MSMT_LEADCHNL_RV_IMPEDANCE_VALUE: 437 OHM
MDC_IDC_MSMT_LEADCHNL_RV_IMPEDANCE_VALUE: 570 OHM
MDC_IDC_MSMT_LEADCHNL_RV_PACING_THRESHOLD_AMPLITUDE: 1.25 V
MDC_IDC_MSMT_LEADCHNL_RV_PACING_THRESHOLD_AMPLITUDE: 1.5 V
MDC_IDC_MSMT_LEADCHNL_RV_PACING_THRESHOLD_PULSEWIDTH: 0.4 MS
MDC_IDC_MSMT_LEADCHNL_RV_PACING_THRESHOLD_PULSEWIDTH: 0.4 MS
MDC_IDC_MSMT_LEADCHNL_RV_SENSING_INTR_AMPL: 25.62 MV
MDC_IDC_MSMT_LEADCHNL_RV_SENSING_INTR_AMPL: 31.62 MV
MDC_IDC_PG_IMPLANT_DTM: NORMAL
MDC_IDC_PG_MFG: NORMAL
MDC_IDC_PG_MODEL: NORMAL
MDC_IDC_PG_SERIAL: NORMAL
MDC_IDC_PG_TYPE: NORMAL
MDC_IDC_SESS_CLINIC_NAME: NORMAL
MDC_IDC_SESS_DTM: NORMAL
MDC_IDC_SESS_TYPE: NORMAL
MDC_IDC_SET_BRADY_AT_MODE_SWITCH_RATE: 171 {BEATS}/MIN
MDC_IDC_SET_BRADY_HYSTRATE: NORMAL
MDC_IDC_SET_BRADY_LOWRATE: 70 {BEATS}/MIN
MDC_IDC_SET_BRADY_MAX_SENSOR_RATE: 130 {BEATS}/MIN
MDC_IDC_SET_BRADY_MAX_TRACKING_RATE: 130 {BEATS}/MIN
MDC_IDC_SET_BRADY_MODE: NORMAL
MDC_IDC_SET_BRADY_PAV_DELAY_LOW: 300 MS
MDC_IDC_SET_BRADY_SAV_DELAY_LOW: 300 MS
MDC_IDC_SET_LEADCHNL_RA_PACING_AMPLITUDE: 3.5 V
MDC_IDC_SET_LEADCHNL_RA_PACING_ANODE_ELECTRODE_1: NORMAL
MDC_IDC_SET_LEADCHNL_RA_PACING_ANODE_LOCATION_1: NORMAL
MDC_IDC_SET_LEADCHNL_RA_PACING_CAPTURE_MODE: NORMAL
MDC_IDC_SET_LEADCHNL_RA_PACING_CATHODE_ELECTRODE_1: NORMAL
MDC_IDC_SET_LEADCHNL_RA_PACING_CATHODE_LOCATION_1: NORMAL
MDC_IDC_SET_LEADCHNL_RA_PACING_POLARITY: NORMAL
MDC_IDC_SET_LEADCHNL_RA_PACING_PULSEWIDTH: 0.4 MS
MDC_IDC_SET_LEADCHNL_RA_SENSING_ANODE_ELECTRODE_1: NORMAL
MDC_IDC_SET_LEADCHNL_RA_SENSING_ANODE_LOCATION_1: NORMAL
MDC_IDC_SET_LEADCHNL_RA_SENSING_CATHODE_ELECTRODE_1: NORMAL
MDC_IDC_SET_LEADCHNL_RA_SENSING_CATHODE_LOCATION_1: NORMAL
MDC_IDC_SET_LEADCHNL_RA_SENSING_POLARITY: NORMAL
MDC_IDC_SET_LEADCHNL_RA_SENSING_SENSITIVITY: 0.3 MV
MDC_IDC_SET_LEADCHNL_RV_PACING_AMPLITUDE: 3.5 V
MDC_IDC_SET_LEADCHNL_RV_PACING_ANODE_ELECTRODE_1: NORMAL
MDC_IDC_SET_LEADCHNL_RV_PACING_ANODE_LOCATION_1: NORMAL
MDC_IDC_SET_LEADCHNL_RV_PACING_CAPTURE_MODE: NORMAL
MDC_IDC_SET_LEADCHNL_RV_PACING_CATHODE_ELECTRODE_1: NORMAL
MDC_IDC_SET_LEADCHNL_RV_PACING_CATHODE_LOCATION_1: NORMAL
MDC_IDC_SET_LEADCHNL_RV_PACING_POLARITY: NORMAL
MDC_IDC_SET_LEADCHNL_RV_PACING_PULSEWIDTH: 0.4 MS
MDC_IDC_SET_LEADCHNL_RV_SENSING_ANODE_ELECTRODE_1: NORMAL
MDC_IDC_SET_LEADCHNL_RV_SENSING_ANODE_LOCATION_1: NORMAL
MDC_IDC_SET_LEADCHNL_RV_SENSING_CATHODE_ELECTRODE_1: NORMAL
MDC_IDC_SET_LEADCHNL_RV_SENSING_CATHODE_LOCATION_1: NORMAL
MDC_IDC_SET_LEADCHNL_RV_SENSING_POLARITY: NORMAL
MDC_IDC_SET_LEADCHNL_RV_SENSING_SENSITIVITY: 0.9 MV
MDC_IDC_SET_ZONE_DETECTION_INTERVAL: 350 MS
MDC_IDC_SET_ZONE_DETECTION_INTERVAL: 400 MS
MDC_IDC_SET_ZONE_STATUS: NORMAL
MDC_IDC_SET_ZONE_STATUS: NORMAL
MDC_IDC_SET_ZONE_TYPE: NORMAL
MDC_IDC_SET_ZONE_VENDOR_TYPE: NORMAL
MDC_IDC_STAT_AT_BURDEN_PERCENT: 0 %
MDC_IDC_STAT_AT_DTM_END: NORMAL
MDC_IDC_STAT_AT_DTM_START: NORMAL
MDC_IDC_STAT_BRADY_AP_VP_PERCENT: 2.05 %
MDC_IDC_STAT_BRADY_AP_VS_PERCENT: 88.27 %
MDC_IDC_STAT_BRADY_AS_VP_PERCENT: 0.04 %
MDC_IDC_STAT_BRADY_AS_VS_PERCENT: 9.64 %
MDC_IDC_STAT_BRADY_DTM_END: NORMAL
MDC_IDC_STAT_BRADY_DTM_START: NORMAL
MDC_IDC_STAT_BRADY_RA_PERCENT_PACED: 91.07 %
MDC_IDC_STAT_BRADY_RV_PERCENT_PACED: 2.09 %
MDC_IDC_STAT_EPISODE_RECENT_COUNT: 0
MDC_IDC_STAT_EPISODE_RECENT_COUNT_DTM_END: NORMAL
MDC_IDC_STAT_EPISODE_RECENT_COUNT_DTM_START: NORMAL
MDC_IDC_STAT_EPISODE_TOTAL_COUNT: 0
MDC_IDC_STAT_EPISODE_TOTAL_COUNT_DTM_END: NORMAL
MDC_IDC_STAT_EPISODE_TOTAL_COUNT_DTM_START: NORMAL
MDC_IDC_STAT_EPISODE_TYPE: NORMAL
MDC_IDC_STAT_TACHYTHERAPY_RECENT_DTM_END: NORMAL
MDC_IDC_STAT_TACHYTHERAPY_RECENT_DTM_START: NORMAL
MDC_IDC_STAT_TACHYTHERAPY_TOTAL_DTM_END: NORMAL
MDC_IDC_STAT_TACHYTHERAPY_TOTAL_DTM_START: NORMAL

## 2025-08-05 PROCEDURE — 93280 PM DEVICE PROGR EVAL DUAL: CPT | Performed by: INTERNAL MEDICINE

## 2025-08-11 ENCOUNTER — OFFICE VISIT (OUTPATIENT)
Dept: ORTHOPEDICS CLINIC | Facility: CLINIC | Age: 75
End: 2025-08-11

## 2025-08-11 VITALS — BODY MASS INDEX: 32.08 KG/M2 | WEIGHT: 258 LBS | HEIGHT: 75 IN

## 2025-08-11 DIAGNOSIS — M17.12 PRIMARY OSTEOARTHRITIS OF LEFT KNEE: Primary | ICD-10-CM

## 2025-08-11 PROCEDURE — 99214 OFFICE O/P EST MOD 30 MIN: CPT | Performed by: STUDENT IN AN ORGANIZED HEALTH CARE EDUCATION/TRAINING PROGRAM

## 2025-08-18 ENCOUNTER — TELEPHONE (OUTPATIENT)
Dept: FAMILY MEDICINE CLINIC | Facility: CLINIC | Age: 75
End: 2025-08-18

## 2025-08-18 DIAGNOSIS — Z01.818 PRE-OP EVALUATION: Primary | ICD-10-CM

## 2025-08-18 DIAGNOSIS — I65.21 STENOSIS OF RIGHT CAROTID ARTERY: ICD-10-CM

## 2025-08-18 DIAGNOSIS — Z95.0 PACEMAKER: ICD-10-CM

## 2025-08-18 DIAGNOSIS — I49.5 SSS (SICK SINUS SYNDROME) (HCC): ICD-10-CM

## 2025-08-19 ENCOUNTER — OFFICE VISIT (OUTPATIENT)
Dept: FAMILY MEDICINE CLINIC | Facility: CLINIC | Age: 75
End: 2025-08-19

## 2025-08-19 VITALS
DIASTOLIC BLOOD PRESSURE: 78 MMHG | SYSTOLIC BLOOD PRESSURE: 124 MMHG | HEIGHT: 75 IN | WEIGHT: 260 LBS | HEART RATE: 49 BPM | RESPIRATION RATE: 18 BRPM | OXYGEN SATURATION: 95 % | BODY MASS INDEX: 32.33 KG/M2

## 2025-08-19 DIAGNOSIS — R09.89 CHEST CONGESTION: ICD-10-CM

## 2025-08-19 DIAGNOSIS — R09.81 SINUS CONGESTION: Primary | ICD-10-CM

## 2025-08-19 PROCEDURE — 99213 OFFICE O/P EST LOW 20 MIN: CPT | Performed by: NURSE PRACTITIONER

## 2025-08-19 RX ORDER — FLUTICASONE PROPIONATE 50 MCG
1 SPRAY, SUSPENSION (ML) NASAL 2 TIMES DAILY
Qty: 11.1 ML | Refills: 0 | Status: SHIPPED | OUTPATIENT
Start: 2025-08-19 | End: 2025-08-26

## 2025-08-19 RX ORDER — ALBUTEROL SULFATE 90 UG/1
2 INHALANT RESPIRATORY (INHALATION) EVERY 4 HOURS PRN
Qty: 8 G | Refills: 0 | Status: SHIPPED | OUTPATIENT
Start: 2025-08-19

## 2025-08-22 ENCOUNTER — LAB ENCOUNTER (OUTPATIENT)
Dept: LAB | Facility: HOSPITAL | Age: 75
End: 2025-08-22
Attending: FAMILY MEDICINE

## 2025-08-22 DIAGNOSIS — Z01.818 PRE-OP EVALUATION: ICD-10-CM

## 2025-08-22 LAB
ALBUMIN SERPL-MCNC: 4.5 G/DL (ref 3.2–4.8)
ALBUMIN/GLOB SERPL: 1.7 (ref 1–2)
ALP LIVER SERPL-CCNC: 34 U/L (ref 45–117)
ALT SERPL-CCNC: 29 U/L (ref 10–49)
ANION GAP SERPL CALC-SCNC: 14 MMOL/L (ref 0–18)
APTT PPP: 26.8 SECONDS (ref 23–36)
AST SERPL-CCNC: 36 U/L (ref ?–34)
BASOPHILS # BLD AUTO: 0.06 X10(3) UL (ref 0–0.2)
BASOPHILS NFR BLD AUTO: 0.5 %
BILIRUB SERPL-MCNC: 0.7 MG/DL (ref 0.2–1.1)
BUN BLD-MCNC: 13 MG/DL (ref 9–23)
CALCIUM BLD-MCNC: 10.3 MG/DL (ref 8.7–10.6)
CHLORIDE SERPL-SCNC: 101 MMOL/L (ref 98–112)
CO2 SERPL-SCNC: 27 MMOL/L (ref 21–32)
CREAT BLD-MCNC: 1.23 MG/DL (ref 0.7–1.3)
EGFRCR SERPLBLD CKD-EPI 2021: 61 ML/MIN/1.73M2 (ref 60–?)
EOSINOPHIL # BLD AUTO: 0.13 X10(3) UL (ref 0–0.7)
EOSINOPHIL NFR BLD AUTO: 1.2 %
ERYTHROCYTE [DISTWIDTH] IN BLOOD BY AUTOMATED COUNT: 13 %
FASTING STATUS PATIENT QL REPORTED: YES
GLOBULIN PLAS-MCNC: 2.6 G/DL (ref 2–3.5)
GLUCOSE BLD-MCNC: 181 MG/DL (ref 70–99)
HCT VFR BLD AUTO: 44.9 % (ref 39–53)
HGB BLD-MCNC: 15.3 G/DL (ref 13–17.5)
IMM GRANULOCYTES # BLD AUTO: 0.04 X10(3) UL (ref 0–1)
IMM GRANULOCYTES NFR BLD: 0.4 %
INR BLD: 1.1 (ref 0.8–1.2)
LYMPHOCYTES # BLD AUTO: 1.65 X10(3) UL (ref 1–4)
LYMPHOCYTES NFR BLD AUTO: 15 %
MCH RBC QN AUTO: 31.2 PG (ref 26–34)
MCHC RBC AUTO-ENTMCNC: 34.1 G/DL (ref 31–37)
MCV RBC AUTO: 91.6 FL (ref 80–100)
MONOCYTES # BLD AUTO: 0.66 X10(3) UL (ref 0.1–1)
MONOCYTES NFR BLD AUTO: 6 %
NEUTROPHILS # BLD AUTO: 8.47 X10 (3) UL (ref 1.5–7.7)
NEUTROPHILS # BLD AUTO: 8.47 X10(3) UL (ref 1.5–7.7)
NEUTROPHILS NFR BLD AUTO: 76.9 %
OSMOLALITY SERPL CALC.SUM OF ELEC: 299 MOSM/KG (ref 275–295)
PLATELET # BLD AUTO: 142 10(3)UL (ref 150–450)
POTASSIUM SERPL-SCNC: 3.3 MMOL/L (ref 3.5–5.1)
PROT SERPL-MCNC: 7.1 G/DL (ref 5.7–8.2)
PROTHROMBIN TIME: 14.3 SECONDS (ref 11.6–14.8)
RBC # BLD AUTO: 4.9 X10(6)UL (ref 3.8–5.8)
SODIUM SERPL-SCNC: 142 MMOL/L (ref 136–145)
WBC # BLD AUTO: 11 X10(3) UL (ref 4–11)

## 2025-08-22 PROCEDURE — 85730 THROMBOPLASTIN TIME PARTIAL: CPT | Performed by: FAMILY MEDICINE

## 2025-08-22 PROCEDURE — 85610 PROTHROMBIN TIME: CPT | Performed by: FAMILY MEDICINE

## 2025-08-22 PROCEDURE — 85025 COMPLETE CBC W/AUTO DIFF WBC: CPT | Performed by: FAMILY MEDICINE

## 2025-08-22 PROCEDURE — 80053 COMPREHEN METABOLIC PANEL: CPT | Performed by: FAMILY MEDICINE

## 2025-08-22 PROCEDURE — 36415 COLL VENOUS BLD VENIPUNCTURE: CPT | Performed by: FAMILY MEDICINE

## 2025-08-25 DIAGNOSIS — K21.9 GASTROESOPHAGEAL REFLUX DISEASE: ICD-10-CM

## 2025-08-26 ENCOUNTER — APPOINTMENT (OUTPATIENT)
Dept: CARDIOLOGY | Age: 75
End: 2025-08-26

## 2025-08-26 VITALS
BODY MASS INDEX: 31.81 KG/M2 | SYSTOLIC BLOOD PRESSURE: 125 MMHG | HEART RATE: 75 BPM | DIASTOLIC BLOOD PRESSURE: 77 MMHG | WEIGHT: 261.36 LBS | OXYGEN SATURATION: 100 %

## 2025-08-26 DIAGNOSIS — Z45.018 ADJUSTMENT AND MANAGEMENT OF CARDIAC PACEMAKER: Primary | ICD-10-CM

## 2025-08-27 ENCOUNTER — OFFICE VISIT (OUTPATIENT)
Dept: FAMILY MEDICINE CLINIC | Facility: CLINIC | Age: 75
End: 2025-08-27

## 2025-08-27 VITALS
SYSTOLIC BLOOD PRESSURE: 138 MMHG | OXYGEN SATURATION: 94 % | HEIGHT: 75 IN | BODY MASS INDEX: 32.45 KG/M2 | HEART RATE: 91 BPM | RESPIRATION RATE: 18 BRPM | DIASTOLIC BLOOD PRESSURE: 82 MMHG | WEIGHT: 261 LBS

## 2025-08-27 DIAGNOSIS — R09.81 SINUS CONGESTION: Primary | ICD-10-CM

## 2025-08-27 PROCEDURE — 99213 OFFICE O/P EST LOW 20 MIN: CPT | Performed by: NURSE PRACTITIONER

## 2025-08-27 RX ORDER — LOSARTAN POTASSIUM 50 MG/1
TABLET ORAL
Qty: 180 TABLET | Refills: 1 | Status: SHIPPED | OUTPATIENT
Start: 2025-08-27

## 2025-08-27 RX ORDER — FLUTICASONE PROPIONATE 50 MCG
1 SPRAY, SUSPENSION (ML) NASAL 2 TIMES DAILY
COMMUNITY
Start: 2025-08-20

## 2025-08-28 RX ORDER — PANTOPRAZOLE SODIUM 40 MG/1
40 TABLET, DELAYED RELEASE ORAL DAILY
Qty: 90 TABLET | Refills: 3 | Status: SHIPPED | OUTPATIENT
Start: 2025-08-28

## 2025-09-16 ENCOUNTER — APPOINTMENT (OUTPATIENT)
Dept: CARDIOLOGY | Age: 75
End: 2025-09-16
Attending: INTERNAL MEDICINE

## 2025-10-21 ENCOUNTER — APPOINTMENT (OUTPATIENT)
Dept: CARDIOLOGY | Age: 75
End: 2025-10-21

## 2025-10-30 ENCOUNTER — APPOINTMENT (OUTPATIENT)
Dept: CARDIOLOGY | Age: 75
End: 2025-10-30
Attending: SURGERY

## 2025-11-05 ENCOUNTER — APPOINTMENT (OUTPATIENT)
Dept: CARDIOLOGY | Age: 75
End: 2025-11-05
Attending: INTERNAL MEDICINE

## 2025-12-04 ENCOUNTER — APPOINTMENT (OUTPATIENT)
Dept: CARDIOLOGY | Age: 75
End: 2025-12-04
Attending: REGISTERED NURSE

## 2026-08-26 ENCOUNTER — APPOINTMENT (OUTPATIENT)
Dept: CARDIOLOGY | Age: 76
End: 2026-08-26
Attending: INTERNAL MEDICINE

## (undated) DIAGNOSIS — H43.393 VITREOUS FLOATER, BILATERAL: ICD-10-CM

## (undated) DIAGNOSIS — H25.11 NUCLEAR SCLEROTIC CATARACT OF RIGHT EYE: ICD-10-CM

## (undated) DIAGNOSIS — K21.9 GASTROESOPHAGEAL REFLUX DISEASE, UNSPECIFIED WHETHER ESOPHAGITIS PRESENT: ICD-10-CM

## (undated) DIAGNOSIS — H25.11 AGE-RELATED NUCLEAR CATARACT OF RIGHT EYE: ICD-10-CM

## (undated) DIAGNOSIS — H25.12 NUCLEAR SCLEROTIC CATARACT OF LEFT EYE: ICD-10-CM

## (undated) DIAGNOSIS — I25.10 CORONARY ARTERY DISEASE INVOLVING NATIVE CORONARY ARTERY OF NATIVE HEART WITHOUT ANGINA PECTORIS: ICD-10-CM

## (undated) DIAGNOSIS — H53.431 ARCUATE VISUAL FIELD DEFECT, RIGHT: ICD-10-CM

## (undated) DIAGNOSIS — I10 HYPERTENSION, BENIGN: ICD-10-CM

## (undated) DIAGNOSIS — R73.9 HYPERGLYCEMIA: ICD-10-CM

## (undated) DIAGNOSIS — M16.0 OSTEOARTHRITIS OF BOTH HIPS, UNSPECIFIED OSTEOARTHRITIS TYPE: ICD-10-CM

## (undated) DIAGNOSIS — E78.00 PURE HYPERCHOLESTEROLEMIA: ICD-10-CM

## (undated) DIAGNOSIS — E11.29 TYPE 2 DIABETES MELLITUS WITH MICROALBUMINURIA, UNSPECIFIED WHETHER LONG TERM INSULIN USE (HCC): ICD-10-CM

## (undated) DIAGNOSIS — R80.9 TYPE 2 DIABETES MELLITUS WITH MICROALBUMINURIA, UNSPECIFIED WHETHER LONG TERM INSULIN USE (HCC): ICD-10-CM

## (undated) DIAGNOSIS — E55.9 VITAMIN D DEFICIENCY: ICD-10-CM

## (undated) DIAGNOSIS — H40.023 OPEN ANGLE WITH BORDERLINE FINDINGS AND HIGH GLAUCOMA RISK IN BOTH EYES: Primary | ICD-10-CM

## (undated) DIAGNOSIS — I65.21 STENOSIS OF RIGHT CAROTID ARTERY: ICD-10-CM

## (undated) DIAGNOSIS — M10.9 GOUTY ARTHROPATHY: ICD-10-CM

## (undated) DIAGNOSIS — I70.0 ABDOMINAL AORTIC ATHEROSCLEROSIS (HCC): ICD-10-CM

## (undated) DEVICE — POSITIONER OR RSPBRY SWIRL 8X8.5X4IN DEVON HEAD PU FOAM SLOT

## (undated) DEVICE — GLOVE SURG 6.5 PROTEXIS LF CRM PF BEAD CUFF STRL PLISPRN

## (undated) DEVICE — Device

## (undated) DEVICE — MYNXGRIP 6F/7F
Type: IMPLANTABLE DEVICE | Status: NON-FUNCTIONAL
Brand: MYNXGRIP

## (undated) DEVICE — SPONGE LAP 18X4IN CTN PREWASH SFPK XRAY DTCT STRL LF DISP

## (undated) DEVICE — BLADE SURG 10 INDIV FOIL WRAP STD SCPL HNDL STRL PRSNA + SS

## (undated) DEVICE — ELECTRODE DFBR UNV 15.2X10.8CM ADH PAD RDTRNS POUCH PADPRO

## (undated) DEVICE — DRAPE ZEISS MSCP MD STL 454X15

## (undated) DEVICE — SHEATH INTRO 6F 10 CM .035 IN SNAP ON DIL LOCK KINK RST SMTH

## (undated) DEVICE — TOWEL SURG 26X15IN BLUE TIBURON NABSB DRAPE ADH STRIP STRL

## (undated) DEVICE — ELECTRODE PT RTN L9 FT VALLEYLAB REM POLYHESIVE ACRYLIC FOAM

## (undated) DEVICE — SUTURE COATED VICRYL PLUS 3-0 SH L27 IN BRAID ANBCTRL COATED

## (undated) DEVICE — SYRINGE 10ML LL CONTRL SYRINGE

## (undated) DEVICE — ELECTRODE ESURG BLADE 6.5IN EDGE LF

## (undated) DEVICE — CATHETER BLN ENROUTE ENFLATE 6MM 25MM 75CM RADOPQ RX LOWPRFL

## (undated) DEVICE — GLOVE SURG 9 PROTEXIS LF CRM PF BEAD CUFF STRL PLISPRN 12IN

## (undated) DEVICE — PAIN TRAY: Brand: MEDLINE INDUSTRIES, INC.

## (undated) DEVICE — SUTURE VCL+ MTPS 4-0 PS2 27IN BRAID COAT ABS

## (undated) DEVICE — GUIDEWIRE ENROUTE 95CM .014IN VASC ERG CNTRL

## (undated) DEVICE — SUTURE MONOCRYL MTPS 4-0 PS2 18IN MONO ABS UNDYED

## (undated) DEVICE — DRAPE 2 INCS FILM ANTIMICROBIAL 23X17IN SURG IOBAN STRL

## (undated) DEVICE — PEN SURGMRK DEVON SKIN DISP REG TIP RULER CAP GENTIAN VIOL

## (undated) DEVICE — NEEDLE SPNL 10GA 5IN BVL

## (undated) DEVICE — BLANKET WARMING L221 IN X W91 IN BAIR HGGR PLMR ADULT L24 IN

## (undated) DEVICE — SPONGE SURG .5X.5IN CTTND ABS PRCS CUT RADOPQ PATTIE STRL LF

## (undated) DEVICE — CATHETER OD6 FR L100 CM LG INNER LUM RADOPQ SLCT JL3.5 CRV

## (undated) DEVICE — GOWN SURG LG FABRIC ASTOUND BLUE LVL 3 NONREINFORCE SET IN

## (undated) DEVICE — PILLOW PSTN 7IN GENTLETOUCH HEAD CONTOURETHANE

## (undated) DEVICE — SYRINGE 30 ML CONC TIP GRAD NONPYROGENIC DEHP FREE LOK MED

## (undated) DEVICE — FORCEP CUSH BIP BAY 1.5MX7.5IN

## (undated) DEVICE — ADHESIVE SKIN CLSR DERMABOND MINI .36 ML LIQUID APL

## (undated) DEVICE — GLOVE SURG 8 PROTEXIS LF CRM PF BEAD CUFF STRL PLISPRN 12IN

## (undated) DEVICE — KIT INTERVENTIONAL NEUROPROTECTION ENROUTE TRANSCAROTID SYSTEM PLUS

## (undated) DEVICE — ELECTRODE PT RTN C30- LB 9FT CORD NONIRRITATE NONSENSITIZE

## (undated) DEVICE — TOOL 14CM LGND 3MM 2 FLAT MATCH HEAD DSCT BURR

## (undated) DEVICE — SHIELD RAD RADPAD RAD PRTC STRL FEM ENTRY ANGIO

## (undated) DEVICE — DISSECTOR LAPSCP 40CM 5MM KITTNER 1 TIP BLUNT STRL DISP

## (undated) DEVICE — GLOVE SURG 7.5 PROTEXIS LF CRM PF SMTH BEAD CUFF STRL

## (undated) DEVICE — GUIDEWIRE VASC OD.035 IN L150 CM EMERALD 3 MM RDS J CRV FX

## (undated) DEVICE — SYRINGE 1ML SLIP TIP STRL TB LF DISP

## (undated) DEVICE — DRESSING GAUZE 1.5X1.5IN HMST QUIKCLOT

## (undated) DEVICE — DRAPE ADH STRIP SM TWL MATTE FNSH 17X11IN SURG STRDRP STRL

## (undated) DEVICE — DRAPE SURG 2 INCISE FILM ANTIMICROBIAL L33 IN X W23 IN IOBAN

## (undated) DEVICE — GOWN SURG XL L3 NONREINFORCE SET IN SLV STRL LF DISP BLUE

## (undated) DEVICE — ADHESIVE DRMBND ADV .7ML LIQUID APL MCBL BRR FLXB 2 OCTYL

## (undated) DEVICE — COVER RGD STRL LF LIGHT HNDL PLASTIC GRN

## (undated) DEVICE — BLADE SURG 15 STRL PRSNA + PLMR

## (undated) DEVICE — TOWEL L28 IN X W17 IN CTN WOVEN PRM BLUE SURG

## (undated) DEVICE — TRAY CATH CMP CR LUBRI-SIL IC STLK SURESTEP 16FR FOLEY URMTR

## (undated) DEVICE — HANDPIECE SCT MDVC FLX-CLR HI CAPACITY FLXB CLR STRL LF DISP

## (undated) DEVICE — NEEDLE SPNL 25GA 5IN PNCL PT ATRM DEHP-FR PVC FREE STRL LF

## (undated) DEVICE — GLOVE SURG 7 PROTEXIS LF CRM PF BEAD CUFF STRL PLISPRN 12IN

## (undated) DEVICE — DRAPE BRST CHEST REINF FENESTRATE ARMBRD CVR 122X106X77IN 14

## (undated) DEVICE — SUTURE PROLENE 5-0 C-1 18IN 2 ARM MONO NABSB BLUE 8717H

## (undated) DEVICE — BAG 28X36IN BAND EQUIPMENT

## (undated) DEVICE — HORIZON TI MED 24 CLIPS/POUCH
Type: IMPLANTABLE DEVICE | Site: CAROTID ARTERY | Status: NON-FUNCTIONAL
Brand: WECK
Removed: 2025-07-15

## (undated) DEVICE — SYRINGE 20 ML GRAD LOK MED

## (undated) DEVICE — DECANTER FLUID DSPNSR BAG BAG-A-JET ASEPTIC TRNSF

## (undated) DEVICE — SUTURE SILK PERMA HAND BLK 0 L18 IN BRAID TIE NABSB

## (undated) DEVICE — CATHETER OD6 FR L100 CM LG INNER LUM RADOPQ SLCT JR4 CRV COR

## (undated) DEVICE — TOWEL OR BLU 16X26IN 4PK

## (undated) DEVICE — KIT DRN 18IN 400CC 12.5IN RND 3 SPRG EVAC Y CNCT TUBE HOLE

## (undated) DEVICE — GOWN SURG 2XL L3 NONREINFORCE SET IN SLV STRL LF DISP BLUE

## (undated) DEVICE — 2% CHLORHEXIDINE SKIN PREP ORANGE 26ML

## (undated) DEVICE — GLOVE SURG SENSICARE SZ 7-1/2

## (undated) DEVICE — SKIN MARKER DUAL TIP WITH RULER CAP AND LABELS: Brand: DEVON

## (undated) DEVICE — GLOVE SURG 7.5 PROTEXIS ESTM LF CRM PF SMTH BEAD CUFF INTLK

## (undated) DEVICE — BANDAID CURAD 3IN X 1IN

## (undated) DEVICE — NEEDLE SPNL 20GA 3.5IN PVC FREE DEHP-FR STRL LF SPNCN

## (undated) DEVICE — GLOVE SURG 7 PROTEXIS PI CLASSIC PWDR FREE BEAD CUFF PLISPRN

## (undated) DEVICE — DRAPE 2 ADH PATCH FLRSCP 35X43IN EQUIPMENT STRDRP STRL CLR

## (undated) DEVICE — SYRINGE 3ML SCL MARK INDIV WRAP STRL MED LF LL

## (undated) DEVICE — NEEDLE SPINAL 22X3-1/2 BLK

## (undated) DEVICE — ELECTRODE DFBR ADULT L4.5 IN X W6.25 IN PREBENT MEDI-TRACE

## (undated) DEVICE — GLOVE SURG SENSICARE SZ 7

## (undated) DEVICE — TOWEL L27 IN X W17 IN CTN STD PREWASH DELINT ABS BLUE SURG

## (undated) DEVICE — TOWEL L26 IN X W15 IN TIBURON NONABSORBENT DRAPE ADH STRIP

## (undated) DEVICE — COVER INST SUTBT ADH BACK CRTDG RADOPQ DISP YLW STRL LF

## (undated) DEVICE — KIT INTRO 4FR 50CM 15CM .018IN MICROWIRE XTN TUBE NDL STIFF

## (undated) DEVICE — SUTURE COAT VICRYL 2-0 CT-2 L27 IN BRAID COAT UNDYED ABS

## (undated) DEVICE — RETRACTOR 70MM PANORAMA SLOT PORT 26MM SURG MIS NS LF DISP

## (undated) DEVICE — SUTURE ETHIBOND EXCEL GRN 0 SH L30 IN BRAID NABSB

## (undated) DEVICE — LOOP VSL MAXI 18IN RED LF 2 CARD MDCHC ELSTMR STRL

## (undated) DEVICE — SYRINGE 1ML GRAD STRL MED LF DISP LL

## (undated) DEVICE — GLOVE SURG 5.5 PROTEXIS LF CRM PF BEAD CUFF STRL PLISPRN

## (undated) DEVICE — NEEDLE HPO 16GA 1.5IN REG WALL REG BVL LL HUB DEHP-FR STRL

## (undated) DEVICE — DRAPE .75 SHT FNFLD 76X52IN SURG CNVRT STRL LF DISP TIBURON

## (undated) DEVICE — GOWN SURG XL SMARTGOWN LVL 4 RAGLAN SLV BRTHBL

## (undated) DEVICE — ADHESIVE SKIN CLSR DERMABOND ADVANCED .7 ML LIQUID APL

## (undated) DEVICE — SUTURE SILK PERMA HAND BLK 2-0 PS L18 IN BRAID NABSB

## (undated) DEVICE — SPONGE SURG 4X4IN CTN 16 PLY XRY DTCT STRL LF DISP

## (undated) DEVICE — TOOL 14CM MIDAS REX LGND 3MM MATCH HEAD FLUTE DSCT STRL LF

## (undated) DEVICE — BLADE SURG 11 UNFRM SHARPNESS STRL SS

## (undated) DEVICE — SUTURE VICRYL 1 OS-6 27IN BRAID COAT ABS UNDYED J535H

## (undated) DEVICE — DRAPE EXPAND CLPSBL C ARM FLRSCP EQUIPMENT C-ARMOR STRL

## (undated) DEVICE — CATHETER BLN ENROUTE ENFLATE 5MM 25MM 75CM RADOPQ RX LOWPRFL

## (undated) DEVICE — HEMOSTAT ABS SURGICEL NU-KNIT OXIDIZED REGENERATED CELLULOSE

## (undated) DEVICE — SPONGE GAUZE L4 IN X W4 IN 16 PLY WOVEN FOLD EDGE CTN

## (undated) DEVICE — SUT PDS II 2-0 FS-1 Z443H

## (undated) DEVICE — SUTURE VCL+ 4-0 SH 27IN BRAID COAT ABS UNDYED

## (undated) DEVICE — STRIP 4X.5IN STRSTRP IPHR POLY POR ADH REINFORCE

## (undated) DEVICE — PREMIUM CONTACT CLOSURE ARM SLING LG

## (undated) NOTE — LETTER
[address] [place] [address] MANAGEMENT DEPARTMENT  [facility] Katie Goodrich. Marques Cat, [address] [facility]  [phone]                    July 13, 2020    [facility] [address]      Dear Margi Simms:   It was a pleasure speaking with you enoch

## (undated) NOTE — LETTER
20      Pamela Obregon   1950      RE: Ellie Almendarez     : 1950    Dear Dr. Kimberly Rosario,    This letter is to inform you that your patient is being scheduled for surgery with Dr. Yoselyn Barakat on date to be determined (TBD) at [facility]

## (undated) NOTE — MR AVS SNAPSHOT
[address] [facility] [address]  [facility],  [address]  [address] [place] [address] [phone]               Thank you for choosing us for your health care visit with Javy Garcia MD.  We are glad to serve you and happy to provide you with this s Current Medications          This list is accurate as of: 4/19/17 11:31 AM.  Always use your most recent med list.                allopurinol 300 MG Tabs   TAKE 1 TABLET BY MOUTH ONCE A DAY   Commonly known as:  ZYLOPRIM           aspirin 325 MG Tabs   Khadijah Wagoner Modification Recommendation   Weight Reduction Maintain normal body weight (body mass index 18.5-24.9 kg/m2)   DASH eating plan Adopt a diet rich in fruits, vegetables, and low fat dairy products with reduced content of saturated and total fat.    Dietary s